# Patient Record
Sex: MALE | Race: WHITE | Employment: FULL TIME | URBAN - METROPOLITAN AREA
[De-identification: names, ages, dates, MRNs, and addresses within clinical notes are randomized per-mention and may not be internally consistent; named-entity substitution may affect disease eponyms.]

---

## 2017-01-25 ENCOUNTER — OFFICE VISIT (OUTPATIENT)
Dept: FAMILY MEDICINE CLINIC | Age: 42
End: 2017-01-25

## 2017-01-25 DIAGNOSIS — Z00.00 ANNUAL PHYSICAL EXAM: Primary | ICD-10-CM

## 2017-01-25 DIAGNOSIS — Z00.00 ANNUAL PHYSICAL EXAM: ICD-10-CM

## 2017-01-25 DIAGNOSIS — I86.1 VARICOCELE: ICD-10-CM

## 2017-01-25 LAB
ALBUMIN SERPL-MCNC: 4.7 G/DL (ref 3.9–4.9)
ALP BLD-CCNC: 89 U/L (ref 35–104)
ALT SERPL-CCNC: 35 U/L (ref 0–41)
ANION GAP SERPL CALCULATED.3IONS-SCNC: 16 MEQ/L (ref 7–13)
AST SERPL-CCNC: 28 U/L (ref 0–40)
BASOPHILS ABSOLUTE: 0.1 K/UL (ref 0–0.2)
BASOPHILS RELATIVE PERCENT: 0.9 %
BILIRUB SERPL-MCNC: 0.5 MG/DL (ref 0–1.2)
BUN BLDV-MCNC: 15 MG/DL (ref 6–20)
CALCIUM SERPL-MCNC: 9.7 MG/DL (ref 8.6–10.2)
CHLORIDE BLD-SCNC: 99 MEQ/L (ref 98–107)
CHOLESTEROL, TOTAL: 235 MG/DL (ref 0–199)
CO2: 23 MEQ/L (ref 22–29)
CREAT SERPL-MCNC: 1.07 MG/DL (ref 0.7–1.2)
EOSINOPHILS ABSOLUTE: 0.3 K/UL (ref 0–0.7)
EOSINOPHILS RELATIVE PERCENT: 3.3 %
GFR AFRICAN AMERICAN: >60
GFR NON-AFRICAN AMERICAN: >60
GLOBULIN: 2.8 G/DL (ref 2.3–3.5)
GLUCOSE BLD-MCNC: 101 MG/DL (ref 74–109)
HCT VFR BLD CALC: 45.3 % (ref 42–52)
HDLC SERPL-MCNC: 43 MG/DL (ref 40–59)
HEMOGLOBIN: 15.3 G/DL (ref 14–18)
LDL CHOLESTEROL CALCULATED: 156 MG/DL (ref 0–129)
LYMPHOCYTES ABSOLUTE: 3.2 K/UL (ref 1–4.8)
LYMPHOCYTES RELATIVE PERCENT: 32.2 %
MCH RBC QN AUTO: 28.7 PG (ref 27–31.3)
MCHC RBC AUTO-ENTMCNC: 33.8 % (ref 33–37)
MCV RBC AUTO: 85 FL (ref 80–100)
MONOCYTES ABSOLUTE: 0.8 K/UL (ref 0.2–0.8)
MONOCYTES RELATIVE PERCENT: 8.1 %
NEUTROPHILS ABSOLUTE: 5.5 K/UL (ref 1.4–6.5)
NEUTROPHILS RELATIVE PERCENT: 55.5 %
PDW BLD-RTO: 13.2 % (ref 11.5–14.5)
PLATELET # BLD: 261 K/UL (ref 130–400)
POTASSIUM SERPL-SCNC: 4 MEQ/L (ref 3.5–5.1)
RBC # BLD: 5.34 M/UL (ref 4.7–6.1)
SODIUM BLD-SCNC: 138 MEQ/L (ref 132–144)
TOTAL PROTEIN: 7.5 G/DL (ref 6.4–8.1)
TRIGL SERPL-MCNC: 178 MG/DL (ref 0–200)
WBC # BLD: 9.8 K/UL (ref 4.8–10.8)

## 2017-01-25 PROCEDURE — 93000 ELECTROCARDIOGRAM COMPLETE: CPT | Performed by: FAMILY MEDICINE

## 2017-01-25 PROCEDURE — 99396 PREV VISIT EST AGE 40-64: CPT | Performed by: FAMILY MEDICINE

## 2017-01-25 ASSESSMENT — ENCOUNTER SYMPTOMS
COUGH: 0
SHORTNESS OF BREATH: 0
DIARRHEA: 0
ABDOMINAL PAIN: 0
EYES NEGATIVE: 1
NAUSEA: 0
CONSTIPATION: 0

## 2017-01-26 VITALS
TEMPERATURE: 97 F | HEIGHT: 68 IN | WEIGHT: 226.06 LBS | RESPIRATION RATE: 12 BRPM | BODY MASS INDEX: 34.26 KG/M2 | DIASTOLIC BLOOD PRESSURE: 84 MMHG | SYSTOLIC BLOOD PRESSURE: 120 MMHG | HEART RATE: 72 BPM

## 2017-02-15 ENCOUNTER — OFFICE VISIT (OUTPATIENT)
Dept: FAMILY MEDICINE CLINIC | Age: 42
End: 2017-02-15

## 2017-02-15 VITALS
HEIGHT: 68 IN | DIASTOLIC BLOOD PRESSURE: 80 MMHG | WEIGHT: 223.9 LBS | RESPIRATION RATE: 18 BRPM | SYSTOLIC BLOOD PRESSURE: 128 MMHG | BODY MASS INDEX: 33.93 KG/M2 | HEART RATE: 78 BPM | TEMPERATURE: 98.2 F

## 2017-02-15 DIAGNOSIS — K52.9 GASTROENTERITIS: Primary | ICD-10-CM

## 2017-02-15 PROCEDURE — 99213 OFFICE O/P EST LOW 20 MIN: CPT | Performed by: FAMILY MEDICINE

## 2017-02-15 RX ORDER — DEXLANSOPRAZOLE 60 MG/1
60 CAPSULE, DELAYED RELEASE ORAL DAILY
Qty: 30 CAPSULE | Refills: 1
Start: 2017-02-15 | End: 2018-10-08 | Stop reason: ALTCHOICE

## 2017-02-15 RX ORDER — DIPHENOXYLATE HYDROCHLORIDE AND ATROPINE SULFATE 2.5; .025 MG/1; MG/1
1 TABLET ORAL 4 TIMES DAILY PRN
Qty: 20 TABLET | Refills: 0 | Status: SHIPPED | OUTPATIENT
Start: 2017-02-15 | End: 2017-02-25

## 2017-02-15 ASSESSMENT — ENCOUNTER SYMPTOMS
COUGH: 0
ABDOMINAL PAIN: 1
RHINORRHEA: 0
WHEEZING: 0
DIARRHEA: 1
SINUS PAIN: 0
SHORTNESS OF BREATH: 0
SWOLLEN GLANDS: 0
EYES NEGATIVE: 1
NAUSEA: 1
SORE THROAT: 0
VOMITING: 1

## 2017-11-08 ENCOUNTER — OFFICE VISIT (OUTPATIENT)
Dept: FAMILY MEDICINE CLINIC | Age: 42
End: 2017-11-08

## 2017-11-08 VITALS
WEIGHT: 226.25 LBS | DIASTOLIC BLOOD PRESSURE: 84 MMHG | OXYGEN SATURATION: 98 % | RESPIRATION RATE: 12 BRPM | TEMPERATURE: 96.6 F | HEART RATE: 82 BPM | HEIGHT: 68 IN | BODY MASS INDEX: 34.29 KG/M2 | SYSTOLIC BLOOD PRESSURE: 118 MMHG

## 2017-11-08 DIAGNOSIS — J01.90 ACUTE BACTERIAL SINUSITIS: Primary | ICD-10-CM

## 2017-11-08 DIAGNOSIS — B96.89 ACUTE BACTERIAL SINUSITIS: Primary | ICD-10-CM

## 2017-11-08 PROCEDURE — 99213 OFFICE O/P EST LOW 20 MIN: CPT | Performed by: FAMILY MEDICINE

## 2017-11-08 RX ORDER — CEFDINIR 300 MG/1
600 CAPSULE ORAL DAILY
Qty: 20 CAPSULE | Refills: 0 | Status: SHIPPED | OUTPATIENT
Start: 2017-11-08 | End: 2018-07-11 | Stop reason: SDUPTHER

## 2017-11-08 NOTE — PROGRESS NOTES
Subjective:      Patient ID: Harley Mosher is a 43 y.o. male. Chief Complaint   Patient presents with    Dizziness     x 3 days. waxing and waning since onset. Occurs intermittently. Episodes last various amounts of time. Aggravated by walking and turning head a certain way. No other symptoms. Has tried nasal decongestants with no relief. HPI    Mary Mcintyre is here today had a little bit of dizziness on and off for last 3-4 days states is more lightheadedness occurs intermittently episodes vary sometimes deathly turning his head and walking makes it worse is tried some nasal decongestants help address with no relief last night sleeping developed a lot of drainage down his throat    No fevers chills at this time  Allergies   Allergen Reactions    Ciprocinonide [Fluocinolone]      Burning skin     Outpatient Encounter Prescriptions as of 11/8/2017   Medication Sig Dispense Refill    cefdinir (OMNICEF) 300 MG capsule Take 2 capsules by mouth daily for 10 days 20 capsule 0    dexlansoprazole (DEXILANT) 60 MG CPDR delayed release capsule Take 1 capsule by mouth daily 30 capsule 1    Probiotic Product (PROBIOTIC DAILY PO) Take by mouth      ibuprofen (ADVIL;MOTRIN) 200 MG tablet Take 200 mg by mouth every 6 hours as needed. OTC as needed        No facility-administered encounter medications on file as of 11/8/2017. Social History     Social History    Marital status:      Spouse name: Jonathan Xiao Number of children: 0    Years of education: N/A     Occupational History    Not on file.      Social History Main Topics    Smoking status: Never Smoker    Smokeless tobacco: Never Used    Alcohol use Yes      Comment: occasssionally    Drug use: No    Sexual activity: Yes     Partners: Female     Other Topics Concern    Not on file     Social History Narrative    No narrative on file     Family History   Problem Relation Age of Onset    High Blood Pressure Father     Heart Disease Maternal

## 2018-03-23 ENCOUNTER — OFFICE VISIT (OUTPATIENT)
Dept: FAMILY MEDICINE CLINIC | Age: 43
End: 2018-03-23
Payer: COMMERCIAL

## 2018-03-23 VITALS
RESPIRATION RATE: 12 BRPM | TEMPERATURE: 96.1 F | OXYGEN SATURATION: 98 % | DIASTOLIC BLOOD PRESSURE: 84 MMHG | SYSTOLIC BLOOD PRESSURE: 120 MMHG | WEIGHT: 222.19 LBS | HEART RATE: 97 BPM | BODY MASS INDEX: 33.67 KG/M2 | HEIGHT: 68 IN

## 2018-03-23 DIAGNOSIS — N64.4 BREAST PAIN, RIGHT: ICD-10-CM

## 2018-03-23 DIAGNOSIS — N64.4 MASTALGIA: Primary | ICD-10-CM

## 2018-03-23 PROCEDURE — 99213 OFFICE O/P EST LOW 20 MIN: CPT | Performed by: FAMILY MEDICINE

## 2018-03-23 RX ORDER — CEPHALEXIN 500 MG/1
500 CAPSULE ORAL 3 TIMES DAILY
Qty: 30 CAPSULE | Refills: 0 | Status: SHIPPED | OUTPATIENT
Start: 2018-03-23 | End: 2018-04-20 | Stop reason: ALTCHOICE

## 2018-03-23 RX ORDER — METHYLPREDNISOLONE 4 MG/1
TABLET ORAL
Qty: 1 KIT | Refills: 0 | Status: SHIPPED | OUTPATIENT
Start: 2018-03-23 | End: 2018-03-29

## 2018-03-23 ASSESSMENT — PATIENT HEALTH QUESTIONNAIRE - PHQ9
1. LITTLE INTEREST OR PLEASURE IN DOING THINGS: 0
SUM OF ALL RESPONSES TO PHQ9 QUESTIONS 1 & 2: 0
SUM OF ALL RESPONSES TO PHQ QUESTIONS 1-9: 0
2. FEELING DOWN, DEPRESSED OR HOPELESS: 0

## 2018-03-23 NOTE — PROGRESS NOTES
Subjective:      Patient ID: Maegan Brown is a 43 y.o. male. Chief Complaint   Patient presents with    Breast Pain     Right x 3-4 weeks. Present on palpation. Denies injury. Unable to feel any lumps. Mild swelling present. Has tried Ibuprofen with mild relief.  Back Pain     X 3-4 DAYS. Upper back and radiates into neck. Aggravated by certain movements and moving head. Tried Heat with mild relief,. HPI    Here today with some right back breast pain he denies any injury and we will feel any lumps felt some mild swelling distress right profile with mild relief and hurts underneath his actual nipple no fevers chills night read by certain movements      He has not noticed any rash like that no one has pulled or irritated his nipple he is a  in his vest best does not hit that area as far she can tell       No fevers chills eating drinking well otherwise      Allergies   Allergen Reactions    Ciprocinonide [Fluocinolone]      Burning skin     Outpatient Encounter Prescriptions as of 3/23/2018   Medication Sig Dispense Refill    methylPREDNISolone (MEDROL DOSEPACK) 4 MG tablet Take by mouth. 1 kit 0    cephALEXin (KEFLEX) 500 MG capsule Take 1 capsule by mouth 3 times daily 30 capsule 0    dexlansoprazole (DEXILANT) 60 MG CPDR delayed release capsule Take 1 capsule by mouth daily 30 capsule 1    Probiotic Product (PROBIOTIC DAILY PO) Take by mouth      ibuprofen (ADVIL;MOTRIN) 200 MG tablet Take 200 mg by mouth every 6 hours as needed. OTC as needed        No facility-administered encounter medications on file as of 3/23/2018. Social History     Social History    Marital status:      Spouse name: Christopher Santos Number of children: 0    Years of education: N/A     Occupational History    Not on file.      Social History Main Topics    Smoking status: Never Smoker    Smokeless tobacco: Never Used    Alcohol use Yes      Comment: occasssionally    Drug use: No    Sexual methylPREDNISolone (MEDROL DOSEPACK) 4 MG tablet     Sig: Take by mouth. Dispense:  1 kit     Refill:  0    cephALEXin (KEFLEX) 500 MG capsule     Sig: Take 1 capsule by mouth 3 times daily     Dispense:  30 capsule     Refill:  0     No orders of the defined types were placed in this encounter.           Health Maintenance Due   Topic Date Due    Diabetes screen  11/07/2015             Controlled Substances Monitoring:             Let's see how                       If anything worsens or changes please call us at once , follow-up in the office as planned,         [address]

## 2018-04-20 ENCOUNTER — OFFICE VISIT (OUTPATIENT)
Dept: FAMILY MEDICINE CLINIC | Age: 43
End: 2018-04-20
Payer: COMMERCIAL

## 2018-04-20 VITALS
HEIGHT: 68 IN | HEART RATE: 91 BPM | SYSTOLIC BLOOD PRESSURE: 112 MMHG | OXYGEN SATURATION: 95 % | RESPIRATION RATE: 12 BRPM | WEIGHT: 219.56 LBS | TEMPERATURE: 96.6 F | BODY MASS INDEX: 33.28 KG/M2 | DIASTOLIC BLOOD PRESSURE: 84 MMHG

## 2018-04-20 DIAGNOSIS — N64.4 BREAST PAIN: Primary | ICD-10-CM

## 2018-04-20 PROCEDURE — 99213 OFFICE O/P EST LOW 20 MIN: CPT | Performed by: FAMILY MEDICINE

## 2018-04-20 RX ORDER — CEPHALEXIN 500 MG/1
500 CAPSULE ORAL 3 TIMES DAILY
Qty: 30 CAPSULE | Refills: 0 | Status: SHIPPED | OUTPATIENT
Start: 2018-04-20 | End: 2018-07-11 | Stop reason: ALTCHOICE

## 2018-05-03 DIAGNOSIS — N64.4 BREAST PAIN: Primary | ICD-10-CM

## 2018-05-04 ENCOUNTER — HOSPITAL ENCOUNTER (OUTPATIENT)
Dept: ULTRASOUND IMAGING | Age: 43
Discharge: HOME | End: 2018-05-06
Payer: COMMERCIAL

## 2018-05-04 ENCOUNTER — HOSPITAL ENCOUNTER (OUTPATIENT)
Dept: WOMENS IMAGING | Age: 43
Discharge: HOME | End: 2018-05-06
Payer: COMMERCIAL

## 2018-05-04 DIAGNOSIS — N64.4 BREAST PAIN: ICD-10-CM

## 2018-05-04 DIAGNOSIS — N64.4 BREAST PAIN: Primary | ICD-10-CM

## 2018-05-04 PROCEDURE — 77066 DX MAMMO INCL CAD BI: CPT

## 2018-05-04 PROCEDURE — 76641 ULTRASOUND BREAST COMPLETE: CPT

## 2018-06-22 ENCOUNTER — OFFICE VISIT (OUTPATIENT)
Dept: FAMILY MEDICINE CLINIC | Age: 43
End: 2018-06-22
Payer: COMMERCIAL

## 2018-06-22 VITALS
SYSTOLIC BLOOD PRESSURE: 118 MMHG | WEIGHT: 220.4 LBS | TEMPERATURE: 98.2 F | RESPIRATION RATE: 14 BRPM | BODY MASS INDEX: 33.4 KG/M2 | HEART RATE: 74 BPM | OXYGEN SATURATION: 99 % | HEIGHT: 68 IN | DIASTOLIC BLOOD PRESSURE: 82 MMHG

## 2018-06-22 DIAGNOSIS — R42 VERTIGO: Primary | ICD-10-CM

## 2018-06-22 DIAGNOSIS — R42 DIZZINESS: ICD-10-CM

## 2018-06-22 PROCEDURE — 99213 OFFICE O/P EST LOW 20 MIN: CPT | Performed by: FAMILY MEDICINE

## 2018-06-22 RX ORDER — METHYLPREDNISOLONE 4 MG/1
TABLET ORAL
Qty: 1 KIT | Refills: 0 | Status: SHIPPED | OUTPATIENT
Start: 2018-06-22 | End: 2019-03-08 | Stop reason: SDUPTHER

## 2018-07-11 ENCOUNTER — OFFICE VISIT (OUTPATIENT)
Dept: FAMILY MEDICINE CLINIC | Age: 43
End: 2018-07-11
Payer: COMMERCIAL

## 2018-07-11 VITALS
HEART RATE: 90 BPM | BODY MASS INDEX: 33.67 KG/M2 | WEIGHT: 222.19 LBS | SYSTOLIC BLOOD PRESSURE: 118 MMHG | HEIGHT: 68 IN | OXYGEN SATURATION: 97 % | DIASTOLIC BLOOD PRESSURE: 80 MMHG | RESPIRATION RATE: 12 BRPM | TEMPERATURE: 95.8 F

## 2018-07-11 DIAGNOSIS — J34.89 SINUS PRESSURE: ICD-10-CM

## 2018-07-11 DIAGNOSIS — H65.03 BILATERAL ACUTE SEROUS OTITIS MEDIA, RECURRENCE NOT SPECIFIED: Primary | ICD-10-CM

## 2018-07-11 PROCEDURE — 99213 OFFICE O/P EST LOW 20 MIN: CPT | Performed by: FAMILY MEDICINE

## 2018-07-11 RX ORDER — CEFDINIR 300 MG/1
600 CAPSULE ORAL DAILY
Qty: 20 CAPSULE | Refills: 0 | Status: SHIPPED | OUTPATIENT
Start: 2018-07-11 | End: 2019-03-08 | Stop reason: SDUPTHER

## 2018-07-11 NOTE — PROGRESS NOTES
Diagnosis Date    Allergic rhinitis     Chronic back pain     Family hx-lung cancer 1/7/2013    FH: kidney cancer 1/7/2013    Tendonitis     rt hand     No past surgical history on file. REVIEW OF SYSTEMS:   Patient seen today for exam.  Denies any problems with hearing, headaches or vision. Denies any shortness of breath, chest pain, nausea or vomiting. No black stool, no blood in the stool. No heartburn. Denies any problems with constipation or diarrhea either. No dysuria type symptoms. Objective:     /80 (Site: Left Arm, Position: Sitting, Cuff Size: Medium Adult)   Pulse 90   Temp 95.8 °F (35.4 °C) (Temporal)   Resp 12   Ht 5' 8\" (1.727 m)   Wt 222 lb 3 oz (100.8 kg)   SpO2 97%   BMI 33.78 kg/m²     Physical Exam        PHYSICAL EXAMINATION:  Vital signs as noted. Alert, oriented in no acute distress. HEENT:  TMs are showing fluid bilaterally. Pharynx reveals postnasal drainage noted. Positive pain over sinuses and forehead  Pos shoddy adenopathy noted bilaterallyNECK: supple. HEART:  RRR without gallops. LUNGS:  clear to auscultation, no wheezing or rhonchi. ABDOMEN:  soft and nontender, bowel sounds positive. EXTREMITIES:  no edema. SKIN: without any changes      Assessment:       Diagnosis Orders   1. Bilateral acute serous otitis media, recurrence not specified  Referral To External ENT - Wesley Gonzalez MD   2.  Sinus pressure  Referral To External ENT - Wesley Gonzalez MD             Plan:        Orders Placed This Encounter   Medications    cefdinir (OMNICEF) 300 MG capsule     Sig: Take 2 capsules by mouth daily for 10 days     Dispense:  20 capsule     Refill:  0     Orders Placed This Encounter   Procedures    Referral To External ENT - Wesley Gonzalez MD     Referral Priority:   Routine     Referral Type:   Eval and Treat     Referral Reason:   Specialty Services Required     Referred to Provider:   Juan Jose Nagy MD     Requested Specialty:   Otolaryngology Number of Visits Requested:   1           Health Maintenance Due   Topic Date Due    Diabetes screen  11/07/2015         Is worse with any dizziness or chronic sinuses will see Dr. Haja Olson for second opinion    Controlled Substances Monitoring:     No flowsheet data found.         If anything worsens or changes please call us at once , follow-up in the office as planned,

## 2018-10-08 ENCOUNTER — OFFICE VISIT (OUTPATIENT)
Dept: FAMILY MEDICINE CLINIC | Age: 43
End: 2018-10-08
Payer: COMMERCIAL

## 2018-10-08 VITALS
WEIGHT: 226.8 LBS | HEART RATE: 80 BPM | TEMPERATURE: 96.7 F | HEIGHT: 69 IN | RESPIRATION RATE: 20 BRPM | BODY MASS INDEX: 33.59 KG/M2 | SYSTOLIC BLOOD PRESSURE: 114 MMHG | DIASTOLIC BLOOD PRESSURE: 78 MMHG

## 2018-10-08 DIAGNOSIS — R61 EXCESSIVE SWEATING: ICD-10-CM

## 2018-10-08 DIAGNOSIS — Z13.220 SCREENING CHOLESTEROL LEVEL: ICD-10-CM

## 2018-10-08 DIAGNOSIS — R53.83 FATIGUE, UNSPECIFIED TYPE: ICD-10-CM

## 2018-10-08 DIAGNOSIS — Z80.1 FAMILY HISTORY OF LUNG CANCER: ICD-10-CM

## 2018-10-08 DIAGNOSIS — Z00.00 ANNUAL PHYSICAL EXAM: ICD-10-CM

## 2018-10-08 DIAGNOSIS — Z23 NEED FOR INFLUENZA VACCINATION: ICD-10-CM

## 2018-10-08 DIAGNOSIS — Z00.00 ANNUAL PHYSICAL EXAM: Primary | ICD-10-CM

## 2018-10-08 LAB
ALBUMIN SERPL-MCNC: 4.2 G/DL (ref 3.9–4.9)
ALP BLD-CCNC: 87 U/L (ref 35–104)
ALT SERPL-CCNC: 15 U/L (ref 0–41)
ANION GAP SERPL CALCULATED.3IONS-SCNC: 16 MEQ/L (ref 7–13)
AST SERPL-CCNC: 19 U/L (ref 0–40)
BASOPHILS ABSOLUTE: 0.1 K/UL (ref 0–0.2)
BASOPHILS RELATIVE PERCENT: 1.2 %
BILIRUB SERPL-MCNC: 0.5 MG/DL (ref 0–1.2)
BUN BLDV-MCNC: 16 MG/DL (ref 6–20)
CALCIUM SERPL-MCNC: 9.1 MG/DL (ref 8.6–10.2)
CHLORIDE BLD-SCNC: 99 MEQ/L (ref 98–107)
CHOLESTEROL, TOTAL: 195 MG/DL (ref 0–199)
CO2: 25 MEQ/L (ref 22–29)
CREAT SERPL-MCNC: 1.05 MG/DL (ref 0.7–1.2)
EOSINOPHILS ABSOLUTE: 0.4 K/UL (ref 0–0.7)
EOSINOPHILS RELATIVE PERCENT: 4.6 %
GFR AFRICAN AMERICAN: >60
GFR NON-AFRICAN AMERICAN: >60
GLOBULIN: 2.9 G/DL (ref 2.3–3.5)
GLUCOSE BLD-MCNC: 98 MG/DL (ref 74–109)
HCT VFR BLD CALC: 42.7 % (ref 42–52)
HDLC SERPL-MCNC: 40 MG/DL (ref 40–59)
HEMOGLOBIN: 14.6 G/DL (ref 14–18)
LDL CHOLESTEROL CALCULATED: 123 MG/DL (ref 0–129)
LYMPHOCYTES ABSOLUTE: 1.9 K/UL (ref 1–4.8)
LYMPHOCYTES RELATIVE PERCENT: 25.3 %
MCH RBC QN AUTO: 29.2 PG (ref 27–31.3)
MCHC RBC AUTO-ENTMCNC: 34.1 % (ref 33–37)
MCV RBC AUTO: 85.6 FL (ref 80–100)
MONOCYTES ABSOLUTE: 0.8 K/UL (ref 0.2–0.8)
MONOCYTES RELATIVE PERCENT: 10 %
NEUTROPHILS ABSOLUTE: 4.4 K/UL (ref 1.4–6.5)
NEUTROPHILS RELATIVE PERCENT: 58.9 %
PDW BLD-RTO: 13.1 % (ref 11.5–14.5)
PLATELET # BLD: 257 K/UL (ref 130–400)
POTASSIUM SERPL-SCNC: 4.2 MEQ/L (ref 3.5–5.1)
RBC # BLD: 4.99 M/UL (ref 4.7–6.1)
SODIUM BLD-SCNC: 140 MEQ/L (ref 132–144)
T4 FREE: 1.21 NG/DL (ref 0.93–1.7)
TOTAL PROTEIN: 7.1 G/DL (ref 6.4–8.1)
TRIGL SERPL-MCNC: 158 MG/DL (ref 0–200)
TSH SERPL DL<=0.05 MIU/L-ACNC: 1.68 UIU/ML (ref 0.27–4.2)
WBC # BLD: 7.6 K/UL (ref 4.8–10.8)

## 2018-10-08 PROCEDURE — 90688 IIV4 VACCINE SPLT 0.5 ML IM: CPT | Performed by: FAMILY MEDICINE

## 2018-10-08 PROCEDURE — 99396 PREV VISIT EST AGE 40-64: CPT | Performed by: FAMILY MEDICINE

## 2018-10-08 PROCEDURE — 90471 IMMUNIZATION ADMIN: CPT | Performed by: FAMILY MEDICINE

## 2018-10-08 RX ORDER — ZOLPIDEM TARTRATE 10 MG/1
10 TABLET ORAL NIGHTLY PRN
Qty: 30 TABLET | Refills: 2 | Status: SHIPPED | OUTPATIENT
Start: 2018-10-08 | End: 2018-11-07

## 2018-10-08 NOTE — PROGRESS NOTES
Vaccine Information Sheet, \"Influenza - Inactivated\"  given to Marleny Leong, or parent/legal guardian of  Marleny Leong and verbalized understanding. Patient responses:    Have you ever had a reaction to a flu vaccine? No  Are you able to eat eggs without adverse effects? Yes  Do you have any current illness? No  Have you ever had Guillian Taylor Syndrome? No    Flu vaccine given per order. Please see immunization tab.
5. Fatigue, unspecified type  TSH Without Reflex    T4, Free    zolpidem (AMBIEN) 10 MG tablet   6. Excessive sweating               Plan:        Orders Placed This Encounter   Medications    zolpidem (AMBIEN) 10 MG tablet     Sig: Take 1 tablet by mouth nightly as needed for Sleep for up to 30 days. .     Dispense:  30 tablet     Refill:  2     Orders Placed This Encounter   Procedures    INFLUENZA, QUADV, 3 YRS AND OLDER, IM, MDV, 0.5ML (FLUZONE QUADV)    CBC With Auto Differential     Standing Status:   Future     Number of Occurrences:   1     Standing Expiration Date:   10/8/2019    Comprehensive Metabolic Panel     Standing Status:   Future     Number of Occurrences:   1     Standing Expiration Date:   10/8/2019    Lipid Panel     Standing Status:   Future     Number of Occurrences:   1     Standing Expiration Date:   10/8/2019     Order Specific Question:   Is Patient Fasting?/# of Hours     Answer:   yes    TSH Without Reflex     Standing Status:   Future     Number of Occurrences:   1     Standing Expiration Date:   10/8/2019    T4, Free     Standing Status:   Future     Number of Occurrences:   1     Standing Expiration Date:   10/8/2019           Health Maintenance Due   Topic Date Due    Diabetes screen  11/07/2015             Controlled Substances Monitoring:     No flowsheet data found. ISteva Holter, am scribing for and in the presence of April De Jesus DO. Electronically signed by :  Koki Edmonds DO, personally performed the services described in this documentation, as scribed by  in my presence, and it is both accurate and complete.  Electronically signed by: April De Jesus DO    10/8/18 9:43 AM    April De Jesus DO

## 2019-03-08 ENCOUNTER — OFFICE VISIT (OUTPATIENT)
Dept: FAMILY MEDICINE CLINIC | Age: 44
End: 2019-03-08
Payer: COMMERCIAL

## 2019-03-08 VITALS
SYSTOLIC BLOOD PRESSURE: 108 MMHG | WEIGHT: 227.3 LBS | BODY MASS INDEX: 33.67 KG/M2 | TEMPERATURE: 96.9 F | HEIGHT: 69 IN | HEART RATE: 92 BPM | OXYGEN SATURATION: 95 % | RESPIRATION RATE: 18 BRPM | DIASTOLIC BLOOD PRESSURE: 78 MMHG

## 2019-03-08 DIAGNOSIS — R06.2 WHEEZING: ICD-10-CM

## 2019-03-08 DIAGNOSIS — R09.89 CHEST CONGESTION: ICD-10-CM

## 2019-03-08 DIAGNOSIS — R05.9 COUGH: Primary | ICD-10-CM

## 2019-03-08 PROCEDURE — 99213 OFFICE O/P EST LOW 20 MIN: CPT | Performed by: FAMILY MEDICINE

## 2019-03-08 PROCEDURE — 94640 AIRWAY INHALATION TREATMENT: CPT | Performed by: FAMILY MEDICINE

## 2019-03-08 RX ORDER — CEFDINIR 300 MG/1
600 CAPSULE ORAL DAILY
Qty: 20 CAPSULE | Refills: 0 | Status: SHIPPED | OUTPATIENT
Start: 2019-03-08 | End: 2019-03-18

## 2019-03-08 RX ORDER — METHYLPREDNISOLONE 4 MG/1
TABLET ORAL
Qty: 1 KIT | Refills: 0 | Status: SHIPPED | OUTPATIENT
Start: 2019-03-08 | End: 2019-03-14

## 2019-03-08 RX ORDER — ALBUTEROL SULFATE 2.5 MG/3ML
2.5 SOLUTION RESPIRATORY (INHALATION) ONCE
Status: COMPLETED | OUTPATIENT
Start: 2019-03-08 | End: 2019-03-08

## 2019-03-08 RX ADMIN — ALBUTEROL SULFATE 2.5 MG: 2.5 SOLUTION RESPIRATORY (INHALATION) at 16:38

## 2019-03-08 ASSESSMENT — PATIENT HEALTH QUESTIONNAIRE - PHQ9
SUM OF ALL RESPONSES TO PHQ QUESTIONS 1-9: 0
SUM OF ALL RESPONSES TO PHQ9 QUESTIONS 1 & 2: 0
2. FEELING DOWN, DEPRESSED OR HOPELESS: 0
SUM OF ALL RESPONSES TO PHQ QUESTIONS 1-9: 0
1. LITTLE INTEREST OR PLEASURE IN DOING THINGS: 0

## 2019-03-11 ENCOUNTER — TELEPHONE (OUTPATIENT)
Dept: FAMILY MEDICINE CLINIC | Age: 44
End: 2019-03-11

## 2019-03-11 RX ORDER — AZITHROMYCIN 250 MG/1
250 TABLET, FILM COATED ORAL SEE ADMIN INSTRUCTIONS
Qty: 6 TABLET | Refills: 0 | Status: SHIPPED | OUTPATIENT
Start: 2019-03-11 | End: 2019-03-16

## 2019-04-18 ENCOUNTER — TELEPHONE (OUTPATIENT)
Dept: FAMILY MEDICINE CLINIC | Age: 44
End: 2019-04-18

## 2023-07-14 ENCOUNTER — OFFICE VISIT (OUTPATIENT)
Dept: PRIMARY CARE | Facility: CLINIC | Age: 48
End: 2023-07-14
Payer: COMMERCIAL

## 2023-07-14 VITALS
TEMPERATURE: 97 F | OXYGEN SATURATION: 96 % | SYSTOLIC BLOOD PRESSURE: 116 MMHG | RESPIRATION RATE: 16 BRPM | HEART RATE: 81 BPM | HEIGHT: 69 IN | WEIGHT: 244 LBS | BODY MASS INDEX: 36.14 KG/M2 | DIASTOLIC BLOOD PRESSURE: 84 MMHG

## 2023-07-14 DIAGNOSIS — S29.019A THORACIC MYOFASCIAL STRAIN, INITIAL ENCOUNTER: Primary | ICD-10-CM

## 2023-07-14 DIAGNOSIS — E66.09 CLASS 2 OBESITY DUE TO EXCESS CALORIES WITHOUT SERIOUS COMORBIDITY WITH BODY MASS INDEX (BMI) OF 36.0 TO 36.9 IN ADULT: ICD-10-CM

## 2023-07-14 DIAGNOSIS — Z00.00 ANNUAL PHYSICAL EXAM: ICD-10-CM

## 2023-07-14 PROBLEM — E66.812 CLASS 2 OBESITY DUE TO EXCESS CALORIES WITHOUT SERIOUS COMORBIDITY WITH BODY MASS INDEX (BMI) OF 36.0 TO 36.9 IN ADULT: Status: ACTIVE | Noted: 2023-07-14

## 2023-07-14 PROCEDURE — 1036F TOBACCO NON-USER: CPT | Performed by: FAMILY MEDICINE

## 2023-07-14 PROCEDURE — 3008F BODY MASS INDEX DOCD: CPT | Performed by: FAMILY MEDICINE

## 2023-07-14 PROCEDURE — 99213 OFFICE O/P EST LOW 20 MIN: CPT | Performed by: FAMILY MEDICINE

## 2023-07-14 RX ORDER — CYCLOBENZAPRINE HCL 10 MG
TABLET ORAL
Qty: 30 TABLET | Refills: 1 | Status: SHIPPED | OUTPATIENT
Start: 2023-07-14 | End: 2023-10-07 | Stop reason: HOSPADM

## 2023-07-14 RX ORDER — BUDESONIDE AND FORMOTEROL FUMARATE DIHYDRATE 80; 4.5 UG/1; UG/1
2 AEROSOL RESPIRATORY (INHALATION)
COMMUNITY
End: 2023-12-05 | Stop reason: ALTCHOICE

## 2023-07-14 RX ORDER — METHYLPREDNISOLONE 4 MG/1
TABLET ORAL
Qty: 21 TABLET | Refills: 0 | Status: SHIPPED | OUTPATIENT
Start: 2023-07-14 | End: 2023-07-21

## 2023-07-14 RX ORDER — ALBUTEROL SULFATE 90 UG/1
2 AEROSOL, METERED RESPIRATORY (INHALATION) EVERY 6 HOURS PRN
COMMUNITY

## 2023-07-14 ASSESSMENT — PATIENT HEALTH QUESTIONNAIRE - PHQ9
1. LITTLE INTEREST OR PLEASURE IN DOING THINGS: NOT AT ALL
2. FEELING DOWN, DEPRESSED OR HOPELESS: NOT AT ALL
SUM OF ALL RESPONSES TO PHQ9 QUESTIONS 1 AND 2: 0

## 2023-07-14 NOTE — PROGRESS NOTES
"Subjective   Patient ID: Dann Ni is a 47 y.o. male who presents for Neck Pain and Shoulder Pain.  HPI  Patient in office being seen for left neck, shoulder, arm pain x 1 1/2 week ago  Pt states possible pinch nerve  *no injury  Pain is described as sharp in shoulder and neck  Rates pain level today as   7/10  Pain does radiate from left side left neck down to left shoulder , arm, ans side  Is taking Tylenol -minimal relief      No other questions and or concerns for today's visit    Review of systems  ; Patient seen today for exam denies any problems with headaches or vision, denies any shortness of breath chest pain nausea or vomiting, no black stool no blood in the stool no heartburn type symptoms denies any problems with constipation or diarrhea, and no dysuria-type symptoms    The patient's allergies medications were reviewed with them today    The patient's social family and surgical history or also reviewed here today, along with her past medical history.     Objective     Alert and active in  no acute distress  HEENT TMs clear oropharynx negative nares clear no drainage noted neck supple  With no adenopathy   Heart regular rate and rhythm without murmur and no carotid bruits  Lungs- clear to auscultation bilaterally, no wheeze or rhonchi noted  Thyroid -negative masses or nodularity  Abdomen- soft times four quadrants , bowel sounds positive no masses or organomegaly, negative tenderness guarding or rebound  Neurological exam unremarkable- DTRs in upper and lower extremities within normal limits.   skin -no lesions noted    Left shoulder positive pain to suboccipital muscles and trapezius on the left neurovascular intact otherwise      /84 (BP Location: Left arm, Patient Position: Sitting, BP Cuff Size: Large adult)   Pulse 81   Temp 36.1 °C (97 °F) (Temporal)   Resp 16   Ht 1.753 m (5' 9\")   Wt 111 kg (244 lb)   SpO2 96%   BMI 36.03 kg/m²     Allergies   Allergen Reactions    " Ciprofloxacin Itching       Assessment/Plan   Problem List Items Addressed This Visit       BMI 36.0-36.9,adult    Class 2 obesity due to excess calories without serious comorbidity with body mass index (BMI) of 36.0 to 36.9 in adult     Other Visit Diagnoses       Thoracic myofascial strain, initial encounter    -  Primary    Relevant Medications    methylPREDNISolone (Medrol Dospak) 4 mg tablets    cyclobenzaprine (Flexeril) 10 mg tablet    Annual physical exam        Relevant Orders    Comprehensive Metabolic Panel    CBC and Auto Differential    Lipid Panel        We will take the medication as ordered let us know if things worsen or change before his next physical get the blood work as ordered      If anything worsens or changes please call us at once, follow up in the office as planned,

## 2023-08-30 ENCOUNTER — OFFICE VISIT (OUTPATIENT)
Dept: PRIMARY CARE | Facility: CLINIC | Age: 48
End: 2023-08-30
Payer: COMMERCIAL

## 2023-08-30 VITALS
WEIGHT: 245.6 LBS | BODY MASS INDEX: 36.38 KG/M2 | HEIGHT: 69 IN | SYSTOLIC BLOOD PRESSURE: 122 MMHG | OXYGEN SATURATION: 96 % | DIASTOLIC BLOOD PRESSURE: 78 MMHG | RESPIRATION RATE: 16 BRPM | HEART RATE: 87 BPM | TEMPERATURE: 97.4 F

## 2023-08-30 DIAGNOSIS — I10 HYPERTENSION, UNSPECIFIED TYPE: ICD-10-CM

## 2023-08-30 DIAGNOSIS — E78.5 DYSLIPIDEMIA: ICD-10-CM

## 2023-08-30 DIAGNOSIS — S29.019A THORACIC MYOFASCIAL STRAIN, INITIAL ENCOUNTER: Primary | ICD-10-CM

## 2023-08-30 PROCEDURE — 3074F SYST BP LT 130 MM HG: CPT | Performed by: FAMILY MEDICINE

## 2023-08-30 PROCEDURE — 3078F DIAST BP <80 MM HG: CPT | Performed by: FAMILY MEDICINE

## 2023-08-30 PROCEDURE — 1036F TOBACCO NON-USER: CPT | Performed by: FAMILY MEDICINE

## 2023-08-30 PROCEDURE — 99213 OFFICE O/P EST LOW 20 MIN: CPT | Performed by: FAMILY MEDICINE

## 2023-08-30 PROCEDURE — 3008F BODY MASS INDEX DOCD: CPT | Performed by: FAMILY MEDICINE

## 2023-08-30 RX ORDER — METHYLPREDNISOLONE 4 MG/1
4 TABLET ORAL ONCE
COMMUNITY
Start: 2023-08-26 | End: 2023-09-25 | Stop reason: ALTCHOICE

## 2023-08-31 NOTE — PROGRESS NOTES
Chhaya brooks Subjective   Patient ID: Dann Ni is a 47 y.o. male who presents for Back Pain.  HPI    Back pain    Pt is recurring back pain started flare last Wednesday  Pt states that pain is in the upper back  Pt went to [facility]gent [facility] [place] on 8/26/23  Pt was given steroid and antiflammtory  injection  Pt was prescribe methylprednisolone   Pt states started to feel better.  Get some rest hydrate hydrate  No other concern    No chest pain shortness of breath with activities    Grandparent with heart disease but mom and dad have not his cholesterols been a problem in the past with been up and down especially his HDL    Trying to give up sugar and doing well with that made a big difference with GERD reflux    Review of systems  ; Patient seen today for exam denies any problems with headaches or vision, denies any shortness of breath chest pain nausea or vomiting, no black stool no blood in the stool no heartburn type symptoms denies any problems with constipation or diarrhea, and no dysuria-type symptoms    The patient's allergies medications were reviewed with them today    The patient's social family and surgical history or also reviewed here today, along with her past medical history.     Objective     Alert and active in  no acute distress  HEENT TMs clear oropharynx negative nares clear no drainage noted neck supple  With no adenopathy   Heart regular rate and rhythm without murmur and no carotid bruits  Lungs- clear to auscultation bilaterally, no wheeze or rhonchi noted  Thyroid -negative masses or nodularity  Abdomen- soft times four quadrants , bowel sounds positive no masses or organomegaly, negative tenderness guarding or rebound  Neurological exam unremarkable- DTRs in upper and lower extremities within normal limits.   skin -no lesions noted      /78 (BP Location: Right arm, Patient Position: Sitting, BP Cuff Size: Large adult)   Pulse 87   Temp 36.3 °C (97.4 °F)  "(Temporal)   Resp 16   Ht 1.753 m (5' 9\")   Wt 111 kg (245 lb 9.6 oz)   SpO2 96%   BMI 36.27 kg/m²     Allergies   Allergen Reactions    Ciprofloxacin Itching       Assessment/Plan   Problem List Items Addressed This Visit    None  Visit Diagnoses       Thoracic myofascial strain, initial encounter    -  Primary    Hypertension, unspecified type        Relevant Orders    CT cardiac scoring wo IV contrast    Dyslipidemia        Relevant Orders    CT cardiac scoring wo IV contrast          At this time I do not see any issues that it is heart related he will go ahead and get the cardiac score as ordered to come to further restratify him with his cholesterol he may need to take a statin we will get a slight side sleeping pillow I think make a difference for him if things worsen or change he will let us know    If anything worsens or changes please call us at once, follow up in the office as planned,     "

## 2023-09-01 ENCOUNTER — APPOINTMENT (OUTPATIENT)
Dept: PRIMARY CARE | Facility: CLINIC | Age: 48
End: 2023-09-01
Payer: COMMERCIAL

## 2023-09-25 ENCOUNTER — OFFICE VISIT (OUTPATIENT)
Dept: PRIMARY CARE | Facility: CLINIC | Age: 48
End: 2023-09-25
Payer: COMMERCIAL

## 2023-09-25 ENCOUNTER — TELEPHONE (OUTPATIENT)
Dept: PRIMARY CARE | Facility: CLINIC | Age: 48
End: 2023-09-25

## 2023-09-25 VITALS
TEMPERATURE: 97.9 F | RESPIRATION RATE: 18 BRPM | BODY MASS INDEX: 37.16 KG/M2 | HEIGHT: 68 IN | SYSTOLIC BLOOD PRESSURE: 120 MMHG | WEIGHT: 245.2 LBS | OXYGEN SATURATION: 98 % | HEART RATE: 87 BPM | DIASTOLIC BLOOD PRESSURE: 80 MMHG

## 2023-09-25 DIAGNOSIS — E78.5 DYSLIPIDEMIA: ICD-10-CM

## 2023-09-25 DIAGNOSIS — M54.9 UPPER BACK PAIN: ICD-10-CM

## 2023-09-25 DIAGNOSIS — I10 HYPERTENSION, UNSPECIFIED TYPE: ICD-10-CM

## 2023-09-25 DIAGNOSIS — R91.8 LUNG NODULES: Primary | ICD-10-CM

## 2023-09-25 DIAGNOSIS — E66.09 CLASS 2 OBESITY DUE TO EXCESS CALORIES WITHOUT SERIOUS COMORBIDITY WITH BODY MASS INDEX (BMI) OF 37.0 TO 37.9 IN ADULT: ICD-10-CM

## 2023-09-25 PROBLEM — M77.9 TENDONITIS: Status: ACTIVE | Noted: 2023-09-25

## 2023-09-25 PROBLEM — J30.9 ALLERGIC RHINITIS: Status: ACTIVE | Noted: 2023-09-25

## 2023-09-25 PROBLEM — K42.9 UMBILICAL HERNIA: Status: ACTIVE | Noted: 2023-09-25

## 2023-09-25 PROBLEM — J45.991 COUGH VARIANT ASTHMA (HHS-HCC): Status: ACTIVE | Noted: 2023-09-25

## 2023-09-25 PROBLEM — K57.92 DIVERTICULITIS: Status: ACTIVE | Noted: 2023-09-25

## 2023-09-25 PROBLEM — K43.9 VENTRAL HERNIA: Status: ACTIVE | Noted: 2023-09-25

## 2023-09-25 PROBLEM — M79.673 FOOT PAIN: Status: ACTIVE | Noted: 2023-09-25

## 2023-09-25 PROBLEM — E55.9 VITAMIN D DEFICIENCY: Status: ACTIVE | Noted: 2023-09-25

## 2023-09-25 PROBLEM — J18.9 RECURRENT PNEUMONIA: Status: ACTIVE | Noted: 2023-09-25

## 2023-09-25 PROBLEM — R53.83 FATIGUE: Status: ACTIVE | Noted: 2023-09-25

## 2023-09-25 PROBLEM — I86.1 VARICOCELE: Status: ACTIVE | Noted: 2017-01-25

## 2023-09-25 PROBLEM — T78.40XA ALLERGIES: Status: ACTIVE | Noted: 2023-09-25

## 2023-09-25 PROCEDURE — 1036F TOBACCO NON-USER: CPT | Performed by: FAMILY MEDICINE

## 2023-09-25 PROCEDURE — 3079F DIAST BP 80-89 MM HG: CPT | Performed by: FAMILY MEDICINE

## 2023-09-25 PROCEDURE — 3074F SYST BP LT 130 MM HG: CPT | Performed by: FAMILY MEDICINE

## 2023-09-25 PROCEDURE — 3008F BODY MASS INDEX DOCD: CPT | Performed by: FAMILY MEDICINE

## 2023-09-25 PROCEDURE — 99214 OFFICE O/P EST MOD 30 MIN: CPT | Performed by: FAMILY MEDICINE

## 2023-09-25 PROCEDURE — 96372 THER/PROPH/DIAG INJ SC/IM: CPT | Performed by: FAMILY MEDICINE

## 2023-09-25 RX ORDER — KETOROLAC TROMETHAMINE 30 MG/ML
60 INJECTION, SOLUTION INTRAMUSCULAR; INTRAVENOUS ONCE
Status: COMPLETED | OUTPATIENT
Start: 2023-09-25 | End: 2023-09-25

## 2023-09-25 RX ORDER — DICLOFENAC SODIUM 75 MG/1
75 TABLET, DELAYED RELEASE ORAL 2 TIMES DAILY PRN
Qty: 60 TABLET | Refills: 2 | Status: SHIPPED | OUTPATIENT
Start: 2023-09-25 | End: 2023-10-07 | Stop reason: HOSPADM

## 2023-09-25 RX ORDER — GABAPENTIN 100 MG/1
CAPSULE ORAL
Qty: 60 CAPSULE | Refills: 2 | Status: SHIPPED | OUTPATIENT
Start: 2023-09-25 | End: 2023-10-07 | Stop reason: HOSPADM

## 2023-09-25 RX ADMIN — KETOROLAC TROMETHAMINE 60 MG: 30 INJECTION, SOLUTION INTRAMUSCULAR; INTRAVENOUS at 08:21

## 2023-09-25 ASSESSMENT — ENCOUNTER SYMPTOMS
WEAKNESS: 0
ABDOMINAL PAIN: 0
BACK PAIN: 1
PARESTHESIAS: 0
WEIGHT LOSS: 0
BOWEL INCONTINENCE: 0
PARESIS: 0
HEADACHES: 0
TINGLING: 0
FEVER: 0
NUMBNESS: 0
DYSURIA: 0
LEG PAIN: 0
PERIANAL NUMBNESS: 0

## 2023-09-25 NOTE — TELEPHONE ENCOUNTER
Ventura Gonzalez, DO Maude Mehta MA  X-rays look fine no signs of fracture,, I think he would benefit from referral to Dr. Cedeno for this chronic thoracic muscle changes in his neck and back,, make sure he has the number      Called patient he is aware.

## 2023-09-25 NOTE — PROGRESS NOTES
Subjective   Patient ID: Dann Ni is a 47 y.o. male who presents for Back Pain.    Back Pain  This is a recurrent problem. The current episode started in the past 7 days. The problem occurs constantly. The problem has been gradually worsening since onset. The pain is present in the thoracic spine. The quality of the pain is described as stabbing. The pain does not radiate. The pain is at a severity of 8/10. The pain is Worse during the night. The symptoms are aggravated by bending, coughing, position, lying down, sitting and twisting. Stiffness is present All day. Pertinent negatives include no abdominal pain, bladder incontinence, bowel incontinence, chest pain, dysuria, fever, headaches, leg pain, numbness, paresis, paresthesias, pelvic pain, perianal numbness, tingling, weakness or weight loss. Risk factors include lack of exercise and obesity.       Patient presents today for upper back pain.  Ongoing x 2-3 months   Describes the pain as sharp at times. Admits that this is mid upper back and radiates to shoulders.  Has tried ibuprofen OTC  Admits that the Flexeril didn't really work for him just made him sleepy.   Denies any injury to his back.   Admits that he was in last month for this as well.   Denies numbness or tingling down his arms.  He states the steroid did help and Toradol helped also.  He denies falling.  He states he was fine for a while and then in worsened.  He fell off a ladder 2 months ago, he landed on his butt.  He feels pulling when he over stretches his neck.  Denies any pain down legs.      No chest pain with exertion no chest pain with walking    That makes it better is arms overhead and is relieved    Declines flu vaccine    Review of systems  ; Patient seen today for exam denies any problems with headaches or vision, denies any shortness of breath chest pain nausea or vomiting, no black stool no blood in the stool no heartburn type symptoms denies any problems with constipation  "or diarrhea, and no dysuria-type symptoms    The patient's allergies medications were reviewed with them today    The patient's social family and surgical history or also reviewed here today, along with her past medical history.     Objective     Alert and active in  no acute distress  HEENT TMs clear oropharynx negative nares clear no drainage noted neck supple  With no adenopathy   Heart regular rate and rhythm without murmur and no carotid bruits  Lungs- clear to auscultation bilaterally, no wheeze or rhonchi noted  Thyroid -negative masses or nodularity  Abdomen- soft times four quadrants , bowel sounds positive no masses or organomegaly, negative tenderness guarding or rebound  Neurological exam unremarkable- DTRs in upper and lower extremities within normal limits.   skin -no lesions noted    Thoracic positive bony tenderness at T3-5 and paraspinal musculature tenderness      /80   Pulse 87   Temp 36.6 °C (97.9 °F) (Temporal)   Resp 18   Ht 1.727 m (5' 8\")   Wt 111 kg (245 lb 3.2 oz)   SpO2 98%   BMI 37.28 kg/m²     Allergies   Allergen Reactions    Ciprofloxacin Itching       Assessment/Plan   Problem List Items Addressed This Visit    None  Visit Diagnoses       Upper back pain        Relevant Medications    ketorolac (Toradol) injection 60 mg (Completed)    diclofenac (Voltaren) 75 mg EC tablet    gabapentin (Neurontin) 100 mg capsule    Other Relevant Orders    Sedimentation Rate    XR thoracic spine 3 views    XR cervical spine 2-3 views    BMI 37.0-37.9, adult        Class 2 obesity due to excess calories without serious comorbidity with body mass index (BMI) of 37.0 to 37.9 in adult        Dyslipidemia        Relevant Orders    Lipid Panel    Hypertension, unspecified type        Relevant Orders    CBC and Auto Differential    Comprehensive Metabolic Panel          Discussed patient's BMI and to institute calorie reduction and increase exercise to decrease risk of diabetes and heart disease " in the future.    CT Cardiac score was not performed AGAINST MEDIAL ADVICE.  Recommend he still have this testing performed.     Xray thoracic spine and cervical spine ordered.  If these are negative, we would recommend he follow up with Dr. Cedeno.    Toradol 60 mg administered.    Voltaren prescribed today.    Gabapentin 100 mg prescribed today for bedtime.    Labs have been ordered, she/he will have these performed and we will contact her/him with results.  (CBC, CMP, Lipid, Sed Rate)    If anything worsens or changes please call us at once, follow up in the office as planned.     Scribe Attestation  By signing my name below, I, Maude Mehta MA, Scribe   attest that this documentation has been prepared under the direction and in the presence of Ventura Gonzalez DO.

## 2023-09-26 ENCOUNTER — LAB (OUTPATIENT)
Dept: LAB | Facility: LAB | Age: 48
End: 2023-09-26
Payer: COMMERCIAL

## 2023-09-26 DIAGNOSIS — R91.8 LUNG NODULES: Primary | ICD-10-CM

## 2023-09-26 DIAGNOSIS — E78.5 DYSLIPIDEMIA: ICD-10-CM

## 2023-09-26 DIAGNOSIS — M54.9 UPPER BACK PAIN: ICD-10-CM

## 2023-09-26 DIAGNOSIS — I10 HYPERTENSION, UNSPECIFIED TYPE: ICD-10-CM

## 2023-09-26 LAB
ALANINE AMINOTRANSFERASE (SGPT) (U/L) IN SER/PLAS: 19 U/L (ref 10–52)
ALBUMIN (G/DL) IN SER/PLAS: 4.4 G/DL (ref 3.4–5)
ALKALINE PHOSPHATASE (U/L) IN SER/PLAS: 70 U/L (ref 33–120)
ANION GAP IN SER/PLAS: 11 MMOL/L (ref 10–20)
ASPARTATE AMINOTRANSFERASE (SGOT) (U/L) IN SER/PLAS: 20 U/L (ref 9–39)
BASOPHILS (10*3/UL) IN BLOOD BY AUTOMATED COUNT: 0.05 X10E9/L (ref 0–0.1)
BASOPHILS/100 LEUKOCYTES IN BLOOD BY AUTOMATED COUNT: 0.8 % (ref 0–2)
BILIRUBIN TOTAL (MG/DL) IN SER/PLAS: 0.5 MG/DL (ref 0–1.2)
CALCIUM (MG/DL) IN SER/PLAS: 9.3 MG/DL (ref 8.6–10.3)
CARBON DIOXIDE, TOTAL (MMOL/L) IN SER/PLAS: 28 MMOL/L (ref 21–32)
CHLORIDE (MMOL/L) IN SER/PLAS: 104 MMOL/L (ref 98–107)
CHOLESTEROL (MG/DL) IN SER/PLAS: 198 MG/DL (ref 0–199)
CHOLESTEROL IN HDL (MG/DL) IN SER/PLAS: 39.1 MG/DL
CHOLESTEROL/HDL RATIO: 5.1
CREATININE (MG/DL) IN SER/PLAS: 1.22 MG/DL (ref 0.5–1.3)
EOSINOPHILS (10*3/UL) IN BLOOD BY AUTOMATED COUNT: 0.25 X10E9/L (ref 0–0.7)
EOSINOPHILS/100 LEUKOCYTES IN BLOOD BY AUTOMATED COUNT: 4 % (ref 0–6)
ERYTHROCYTE DISTRIBUTION WIDTH (RATIO) BY AUTOMATED COUNT: 13 % (ref 11.5–14.5)
ERYTHROCYTE MEAN CORPUSCULAR HEMOGLOBIN CONCENTRATION (G/DL) BY AUTOMATED: 32.9 G/DL (ref 32–36)
ERYTHROCYTE MEAN CORPUSCULAR VOLUME (FL) BY AUTOMATED COUNT: 87 FL (ref 80–100)
ERYTHROCYTES (10*6/UL) IN BLOOD BY AUTOMATED COUNT: 4.9 X10E12/L (ref 4.5–5.9)
GFR MALE: 73 ML/MIN/1.73M2
GLUCOSE (MG/DL) IN SER/PLAS: 98 MG/DL (ref 74–99)
HEMATOCRIT (%) IN BLOOD BY AUTOMATED COUNT: 42.8 % (ref 41–52)
HEMOGLOBIN (G/DL) IN BLOOD: 14.1 G/DL (ref 13.5–17.5)
IMMATURE GRANULOCYTES/100 LEUKOCYTES IN BLOOD BY AUTOMATED COUNT: 0.3 % (ref 0–0.9)
LDL: 116 MG/DL (ref 0–99)
LEUKOCYTES (10*3/UL) IN BLOOD BY AUTOMATED COUNT: 6.3 X10E9/L (ref 4.4–11.3)
LYMPHOCYTES (10*3/UL) IN BLOOD BY AUTOMATED COUNT: 1.31 X10E9/L (ref 1.2–4.8)
LYMPHOCYTES/100 LEUKOCYTES IN BLOOD BY AUTOMATED COUNT: 21 % (ref 13–44)
MONOCYTES (10*3/UL) IN BLOOD BY AUTOMATED COUNT: 0.64 X10E9/L (ref 0.1–1)
MONOCYTES/100 LEUKOCYTES IN BLOOD BY AUTOMATED COUNT: 10.2 % (ref 2–10)
NEUTROPHILS (10*3/UL) IN BLOOD BY AUTOMATED COUNT: 3.98 X10E9/L (ref 1.2–7.7)
NEUTROPHILS/100 LEUKOCYTES IN BLOOD BY AUTOMATED COUNT: 63.7 % (ref 40–80)
NON HDL CHOLESTEROL: 159 MG/DL
PLATELETS (10*3/UL) IN BLOOD AUTOMATED COUNT: 257 X10E9/L (ref 150–450)
POTASSIUM (MMOL/L) IN SER/PLAS: 4.2 MMOL/L (ref 3.5–5.3)
PROTEIN TOTAL: 7.2 G/DL (ref 6.4–8.2)
SEDIMENTATION RATE, ERYTHROCYTE: 13 MM/H (ref 0–15)
SODIUM (MMOL/L) IN SER/PLAS: 139 MMOL/L (ref 136–145)
TRIGLYCERIDE (MG/DL) IN SER/PLAS: 214 MG/DL (ref 0–149)
UREA NITROGEN (MG/DL) IN SER/PLAS: 19 MG/DL (ref 6–23)
VLDL: 43 MG/DL (ref 0–40)

## 2023-09-26 PROCEDURE — 85025 COMPLETE CBC W/AUTO DIFF WBC: CPT

## 2023-09-26 PROCEDURE — 80061 LIPID PANEL: CPT

## 2023-09-26 PROCEDURE — 85652 RBC SED RATE AUTOMATED: CPT

## 2023-09-26 PROCEDURE — 36415 COLL VENOUS BLD VENIPUNCTURE: CPT

## 2023-09-26 PROCEDURE — 80053 COMPREHEN METABOLIC PANEL: CPT

## 2023-09-26 NOTE — TELEPHONE ENCOUNTER
Pt is calling because he saw Dr Gonzalez yesterday for upper back pain and was referred to a chiropractor. He set up an appointment with that doctor for tomorrow but he also made another appointment with Dr Gonzalez for tomorrow because he is now having numbness down his legs. He states it feels like it might be a pinched nerve because it's an electrical feeling. He wasn't sure if he should keep the appointment with Dr Gonzalez tomorrow for this or just go see the chiropractor only  Please advise as to whether or not he should cancel the appointment with Dr Gonzalez (wanted to leave this message with another provider since Dr Gonzalez is out of the office today)    Thanks      Pt's # [phone]

## 2023-09-27 ENCOUNTER — TELEPHONE (OUTPATIENT)
Dept: PRIMARY CARE | Facility: CLINIC | Age: 48
End: 2023-09-27

## 2023-09-27 ENCOUNTER — APPOINTMENT (OUTPATIENT)
Dept: PRIMARY CARE | Facility: CLINIC | Age: 48
End: 2023-09-27
Payer: COMMERCIAL

## 2023-09-27 NOTE — TELEPHONE ENCOUNTER
Per Dr. Gonzalez. Call the lab to add on a ACE level to his labs that were done yesterday.    Called the lab, and LMOM to add on.

## 2023-09-28 LAB — ANGIOTENSIN CONVERTING ENZYME: NORMAL

## 2023-09-29 ENCOUNTER — TELEPHONE (OUTPATIENT)
Dept: PRIMARY CARE | Facility: CLINIC | Age: 48
End: 2023-09-29

## 2023-09-29 ENCOUNTER — LAB (OUTPATIENT)
Dept: LAB | Facility: LAB | Age: 48
End: 2023-09-29
Payer: COMMERCIAL

## 2023-09-29 DIAGNOSIS — R91.8 LUNG NODULES: ICD-10-CM

## 2023-09-29 PROCEDURE — 82164 ANGIOTENSIN I ENZYME TEST: CPT

## 2023-09-29 PROCEDURE — 36415 COLL VENOUS BLD VENIPUNCTURE: CPT

## 2023-09-29 NOTE — TELEPHONE ENCOUNTER
Patient called and was made aware.    Patient had concerns of numbness in his low back and legs. He was not sure if this was due to the Gabapentin. He did stop the medication. Was unsure what else he could do.

## 2023-09-29 NOTE — TELEPHONE ENCOUNTER
Ventura Gonzalez, DO  Anya Joshi MA  Cc: Ventura Gonzalez, DO  This patient is CT cardiac score,, that showed nodules I need you to call the radiologist department, and have them asked the radiologist what test dr simpson recommend next, CT of his chest without contrast dedicated to that, or what other recommendation he has no cancer healthy miguelito,, we do have an ACE level pending for sarcoidosis,, he has had thoracic kind of pain at times over this last month,, blood work all within normal limits I be glad to talk to radiologist if he wants to call my cell phone [phone]              I called and LM for patient to call the office.  Please transfer to Anya when patient calls back

## 2023-09-30 ENCOUNTER — HOSPITAL ENCOUNTER (INPATIENT)
Facility: HOSPITAL | Age: 48
LOS: 1 days | Discharge: HOSPICE | DRG: 092 | End: 2023-10-01
Attending: STUDENT IN AN ORGANIZED HEALTH CARE EDUCATION/TRAINING PROGRAM | Admitting: STUDENT IN AN ORGANIZED HEALTH CARE EDUCATION/TRAINING PROGRAM
Payer: COMMERCIAL

## 2023-09-30 ENCOUNTER — APPOINTMENT (OUTPATIENT)
Dept: RADIOLOGY | Facility: HOSPITAL | Age: 48
DRG: 092 | End: 2023-09-30
Payer: COMMERCIAL

## 2023-09-30 DIAGNOSIS — M89.8X8 MASS OF SPINE: Primary | ICD-10-CM

## 2023-09-30 DIAGNOSIS — J45.909 MILD ASTHMA, UNSPECIFIED WHETHER COMPLICATED, UNSPECIFIED WHETHER PERSISTENT (HHS-HCC): ICD-10-CM

## 2023-09-30 LAB
ALBUMIN SERPL BCP-MCNC: 4.9 G/DL (ref 3.4–5)
ALP SERPL-CCNC: 70 U/L (ref 33–120)
ALT SERPL W P-5'-P-CCNC: 30 U/L (ref 10–52)
ANION GAP SERPL CALC-SCNC: 13 MMOL/L (ref 10–20)
AST SERPL W P-5'-P-CCNC: 28 U/L (ref 9–39)
BASOPHILS # BLD AUTO: 0.05 X10*3/UL (ref 0–0.1)
BASOPHILS NFR BLD AUTO: 0.7 %
BILIRUB SERPL-MCNC: 0.6 MG/DL (ref 0–1.2)
BNP SERPL-MCNC: 15 PG/ML (ref 0–99)
BUN SERPL-MCNC: 15 MG/DL (ref 6–23)
CALCIUM SERPL-MCNC: 9.8 MG/DL (ref 8.6–10.3)
CHLORIDE SERPL-SCNC: 100 MMOL/L (ref 98–107)
CO2 SERPL-SCNC: 28 MMOL/L (ref 21–32)
CREAT SERPL-MCNC: 0.97 MG/DL (ref 0.5–1.3)
EOSINOPHIL # BLD AUTO: 0.15 X10*3/UL (ref 0–0.7)
EOSINOPHIL NFR BLD AUTO: 2.1 %
ERYTHROCYTE [DISTWIDTH] IN BLOOD BY AUTOMATED COUNT: 12.7 % (ref 11.5–14.5)
GFR SERPL CREATININE-BSD FRML MDRD: >90 ML/MIN/1.73M*2
GLUCOSE SERPL-MCNC: 150 MG/DL (ref 74–99)
HCT VFR BLD AUTO: 44.6 % (ref 41–52)
HGB BLD-MCNC: 14.9 G/DL (ref 13.5–17.5)
IMM GRANULOCYTES # BLD AUTO: 0.03 X10*3/UL (ref 0–0.7)
IMM GRANULOCYTES NFR BLD AUTO: 0.4 % (ref 0–0.9)
LYMPHOCYTES # BLD AUTO: 1.36 X10*3/UL (ref 1.2–4.8)
LYMPHOCYTES NFR BLD AUTO: 19.2 %
MCH RBC QN AUTO: 28.4 PG (ref 26–34)
MCHC RBC AUTO-ENTMCNC: 33.4 G/DL (ref 32–36)
MCV RBC AUTO: 85 FL (ref 80–100)
MONOCYTES # BLD AUTO: 0.62 X10*3/UL (ref 0.1–1)
MONOCYTES NFR BLD AUTO: 8.8 %
NEUTROPHILS # BLD AUTO: 4.87 X10*3/UL (ref 1.2–7.7)
NEUTROPHILS NFR BLD AUTO: 68.8 %
NRBC BLD-RTO: 0 /100 WBCS (ref 0–0)
PLATELET # BLD AUTO: 256 X10*3/UL (ref 150–450)
PMV BLD AUTO: 9.6 FL (ref 7.5–11.5)
POTASSIUM SERPL-SCNC: 3.9 MMOL/L (ref 3.5–5.3)
PROT SERPL-MCNC: 8.1 G/DL (ref 6.4–8.2)
RBC # BLD AUTO: 5.25 X10*6/UL (ref 4.5–5.9)
SODIUM SERPL-SCNC: 137 MMOL/L (ref 136–145)
WBC # BLD AUTO: 7.1 X10*3/UL (ref 4.4–11.3)

## 2023-09-30 PROCEDURE — 82607 VITAMIN B-12: CPT

## 2023-09-30 PROCEDURE — 82746 ASSAY OF FOLIC ACID SERUM: CPT

## 2023-09-30 PROCEDURE — 84075 ASSAY ALKALINE PHOSPHATASE: CPT | Performed by: EMERGENCY MEDICINE

## 2023-09-30 PROCEDURE — 70450 CT HEAD/BRAIN W/O DYE: CPT

## 2023-09-30 PROCEDURE — 36415 COLL VENOUS BLD VENIPUNCTURE: CPT | Performed by: EMERGENCY MEDICINE

## 2023-09-30 PROCEDURE — 83880 ASSAY OF NATRIURETIC PEPTIDE: CPT | Performed by: EMERGENCY MEDICINE

## 2023-09-30 PROCEDURE — 99285 EMERGENCY DEPT VISIT HI MDM: CPT | Performed by: STUDENT IN AN ORGANIZED HEALTH CARE EDUCATION/TRAINING PROGRAM

## 2023-09-30 PROCEDURE — 96360 HYDRATION IV INFUSION INIT: CPT

## 2023-09-30 PROCEDURE — 85025 COMPLETE CBC W/AUTO DIFF WBC: CPT | Performed by: STUDENT IN AN ORGANIZED HEALTH CARE EDUCATION/TRAINING PROGRAM

## 2023-09-30 PROCEDURE — 83880 ASSAY OF NATRIURETIC PEPTIDE: CPT | Performed by: STUDENT IN AN ORGANIZED HEALTH CARE EDUCATION/TRAINING PROGRAM

## 2023-09-30 PROCEDURE — 80053 COMPREHEN METABOLIC PANEL: CPT | Mod: STJLAB | Performed by: STUDENT IN AN ORGANIZED HEALTH CARE EDUCATION/TRAINING PROGRAM

## 2023-09-30 PROCEDURE — 72125 CT NECK SPINE W/O DYE: CPT

## 2023-09-30 PROCEDURE — 72128 CT CHEST SPINE W/O DYE: CPT

## 2023-09-30 PROCEDURE — 72131 CT LUMBAR SPINE W/O DYE: CPT

## 2023-09-30 ASSESSMENT — PAIN DESCRIPTION - PAIN TYPE: TYPE: OTHER (COMMENT)

## 2023-09-30 ASSESSMENT — PAIN SCALES - GENERAL: PAINLEVEL_OUTOF10: 8

## 2023-09-30 ASSESSMENT — COLUMBIA-SUICIDE SEVERITY RATING SCALE - C-SSRS
6. HAVE YOU EVER DONE ANYTHING, STARTED TO DO ANYTHING, OR PREPARED TO DO ANYTHING TO END YOUR LIFE?: NO
1. IN THE PAST MONTH, HAVE YOU WISHED YOU WERE DEAD OR WISHED YOU COULD GO TO SLEEP AND NOT WAKE UP?: NO
2. HAVE YOU ACTUALLY HAD ANY THOUGHTS OF KILLING YOURSELF?: NO

## 2023-09-30 ASSESSMENT — PAIN DESCRIPTION - LOCATION: LOCATION: BACK

## 2023-09-30 ASSESSMENT — PAIN DESCRIPTION - DESCRIPTORS: DESCRIPTORS: SHARP

## 2023-09-30 ASSESSMENT — PAIN - FUNCTIONAL ASSESSMENT: PAIN_FUNCTIONAL_ASSESSMENT: 0-10

## 2023-10-01 ENCOUNTER — HOSPITAL ENCOUNTER (INPATIENT)
Facility: HOSPITAL | Age: 48
LOS: 6 days | Discharge: HOME | DRG: 820 | End: 2023-10-07
Attending: STUDENT IN AN ORGANIZED HEALTH CARE EDUCATION/TRAINING PROGRAM | Admitting: STUDENT IN AN ORGANIZED HEALTH CARE EDUCATION/TRAINING PROGRAM
Payer: COMMERCIAL

## 2023-10-01 ENCOUNTER — ANESTHESIA EVENT (OUTPATIENT)
Dept: OPERATING ROOM | Facility: HOSPITAL | Age: 48
DRG: 820 | End: 2023-10-01
Payer: COMMERCIAL

## 2023-10-01 ENCOUNTER — ANESTHESIA (OUTPATIENT)
Dept: OPERATING ROOM | Facility: HOSPITAL | Age: 48
DRG: 820 | End: 2023-10-01
Payer: COMMERCIAL

## 2023-10-01 ENCOUNTER — PREP FOR PROCEDURE (OUTPATIENT)
Dept: NEUROSURGERY | Facility: HOSPITAL | Age: 48
End: 2023-10-01

## 2023-10-01 VITALS
RESPIRATION RATE: 16 BRPM | HEART RATE: 72 BPM | DIASTOLIC BLOOD PRESSURE: 74 MMHG | WEIGHT: 244 LBS | SYSTOLIC BLOOD PRESSURE: 119 MMHG | HEIGHT: 68 IN | BODY MASS INDEX: 36.98 KG/M2 | TEMPERATURE: 98.2 F | OXYGEN SATURATION: 96 %

## 2023-10-01 DIAGNOSIS — D49.2 THORACIC SPINE TUMOR: Primary | ICD-10-CM

## 2023-10-01 DIAGNOSIS — J45.909 MILD ASTHMA, UNSPECIFIED WHETHER COMPLICATED, UNSPECIFIED WHETHER PERSISTENT (HHS-HCC): ICD-10-CM

## 2023-10-01 PROBLEM — M89.8X8 MASS OF SPINE: Status: ACTIVE | Noted: 2023-10-01

## 2023-10-01 PROBLEM — M79.673 FOOT PAIN: Status: RESOLVED | Noted: 2023-09-25 | Resolved: 2023-10-01

## 2023-10-01 PROBLEM — R59.0 HILAR LYMPHADENOPATHY: Status: ACTIVE | Noted: 2023-10-01

## 2023-10-01 PROBLEM — M77.9 TENDONITIS: Status: RESOLVED | Noted: 2023-09-25 | Resolved: 2023-10-01

## 2023-10-01 PROBLEM — J18.9 RECURRENT PNEUMONIA: Status: RESOLVED | Noted: 2023-09-25 | Resolved: 2023-10-01

## 2023-10-01 PROBLEM — R91.8 PULMONARY NODULES: Status: ACTIVE | Noted: 2023-10-01

## 2023-10-01 PROBLEM — R22.2 PARASPINAL MASS: Status: ACTIVE | Noted: 2023-10-01

## 2023-10-01 PROBLEM — M89.8X8 MASS OF SPINE: Status: RESOLVED | Noted: 2023-10-01 | Resolved: 2023-10-01

## 2023-10-01 PROBLEM — K57.92 DIVERTICULITIS: Status: RESOLVED | Noted: 2023-09-25 | Resolved: 2023-10-01

## 2023-10-01 LAB
ANION GAP SERPL CALC-SCNC: 11 MMOL/L (ref 10–20)
APTT PPP: 32 SECONDS (ref 27–38)
BUN SERPL-MCNC: 19 MG/DL (ref 6–23)
CALCIUM SERPL-MCNC: 9.7 MG/DL (ref 8.6–10.3)
CHLORIDE SERPL-SCNC: 103 MMOL/L (ref 98–107)
CO2 SERPL-SCNC: 29 MMOL/L (ref 21–32)
CREAT SERPL-MCNC: 1.02 MG/DL (ref 0.5–1.3)
CRP SERPL-MCNC: 0.63 MG/DL
ERYTHROCYTE [DISTWIDTH] IN BLOOD BY AUTOMATED COUNT: 12.8 % (ref 11.5–14.5)
ERYTHROCYTE [SEDIMENTATION RATE] IN BLOOD BY WESTERGREN METHOD: 15 MM/H (ref 0–15)
FOLATE SERPL-MCNC: >24 NG/ML
GFR SERPL CREATININE-BSD FRML MDRD: >90 ML/MIN/1.73M*2
GLUCOSE SERPL-MCNC: 100 MG/DL (ref 74–99)
HCT VFR BLD AUTO: 41.6 % (ref 41–52)
HGB BLD-MCNC: 13.8 G/DL (ref 13.5–17.5)
INR PPP: 1.1 (ref 0.9–1.1)
MCH RBC QN AUTO: 28.6 PG (ref 26–34)
MCHC RBC AUTO-ENTMCNC: 33.2 G/DL (ref 32–36)
MCV RBC AUTO: 86 FL (ref 80–100)
NRBC BLD-RTO: 0 /100 WBCS (ref 0–0)
PLATELET # BLD AUTO: 264 X10*3/UL (ref 150–450)
PMV BLD AUTO: 9.9 FL (ref 7.5–11.5)
POTASSIUM SERPL-SCNC: 4 MMOL/L (ref 3.5–5.3)
PROTHROMBIN TIME: 12.4 SECONDS (ref 9.8–12.8)
RBC # BLD AUTO: 4.83 X10*6/UL (ref 4.5–5.9)
SODIUM SERPL-SCNC: 139 MMOL/L (ref 136–145)
VIT B12 SERPL-MCNC: 549 PG/ML (ref 211–911)
WBC # BLD AUTO: 8.3 X10*3/UL (ref 4.4–11.3)

## 2023-10-01 PROCEDURE — 99236 HOSP IP/OBS SAME DATE HI 85: CPT | Performed by: STUDENT IN AN ORGANIZED HEALTH CARE EDUCATION/TRAINING PROGRAM

## 2023-10-01 PROCEDURE — 00BX0ZZ EXCISION OF THORACIC SPINAL CORD, OPEN APPROACH: ICD-10-PCS | Performed by: STUDENT IN AN ORGANIZED HEALTH CARE EDUCATION/TRAINING PROGRAM

## 2023-10-01 PROCEDURE — 99255 IP/OBS CONSLTJ NEW/EST HI 80: CPT | Performed by: STUDENT IN AN ORGANIZED HEALTH CARE EDUCATION/TRAINING PROGRAM

## 2023-10-01 PROCEDURE — 72156 MRI NECK SPINE W/O & W/DYE: CPT | Performed by: RADIOLOGY

## 2023-10-01 PROCEDURE — 2500000004 HC RX 250 GENERAL PHARMACY W/ HCPCS (ALT 636 FOR OP/ED)

## 2023-10-01 PROCEDURE — 70450 CT HEAD/BRAIN W/O DYE: CPT | Performed by: STUDENT IN AN ORGANIZED HEALTH CARE EDUCATION/TRAINING PROGRAM

## 2023-10-01 PROCEDURE — 85652 RBC SED RATE AUTOMATED: CPT

## 2023-10-01 PROCEDURE — 85610 PROTHROMBIN TIME: CPT | Performed by: STUDENT IN AN ORGANIZED HEALTH CARE EDUCATION/TRAINING PROGRAM

## 2023-10-01 PROCEDURE — 1100000001 HC PRIVATE ROOM DAILY

## 2023-10-01 PROCEDURE — 86140 C-REACTIVE PROTEIN: CPT

## 2023-10-01 PROCEDURE — 2500000001 HC RX 250 WO HCPCS SELF ADMINISTERED DRUGS (ALT 637 FOR MEDICARE OP)

## 2023-10-01 PROCEDURE — 2500000004 HC RX 250 GENERAL PHARMACY W/ HCPCS (ALT 636 FOR OP/ED): Performed by: STUDENT IN AN ORGANIZED HEALTH CARE EDUCATION/TRAINING PROGRAM

## 2023-10-01 PROCEDURE — 85027 COMPLETE CBC AUTOMATED: CPT

## 2023-10-01 PROCEDURE — 72158 MRI LUMBAR SPINE W/O & W/DYE: CPT | Performed by: RADIOLOGY

## 2023-10-01 PROCEDURE — 2580000001 HC RX 258 IV SOLUTIONS

## 2023-10-01 PROCEDURE — 36415 COLL VENOUS BLD VENIPUNCTURE: CPT | Performed by: STUDENT IN AN ORGANIZED HEALTH CARE EDUCATION/TRAINING PROGRAM

## 2023-10-01 PROCEDURE — 36415 COLL VENOUS BLD VENIPUNCTURE: CPT

## 2023-10-01 PROCEDURE — A9575 INJ GADOTERATE MEGLUMI 0.1ML: HCPCS | Performed by: STUDENT IN AN ORGANIZED HEALTH CARE EDUCATION/TRAINING PROGRAM

## 2023-10-01 PROCEDURE — 74177 CT ABD & PELVIS W/CONTRAST: CPT | Performed by: RADIOLOGY

## 2023-10-01 PROCEDURE — 2550000001 HC RX 255 CONTRASTS: Performed by: STUDENT IN AN ORGANIZED HEALTH CARE EDUCATION/TRAINING PROGRAM

## 2023-10-01 PROCEDURE — 72157 MRI CHEST SPINE W/O & W/DYE: CPT | Performed by: RADIOLOGY

## 2023-10-01 PROCEDURE — 72131 CT LUMBAR SPINE W/O DYE: CPT | Performed by: STUDENT IN AN ORGANIZED HEALTH CARE EDUCATION/TRAINING PROGRAM

## 2023-10-01 PROCEDURE — 72125 CT NECK SPINE W/O DYE: CPT | Performed by: STUDENT IN AN ORGANIZED HEALTH CARE EDUCATION/TRAINING PROGRAM

## 2023-10-01 PROCEDURE — 1210000001 HC SEMI-PRIVATE ROOM DAILY

## 2023-10-01 PROCEDURE — 72128 CT CHEST SPINE W/O DYE: CPT | Performed by: STUDENT IN AN ORGANIZED HEALTH CARE EDUCATION/TRAINING PROGRAM

## 2023-10-01 PROCEDURE — 71260 CT THORAX DX C+: CPT | Performed by: RADIOLOGY

## 2023-10-01 PROCEDURE — 80048 BASIC METABOLIC PNL TOTAL CA: CPT

## 2023-10-01 PROCEDURE — 87081 CULTURE SCREEN ONLY: CPT

## 2023-10-01 RX ORDER — FLUTICASONE FUROATE AND VILANTEROL 100; 25 UG/1; UG/1
1 POWDER RESPIRATORY (INHALATION)
Status: DISCONTINUED | OUTPATIENT
Start: 2023-10-02 | End: 2023-10-07 | Stop reason: HOSPADM

## 2023-10-01 RX ORDER — DEXAMETHASONE SODIUM PHOSPHATE 4 MG/ML
4 INJECTION, SOLUTION INTRA-ARTICULAR; INTRALESIONAL; INTRAMUSCULAR; INTRAVENOUS; SOFT TISSUE EVERY 6 HOURS
Status: DISCONTINUED | OUTPATIENT
Start: 2023-10-02 | End: 2023-10-06

## 2023-10-01 RX ORDER — ALBUTEROL SULFATE 90 UG/1
2 AEROSOL, METERED RESPIRATORY (INHALATION) EVERY 6 HOURS PRN
Status: DISCONTINUED | OUTPATIENT
Start: 2023-10-01 | End: 2023-10-07 | Stop reason: HOSPADM

## 2023-10-01 RX ORDER — AMOXICILLIN 250 MG
2 CAPSULE ORAL 2 TIMES DAILY
Status: DISCONTINUED | OUTPATIENT
Start: 2023-10-01 | End: 2023-10-07 | Stop reason: HOSPADM

## 2023-10-01 RX ORDER — POLYETHYLENE GLYCOL 3350 17 G/17G
17 POWDER, FOR SOLUTION ORAL DAILY PRN
Status: DISCONTINUED | OUTPATIENT
Start: 2023-10-01 | End: 2023-10-01 | Stop reason: HOSPADM

## 2023-10-01 RX ORDER — CYCLOBENZAPRINE HCL 10 MG
10 TABLET ORAL 3 TIMES DAILY
Status: DISCONTINUED | OUTPATIENT
Start: 2023-10-01 | End: 2023-10-07 | Stop reason: HOSPADM

## 2023-10-01 RX ORDER — GADOTERATE MEGLUMINE 376.9 MG/ML
20 INJECTION INTRAVENOUS
Status: COMPLETED | OUTPATIENT
Start: 2023-10-01 | End: 2023-10-01

## 2023-10-01 RX ORDER — DEXAMETHASONE SODIUM PHOSPHATE 4 MG/ML
10 INJECTION, SOLUTION INTRA-ARTICULAR; INTRALESIONAL; INTRAMUSCULAR; INTRAVENOUS; SOFT TISSUE ONCE
Status: COMPLETED | OUTPATIENT
Start: 2023-10-01 | End: 2023-10-01

## 2023-10-01 RX ORDER — DEXAMETHASONE SODIUM PHOSPHATE 4 MG/ML
4 INJECTION, SOLUTION INTRA-ARTICULAR; INTRALESIONAL; INTRAMUSCULAR; INTRAVENOUS; SOFT TISSUE EVERY 6 HOURS
Status: DISCONTINUED | OUTPATIENT
Start: 2023-10-01 | End: 2023-10-01 | Stop reason: HOSPADM

## 2023-10-01 RX ORDER — OXYCODONE HYDROCHLORIDE 5 MG/1
5 TABLET ORAL EVERY 4 HOURS PRN
Status: DISCONTINUED | OUTPATIENT
Start: 2023-10-01 | End: 2023-10-02

## 2023-10-01 RX ORDER — POLYETHYLENE GLYCOL 3350 17 G/17G
17 POWDER, FOR SOLUTION ORAL DAILY
Status: DISCONTINUED | OUTPATIENT
Start: 2023-10-02 | End: 2023-10-07 | Stop reason: HOSPADM

## 2023-10-01 RX ORDER — ALBUTEROL SULFATE 90 UG/1
2 AEROSOL, METERED RESPIRATORY (INHALATION) EVERY 6 HOURS PRN
Status: CANCELLED | OUTPATIENT
Start: 2023-10-01

## 2023-10-01 RX ORDER — FLUTICASONE FUROATE AND VILANTEROL 100; 25 UG/1; UG/1
1 POWDER RESPIRATORY (INHALATION)
Status: CANCELLED | OUTPATIENT
Start: 2023-10-01

## 2023-10-01 RX ORDER — NALOXONE HYDROCHLORIDE 0.4 MG/ML
0.2 INJECTION, SOLUTION INTRAMUSCULAR; INTRAVENOUS; SUBCUTANEOUS EVERY 5 MIN PRN
Status: DISCONTINUED | OUTPATIENT
Start: 2023-10-01 | End: 2023-10-07 | Stop reason: HOSPADM

## 2023-10-01 RX ORDER — OXYCODONE AND ACETAMINOPHEN 5; 325 MG/1; MG/1
1 TABLET ORAL EVERY 6 HOURS PRN
Status: DISCONTINUED | OUTPATIENT
Start: 2023-10-01 | End: 2023-10-01 | Stop reason: HOSPADM

## 2023-10-01 RX ORDER — ACETAMINOPHEN 325 MG/1
650 TABLET ORAL EVERY 6 HOURS SCHEDULED
Status: DISCONTINUED | OUTPATIENT
Start: 2023-10-02 | End: 2023-10-07 | Stop reason: HOSPADM

## 2023-10-01 RX ADMIN — OXYCODONE HYDROCHLORIDE AND ACETAMINOPHEN 1 TABLET: 5; 325 TABLET ORAL at 05:14

## 2023-10-01 RX ADMIN — SODIUM CHLORIDE, POTASSIUM CHLORIDE, SODIUM LACTATE AND CALCIUM CHLORIDE 500 ML: 600; 310; 30; 20 INJECTION, SOLUTION INTRAVENOUS at 02:00

## 2023-10-01 RX ADMIN — GADOTERATE MEGLUMINE 20 ML: 376.9 INJECTION INTRAVENOUS at 11:12

## 2023-10-01 RX ADMIN — IOHEXOL 75 ML: 350 INJECTION, SOLUTION INTRAVENOUS at 12:18

## 2023-10-01 RX ADMIN — OXYCODONE HYDROCHLORIDE AND ACETAMINOPHEN 1 TABLET: 5; 325 TABLET ORAL at 13:54

## 2023-10-01 RX ADMIN — HYDROMORPHONE HYDROCHLORIDE 0.5 MG: 1 INJECTION, SOLUTION INTRAMUSCULAR; INTRAVENOUS; SUBCUTANEOUS at 09:15

## 2023-10-01 RX ADMIN — DEXAMETHASONE SODIUM PHOSPHATE 10 MG: 4 INJECTION, SOLUTION INTRAMUSCULAR; INTRAVENOUS at 14:42

## 2023-10-01 SDOH — SOCIAL STABILITY: SOCIAL INSECURITY: DO YOU FEEL UNSAFE GOING BACK TO THE PLACE WHERE YOU ARE LIVING?: NO

## 2023-10-01 SDOH — SOCIAL STABILITY: SOCIAL INSECURITY: ARE THERE ANY APPARENT SIGNS OF INJURIES/BEHAVIORS THAT COULD BE RELATED TO ABUSE/NEGLECT?: NO

## 2023-10-01 SDOH — SOCIAL STABILITY: SOCIAL INSECURITY: HAVE YOU HAD THOUGHTS OF HARMING ANYONE ELSE?: NO

## 2023-10-01 SDOH — SOCIAL STABILITY: SOCIAL INSECURITY: ABUSE: ADULT

## 2023-10-01 SDOH — SOCIAL STABILITY: SOCIAL INSECURITY: DO YOU FEEL ANYONE HAS EXPLOITED OR TAKEN ADVANTAGE OF YOU FINANCIALLY OR OF YOUR PERSONAL PROPERTY?: YES

## 2023-10-01 SDOH — SOCIAL STABILITY: SOCIAL INSECURITY: ARE THERE ANY APPARENT SIGNS OF INJURIES/BEHAVIORS THAT COULD BE RELATED TO ABUSE/NEGLECT?: YES

## 2023-10-01 SDOH — SOCIAL STABILITY: SOCIAL INSECURITY: ARE YOU OR HAVE YOU BEEN THREATENED OR ABUSED PHYSICALLY, EMOTIONALLY, OR SEXUALLY BY ANYONE?: NO

## 2023-10-01 SDOH — SOCIAL STABILITY: SOCIAL INSECURITY: DO YOU FEEL ANYONE HAS EXPLOITED OR TAKEN ADVANTAGE OF YOU FINANCIALLY OR OF YOUR PERSONAL PROPERTY?: NO

## 2023-10-01 SDOH — SOCIAL STABILITY: SOCIAL INSECURITY: DOES ANYONE TRY TO KEEP YOU FROM HAVING/CONTACTING OTHER FRIENDS OR DOING THINGS OUTSIDE YOUR HOME?: NO

## 2023-10-01 SDOH — SOCIAL STABILITY: SOCIAL INSECURITY: HAS ANYONE EVER THREATENED TO HURT YOUR FAMILY OR YOUR PETS?: YES

## 2023-10-01 SDOH — HEALTH STABILITY: MENTAL HEALTH: EXPERIENCED ANY OF THE FOLLOWING LIFE EVENTS: OTHER (COMMENT)

## 2023-10-01 SDOH — SOCIAL STABILITY: SOCIAL INSECURITY: HAS ANYONE EVER THREATENED TO HURT YOUR FAMILY OR YOUR PETS?: NO

## 2023-10-01 SDOH — SOCIAL STABILITY: SOCIAL INSECURITY

## 2023-10-01 SDOH — SOCIAL STABILITY: SOCIAL INSECURITY: DOES ANYONE TRY TO KEEP YOU FROM HAVING/CONTACTING OTHER FRIENDS OR DOING THINGS OUTSIDE YOUR HOME?: YES

## 2023-10-01 SDOH — SOCIAL STABILITY: SOCIAL INSECURITY: DO YOU FEEL UNSAFE GOING BACK TO THE PLACE WHERE YOU ARE LIVING?: YES

## 2023-10-01 SDOH — SOCIAL STABILITY: SOCIAL INSECURITY: WERE YOU ABLE TO COMPLETE ALL THE BEHAVIORAL HEALTH SCREENINGS?: YES

## 2023-10-01 SDOH — SOCIAL STABILITY: SOCIAL INSECURITY: ARE YOU OR HAVE YOU BEEN THREATENED OR ABUSED PHYSICALLY, EMOTIONALLY, OR SEXUALLY BY ANYONE?: YES

## 2023-10-01 SDOH — SOCIAL STABILITY: SOCIAL INSECURITY: HAVE YOU HAD THOUGHTS OF HARMING ANYONE ELSE?: YES

## 2023-10-01 ASSESSMENT — PATIENT HEALTH QUESTIONNAIRE - PHQ9
2. FEELING DOWN, DEPRESSED OR HOPELESS: NOT AT ALL
2. FEELING DOWN, DEPRESSED OR HOPELESS: NOT AT ALL
1. LITTLE INTEREST OR PLEASURE IN DOING THINGS: NOT AT ALL
SUM OF ALL RESPONSES TO PHQ9 QUESTIONS 1 & 2: 0
2. FEELING DOWN, DEPRESSED OR HOPELESS: NOT AT ALL
SUM OF ALL RESPONSES TO PHQ9 QUESTIONS 1 & 2: 0
SUM OF ALL RESPONSES TO PHQ9 QUESTIONS 1 & 2: 0
1. LITTLE INTEREST OR PLEASURE IN DOING THINGS: NOT AT ALL
1. LITTLE INTEREST OR PLEASURE IN DOING THINGS: NOT AT ALL

## 2023-10-01 ASSESSMENT — LIFESTYLE VARIABLES
HOW MANY STANDARD DRINKS CONTAINING ALCOHOL DO YOU HAVE ON A TYPICAL DAY: 1 OR 2
AUDIT-C TOTAL SCORE: 2
SKIP TO QUESTIONS 9-10: 0
SUBSTANCE_ABUSE_PAST_12_MONTHS: NO
AUDIT-C TOTAL SCORE: 2
HOW OFTEN DO YOU HAVE 6 OR MORE DRINKS ON ONE OCCASION: LESS THAN MONTHLY
HOW OFTEN DO YOU HAVE A DRINK CONTAINING ALCOHOL: MONTHLY OR LESS
AUDIT-C TOTAL SCORE: 2
HOW MANY STANDARD DRINKS CONTAINING ALCOHOL DO YOU HAVE ON A TYPICAL DAY: 1 OR 2
HOW OFTEN DO YOU HAVE 6 OR MORE DRINKS ON ONE OCCASION: LESS THAN MONTHLY
HOW MANY STANDARD DRINKS CONTAINING ALCOHOL DO YOU HAVE ON A TYPICAL DAY: PATIENT DOES NOT DRINK
PRESCIPTION_ABUSE_PAST_12_MONTHS: NO
AUDIT-C TOTAL SCORE: 2
SKIP TO QUESTIONS 9-10: 0
SKIP TO QUESTIONS 9-10: 0
SUBSTANCE_ABUSE_PAST_12_MONTHS: NO
SUBSTANCE_ABUSE_PAST_12_MONTHS: NO
HOW OFTEN DO YOU HAVE 6 OR MORE DRINKS ON ONE OCCASION: LESS THAN MONTHLY
HOW OFTEN DO YOU HAVE A DRINK CONTAINING ALCOHOL: MONTHLY OR LESS
HOW OFTEN DO YOU HAVE A DRINK CONTAINING ALCOHOL: MONTHLY OR LESS
PRESCIPTION_ABUSE_PAST_12_MONTHS: NO
PRESCIPTION_ABUSE_PAST_12_MONTHS: NO

## 2023-10-01 ASSESSMENT — COGNITIVE AND FUNCTIONAL STATUS - GENERAL
PATIENT BASELINE BEDBOUND: NO
DAILY ACTIVITIY SCORE: 24
MOBILITY SCORE: 20
CLIMB 3 TO 5 STEPS WITH RAILING: A LOT
MOBILITY SCORE: 22
DAILY ACTIVITIY SCORE: 24
PATIENT BASELINE BEDBOUND: NO
CLIMB 3 TO 5 STEPS WITH RAILING: A LITTLE
MOBILITY SCORE: 24
WALKING IN HOSPITAL ROOM: A LITTLE
WALKING IN HOSPITAL ROOM: A LOT

## 2023-10-01 ASSESSMENT — ACTIVITIES OF DAILY LIVING (ADL)
GROOMING: INDEPENDENT
TOILETING: INDEPENDENT
WALKS IN HOME: INDEPENDENT
GROOMING: INDEPENDENT
HEARING - RIGHT EAR: FUNCTIONAL
HEARING - RIGHT EAR: FUNCTIONAL
FEEDING YOURSELF: INDEPENDENT
BATHING: INDEPENDENT
ADEQUATE_TO_COMPLETE_ADL: YES
ADEQUATE_TO_COMPLETE_ADL: YES
DRESSING YOURSELF: INDEPENDENT
HEARING - LEFT EAR: FUNCTIONAL
PATIENT'S MEMORY ADEQUATE TO SAFELY COMPLETE DAILY ACTIVITIES?: YES
FEEDING YOURSELF: INDEPENDENT
BATHING: INDEPENDENT
JUDGMENT_ADEQUATE_SAFELY_COMPLETE_DAILY_ACTIVITIES: YES
JUDGMENT_ADEQUATE_SAFELY_COMPLETE_DAILY_ACTIVITIES: YES
ASSISTIVE_DEVICE: EYEGLASSES
TOILETING: NEEDS ASSISTANCE
PATIENT'S MEMORY ADEQUATE TO SAFELY COMPLETE DAILY ACTIVITIES?: YES
HEARING - LEFT EAR: FUNCTIONAL
WALKS IN HOME: INDEPENDENT

## 2023-10-01 ASSESSMENT — PAIN SCALES - GENERAL
PAINLEVEL_OUTOF10: 8
PAINLEVEL_OUTOF10: 0 - NO PAIN
PAINLEVEL_OUTOF10: 0 - NO PAIN
PAINLEVEL_OUTOF10: 7
PAINLEVEL_OUTOF10: 6

## 2023-10-01 ASSESSMENT — PAIN - FUNCTIONAL ASSESSMENT
PAIN_FUNCTIONAL_ASSESSMENT: 0-10
PAIN_FUNCTIONAL_ASSESSMENT: 0-10

## 2023-10-01 ASSESSMENT — ENCOUNTER SYMPTOMS
SEIZURES: 0
FEVER: 0
PALPITATIONS: 0
DIFFICULTY URINATING: 0
WEAKNESS: 0
FREQUENCY: 0
NAUSEA: 1
BLOOD IN STOOL: 0
BACK PAIN: 1
COUGH: 0
SHORTNESS OF BREATH: 0
HEADACHES: 0
DYSURIA: 0
DIARRHEA: 0
VOMITING: 0
CONSTIPATION: 0
SORE THROAT: 0
HEMATURIA: 0
UNEXPECTED WEIGHT CHANGE: 0
HALLUCINATIONS: 0

## 2023-10-01 ASSESSMENT — COLUMBIA-SUICIDE SEVERITY RATING SCALE - C-SSRS
2. HAVE YOU ACTUALLY HAD ANY THOUGHTS OF KILLING YOURSELF?: NO
6. HAVE YOU EVER DONE ANYTHING, STARTED TO DO ANYTHING, OR PREPARED TO DO ANYTHING TO END YOUR LIFE?: NO
1. IN THE PAST MONTH, HAVE YOU WISHED YOU WERE DEAD OR WISHED YOU COULD GO TO SLEEP AND NOT WAKE UP?: NO

## 2023-10-01 ASSESSMENT — PAIN SCALES - WONG BAKER: WONGBAKER_NUMERICALRESPONSE: HURTS WHOLE LOT

## 2023-10-01 ASSESSMENT — PAIN SCALES - PAIN ASSESSMENT IN ADVANCED DEMENTIA (PAINAD)
BREATHING: NORMAL
BREATHING: NORMAL
FACIALEXPRESSION: FACIAL GRIMACING

## 2023-10-01 NOTE — CONSULTS
Reason For Consult  Thoracic pain with progressive numbness below the nipple line    History Of Present Illness  Dann Ni is a 47 y.o. male presenting with thoracic pain with numbness below the nipple line for 1 week. He states that he had prior stabbing pain that was treated with steroids in August. When the pain restarted, he came to the ED to make sure there was nothing concerning. He knew about the pulmonary nodules previously (incidentally found on cardiology workup and was pending a CT chest to look for the lesions as an OP.     The patient denies any bowel/bladder symptoms. He denies any weakness in his legs. However, he endorses gait instability and the sensation of falling. Given his symptoms, there is concern for cord compression. Further imaging will dictate plans.      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    Surgical History  He has a past surgical history that includes Other surgical history (04/12/2019).     Social History  He reports that he has never smoked. He has never used smokeless tobacco. He reports that he does not currently use alcohol. He reports that he does not use drugs.    Family History  Family History   Problem Relation Name Age of Onset    Hip dysplasia Mother [place]     Arthritis Mother [place]     Other (htn) Father      Prostatitis Father          Allergies  Ciprofloxacin    Review of Systems  14/14 systems reviewed and negative other than what is listed in the history of present illness       Physical Exam  Aox3  PERRL   EOMI   5/5 x 4  Numbness below his nipple   Negative Das's     Last Recorded Vitals  Blood pressure (!)  "140/99, pulse 83, temperature 36.3 °C (97.3 °F), temperature source Temporal, resp. rate 16, height 1.727 m (5' 8\"), weight 111 kg (244 lb 7.8 oz), SpO2 97 %.    Relevant Results    Imaging personally reviewed with CTH negative for any acute intracranial process, CT C/T spine without acute fracture but CT T-spine T7 soft tissue mass, CT chest with mutliple bilateral pulmonary nodules      Assessment/Plan     Mr. Ni is a very nice 46 yo man presenting with 1 week of progressive upper back pain with numbness and tingling beginning at the nipple line. CT T-spine and CT Chest concerning for metastatic disease.     - recommend MRI neuro-axis with and without contrast to better assess for cord compression (stat)   - med/onc c/s   - further recs pending imaging     I spent 35 minutes in the professional and overall care of this patient.      Yesenia Marin MD    of Neurosurgery   [facility]   [facility]   Office: [phone]  Fax: [phone]     "

## 2023-10-01 NOTE — ED PROVIDER NOTES
HPI   Chief Complaint   Patient presents with    Numbness     States numbness from chest down since Thursday, pain between shoulder blades [id number]     47-year-old male with past medical history significant for HTN, HLD, asthma presents with numbness.  Per patient numbness began approximately 10 days ago after he noticed some pain in his back and was evaluated by his PCP who prescribed him diclofenac and gabapentin.  The next day after taking these medications the patient reports that he developed a numbness that begins at his nipple line and travels all the way down to his feet.  He also endorses that he has subjective weakness of his lower extremities.  He denies urinary incontinence, fecal incontinence, but endorses saddle anesthesia that is part of his whole lower extremity numbness.  Denies nausea, vomiting, diarrhea, fever, chills, shortness of breath, cough, chest pain, heart palpitation, abdominal pain, dysuria, urinary frequency                    Shea Coma Scale Score: 15                  Patient History   Past Medical History:   Diagnosis Date    Anosmia 06/15/2020    Loss of smell    Cough variant asthma 04/02/2019    Cough variant asthma    Other conditions influencing health status     No significant past surgical history    Other conditions influencing health status     No significant past medical history    Personal history of other diseases of the respiratory system 04/14/2019    History of bronchitis    Personal history of other drug therapy 10/23/2019    History of influenza vaccination    Personal history of other specified conditions 03/30/2019    History of persistent cough     Past Surgical History:   Procedure Laterality Date    OTHER SURGICAL HISTORY  04/12/2019    No history of surgery     Family History   Problem Relation Name Age of Onset    Hip dysplasia Mother [place]     Arthritis Mother [place]     Other (htn) Father      Prostatitis Father       Social History     Tobacco Use    Smoking  status: Never    Smokeless tobacco: Never   Substance Use Topics    Alcohol use: Not Currently    Drug use: Never       Physical Exam   ED Triage Vitals [09/30/23 1312]   Temp Heart Rate Resp BP   36.2 °C (97.2 °F) 80 18 148/88      SpO2 Temp Source Heart Rate Source Patient Position   99 % Temporal Monitor Sitting      BP Location FiO2 (%)     Left arm --       Physical Exam  Vitals reviewed.   Constitutional:       General: He is not in acute distress.     Appearance: Normal appearance. He is normal weight. He is not ill-appearing.   HENT:      Head: Normocephalic and atraumatic.      Right Ear: External ear normal.      Left Ear: External ear normal.      Nose: Nose normal.      Mouth/Throat:      Mouth: Mucous membranes are moist.      Pharynx: Oropharynx is clear.   Eyes:      Extraocular Movements: Extraocular movements intact.      Pupils: Pupils are equal, round, and reactive to light.   Cardiovascular:      Rate and Rhythm: Normal rate and regular rhythm.      Pulses: Normal pulses.      Heart sounds: Normal heart sounds. No murmur heard.     No friction rub. No gallop.   Pulmonary:      Effort: Pulmonary effort is normal.      Breath sounds: Normal breath sounds. No wheezing, rhonchi or rales.   Abdominal:      General: Abdomen is flat. Bowel sounds are normal.      Palpations: Abdomen is soft.   Musculoskeletal:         General: Normal range of motion.      Cervical back: Normal range of motion and neck supple.   Skin:     General: Skin is warm and dry.      Capillary Refill: Capillary refill takes less than 2 seconds.   Neurological:      General: No focal deficit present.      Mental Status: He is alert and oriented to person, place, and time.      Sensory: No sensory deficit.      Motor: No weakness.      Coordination: Coordination normal.      Gait: Gait abnormal.      Deep Tendon Reflexes: Reflexes normal.   Psychiatric:         Mood and Affect: Mood normal.         Behavior: Behavior normal.          ED Course & MDM        Medical Decision Making  47-year-old male presenting with new onset numbness extending from T4 dermatome through bilateral lower extremities to toes.  History concerning for demyelinating process.  Imaging ordered for further work-up and pending at time of transfer to resident physician, Dr. Jordi Aquino.

## 2023-10-01 NOTE — PROGRESS NOTES
Pharmacy Medication History Review    Dann Ni is a 47 y.o. male admitted for Mass of spine. Pharmacy reviewed the patient's [id number] medications and allergies for accuracy.    The list below reflectives the updated PTA list. Please review each medication in order reconciliation for additional clarification and justification.  Medications Prior to Admission   Medication Sig Dispense Refill Last Dose    albuterol 90 mcg/actuation inhaler Inhale 2 puffs every 6 hours if needed for shortness of breath or wheezing.   Past Month    budesonide-formoteroL (Symbicort) 80-4.5 mcg/actuation inhaler Inhale 2 puffs 2 times a day.   9/30/2023 at 0900    cyclobenzaprine (Flexeril) 10 mg tablet 1/2 - 1 tab at hs prn spasms (Patient not taking: Reported on 10/1/2023) 30 tablet 1 Unknown    diclofenac (Voltaren) 75 mg EC tablet Take 1 tablet (75 mg) by mouth 2 times a day as needed (pain). Do not crush, chew, or split. (Patient not taking: Reported on 10/1/2023) 60 tablet 2 9/30/2023 at 0400    gabapentin (Neurontin) 100 mg capsule Take 1-2 at bedtime (Patient not taking: Reported on 10/1/2023) 60 capsule 2 Past Week at 2100        The list below reflectives the updated allergy list. Please review each documented allergy for additional clarification and justification.  Allergies  Reviewed by Samuel Baeulieu DO on 10/1/2023        Severity Reactions Comments    Ciprofloxacin Not Specified Itching             Below are additional concerns with the patient's PTA list.      Criselda Saldaña CPhT

## 2023-10-01 NOTE — PROGRESS NOTES
Physical Therapy                 Therapy Communication Note    Patient Name: Dann Ni  MRN: [id number]  Today's Date: 10/1/2023     Discipline: Physical Therapy    Missed Visit Reason: Missed Visit Reason: Patient in a medical procedure (Taken for MRI.)    Missed Time: Attempt    Comment: Pt chart reviewed and spoke to pt's nurse on floor. Pt to be taken off floor for MRI. Will hold PT eval at this time and await determination of plan of care.

## 2023-10-01 NOTE — PROGRESS NOTES
Occupational Therapy                 Therapy Communication Note    Patient Name: Dann Ni  MRN: [id number]  Today's Date: 10/1/2023     Discipline: Occupational Therapy    Missed Visit Reason: Missed Visit Reason: Patient in a medical procedure    Missed Time: Attempt    Comment:

## 2023-10-01 NOTE — DISCHARGE INSTRUCTIONS
Transfer to [facility] in [facility] for emergent surgery given cord compression at T2-T3 per MRI  Remain n.p.o.

## 2023-10-01 NOTE — H&P
History Of Present Illness  Dann Ni is a 47 y.o. male presenting with with past medical significant for diverticulitis, HLD, and asthma, who is present with one week of upper back pain and generalized numbness. Patient reports that he has had numbness below nipple down to the knees that has been worsening for the past week. He notes that the numbness is associated with sharp back pain between his shoulder blades that is exacerbated by pressure.  Denies any urinary incontinence, constipation, saddle anesthesia, vision change, fevers, unexpected weight loss, or weakness. Additionally, reports no history of TB, cardiac disease, sick contacts, or recent travel.    To note, he has was seen by his PCP in July for back pain and was given a medrol dose pack and cyclobenzaprine which helped at the time. He was also seen at his PCP on 9/25 and given voltaren and gabapentin.    ED Course:  Vitals: BP: 148/88, HR: 80, RR: 18, SpO2: 99% on room air, T: 36.2  Labs: CBC, CMP, BNP all unremarkable.  Imaging: CT T, L, C spine and head showed: 4.1 cm x 1.8 cm x 1 cm ill defined soft tissue mass in the paravertebral soft tissue of the T7 extends into the T7-T8 neural foramen, no bone destruction of the T7 vertebral body.  There is an ill-defined sclerotic lesion in the T6 vertebral body that is incompletely characterized.  No osseous distraction erosive change of the endplates suggest osteomyelitis.  Findings are likely malignant.  Innumerable subcentimeter solid bilateral pulmonary nodules and mediastinal and bilateral hilar lymphadenopathy.  No acute intracranial abnormality or calvarial fracture.  No acute fracture or traumatic malalignment.  Ill-defined mesenteric stranding/fluid of intermittent etiology with several prominent mesenteric lymph nodes.  Interventions: 5 mL LR bolused.  Neurology consulted in the ED, requested MRI for further evaluation.     Past Medical History  As above.    Surgical History  None      Social History  History of chewing tobacco. Rare alcohol use. Denies any recreational drug use. Lives at home with his wife. Current .    Family History  Family History   Problem Relation Name Age of Onset    Hip dysplasia Mother [place]     Arthritis Mother [place]     Other (htn) Father      Prostatitis Father          Allergies  Ciprofloxacin - burning skin    Review of Systems   Constitutional:  Negative for fever and unexpected weight change.   HENT:  Negative for sore throat.    Eyes:  Negative for visual disturbance.   Respiratory:  Negative for cough and shortness of breath.    Cardiovascular:  Negative for chest pain and palpitations.   Gastrointestinal:  Positive for nausea. Negative for blood in stool, constipation, diarrhea and vomiting.   Genitourinary:  Negative for difficulty urinating, dysuria, frequency, hematuria and urgency.   Musculoskeletal:  Positive for back pain.   Neurological:  Negative for seizures, syncope, weakness and headaches.   Psychiatric/Behavioral:  Negative for hallucinations.         Physical Exam  Constitutional:       General: He is not in acute distress.     Appearance: Normal appearance. He is not toxic-appearing.   HENT:      Head: Normocephalic and atraumatic.      Mouth/Throat:      Mouth: Mucous membranes are moist.      Pharynx: No posterior oropharyngeal erythema.   Eyes:      General: No scleral icterus.     Extraocular Movements: Extraocular movements intact.      Pupils: Pupils are equal, round, and reactive to light.   Cardiovascular:      Rate and Rhythm: Normal rate and regular rhythm.      Heart sounds: No murmur heard.     No friction rub. No gallop.   Pulmonary:      Effort: Pulmonary effort is normal. No respiratory distress.      Breath sounds: Normal breath sounds. No wheezing, rhonchi or rales.   Abdominal:      General: Abdomen is flat. There is distension.      Palpations: Abdomen is soft.      Tenderness: There is no abdominal tenderness.  There is no guarding.   Musculoskeletal:         General: No swelling or tenderness.      Cervical back: Neck supple. No rigidity or tenderness.      Comments: Point tenderness of the thoracic spine at the level of the tip of the shoulder blade.   Skin:     General: Skin is warm and dry.      Capillary Refill: Capillary refill takes less than 2 seconds.      Coloration: Skin is not jaundiced.      Findings: No erythema.   Neurological:      General: No focal deficit present.      Mental Status: He is alert and oriented to person, place, and time.      Motor: No weakness.   Psychiatric:         Mood and Affect: Mood normal.         Behavior: Behavior normal.          Last Recorded Vitals  /84   Pulse 96   Temp 35.9 °C (96.6 °F) (Temporal)   Resp 18   Wt 111 kg (244 lb 7.8 oz)   SpO2 94%     Relevant Results  Scheduled medications     Continuous medications     PRN medications  PRN medications: oxyCODONE-acetaminophen  Results for orders placed or performed during the hospital encounter of 09/30/23 (from the past 24 hour(s))   CBC with Differential   Result Value Ref Range    WBC 7.1 4.4 - 11.3 x10*3/uL    nRBC 0.0 0.0 - 0.0 /100 WBCs    RBC 5.25 4.50 - 5.90 x10*6/uL    Hemoglobin 14.9 13.5 - 17.5 g/dL    Hematocrit 44.6 41.0 - 52.0 %    MCV 85 80 - 100 fL    MCH 28.4 26.0 - 34.0 pg    MCHC 33.4 32.0 - 36.0 g/dL    RDW 12.7 11.5 - 14.5 %    Platelets 256 150 - 450 x10*3/uL    MPV 9.6 7.5 - 11.5 fL    Neutrophils % 68.8 40.0 - 80.0 %    Immature Granulocytes %, Automated 0.4 0.0 - 0.9 %    Lymphocytes % 19.2 13.0 - 44.0 %    Monocytes % 8.8 2.0 - 10.0 %    Eosinophils % 2.1 0.0 - 6.0 %    Basophils % 0.7 0.0 - 2.0 %    Neutrophils Absolute 4.87 1.20 - 7.70 x10*3/uL    Immature Granulocytes Absolute, Automated 0.03 0.00 - 0.70 x10*3/uL    Lymphocytes Absolute 1.36 1.20 - 4.80 x10*3/uL    Monocytes Absolute 0.62 0.10 - 1.00 x10*3/uL    Eosinophils Absolute 0.15 0.00 - 0.70 x10*3/uL    Basophils Absolute 0.05  0.00 - 0.10 x10*3/uL   Comprehensive Metabolic Panel   Result Value Ref Range    Glucose 150 (H) 74 - 99 mg/dL    Sodium 137 136 - 145 mmol/L    Potassium 3.9 3.5 - 5.3 mmol/L    Chloride 100 98 - 107 mmol/L    Bicarbonate 28 21 - 32 mmol/L    Anion Gap 13 10 - 20 mmol/L    Urea Nitrogen 15 6 - 23 mg/dL    Creatinine 0.97 0.50 - 1.30 mg/dL    eGFR >90 >60 mL/min/1.73m*2    Calcium 9.8 8.6 - 10.3 mg/dL    Albumin 4.9 3.4 - 5.0 g/dL    Alkaline Phosphatase 70 33 - 120 U/L    Total Protein 8.1 6.4 - 8.2 g/dL    AST 28 9 - 39 U/L    Bilirubin, Total 0.6 0.0 - 1.2 mg/dL    ALT 30 10 - 52 U/L   Brain Natriuretic Peptide   Result Value Ref Range    BNP 15 0 - 99 pg/mL     CT head wo IV contrast  CT cervical spine wo IV contrast  CT lumbar spine wo IV contrast  CT thoracic spine wo IV contrast    Result Date: 10/1/2023  Interpreted By:  Jose Raul Grace, STUDY: CT HEAD WO IV CONTRAST; CT CERVICAL SPINE WO IV CONTRAST; CT LUMBAR SPINE WO IV CONTRAST; CT THORACIC SPINE WO IV CONTRAST;  10/1/2023 12:28 am   INDICATION: anticoagulated head injury; Signs/Symptoms:New onset numbness from T7 moving inferiorly.   COMPARISON: CT coronary artery calcium 09/26/2023   ACCESSION NUMBER(S): [id number]; [id number]; [id number]; [id number]   ORDERING CLINICIAN: CHRISTIANO COPELAND   TECHNIQUE: Axial noncontrast images of the head, cervical, thoracic and lumbar spine with coronal and sagittal reconstructed images.   FINDINGS: CT HEAD:   BRAIN PARENCHYMA:  No acute intraparenchymal hemorrhage or parenchymal evidence of acute large territory ischemic infarct. No mass-effect. Gray-white matter distinction is preserved.   VENTRICLES and EXTRA-AXIAL SPACES: Incidental note of cavum septum pellucidum at vergae. No acute extra-axial or intraventricular hemorrhage. No effacement of cerebral sulci. Ventricles and sulci are age-concordant.   PARANASAL SINUSES/MASTOIDS: Small amount of frothy secretions in the left maxillary sinus. No hemorrhage  or air-fluid levels within the visualized paranasal sinuses. The mastoids are well aerated.   CALVARIUM/ORBITS:  No skull fracture.  The orbits and globes are intact to the extent visualized.   EXTRACRANIAL SOFT TISSUES: No discernible abnormality.     CT CERVICAL SPINE:   PREVERTEBRAL SOFT TISSUES: Within normal limits.   CRANIOCERVICAL JUNCTION: Intact.   ALIGNMENT:  No traumatic malalignment or traumatic facet widening.   VERTEBRAE:  No acute fracture. Vertebral body heights are maintained.   SPINAL CANAL/INTERVERTEBRAL DISCS: No high-grade spinal canal stenosis. No significant disc height loss. Minor anterior endplate spurring at C2-C3, C5-C6, C6-C7.   NEURAL FORAMINA: Multilevel uncovertebral joint and facet arthropathy notably contribute to moderate right C3-C4 foraminal stenosis, mild right C5-C6 foraminal stenosis..   OTHER: None.     CT THORACIC SPINE:   PARASPINAL SOFT TISSUES: There is a 4.1 cm x 1.8 cm x 1 cm ill-defined soft tissue mass in the paravertebral soft tissues at T7 that extends to the T7-T8 neural foramen, and partially surrounds the anterior and right lateral aspects of the T7 vertebral body.   ALIGNMENT:  No traumatic malalignment or traumatic facet widening.   VERTEBRAE: Within the T6 vertebral body, there is a 1 cm mildly sclerotic demonstrating corticoid morphology on axial images suggestive of hemangioma. No acute fracture. Vertebral body heights are maintained. No endplate destruction.   SPINAL CANAL/INTERVERTEBRAL DISCS: No high-grade spinal canal stenosis. No significant disc height loss. There is minor anterior endplate spurring in the midthoracic spine. There are no significant posterior disc spur complexes.   VISUALIZED CHEST: There are innumerable subcentimeter pulmonary nodules in all lobes of both visualized lungs, all measuring < 1 cm. These are noncalcified. Additionally, there are numerous enlarged mediastinal and bilateral hilar lymph nodes, for example 1.7 cm right lower  paratracheal, 1.8 cm subcarinal, amongst numerous others. The ascending aorta is mildly ectatic at 3.9 cm. There is mild coronary artery calcification of the left main coronary artery.     CT LUMBAR SPINE:   PARASPINAL SOFT TISSUES: No significant abnormality.   ALIGNMENT:  No traumatic malalignment or traumatic facet widening.   VERTEBRAE:  No acute fracture. Vertebral body heights are maintained.   SPINAL CANAL/INTERVERTEBRAL DISCS: No high-grade spinal canal stenosis. No significant disc height loss.   NEURAL FORAMINA: No significant neural stenosis.   VISUALIZED ABDOMEN: There is sclerosis of both sacroiliac joints on the ilial and sacral side with associated erosions and ankylosis. There is no surrounding inflammation or fluid collection. There is a nonobstructing 0.4 cm left renal calculus. Mild aortic atherosclerosis without AAA. There is stranding/fluid in the partially visualized mesentery in several prominent lymph nodes measuring up to 1 cm.       1. 4.1 cm x 1.8 cm x 1 cm ill-defined soft tissue mass in the paravertebral soft tissues at T7 extends into the T7-T8 neural foramen likely accounting for patient's neurological symptoms, and partially surrounds the anterior and right lateral aspects of the T7 vertebral body. There is no bone destruction of the T7 vertebral body. However there is an ill-defined sclerotic lesion in the T6 vertebral body that is incompletely characterized. There is no osseous destruction or erosive change about the endplates to suggest diskitis osteomyelitis. Findings are likely malignant. Recommend contrast enhanced MRI of the thoracic spine for further evaluation, there is an seen which may be determined on a clinical basis as this does not appear to cause significant compromise.   2. Innumerable subcentimeter solid bilateral pulmonary nodules and mediastinal and bilateral hilar lymphadenopathy. Given the nature of the paraspinal mass, findings are concerning for metastatic  disease of unknown primary which may include abdominopelvic malignancy or lymphoma. However, lymphoma is less common to manifest as pulmonary nodules. Recommend oncological follow-up in full workup that may include PET-CT, lymph node biopsy. YELLOW ALERT: An incidental finding alert was sent per protocol.   3. No acute intracranial abnormality or calvarial fracture.   4. No acute fracture or traumatic malalignment.   5. Ill-defined mesenteric stranding/fluid of indeterminate etiology with several prominent mesenteric lymph nodes, appearance that can be seen with lymphoma or mesenteric panniculitis.   MACRO: Critical Finding:  See findings. Notification was initiated on 10/1/2023 at 1:37 am by  Jose Raul Grace.  (**-YCF-**) Instructions:   Signed by: Jose Raul Grace 10/1/2023 1:37 AM Dictation workstation:   [id number]     Assessment/Plan   Dann Ni is a 47 y.o. male presenting with with past medical significant for diverticulitis, HLD, and asthma, who is present with one week of upper back pain and generalized numbness.  Imaging showed T7 mass with sclerotic lesion at T6 vertebral body, as well as pulmonary nodules and prominent lymph nodes.  High suspicion for metastatic disease.  Additionally will consider musculoskeletal pain, and osteomyelitis.  Patient currently admitted for evaluation of spinal mass.  Neurology consulted, with MRI ordered.    #T7 Mass 2/2 malignancy?  #Sclerotic lesion T6 vertebral body  #Paresthesia  #Back pain  -Neurologic symptoms most likely secondary to soft tissue mass, concern for possible malignancy.   -CT scan showed 4.1 cm x 1.8 cm x 1 cm mass in the T7-T8 neural foramen and sclerotic lesion at the vertebral body of T6  -Percocet every 6 hours as needed moderate to severe pain  -MR thoracic spine with and without contrast ordered  -ESR and CRP ordered  -Neurology consulted, appreciate recs    #Pulmonary nodules 2/2 metastatic disease?  -Concern for metastatic disease  given history and imaging  -CT scan significant for mediastinal and bilateral hilar lymphadenopathy and prominent mesenteric lymph nodes  -Continue to monitor    Chronic conditions  #Diverticulitis  #HLD  #Asthma  -Continue home medications and management as necessary    IVF: None  Diet: NPO pending possible procedure  ABx: None  Consults: Neurosurgery  DVT: SCDs    CODE STATUS: Full    Dispo: Patient will need MRI and further evaluation for possible metastatic disease.  Expect 2+ additional midnights.    Case to be discussed with attending physician,    Samuel Beaulieu D.O. PGY-1    Senior resident addendum  Patient seen and examined independently from intern physician.  The note as shown as above has been edited to reflect my input.    Olivia Hoffmann MD  PGY-3

## 2023-10-01 NOTE — ED PROVIDER NOTES
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    Loss of smell    Cough variant asthma 04/02/2019    Cough variant asthma    Other conditions influencing health status     No significant past surgical history    Other conditions influencing health status     No significant past medical history    Personal history of other diseases of the respiratory system 04/14/2019    History of bronchitis    Personal history of other drug therapy 10/23/2019    History of influenza vaccination    Personal history of other specified conditions 03/30/2019    History of persistent cough     Past Surgical History:   Procedure Laterality Date    OTHER SURGICAL HISTORY  04/12/2019    No history of surgery     Family History   Problem Relation Name Age of Onset    Hip dysplasia Mother [place]     Arthritis Mother [place]     Other (htn) Father      Prostatitis Father       Social History     Tobacco Use    Smoking status: Never    Smokeless tobacco: Never   Substance Use Topics    Alcohol use: Not Currently    Drug use: Never       Physical Exam   ED Triage Vitals [09/30/23 1312]   Temp Heart Rate Resp BP   36.2 °C (97.2 °F) 80 18 148/88      SpO2 Temp Source Heart Rate Source Patient Position   99 % Temporal Monitor Sitting      BP Location FiO2 (%)     Left arm --       Physical Exam    ED Course & MDM   Diagnoses as of 10/02/23 1101   Mass of spine       Medical Decision Making  Patient signed out to me by previous provider awaiting CT spine imaging results.  Please refer to the prior HPI for history and information.  Briefly, this is a 47-year-old male presenting to the ED with a complaint of numbness from his mid thorax downward.     CT spinal imaging revealed a soft tissue mass in the  neural foramen of T7-T8 and partially surrounding the right lateral aspect of T7 vertebral body.  Findings suggestive of malignancy.  Attending, Dr. Pizano, spoke with neurosurgeon, Dr. Martin, consulted and agreed with MRI and admission.    Disposition: Patient admitted for MRI of the spine and further evaluation by neurosurgery.        Procedure  Procedures     Jordi Corley MD  Resident  10/02/23 [id number]

## 2023-10-01 NOTE — DISCHARGE SUMMARY
Discharge Diagnosis  Paraspinal mass    Issues Requiring Follow-Up  Transfer to [facility] in [facility] for emergent surgery given cord compression at T2-T3 per MRI  Remain n.p.o.    Discharge Meds     Your medication list        ASK your doctor about these medications        Instructions Last Dose Given Next Dose Due   albuterol 90 mcg/actuation inhaler           budesonide-formoteroL 80-4.5 mcg/actuation inhaler  Commonly known as: Symbicort           cyclobenzaprine 10 mg tablet  Commonly known as: Flexeril      1/2 - 1 tab at hs prn spasms       diclofenac 75 mg EC tablet  Commonly known as: Voltaren      Take 1 tablet (75 mg) by mouth 2 times a day as needed (pain). Do not crush, chew, or split.       gabapentin 100 mg capsule  Commonly known as: Neurontin      Take 1-2 at bedtime                Test Results Pending At Discharge  Pending Labs       Order Current Status    Extra Tubes In process    Extra Tubes In process    Light Blue Top In process    SST TOP In process            Hospital Course  Mr. Ni is a 47-year-old man with no significant past medical history admitted for suspected paraspinal mass and suspected metastatic disease. Patien reports that he first started having back pain this past July and August;  Outpatient workup includes back x-rays which were normal. Patient was discharged on Medrol Dosepak and cyclobenzaprine. Patient returned to the ED, states worsening back pain with numbness below the nipple line that started last week as well as sharp pain between his shoulder blades. Patient denies red flags, denies incontinence, constipation, denies weakness or saddle anesthesia. In the ED, vitally stable and Labs eluding CBC and CMP were normal.  CT of his spine demonstrated a 4.1 x 1.8 x 1 cm soft tissue mass in the paravertebral soft tissues at T7 extending into T7-T8 neuroforamen as well as innumerable pulmonary nodules and mediastinal hilar lymphadenopathy.  Patient admitted  for work-up of paraspinal mass and incidental findings made of pulmonary nodules and bilateral hilar lymphadenopathy.  Overall constellation of findings suggests metastatic disease however we need more data to determine primary.  Neurosurgery was consulted for further recommendations regarding paraspinal mass with neural foraminal impingement.  Patient had MRI in the morning of the cervical, thoracic and lumbar spine.  Radiology informed attending that patient has cord compression at T2-T3, concerning for lymphoma. Patient will be transferred to [facility] for emergency surgery.   Patient will need other work-up to determine primary site of malignancy.     Pertinent Physical Exam At Time of Discharge  General: well appearing, no acute distress  HEENT:  moist mucous membranes  CV: regular rate and rhythm, no murmurs, 2+ pulses in all extremities  RESP: CTA bilaterally, normal chest expansion, no resp distress  Abd: soft, nontender, nondistended  Neuro: alert and oriented x3, speech appropriate; B LE: diminished sensation, tenderness to potation on paraspinal thoracic spine  Vascular: no peripheral edema appreciated  Skin: no rashes  MSK: no joint swelling strength intact 5/5 BL LE/UE     Outpatient Follow-Up  Future Appointments   Date Time Provider Department Center   10/9/2023  9:15 AM Ventura Gonzalez DO [id number] [place]         Jazmyn Cerda DO  Case and plan discussed with attending

## 2023-10-02 ENCOUNTER — APPOINTMENT (OUTPATIENT)
Dept: NEUROLOGY | Facility: HOSPITAL | Age: 48
DRG: 820 | End: 2023-10-02
Payer: COMMERCIAL

## 2023-10-02 ENCOUNTER — DOCUMENTATION (OUTPATIENT)
Dept: INPATIENT UNIT | Facility: HOSPITAL | Age: 48
End: 2023-10-02

## 2023-10-02 PROBLEM — Z98.890 PONV (POSTOPERATIVE NAUSEA AND VOMITING): Status: ACTIVE | Noted: 2023-10-02

## 2023-10-02 PROBLEM — R11.2 PONV (POSTOPERATIVE NAUSEA AND VOMITING): Status: ACTIVE | Noted: 2023-10-02

## 2023-10-02 LAB
ABO GROUP (TYPE) IN BLOOD: NORMAL
ABO GROUP (TYPE) IN BLOOD: NORMAL
ANGIOTENSIN CONVERTING ENZYME: 59 U/L (ref 16–85)
ANTIBODY SCREEN: NORMAL
ERYTHROCYTE [DISTWIDTH] IN BLOOD BY AUTOMATED COUNT: 12.6 % (ref 11.5–14.5)
HCT VFR BLD AUTO: 31.5 % (ref 41–52)
HGB BLD-MCNC: 11.3 G/DL (ref 13.5–17.5)
MCH RBC QN AUTO: 29.8 PG (ref 26–34)
MCHC RBC AUTO-ENTMCNC: 35.9 G/DL (ref 32–36)
MCV RBC AUTO: 83 FL (ref 80–100)
NRBC BLD-RTO: 0 /100 WBCS (ref 0–0)
PLATELET # BLD AUTO: 237 X10*3/UL (ref 150–450)
PMV BLD AUTO: 10.2 FL (ref 7.5–11.5)
RBC # BLD AUTO: 3.79 X10*6/UL (ref 4.5–5.9)
RH FACTOR (ANTIGEN D): NORMAL
RH FACTOR (ANTIGEN D): NORMAL
WBC # BLD AUTO: 16.8 X10*3/UL (ref 4.4–11.3)

## 2023-10-02 PROCEDURE — 2500000005 HC RX 250 GENERAL PHARMACY W/O HCPCS

## 2023-10-02 PROCEDURE — 88363 XM ARCHIVE TISSUE MOLEC ANAL: CPT | Performed by: PATHOLOGY

## 2023-10-02 PROCEDURE — 88275 CYTOGENETICS 100-300: CPT | Performed by: STUDENT IN AN ORGANIZED HEALTH CARE EDUCATION/TRAINING PROGRAM

## 2023-10-02 PROCEDURE — 88291 CYTO/MOLECULAR REPORT: CPT | Performed by: STUDENT IN AN ORGANIZED HEALTH CARE EDUCATION/TRAINING PROGRAM

## 2023-10-02 PROCEDURE — 2500000004 HC RX 250 GENERAL PHARMACY W/ HCPCS (ALT 636 FOR OP/ED): Performed by: STUDENT IN AN ORGANIZED HEALTH CARE EDUCATION/TRAINING PROGRAM

## 2023-10-02 PROCEDURE — 88341 IMHCHEM/IMCYTCHM EA ADD ANTB: CPT | Mod: TC | Performed by: STUDENT IN AN ORGANIZED HEALTH CARE EDUCATION/TRAINING PROGRAM

## 2023-10-02 PROCEDURE — 36415 COLL VENOUS BLD VENIPUNCTURE: CPT | Performed by: STUDENT IN AN ORGANIZED HEALTH CARE EDUCATION/TRAINING PROGRAM

## 2023-10-02 PROCEDURE — 88342 IMHCHEM/IMCYTCHM 1ST ANTB: CPT | Performed by: PATHOLOGY

## 2023-10-02 PROCEDURE — 88189 FLOWCYTOMETRY/READ 16 & >: CPT | Performed by: PATHOLOGY

## 2023-10-02 PROCEDURE — 81305 MYD88 GENE P.LEU265PRO VRNT: CPT | Performed by: STUDENT IN AN ORGANIZED HEALTH CARE EDUCATION/TRAINING PROGRAM

## 2023-10-02 PROCEDURE — 88365 INSITU HYBRIDIZATION (FISH): CPT | Mod: TC,SUR | Performed by: STUDENT IN AN ORGANIZED HEALTH CARE EDUCATION/TRAINING PROGRAM

## 2023-10-02 PROCEDURE — 88331 PATH CONSLTJ SURG 1 BLK 1SPC: CPT | Mod: TC | Performed by: PATHOLOGY

## 2023-10-02 PROCEDURE — 2500000002 HC RX 250 W HCPCS SELF ADMINISTERED DRUGS (ALT 637 FOR MEDICARE OP, ALT 636 FOR OP/ED)

## 2023-10-02 PROCEDURE — 88291 CYTO/MOLECULAR REPORT: CPT | Performed by: PATHOLOGY

## 2023-10-02 PROCEDURE — 2580000001 HC RX 258 IV SOLUTIONS: Performed by: ANESTHESIOLOGY

## 2023-10-02 PROCEDURE — 95941 IONM REMOTE/>1 PT OR PER HR: CPT | Performed by: STUDENT IN AN ORGANIZED HEALTH CARE EDUCATION/TRAINING PROGRAM

## 2023-10-02 PROCEDURE — A4217 STERILE WATER/SALINE, 500 ML: HCPCS | Performed by: STUDENT IN AN ORGANIZED HEALTH CARE EDUCATION/TRAINING PROGRAM

## 2023-10-02 PROCEDURE — 95955 EEG DURING SURGERY: CPT | Performed by: STUDENT IN AN ORGANIZED HEALTH CARE EDUCATION/TRAINING PROGRAM

## 2023-10-02 PROCEDURE — 88341 IMHCHEM/IMCYTCHM EA ADD ANTB: CPT | Performed by: PATHOLOGY

## 2023-10-02 PROCEDURE — 1100000001 HC PRIVATE ROOM DAILY

## 2023-10-02 PROCEDURE — 2500000001 HC RX 250 WO HCPCS SELF ADMINISTERED DRUGS (ALT 637 FOR MEDICARE OP): Performed by: STUDENT IN AN ORGANIZED HEALTH CARE EDUCATION/TRAINING PROGRAM

## 2023-10-02 PROCEDURE — 7100000001 HC RECOVERY ROOM TIME - INITIAL BASE CHARGE: Performed by: STUDENT IN AN ORGANIZED HEALTH CARE EDUCATION/TRAINING PROGRAM

## 2023-10-02 PROCEDURE — 88365 INSITU HYBRIDIZATION (FISH): CPT | Performed by: PATHOLOGY

## 2023-10-02 PROCEDURE — 88305 TISSUE EXAM BY PATHOLOGIST: CPT | Performed by: PATHOLOGY

## 2023-10-02 PROCEDURE — 3600000009 HC OR TIME - EACH INCREMENTAL 1 MINUTE - PROCEDURE LEVEL FOUR: Performed by: STUDENT IN AN ORGANIZED HEALTH CARE EDUCATION/TRAINING PROGRAM

## 2023-10-02 PROCEDURE — 88185 FLOWCYTOMETRY/TC ADD-ON: CPT | Mod: TC | Performed by: STUDENT IN AN ORGANIZED HEALTH CARE EDUCATION/TRAINING PROGRAM

## 2023-10-02 PROCEDURE — 88271 CYTOGENETICS DNA PROBE: CPT | Performed by: STUDENT IN AN ORGANIZED HEALTH CARE EDUCATION/TRAINING PROGRAM

## 2023-10-02 PROCEDURE — 88341 IMHCHEM/IMCYTCHM EA ADD ANTB: CPT | Mod: TC,SUR | Performed by: STUDENT IN AN ORGANIZED HEALTH CARE EDUCATION/TRAINING PROGRAM

## 2023-10-02 PROCEDURE — 2720000007 HC OR 272 NO HCPCS: Performed by: STUDENT IN AN ORGANIZED HEALTH CARE EDUCATION/TRAINING PROGRAM

## 2023-10-02 PROCEDURE — 2580000001 HC RX 258 IV SOLUTIONS

## 2023-10-02 PROCEDURE — G0452 MOLECULAR PATHOLOGY INTERPR: HCPCS | Performed by: STUDENT IN AN ORGANIZED HEALTH CARE EDUCATION/TRAINING PROGRAM

## 2023-10-02 PROCEDURE — 3700000001 HC GENERAL ANESTHESIA TIME - INITIAL BASE CHARGE: Performed by: STUDENT IN AN ORGANIZED HEALTH CARE EDUCATION/TRAINING PROGRAM

## 2023-10-02 PROCEDURE — A63281 PR BX/EXCIS SPIN TUM,INDUR,XMED,THOR: Performed by: ANESTHESIOLOGY

## 2023-10-02 PROCEDURE — 3700000002 HC GENERAL ANESTHESIA TIME - EACH INCREMENTAL 1 MINUTE: Performed by: STUDENT IN AN ORGANIZED HEALTH CARE EDUCATION/TRAINING PROGRAM

## 2023-10-02 PROCEDURE — 63281 BX/EXC IDRL SPINE LESN THRC: CPT | Performed by: STUDENT IN AN ORGANIZED HEALTH CARE EDUCATION/TRAINING PROGRAM

## 2023-10-02 PROCEDURE — 86901 BLOOD TYPING SEROLOGIC RH(D): CPT | Performed by: STUDENT IN AN ORGANIZED HEALTH CARE EDUCATION/TRAINING PROGRAM

## 2023-10-02 PROCEDURE — 81261 IGH GENE REARRANGE AMP METH: CPT | Performed by: STUDENT IN AN ORGANIZED HEALTH CARE EDUCATION/TRAINING PROGRAM

## 2023-10-02 PROCEDURE — 88305 TISSUE EXAM BY PATHOLOGIST: CPT | Mod: TC | Performed by: STUDENT IN AN ORGANIZED HEALTH CARE EDUCATION/TRAINING PROGRAM

## 2023-10-02 PROCEDURE — 7100000002 HC RECOVERY ROOM TIME - EACH INCREMENTAL 1 MINUTE: Performed by: STUDENT IN AN ORGANIZED HEALTH CARE EDUCATION/TRAINING PROGRAM

## 2023-10-02 PROCEDURE — 95939 C MOTOR EVOKED UPR&LWR LIMBS: CPT

## 2023-10-02 PROCEDURE — 2780000003 HC OR 278 NO HCPCS: Performed by: STUDENT IN AN ORGANIZED HEALTH CARE EDUCATION/TRAINING PROGRAM

## 2023-10-02 PROCEDURE — 71045 X-RAY EXAM CHEST 1 VIEW: CPT | Performed by: RADIOLOGY

## 2023-10-02 PROCEDURE — P9045 ALBUMIN (HUMAN), 5%, 250 ML: HCPCS | Mod: JZ

## 2023-10-02 PROCEDURE — 88331 PATH CONSLTJ SURG 1 BLK 1SPC: CPT | Mod: TC,SUR | Performed by: PATHOLOGY

## 2023-10-02 PROCEDURE — 95940 IONM IN OPERATNG ROOM 15 MIN: CPT

## 2023-10-02 PROCEDURE — 2500000004 HC RX 250 GENERAL PHARMACY W/ HCPCS (ALT 636 FOR OP/ED)

## 2023-10-02 PROCEDURE — 2500000005 HC RX 250 GENERAL PHARMACY W/O HCPCS: Performed by: STUDENT IN AN ORGANIZED HEALTH CARE EDUCATION/TRAINING PROGRAM

## 2023-10-02 PROCEDURE — 95938 SOMATOSENSORY TESTING: CPT | Performed by: STUDENT IN AN ORGANIZED HEALTH CARE EDUCATION/TRAINING PROGRAM

## 2023-10-02 PROCEDURE — 95939 C MOTOR EVOKED UPR&LWR LIMBS: CPT | Performed by: STUDENT IN AN ORGANIZED HEALTH CARE EDUCATION/TRAINING PROGRAM

## 2023-10-02 PROCEDURE — 3600000004 HC OR TIME - INITIAL BASE CHARGE - PROCEDURE LEVEL FOUR: Performed by: STUDENT IN AN ORGANIZED HEALTH CARE EDUCATION/TRAINING PROGRAM

## 2023-10-02 PROCEDURE — 95870 NDL EMG LMTD STD MUSC 1 XTR: CPT | Performed by: STUDENT IN AN ORGANIZED HEALTH CARE EDUCATION/TRAINING PROGRAM

## 2023-10-02 PROCEDURE — 85027 COMPLETE CBC AUTOMATED: CPT | Performed by: STUDENT IN AN ORGANIZED HEALTH CARE EDUCATION/TRAINING PROGRAM

## 2023-10-02 RX ORDER — ONDANSETRON HYDROCHLORIDE 2 MG/ML
INJECTION, SOLUTION INTRAVENOUS AS NEEDED
Status: DISCONTINUED | OUTPATIENT
Start: 2023-10-02 | End: 2023-10-02

## 2023-10-02 RX ORDER — BUPIVACAINE HYDROCHLORIDE 2.5 MG/ML
INJECTION, SOLUTION INFILTRATION; PERINEURAL AS NEEDED
Status: DISCONTINUED | OUTPATIENT
Start: 2023-10-02 | End: 2023-10-02 | Stop reason: HOSPADM

## 2023-10-02 RX ORDER — OXYCODONE HYDROCHLORIDE 5 MG/1
5 TABLET ORAL EVERY 4 HOURS PRN
Status: DISCONTINUED | OUTPATIENT
Start: 2023-10-02 | End: 2023-10-07 | Stop reason: HOSPADM

## 2023-10-02 RX ORDER — ESMOLOL HYDROCHLORIDE 10 MG/ML
INJECTION INTRAVENOUS AS NEEDED
Status: DISCONTINUED | OUTPATIENT
Start: 2023-10-02 | End: 2023-10-02

## 2023-10-02 RX ORDER — REMIFENTANIL HYDROCHLORIDE 1 MG/ML
INJECTION, POWDER, LYOPHILIZED, FOR SOLUTION INTRAVENOUS CONTINUOUS PRN
Status: DISCONTINUED | OUTPATIENT
Start: 2023-10-02 | End: 2023-10-02

## 2023-10-02 RX ORDER — POLYMYXIN B 500000 [USP'U]/1
INJECTION, POWDER, LYOPHILIZED, FOR SOLUTION INTRAMUSCULAR; INTRATHECAL; INTRAVENOUS; OPHTHALMIC AS NEEDED
Status: DISCONTINUED | OUTPATIENT
Start: 2023-10-02 | End: 2023-10-02 | Stop reason: HOSPADM

## 2023-10-02 RX ORDER — CEFAZOLIN SODIUM 2 G/100ML
2 INJECTION, SOLUTION INTRAVENOUS ONCE
Status: COMPLETED | OUTPATIENT
Start: 2023-10-02 | End: 2023-10-02

## 2023-10-02 RX ORDER — ALBUMIN HUMAN 50 G/1000ML
SOLUTION INTRAVENOUS AS NEEDED
Status: DISCONTINUED | OUTPATIENT
Start: 2023-10-02 | End: 2023-10-02

## 2023-10-02 RX ORDER — DROPERIDOL 2.5 MG/ML
0.62 INJECTION, SOLUTION INTRAMUSCULAR; INTRAVENOUS ONCE AS NEEDED
Status: DISCONTINUED | OUTPATIENT
Start: 2023-10-02 | End: 2023-10-02 | Stop reason: HOSPADM

## 2023-10-02 RX ORDER — DEXTROSE MONOHYDRATE 100 MG/ML
0.3 INJECTION, SOLUTION INTRAVENOUS ONCE AS NEEDED
Status: DISCONTINUED | OUTPATIENT
Start: 2023-10-02 | End: 2023-10-07 | Stop reason: HOSPADM

## 2023-10-02 RX ORDER — HEPARIN SODIUM 5000 [USP'U]/ML
5000 INJECTION, SOLUTION INTRAVENOUS; SUBCUTANEOUS EVERY 8 HOURS
Status: DISCONTINUED | OUTPATIENT
Start: 2023-10-03 | End: 2023-10-07 | Stop reason: HOSPADM

## 2023-10-02 RX ORDER — SODIUM CHLORIDE 9 MG/ML
100 INJECTION, SOLUTION INTRAVENOUS CONTINUOUS
Status: DISCONTINUED | OUTPATIENT
Start: 2023-10-02 | End: 2023-10-07 | Stop reason: HOSPADM

## 2023-10-02 RX ORDER — INSULIN LISPRO 100 [IU]/ML
0-10 INJECTION, SOLUTION INTRAVENOUS; SUBCUTANEOUS
Status: DISCONTINUED | OUTPATIENT
Start: 2023-10-02 | End: 2023-10-07 | Stop reason: HOSPADM

## 2023-10-02 RX ORDER — MIDAZOLAM HYDROCHLORIDE 1 MG/ML
INJECTION INTRAMUSCULAR; INTRAVENOUS AS NEEDED
Status: DISCONTINUED | OUTPATIENT
Start: 2023-10-02 | End: 2023-10-02

## 2023-10-02 RX ORDER — TRANEXAMIC ACID 10 MG/ML
INJECTION, SOLUTION INTRAVENOUS AS NEEDED
Status: DISCONTINUED | OUTPATIENT
Start: 2023-10-02 | End: 2023-10-02

## 2023-10-02 RX ORDER — GLYCOPYRROLATE 0.2 MG/ML
INJECTION INTRAMUSCULAR; INTRAVENOUS AS NEEDED
Status: DISCONTINUED | OUTPATIENT
Start: 2023-10-02 | End: 2023-10-02

## 2023-10-02 RX ORDER — MAGNESIUM SULFATE HEPTAHYDRATE 40 MG/ML
INJECTION, SOLUTION INTRAVENOUS AS NEEDED
Status: DISCONTINUED | OUTPATIENT
Start: 2023-10-02 | End: 2023-10-02

## 2023-10-02 RX ORDER — LIDOCAINE HYDROCHLORIDE AND EPINEPHRINE 5; 5 MG/ML; UG/ML
INJECTION, SOLUTION INFILTRATION; PERINEURAL AS NEEDED
Status: DISCONTINUED | OUTPATIENT
Start: 2023-10-02 | End: 2023-10-02 | Stop reason: HOSPADM

## 2023-10-02 RX ORDER — PANTOPRAZOLE SODIUM 40 MG/1
40 TABLET, DELAYED RELEASE ORAL
Status: DISCONTINUED | OUTPATIENT
Start: 2023-10-03 | End: 2023-10-07 | Stop reason: HOSPADM

## 2023-10-02 RX ORDER — PHENYLEPHRINE HCL IN 0.9% NACL 1 MG/10 ML
SYRINGE (ML) INTRAVENOUS AS NEEDED
Status: DISCONTINUED | OUTPATIENT
Start: 2023-10-02 | End: 2023-10-02

## 2023-10-02 RX ORDER — PROPOFOL 10 MG/ML
INJECTION, EMULSION INTRAVENOUS CONTINUOUS PRN
Status: DISCONTINUED | OUTPATIENT
Start: 2023-10-02 | End: 2023-10-02

## 2023-10-02 RX ORDER — PHENYLEPHRINE 10 MG/250 ML(40 MCG/ML)IN 0.9 % SOD.CHLORIDE INTRAVENOUS
CONTINUOUS PRN
Status: DISCONTINUED | OUTPATIENT
Start: 2023-10-02 | End: 2023-10-02

## 2023-10-02 RX ORDER — LIDOCAINE HYDROCHLORIDE 10 MG/ML
0.1 INJECTION, SOLUTION EPIDURAL; INFILTRATION; INTRACAUDAL; PERINEURAL ONCE
Status: DISCONTINUED | OUTPATIENT
Start: 2023-10-02 | End: 2023-10-02 | Stop reason: HOSPADM

## 2023-10-02 RX ORDER — HYDROMORPHONE HYDROCHLORIDE 1 MG/ML
0.2 INJECTION, SOLUTION INTRAMUSCULAR; INTRAVENOUS; SUBCUTANEOUS
Status: DISCONTINUED | OUTPATIENT
Start: 2023-10-02 | End: 2023-10-07 | Stop reason: HOSPADM

## 2023-10-02 RX ORDER — ALBUTEROL SULFATE 0.83 MG/ML
2.5 SOLUTION RESPIRATORY (INHALATION) ONCE AS NEEDED
Status: DISCONTINUED | OUTPATIENT
Start: 2023-10-02 | End: 2023-10-02 | Stop reason: HOSPADM

## 2023-10-02 RX ORDER — VANCOMYCIN HYDROCHLORIDE 1 G/20ML
INJECTION, POWDER, LYOPHILIZED, FOR SOLUTION INTRAVENOUS AS NEEDED
Status: DISCONTINUED | OUTPATIENT
Start: 2023-10-02 | End: 2023-10-02 | Stop reason: HOSPADM

## 2023-10-02 RX ORDER — SODIUM CHLORIDE 0.9 G/100ML
IRRIGANT IRRIGATION AS NEEDED
Status: DISCONTINUED | OUTPATIENT
Start: 2023-10-02 | End: 2023-10-02 | Stop reason: HOSPADM

## 2023-10-02 RX ORDER — SODIUM CHLORIDE, SODIUM LACTATE, POTASSIUM CHLORIDE, CALCIUM CHLORIDE 600; 310; 30; 20 MG/100ML; MG/100ML; MG/100ML; MG/100ML
100 INJECTION, SOLUTION INTRAVENOUS CONTINUOUS
Status: DISCONTINUED | OUTPATIENT
Start: 2023-10-02 | End: 2023-10-02 | Stop reason: HOSPADM

## 2023-10-02 RX ORDER — LABETALOL HYDROCHLORIDE 5 MG/ML
5 INJECTION, SOLUTION INTRAVENOUS ONCE AS NEEDED
Status: DISCONTINUED | OUTPATIENT
Start: 2023-10-02 | End: 2023-10-02 | Stop reason: HOSPADM

## 2023-10-02 RX ORDER — SODIUM CHLORIDE, SODIUM LACTATE, POTASSIUM CHLORIDE, CALCIUM CHLORIDE 600; 310; 30; 20 MG/100ML; MG/100ML; MG/100ML; MG/100ML
INJECTION, SOLUTION INTRAVENOUS CONTINUOUS PRN
Status: DISCONTINUED | OUTPATIENT
Start: 2023-10-02 | End: 2023-10-02

## 2023-10-02 RX ORDER — FENTANYL CITRATE 50 UG/ML
INJECTION, SOLUTION INTRAMUSCULAR; INTRAVENOUS AS NEEDED
Status: DISCONTINUED | OUTPATIENT
Start: 2023-10-02 | End: 2023-10-02

## 2023-10-02 RX ORDER — PROPOFOL 10 MG/ML
INJECTION, EMULSION INTRAVENOUS AS NEEDED
Status: DISCONTINUED | OUTPATIENT
Start: 2023-10-02 | End: 2023-10-02

## 2023-10-02 RX ORDER — OXYCODONE HYDROCHLORIDE 5 MG/1
10 TABLET ORAL EVERY 4 HOURS PRN
Status: DISCONTINUED | OUTPATIENT
Start: 2023-10-02 | End: 2023-10-07 | Stop reason: HOSPADM

## 2023-10-02 RX ORDER — CEFAZOLIN 1 G/1
INJECTION, POWDER, FOR SOLUTION INTRAVENOUS AS NEEDED
Status: DISCONTINUED | OUTPATIENT
Start: 2023-10-02 | End: 2023-10-02

## 2023-10-02 RX ORDER — DEXTROSE 50 % IN WATER (D50W) INTRAVENOUS SYRINGE
25
Status: DISCONTINUED | OUTPATIENT
Start: 2023-10-02 | End: 2023-10-07 | Stop reason: HOSPADM

## 2023-10-02 RX ORDER — BACITRACIN 500 [USP'U]/G
OINTMENT TOPICAL AS NEEDED
Status: DISCONTINUED | OUTPATIENT
Start: 2023-10-02 | End: 2023-10-02 | Stop reason: HOSPADM

## 2023-10-02 RX ORDER — ONDANSETRON 4 MG/1
4 TABLET, ORALLY DISINTEGRATING ORAL ONCE
Status: DISCONTINUED | OUTPATIENT
Start: 2023-10-02 | End: 2023-10-02 | Stop reason: HOSPADM

## 2023-10-02 RX ADMIN — Medication 120 MCG: at 10:51

## 2023-10-02 RX ADMIN — PROPOFOL 200 MG: 10 INJECTION, EMULSION INTRAVENOUS at 08:07

## 2023-10-02 RX ADMIN — ALBUMIN (HUMAN) 250 ML: 12.5 SOLUTION INTRAVENOUS at 11:02

## 2023-10-02 RX ADMIN — Medication 120 MCG: at 08:42

## 2023-10-02 RX ADMIN — Medication 120 MCG: at 09:05

## 2023-10-02 RX ADMIN — PROPOFOL 125 MCG/KG/MIN: 10 INJECTION, EMULSION INTRAVENOUS at 08:35

## 2023-10-02 RX ADMIN — ACETAMINOPHEN 650 MG: 325 TABLET, FILM COATED ORAL at 21:09

## 2023-10-02 RX ADMIN — Medication 120 MCG: at 09:44

## 2023-10-02 RX ADMIN — FENTANYL CITRATE 100 MCG: 50 INJECTION, SOLUTION INTRAMUSCULAR; INTRAVENOUS at 08:07

## 2023-10-02 RX ADMIN — ESMOLOL HYDROCHLORIDE 20 MG: 100 INJECTION, SOLUTION INTRAVENOUS at 08:44

## 2023-10-02 RX ADMIN — MAGNESIUM SULFATE HEPTAHYDRATE 2 G: 40 INJECTION, SOLUTION INTRAVENOUS at 09:46

## 2023-10-02 RX ADMIN — OXYCODONE HYDROCHLORIDE 5 MG: 5 TABLET ORAL at 12:45

## 2023-10-02 RX ADMIN — Medication 120 MCG: at 09:24

## 2023-10-02 RX ADMIN — CYCLOBENZAPRINE 10 MG: 10 TABLET, FILM COATED ORAL at 02:40

## 2023-10-02 RX ADMIN — ONDANSETRON 4 MG: 2 INJECTION INTRAMUSCULAR; INTRAVENOUS at 10:42

## 2023-10-02 RX ADMIN — DEXAMETHASONE SODIUM PHOSPHATE 4 MG: 4 INJECTION, SOLUTION INTRA-ARTICULAR; INTRALESIONAL; INTRAMUSCULAR; INTRAVENOUS; SOFT TISSUE at 17:30

## 2023-10-02 RX ADMIN — PROPOFOL 125 MCG/KG/MIN: 10 INJECTION, EMULSION INTRAVENOUS at 09:25

## 2023-10-02 RX ADMIN — SENNOSIDES AND DOCUSATE SODIUM 2 TABLET: 50; 8.6 TABLET ORAL at 21:09

## 2023-10-02 RX ADMIN — ACETAMINOPHEN 650 MG: 325 TABLET, FILM COATED ORAL at 02:41

## 2023-10-02 RX ADMIN — OXYCODONE HYDROCHLORIDE 5 MG: 5 TABLET ORAL at 17:42

## 2023-10-02 RX ADMIN — GLYCOPYRROLATE 0.3 MG: 0.2 INJECTION INTRAMUSCULAR; INTRAVENOUS at 08:52

## 2023-10-02 RX ADMIN — Medication 0.3 MCG/KG/MIN: at 09:15

## 2023-10-02 RX ADMIN — REMIFENTANIL HYDROCHLORIDE 0.05 MCG/KG/MIN: 1 INJECTION, POWDER, LYOPHILIZED, FOR SOLUTION INTRAVENOUS at 08:38

## 2023-10-02 RX ADMIN — MIDAZOLAM HYDROCHLORIDE 2 MG: 1 INJECTION, SOLUTION INTRAMUSCULAR; INTRAVENOUS at 07:58

## 2023-10-02 RX ADMIN — CYCLOBENZAPRINE 10 MG: 10 TABLET, FILM COATED ORAL at 21:10

## 2023-10-02 RX ADMIN — Medication 160 MCG: at 11:03

## 2023-10-02 RX ADMIN — ALBUTEROL SULFATE 6 PUFF: 90 AEROSOL, METERED RESPIRATORY (INHALATION) at 08:47

## 2023-10-02 RX ADMIN — SODIUM CHLORIDE, POTASSIUM CHLORIDE, SODIUM LACTATE AND CALCIUM CHLORIDE: 600; 310; 30; 20 INJECTION, SOLUTION INTRAVENOUS at 09:33

## 2023-10-02 RX ADMIN — TRANEXAMIC ACID 1000 MG: 10 INJECTION, SOLUTION INTRAVENOUS at 09:07

## 2023-10-02 RX ADMIN — FENTANYL CITRATE 100 MCG: 50 INJECTION, SOLUTION INTRAMUSCULAR; INTRAVENOUS at 10:50

## 2023-10-02 RX ADMIN — SUGAMMADEX 250 MG: 100 INJECTION, SOLUTION INTRAVENOUS at 12:03

## 2023-10-02 RX ADMIN — GLYCOPYRROLATE 0.2 MG: 0.2 INJECTION INTRAMUSCULAR; INTRAVENOUS at 10:49

## 2023-10-02 RX ADMIN — SODIUM CHLORIDE, POTASSIUM CHLORIDE, SODIUM LACTATE AND CALCIUM CHLORIDE: 600; 310; 30; 20 INJECTION, SOLUTION INTRAVENOUS at 08:15

## 2023-10-02 RX ADMIN — CYCLOBENZAPRINE 10 MG: 10 TABLET, FILM COATED ORAL at 15:00

## 2023-10-02 RX ADMIN — CEFAZOLIN SODIUM 2 G: 2 INJECTION, SOLUTION INTRAVENOUS at 09:00

## 2023-10-02 RX ADMIN — ALBUMIN (HUMAN) 250 ML: 12.5 SOLUTION INTRAVENOUS at 11:25

## 2023-10-02 RX ADMIN — Medication 160 MCG: at 11:15

## 2023-10-02 RX ADMIN — ALBUTEROL SULFATE 6 PUFF: 90 AEROSOL, METERED RESPIRATORY (INHALATION) at 08:45

## 2023-10-02 RX ADMIN — ESMOLOL HYDROCHLORIDE 20 MG: 100 INJECTION, SOLUTION INTRAVENOUS at 09:04

## 2023-10-02 RX ADMIN — ALBUMIN (HUMAN) 250 ML: 12.5 SOLUTION INTRAVENOUS at 11:10

## 2023-10-02 RX ADMIN — Medication 180 MCG: at 08:52

## 2023-10-02 RX ADMIN — SODIUM CHLORIDE, POTASSIUM CHLORIDE, SODIUM LACTATE AND CALCIUM CHLORIDE 100 ML/HR: 600; 310; 30; 20 INJECTION, SOLUTION INTRAVENOUS at 12:15

## 2023-10-02 RX ADMIN — DEXAMETHASONE SODIUM PHOSPHATE 4 MG: 4 INJECTION, SOLUTION INTRA-ARTICULAR; INTRALESIONAL; INTRAMUSCULAR; INTRAVENOUS; SOFT TISSUE at 05:28

## 2023-10-02 RX ADMIN — DEXAMETHASONE SODIUM PHOSPHATE 4 MG: 4 INJECTION, SOLUTION INTRA-ARTICULAR; INTRALESIONAL; INTRAMUSCULAR; INTRAVENOUS; SOFT TISSUE at 08:30

## 2023-10-02 RX ADMIN — Medication 120 MCG: at 09:58

## 2023-10-02 RX ADMIN — Medication 120 MCG: at 10:13

## 2023-10-02 RX ADMIN — Medication 120 MCG: at 09:20

## 2023-10-02 RX ADMIN — PIPERACILLIN SODIUM AND TAZOBACTAM SODIUM 3.38 G: 3; .375 INJECTION, SOLUTION INTRAVENOUS at 21:04

## 2023-10-02 RX ADMIN — Medication 120 MCG: at 10:28

## 2023-10-02 RX ADMIN — ACETAMINOPHEN 650 MG: 325 TABLET, FILM COATED ORAL at 14:40

## 2023-10-02 ASSESSMENT — PAIN SCALES - GENERAL
PAINLEVEL_OUTOF10: 4
PAINLEVEL_OUTOF10: 3
PAINLEVEL_OUTOF10: 5 - MODERATE PAIN
PAIN_LEVEL: 2
PAINLEVEL_OUTOF10: 0 - NO PAIN
PAINLEVEL_OUTOF10: 0 - NO PAIN
PAINLEVEL_OUTOF10: 3
PAINLEVEL_OUTOF10: 0 - NO PAIN
PAINLEVEL_OUTOF10: 5 - MODERATE PAIN
PAINLEVEL_OUTOF10: 5 - MODERATE PAIN
PAINLEVEL_OUTOF10: 4
PAINLEVEL_OUTOF10: 5 - MODERATE PAIN
PAINLEVEL_OUTOF10: 3
PAINLEVEL_OUTOF10: 3
PAINLEVEL_OUTOF10: 0 - NO PAIN
PAINLEVEL_OUTOF10: 5 - MODERATE PAIN
PAINLEVEL_OUTOF10: 0 - NO PAIN

## 2023-10-02 ASSESSMENT — PAIN - FUNCTIONAL ASSESSMENT
PAIN_FUNCTIONAL_ASSESSMENT: 0-10

## 2023-10-02 ASSESSMENT — PAIN SCALES - WONG BAKER: WONGBAKER_NUMERICALRESPONSE: HURTS LITTLE BIT

## 2023-10-02 ASSESSMENT — PAIN DESCRIPTION - DESCRIPTORS: DESCRIPTORS: ACHING

## 2023-10-02 NOTE — ANESTHESIA POSTPROCEDURE EVALUATION
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])    Surgeons: Yesenia Marin MD Responsible Provider: Gilberto Luz MD    Anesthesia Type: general ASA Status: 2            Anesthesia Type: general    Vitals Value Taken Time   /87 10/02/23 1205   Temp 36 10/02/23 1205   Pulse 114 10/02/23 1205   Resp 28 10/02/23 1205   SpO2 98 10/02/23 1205       Anesthesia Post Evaluation    Patient location during evaluation: bedside  Patient participation: complete - patient participated  Level of consciousness: agitated  Pain score: 2  Pain management: adequate  Airway patency: patent  Cardiovascular status: acceptable  Respiratory status: acceptable and face mask  Hydration status: euvolemic        No notable events documented.

## 2023-10-02 NOTE — PERIOPERATIVE NURSING NOTE
NEURO TEAM WAS MADE AWARE THAT XRAY WAS COMPLETED AND LAB WAS SENT. PER SURGICAL TEAM, OKAY FOR PT TO GO TO FLOOR. PER TEAM, CBC DOES NOT NEED TO BE RESULTED .

## 2023-10-02 NOTE — H&P
History Of Present Illness  Dann Ni is a 47 y.o. male presenting with 1 week paresthesias below T4 level.     Patient's imaging was personally reviewed and notable for T2-3 L dorsal epidural soft tissue mass with ventro-lateral displacement of the spinal cord.    Past Medical History  He has a past medical history of Anosmia (06/15/2020), Cough variant asthma (04/02/2019), Diverticulitis (09/25/2023), Foot pain (09/25/2023), Other conditions influencing health status, Other conditions influencing health status, Personal history of other diseases of the respiratory system (04/14/2019), Personal history of other drug therapy (10/23/2019), Personal history of other specified conditions (03/30/2019), Recurrent pneumonia (09/25/2023), and Tendonitis (09/25/2023).    Surgical History  He has a past surgical history that includes Other surgical history (04/12/2019).     Social History  He reports that he has never smoked. He has never used smokeless tobacco. He reports that he does not currently use alcohol. He reports that he does not use drugs.     Allergies  Ciprofloxacin    Medications  Medications Prior to Admission   Medication Sig Dispense Refill Last Dose    albuterol 90 mcg/actuation inhaler Inhale 2 puffs every 6 hours if needed for shortness of breath or wheezing.       budesonide-formoteroL (Symbicort) 80-4.5 mcg/actuation inhaler Inhale 2 puffs 2 times a day.       cyclobenzaprine (Flexeril) 10 mg tablet 1/2 - 1 tab at hs prn spasms (Patient not taking: Reported on 10/1/2023) 30 tablet 1     diclofenac (Voltaren) 75 mg EC tablet Take 1 tablet (75 mg) by mouth 2 times a day as needed (pain). Do not crush, chew, or split. (Patient not taking: Reported on 10/1/2023) 60 tablet 2     gabapentin (Neurontin) 100 mg capsule Take 1-2 at bedtime (Patient not taking: Reported on 10/1/2023) 60 capsule 2        Review of Systems   All other systems reviewed and are negative.       Neurological Exam  Mental  Status  Alert.    Cranial Nerves  CN III, IV, VI: Extraocular movements intact bilaterally. Pupils equal round and reactive to light bilaterally.      Awake, Ox3  BUE D/B/T/HG/IO 5  BLE HF/KE/DF/PF 5  Diminished sensation to light touch below T4 /5 level  No le or clonus    Physical Exam  Constitutional:       Appearance: Normal appearance.   HENT:      Mouth/Throat:      Mouth: Mucous membranes are dry.   Eyes:      Extraocular Movements: Extraocular movements intact.      Pupils: Pupils are equal, round, and reactive to light.   Cardiovascular:      Rate and Rhythm: Normal rate and regular rhythm.   Pulmonary:      Breath sounds: Normal breath sounds.   Abdominal:      General: Bowel sounds are normal.      Palpations: Abdomen is soft.   Musculoskeletal:      Cervical back: Neck supple.   Skin:     General: Skin is warm and dry.   Neurological:      Mental Status: He is alert.       Last Recorded Vitals  There were no vitals taken for this visit.     Assessment/Plan     Patient is a 47 year old male with h/o HLD, asthma p/w 1 week progressive diminished sensation below T4/5, MRI compressive dorsal epidural lesion T2/3.    Floor  OR Mon 10/2 posterior decompression/fusion  CBC/RFP/coag/T+S/EKG/CXR/UA  Home meds  NPO at midnight  SCDs    Daniele Lyons MD    Note authored by resident on neurosurgery team, with all questions or to contact team please page at [phone]

## 2023-10-02 NOTE — ANESTHESIA PROCEDURE NOTES
Airway  Date/Time: 10/2/2023 8:29 AM  Urgency: elective    Difficult airway    Staffing  Performed: resident   Authorized by: Gilberto Luz MD    Performed by: Cristopher Valle MD  Patient location during procedure: OR    Indications and Patient Condition  Indications for airway management: anesthesia  Spontaneous Ventilation: absent  Sedation level: deep  Preoxygenated: yes  Patient position: sniffing      Final Airway Details  Final airway type: endotracheal airway      Successful airway: ETT  Cuffed: yes   Successful intubation technique: video laryngoscopy  Facilitating devices/methods: intubating stylet  Endotracheal tube insertion site: oral  Blade: Vin  Blade size: #4  ETT size (mm): 7.0  Placement verified by: chest auscultation and capnometry   Measured from: lips  Number of attempts at approach: 1  Number of other approaches attempted: 2

## 2023-10-02 NOTE — OP NOTE
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]     Procedures  Laminectomy Thoracic with Excision Lesion  36312 - MD GARRIDO BX/EXC ISPI EMILY IDRL XMED THORACIC      Surgeons      * Yesenia Marin - Primary    Resident/Fellow/Other Assistant:  Elda Guzman     Procedure Summary  Anesthesia: General  ASA: II  Anesthesia Staff: Anesthesiologist: Gilberto Luz MD  Anesthesia Resident: Cristopher Valle MD  Estimated Blood Loss: 450mL  Intra-op Medications:   Medication Name Total Dose   thrombin (recombinant) (Recothrom) topical solution 5,000 Units   gelatin absorbable (Gelfoam) 100 sponge 1 each   polymyxin B injection 500,000 Units   sodium chloride 0.9 % irrigation solution 1,000 mL   vancomycin (Vancocin) vial for injection 1 g   lidocaine-epinephrine (Xylocaine W/EPI) 0.5 %-1:200,000 injection 10 mL   BUPivacaine HCl (Marcaine) 0.25 % (2.5 mg/mL) injection 20 mL   bacitracin ointment 1 Application              Anesthesia Record               Intraprocedure I/O Totals          Intake    Magnesium Sulfate 0.00 mL    The total shown is the total volume documented since Anesthesia Start was filed.    Propofol Drip 0.00 mL    The total shown is the total volume documented since Anesthesia Start was filed.    Esmolol Drip 0.00 mL    The total shown is the total volume documented since Anesthesia Start was filed.    Phenylephrine Drip 0.00 mL    The total shown is the total volume documented since Anesthesia Start was filed.    Total Intake 0 mL          Specimen:   ID Type Source Tests Collected by Time   1 : Epidural Mass Tissue SOFT TISSUE MASS RESECTION SURGICAL PATHOLOGY EXAM Yesenia Marin MD 10/2/2023 0944   2 : Spinous Process Tissue BONE RESECTION  SURGICAL PATHOLOGY EXAM Yesenia Marin MD 10/2/2023 1016   3 : EPIDURAL MASS 2 Tissue SOFT TISSUE MASS RESECTION SURGICAL PATHOLOGY EXAM Yesenia Marin MD 10/2/2023 1024   4 : Additional Epidural Mass Tissue SOFT TISSUE MASS RESECTION SURGICAL PATHOLOGY EXAM Yesenia Marin MD 10/2/2023 1044        Staff:   Circulator: Adela Arthur, RN; Angelique Kimball, JULIO C  Scrub Person: Estella Harman; Tresa Jane         Drains and/or Catheters:   Urethral Catheter Non-latex 16 Fr. (Active)       Tourniquet Times:         Implants:     Findings: epidural mass with cord compression from T2-T4    Indications: Dann Ni is an 47 y.o. 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 He has had 1 week of progressive numbness from the nipple down. He reports gait instability but denies bowel/bladder incontinence. MRI showed a large enhancing T2-4 epidural mass.     The patient was seen in the preoperative area. The risks, benefits, complications, treatment options, non-operative alternatives, expected recovery and outcomes were discussed with the patient. The possibilities of reaction to medication, pulmonary aspiration, injury to surrounding structures, bleeding, recurrent infection, the need for additional procedures, failure to diagnose a condition, and creating a complication requiring transfusion or operation were discussed with the patient. The patient concurred with the proposed plan, giving informed consent.  The site of surgery was properly noted/marked if necessary per policy. The patient has been actively warmed in preoperative area. Preoperative antibiotics have been ordered and given within 1 hours of incision. Venous thrombosis prophylaxis have been ordered including bilateral sequential compression devices    Procedure Details:   The patient was brought to he OR and intubated in the usual fashion. The patient had all anesthesia lines placed without issue. Neuromonitoring was obtained with pre-flip  baselines. The patient was then flipped into the prone position on a Naveen table and secured and padded into place. The C-arm was utilized to localize the T2-4 lesion. The incision was marked and prepped in the usual fashion. Perioperative antibiotics were given. 0.5% lidocaine was injected into the skin and the incision was dissected down to the level of the bone sharply with skin knife and bovie. Confirmatory xrays were taken to assure we were at the level of interest. Laminectomies from T1-T4 was performed with high speed drill and rongeurs. The epidural mass was immediately seen. The tumor was extremely bloody but was well controlled with bipolars and floseal. The tumor was removed en-bloc and sent for frozen. The intraoperative pathology was consistent with our suspicion for lymphoma.     The cord was decompressed and good decompression was confirmed via ultrasound. Neuromonitoring was stable throughout the case with no changes from baseline. The wound was copiously irrigated. A 10-round drain was tunneled subfascially and secured with 2-0 silk. The wound was closed in usual layers with 0 and 2-0 vicryls. The skin was closed with staples. The patient was flipped back into the supine position and turned over to anesthesia for extubation.     Complications:  None; patient tolerated the procedure well.    Disposition: PACU - hemodynamically stable.  Condition: stable     Attending Attestation: I was present and scrubbed for the entire procedure.    Yesenia Marin  Phone Number: [phone]

## 2023-10-02 NOTE — SIGNIFICANT EVENT
"RADAR AUTO PAGE     10/02/23 1513   Onset Documentation   Rapid Response Initiated By Radar auto page   Location/Room [facility] [place]   Pager Time 1511   Event End Time 1515   Level II Called No   Primary Reason for Call Radar auto page     Visit Vitals  BP 94/56 (BP Location: Right arm, Patient Position: Sitting)   Pulse 96   Temp 36 °C (96.8 °F)   Resp 16   Ht 1.727 m (5' 8\")   Wt 111 kg (244 lb 11.4 oz)   SpO2 98%   BMI 37.21 kg/m²   Smoking Status Never   BSA 2.31 m²     Radar score 6      RADAR auto page received. Division RN, Jose L, contacted and she has no further concerns at this time. Please page rapid response with any acute rapid needs.  "

## 2023-10-02 NOTE — NURSING NOTE
10/1/23 Nursing AOC notified of transfer/transport delays at approx 1730. Surgery team at [facility] made aware of delay per Dr. Jurado and stated that delayed ETA was OK with them. Surgical nurse, Valentina Fuentes, given report by this nurse . Night shift nurse given name of oncoming  OR RN at [facility] for further communication.

## 2023-10-02 NOTE — BRIEF OP NOTE
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    Procedure Summary  Anesthesia: General  ASA: II  Anesthesia Staff: Anesthesiologist: Gilberto Luz MD  Anesthesia Resident: Cristopher Valle MD  Estimated Blood Loss: 450mL  Intra-op Medications:   Medication Name Total Dose   thrombin (recombinant) (Recothrom) topical solution 5,000 Units   gelatin absorbable (Gelfoam) 100 sponge 1 each   polymyxin B injection 500,000 Units   sodium chloride 0.9 % irrigation solution 1,000 mL   vancomycin (Vancocin) vial for injection 1 g   lidocaine-epinephrine (Xylocaine W/EPI) 0.5 %-1:200,000 injection 10 mL   BUPivacaine HCl (Marcaine) 0.25 % (2.5 mg/mL) injection 20 mL   bacitracin ointment 1 Application              Anesthesia Record               Intraprocedure I/O Totals          Intake    Magnesium Sulfate 0.00 mL    The total shown is the total volume documented since Anesthesia Start was filed.    Propofol Drip 0.00 mL    The total shown is the total volume documented since Anesthesia Start was filed.    Esmolol Drip 0.00 mL    The total shown is the total volume documented since Anesthesia Start was filed.    Phenylephrine Drip 0.00 mL    The total shown is the total volume documented since Anesthesia Start was filed.    Total Intake 0 mL          Specimen:   ID Type Source Tests Collected by Time   1 : Epidural Mass Tissue SOFT TISSUE MASS RESECTION SURGICAL PATHOLOGY EXAM Yesenia Marin MD 10/2/2023 0944   2 : Spinous Process Tissue BONE RESECTION SURGICAL PATHOLOGY EXAM Yesenia Marin MD 10/2/2023  1016   3 : EPIDURAL MASS 2 Tissue SOFT TISSUE MASS RESECTION SURGICAL PATHOLOGY EXAM Yesenia Marin MD 10/2/2023 1024   4 : Additional Epidural Mass Tissue SOFT TISSUE MASS RESECTION SURGICAL PATHOLOGY EXAM Yesenia Marin MD 10/2/2023 1044        Staff:   Circulator: Adela Arthur, RN; Angelique Kimball, JULIO C  Scrub Person: Estella Harman; Tresa Jane          Findings: bloody epidural mass; stable motors/SSEP throughout surgery    Complications:  None; patient tolerated the procedure well.     Disposition: PACU - hemodynamically stable.  Condition: stable  Specimens Collected:   ID Type Source Tests Collected by Time   1 : Epidural Mass Tissue SOFT TISSUE MASS RESECTION SURGICAL PATHOLOGY EXAM Yesenia Marin MD 10/2/2023 0944   2 : Spinous Process Tissue BONE RESECTION SURGICAL PATHOLOGY EXAM Yesenia Marin MD 10/2/2023 1016   3 : EPIDURAL MASS 2 Tissue SOFT TISSUE MASS RESECTION SURGICAL PATHOLOGY EXAM Yesenia Marin MD 10/2/2023 1024   4 : Additional Epidural Mass Tissue SOFT TISSUE MASS RESECTION SURGICAL PATHOLOGY EXAM Yesenia Marin MD 10/2/2023 1044     Attending Attestation: I was present and scrubbed for the entire procedure.    Yesenia Marin  Phone Number: [phone]

## 2023-10-02 NOTE — ANESTHESIA PREPROCEDURE EVALUATION
Patient: Dann Ni    Procedure Information       Date/Time: 10/02/23 0900    Procedure: Laminectomy Thoracic with Excision Lesion (Spine Thoracic)    Location: [facility] OR 24 / [facility] OR    Surgeons: Yesenia Marin MD            Relevant Problems   Anesthesia (within normal limits)      Cardiovascular   (+) Hyperlipidemia      Endocrine   (+) Class 2 obesity due to excess calories without serious comorbidity with body mass index (BMI) of 36.0 to 36.9 in adult      GI (within normal limits)      /Renal (within normal limits)      Pulmonary  On inhalers daily    (+) Cough variant asthma   (+) Pulmonary nodules      GI/Hepatic (within normal limits)      Hematology (within normal limits)      Eyes, Ears, Nose, and Throat (within normal limits)      Infectious Disease (within normal limits)      Other   (+) Hilar lymphadenopathy       Clinical information reviewed:    Allergies                NPO Detail:  NPO/Void Status  Date of Last Liquid: 10/01/23  Time of Last Liquid: 2300  Date of Last Solid: 10/01/23  Time of Last Solid: 2300         Physical Exam    Airway  Mallampati: II  TM distance: >3 FB  Neck ROM: full     Cardiovascular   Rhythm: regular  Rate: normal     Dental    Pulmonary - normal exam  Breath sounds clear to auscultation     Abdominal   (+) obese  Abdomen: soft             Anesthesia Plan    ASA 2     general     intravenous induction   Anesthetic plan and risks discussed with patient and spouse.  Use of blood products discussed with patient who consented to blood products.    Plan discussed with resident and attending.

## 2023-10-03 ENCOUNTER — APPOINTMENT (OUTPATIENT)
Dept: RADIOLOGY | Facility: HOSPITAL | Age: 48
DRG: 820 | End: 2023-10-03
Payer: COMMERCIAL

## 2023-10-03 LAB
ERYTHROCYTE [DISTWIDTH] IN BLOOD BY AUTOMATED COUNT: 12.5 % (ref 11.5–14.5)
GLUCOSE BLD MANUAL STRIP-MCNC: 138 MG/DL (ref 74–99)
GLUCOSE BLD MANUAL STRIP-MCNC: 145 MG/DL (ref 74–99)
GLUCOSE BLD MANUAL STRIP-MCNC: 156 MG/DL (ref 74–99)
GLUCOSE BLD MANUAL STRIP-MCNC: 206 MG/DL (ref 74–99)
HCT VFR BLD AUTO: 30.1 % (ref 41–52)
HGB BLD-MCNC: 9.8 G/DL (ref 13.5–17.5)
MCH RBC QN AUTO: 28.7 PG (ref 26–34)
MCHC RBC AUTO-ENTMCNC: 32.6 G/DL (ref 32–36)
MCV RBC AUTO: 88 FL (ref 80–100)
NRBC BLD-RTO: 0 /100 WBCS (ref 0–0)
PLATELET # BLD AUTO: 221 X10*3/UL (ref 150–450)
PMV BLD AUTO: 10.3 FL (ref 7.5–11.5)
RBC # BLD AUTO: 3.41 X10*6/UL (ref 4.5–5.9)
STAPHYLOCOCCUS SPEC CULT: NORMAL
WBC # BLD AUTO: 11.3 X10*3/UL (ref 4.4–11.3)

## 2023-10-03 PROCEDURE — 96372 THER/PROPH/DIAG INJ SC/IM: CPT | Performed by: STUDENT IN AN ORGANIZED HEALTH CARE EDUCATION/TRAINING PROGRAM

## 2023-10-03 PROCEDURE — 2500000004 HC RX 250 GENERAL PHARMACY W/ HCPCS (ALT 636 FOR OP/ED): Performed by: STUDENT IN AN ORGANIZED HEALTH CARE EDUCATION/TRAINING PROGRAM

## 2023-10-03 PROCEDURE — 1100000001 HC PRIVATE ROOM DAILY

## 2023-10-03 PROCEDURE — 36415 COLL VENOUS BLD VENIPUNCTURE: CPT | Performed by: STUDENT IN AN ORGANIZED HEALTH CARE EDUCATION/TRAINING PROGRAM

## 2023-10-03 PROCEDURE — 82947 ASSAY GLUCOSE BLOOD QUANT: CPT

## 2023-10-03 PROCEDURE — 2500000004 HC RX 250 GENERAL PHARMACY W/ HCPCS (ALT 636 FOR OP/ED): Performed by: NURSE PRACTITIONER

## 2023-10-03 PROCEDURE — 72080 X-RAY EXAM THORACOLMB 2/> VW: CPT | Mod: FY

## 2023-10-03 PROCEDURE — 2500000002 HC RX 250 W HCPCS SELF ADMINISTERED DRUGS (ALT 637 FOR MEDICARE OP, ALT 636 FOR OP/ED): Performed by: NURSE PRACTITIONER

## 2023-10-03 PROCEDURE — 85027 COMPLETE CBC AUTOMATED: CPT | Performed by: STUDENT IN AN ORGANIZED HEALTH CARE EDUCATION/TRAINING PROGRAM

## 2023-10-03 PROCEDURE — 99222 1ST HOSP IP/OBS MODERATE 55: CPT | Performed by: PHYSICIAN ASSISTANT

## 2023-10-03 PROCEDURE — 2500000005 HC RX 250 GENERAL PHARMACY W/O HCPCS: Performed by: STUDENT IN AN ORGANIZED HEALTH CARE EDUCATION/TRAINING PROGRAM

## 2023-10-03 PROCEDURE — 2500000002 HC RX 250 W HCPCS SELF ADMINISTERED DRUGS (ALT 637 FOR MEDICARE OP, ALT 636 FOR OP/ED): Performed by: STUDENT IN AN ORGANIZED HEALTH CARE EDUCATION/TRAINING PROGRAM

## 2023-10-03 PROCEDURE — 2500000001 HC RX 250 WO HCPCS SELF ADMINISTERED DRUGS (ALT 637 FOR MEDICARE OP): Performed by: STUDENT IN AN ORGANIZED HEALTH CARE EDUCATION/TRAINING PROGRAM

## 2023-10-03 PROCEDURE — 96372 THER/PROPH/DIAG INJ SC/IM: CPT | Performed by: NURSE PRACTITIONER

## 2023-10-03 PROCEDURE — 94640 AIRWAY INHALATION TREATMENT: CPT

## 2023-10-03 RX ADMIN — HEPARIN SODIUM 5000 UNITS: 5000 INJECTION INTRAVENOUS; SUBCUTANEOUS at 08:44

## 2023-10-03 RX ADMIN — PIPERACILLIN SODIUM AND TAZOBACTAM SODIUM 3.38 G: 3; .375 INJECTION, SOLUTION INTRAVENOUS at 09:37

## 2023-10-03 RX ADMIN — PIPERACILLIN SODIUM AND TAZOBACTAM SODIUM 3.38 G: 3; .375 INJECTION, SOLUTION INTRAVENOUS at 14:20

## 2023-10-03 RX ADMIN — HEPARIN SODIUM 5000 UNITS: 5000 INJECTION INTRAVENOUS; SUBCUTANEOUS at 16:30

## 2023-10-03 RX ADMIN — ACETAMINOPHEN 650 MG: 325 TABLET, FILM COATED ORAL at 21:17

## 2023-10-03 RX ADMIN — Medication: at 08:00

## 2023-10-03 RX ADMIN — INSULIN LISPRO 2 UNITS: 100 INJECTION, SOLUTION INTRAVENOUS; SUBCUTANEOUS at 12:46

## 2023-10-03 RX ADMIN — PIPERACILLIN SODIUM AND TAZOBACTAM SODIUM 3.38 G: 3; .375 INJECTION, SOLUTION INTRAVENOUS at 21:17

## 2023-10-03 RX ADMIN — SENNOSIDES AND DOCUSATE SODIUM 2 TABLET: 50; 8.6 TABLET ORAL at 21:17

## 2023-10-03 RX ADMIN — OXYCODONE HYDROCHLORIDE 5 MG: 5 TABLET ORAL at 06:46

## 2023-10-03 RX ADMIN — FLUTICASONE FUROATE AND VILANTEROL TRIFENATATE 1 PUFF: 100; 25 POWDER RESPIRATORY (INHALATION) at 10:02

## 2023-10-03 RX ADMIN — HEPARIN SODIUM 5000 UNITS: 5000 INJECTION INTRAVENOUS; SUBCUTANEOUS at 00:54

## 2023-10-03 RX ADMIN — CYCLOBENZAPRINE 10 MG: 10 TABLET, FILM COATED ORAL at 08:43

## 2023-10-03 RX ADMIN — DEXAMETHASONE SODIUM PHOSPHATE 4 MG: 4 INJECTION, SOLUTION INTRA-ARTICULAR; INTRALESIONAL; INTRAMUSCULAR; INTRAVENOUS; SOFT TISSUE at 00:55

## 2023-10-03 RX ADMIN — PANTOPRAZOLE SODIUM 40 MG: 40 TABLET, DELAYED RELEASE ORAL at 06:40

## 2023-10-03 RX ADMIN — ACETAMINOPHEN 650 MG: 325 TABLET, FILM COATED ORAL at 08:43

## 2023-10-03 RX ADMIN — PIPERACILLIN SODIUM AND TAZOBACTAM SODIUM 3.38 G: 3; .375 INJECTION, SOLUTION INTRAVENOUS at 02:44

## 2023-10-03 RX ADMIN — CYCLOBENZAPRINE 10 MG: 10 TABLET, FILM COATED ORAL at 14:20

## 2023-10-03 RX ADMIN — DEXAMETHASONE SODIUM PHOSPHATE 4 MG: 4 INJECTION, SOLUTION INTRA-ARTICULAR; INTRALESIONAL; INTRAMUSCULAR; INTRAVENOUS; SOFT TISSUE at 11:27

## 2023-10-03 RX ADMIN — INSULIN LISPRO 4 UNITS: 100 INJECTION, SOLUTION INTRAVENOUS; SUBCUTANEOUS at 18:19

## 2023-10-03 RX ADMIN — CYCLOBENZAPRINE 10 MG: 10 TABLET, FILM COATED ORAL at 21:17

## 2023-10-03 RX ADMIN — ACETAMINOPHEN 650 MG: 325 TABLET, FILM COATED ORAL at 14:20

## 2023-10-03 RX ADMIN — DEXAMETHASONE SODIUM PHOSPHATE 4 MG: 4 INJECTION, SOLUTION INTRA-ARTICULAR; INTRALESIONAL; INTRAMUSCULAR; INTRAVENOUS; SOFT TISSUE at 06:45

## 2023-10-03 RX ADMIN — DEXAMETHASONE SODIUM PHOSPHATE 4 MG: 4 INJECTION, SOLUTION INTRA-ARTICULAR; INTRALESIONAL; INTRAMUSCULAR; INTRAVENOUS; SOFT TISSUE at 17:47

## 2023-10-03 RX ADMIN — SENNOSIDES AND DOCUSATE SODIUM 2 TABLET: 50; 8.6 TABLET ORAL at 08:44

## 2023-10-03 RX ADMIN — POLYETHYLENE GLYCOL 3350 17 G: 17 POWDER, FOR SOLUTION ORAL at 08:44

## 2023-10-03 RX ADMIN — ACETAMINOPHEN 650 MG: 325 TABLET, FILM COATED ORAL at 02:41

## 2023-10-03 SDOH — SOCIAL STABILITY: SOCIAL NETWORK: HOW OFTEN DO YOU ATTEND MEETINGS OF THE CLUBS OR ORGANIZATIONS YOU BELONG TO?: NEVER

## 2023-10-03 SDOH — SOCIAL STABILITY: SOCIAL INSECURITY: WITHIN THE LAST YEAR, HAVE YOU BEEN HUMILIATED OR EMOTIONALLY ABUSED IN OTHER WAYS BY YOUR PARTNER OR EX-PARTNER?: NO

## 2023-10-03 SDOH — SOCIAL STABILITY: SOCIAL NETWORK: ARE YOU MARRIED, WIDOWED, DIVORCED, SEPARATED, NEVER MARRIED, OR LIVING WITH A PARTNER?: MARRIED

## 2023-10-03 SDOH — SOCIAL STABILITY: SOCIAL INSECURITY
WITHIN THE LAST YEAR, HAVE YOU BEEN RAPED OR FORCED TO HAVE ANY KIND OF SEXUAL ACTIVITY BY YOUR PARTNER OR EX-PARTNER?: NO

## 2023-10-03 SDOH — SOCIAL STABILITY: SOCIAL NETWORK
DO YOU BELONG TO ANY CLUBS OR ORGANIZATIONS SUCH AS CHURCH GROUPS UNIONS, FRATERNAL OR ATHLETIC GROUPS, OR SCHOOL GROUPS?: NO

## 2023-10-03 SDOH — ECONOMIC STABILITY: FOOD INSECURITY: WITHIN THE PAST 12 MONTHS, YOU WORRIED THAT YOUR FOOD WOULD RUN OUT BEFORE YOU GOT THE MONEY TO BUY MORE.: NEVER TRUE

## 2023-10-03 SDOH — HEALTH STABILITY: MENTAL HEALTH: HOW OFTEN DO YOU HAVE A DRINK CONTAINING ALCOHOL?: NEVER

## 2023-10-03 SDOH — SOCIAL STABILITY: SOCIAL INSECURITY: WITHIN THE LAST YEAR, HAVE YOU BEEN AFRAID OF YOUR PARTNER OR EX-PARTNER?: NO

## 2023-10-03 SDOH — HEALTH STABILITY: MENTAL HEALTH
STRESS IS WHEN SOMEONE FEELS TENSE, NERVOUS, ANXIOUS, OR CAN'T SLEEP AT NIGHT BECAUSE THEIR MIND IS TROUBLED. HOW STRESSED ARE YOU?: TO SOME EXTENT

## 2023-10-03 SDOH — SOCIAL STABILITY: SOCIAL INSECURITY: ARE YOU MARRIED, WIDOWED, DIVORCED, SEPARATED, NEVER MARRIED, OR LIVING WITH A PARTNER?: MARRIED

## 2023-10-03 SDOH — ECONOMIC STABILITY: INCOME INSECURITY: IN THE PAST 12 MONTHS HAS THE ELECTRIC, GAS, OIL, OR WATER COMPANY THREATENED TO SHUT OFF SERVICES IN YOUR HOME?: NO

## 2023-10-03 SDOH — HEALTH STABILITY: MENTAL HEALTH
DO YOU FEEL STRESS - TENSE, RESTLESS, NERVOUS, OR ANXIOUS, OR UNABLE TO SLEEP AT NIGHT BECAUSE YOUR MIND IS TROUBLED ALL THE TIME - THESE DAYS?: TO SOME EXTENT

## 2023-10-03 SDOH — ECONOMIC STABILITY: FOOD INSECURITY: WITHIN THE PAST 12 MONTHS, YOU WORRIED THAT YOUR FOOD WOULD RUN OUT BEFORE YOU GOT MONEY TO BUY MORE.: NEVER TRUE

## 2023-10-03 SDOH — HEALTH STABILITY: MENTAL HEALTH: HOW OFTEN DO YOU HAVE 6 OR MORE DRINKS ON ONE OCCASION?: NEVER

## 2023-10-03 SDOH — SOCIAL STABILITY: SOCIAL NETWORK: IN A TYPICAL WEEK, HOW MANY TIMES DO YOU TALK ON THE PHONE WITH FAMILY, FRIENDS, OR NEIGHBORS?: ONCE A WEEK

## 2023-10-03 SDOH — ECONOMIC STABILITY: INCOME INSECURITY: IN THE LAST 12 MONTHS, WAS THERE A TIME WHEN YOU WERE NOT ABLE TO PAY THE MORTGAGE OR RENT ON TIME?: NO

## 2023-10-03 SDOH — ECONOMIC STABILITY: TRANSPORTATION INSECURITY
IN THE PAST 12 MONTHS, HAS THE LACK OF TRANSPORTATION KEPT YOU FROM MEDICAL APPOINTMENTS OR FROM GETTING MEDICATIONS?: NO

## 2023-10-03 SDOH — HEALTH STABILITY: MENTAL HEALTH: HOW MANY STANDARD DRINKS CONTAINING ALCOHOL DO YOU HAVE ON A TYPICAL DAY?: PATIENT DOES NOT DRINK

## 2023-10-03 SDOH — HEALTH STABILITY: PHYSICAL HEALTH: ON AVERAGE, HOW MANY MINUTES DO YOU ENGAGE IN EXERCISE AT THIS LEVEL?: 90 MIN

## 2023-10-03 SDOH — SOCIAL STABILITY: SOCIAL INSECURITY
WITHIN THE LAST YEAR, HAVE YOU BEEN KICKED, HIT, SLAPPED, OR OTHERWISE PHYSICALLY HURT BY YOUR PARTNER OR EX-PARTNER?: NO

## 2023-10-03 SDOH — HEALTH STABILITY: MENTAL HEALTH: HOW OFTEN DO YOU HAVE SIX OR MORE DRINKS ON ONE OCCASION?: NEVER

## 2023-10-03 SDOH — ECONOMIC STABILITY: HOUSING INSECURITY: IN THE LAST 12 MONTHS, WAS THERE A TIME WHEN YOU WERE NOT ABLE TO PAY THE MORTGAGE OR RENT ON TIME?: NO

## 2023-10-03 SDOH — ECONOMIC STABILITY: FOOD INSECURITY: WITHIN THE PAST 12 MONTHS, THE FOOD YOU BOUGHT JUST DIDN'T LAST AND YOU DIDN'T HAVE MONEY TO GET MORE.: NEVER TRUE

## 2023-10-03 SDOH — ECONOMIC STABILITY: HOUSING INSECURITY
IN THE LAST 12 MONTHS, WAS THERE A TIME WHEN YOU DID NOT HAVE A STEADY PLACE TO SLEEP OR SLEPT IN A SHELTER (INCLUDING NOW)?: NO

## 2023-10-03 SDOH — SOCIAL STABILITY: SOCIAL NETWORK: HOW OFTEN DO YOU GET TOGETHER WITH FRIENDS OR RELATIVES?: THREE TIMES A WEEK

## 2023-10-03 SDOH — SOCIAL STABILITY: SOCIAL INSECURITY
WITHIN THE LAST YEAR, HAVE TO BEEN RAPED OR FORCED TO HAVE ANY KIND OF SEXUAL ACTIVITY BY YOUR PARTNER OR EX-PARTNER?: NO

## 2023-10-03 SDOH — ECONOMIC STABILITY: TRANSPORTATION INSECURITY: IN THE PAST 12 MONTHS, HAS LACK OF TRANSPORTATION KEPT YOU FROM MEDICAL APPOINTMENTS OR FROM GETTING MEDICATIONS?: NO

## 2023-10-03 SDOH — SOCIAL STABILITY: SOCIAL NETWORK: HOW OFTEN DO YOU ATTEND CHURCH OR RELIGIOUS SERVICES?: NEVER

## 2023-10-03 SDOH — ECONOMIC STABILITY: INCOME INSECURITY: IN THE PAST 12 MONTHS, HAS THE ELECTRIC, GAS, OIL, OR WATER COMPANY THREATENED TO SHUT OFF SERVICE IN YOUR HOME?: NO

## 2023-10-03 SDOH — ECONOMIC STABILITY: FOOD INSECURITY: HOW HARD IS IT FOR YOU TO PAY FOR THE VERY BASICS LIKE FOOD, HOUSING, MEDICAL CARE, AND HEATING?: NOT HARD AT ALL

## 2023-10-03 SDOH — HEALTH STABILITY: PHYSICAL HEALTH: ON AVERAGE, HOW MANY DAYS PER WEEK DO YOU ENGAGE IN MODERATE TO STRENUOUS EXERCISE (LIKE A BRISK WALK)?: 7 DAYS

## 2023-10-03 SDOH — HEALTH STABILITY: MENTAL HEALTH: HOW MANY DRINKS CONTAINING ALCOHOL DO YOU HAVE ON A TYPICAL DAY WHEN YOU ARE DRINKING?: PATIENT DOES NOT DRINK

## 2023-10-03 SDOH — SOCIAL STABILITY: SOCIAL NETWORK
DO YOU BELONG TO ANY CLUBS OR ORGANIZATIONS SUCH AS CHURCH GROUPS, UNIONS, FRATERNAL OR ATHLETIC GROUPS, OR SCHOOL GROUPS?: NO

## 2023-10-03 SDOH — SOCIAL STABILITY: SOCIAL NETWORK: HOW OFTEN DO YOU ATTENT MEETINGS OF THE CLUB OR ORGANIZATION YOU BELONG TO?: NEVER

## 2023-10-03 SDOH — ECONOMIC STABILITY: HOUSING INSECURITY: IN THE LAST 12 MONTHS, HOW MANY PLACES HAVE YOU LIVED?: 1

## 2023-10-03 SDOH — ECONOMIC STABILITY: INCOME INSECURITY: HOW HARD IS IT FOR YOU TO PAY FOR THE VERY BASICS LIKE FOOD, HOUSING, MEDICAL CARE, AND HEATING?: NOT HARD AT ALL

## 2023-10-03 SDOH — ECONOMIC STABILITY: TRANSPORTATION INSECURITY
IN THE PAST 12 MONTHS, HAS LACK OF TRANSPORTATION KEPT YOU FROM MEETINGS, WORK, OR FROM GETTING THINGS NEEDED FOR DAILY LIVING?: NO

## 2023-10-03 ASSESSMENT — LIFESTYLE VARIABLES
SKIP TO QUESTIONS 9-10: 1
AUDIT-C TOTAL SCORE: 0

## 2023-10-03 ASSESSMENT — PAIN SCALES - GENERAL
PAINLEVEL_OUTOF10: 4
PAINLEVEL_OUTOF10: 5 - MODERATE PAIN
PAINLEVEL_OUTOF10: 2
PAINLEVEL_OUTOF10: 4

## 2023-10-03 ASSESSMENT — ACTIVITIES OF DAILY LIVING (ADL): LACK_OF_TRANSPORTATION: NO

## 2023-10-03 NOTE — PROGRESS NOTES
"Dann Ni is a 47 y.o. male on day 2 of admission presenting with Thoracic spine tumor.    Subjective   Patient states sensation improving       Objective     Physical Exam  Awake, Ox3  BUE D/B/T/HG/IO 5  BLE HF/KE/DF/PF 5  Diminished sensation to light touch below T4 /5 level, improving  No le or clonus  Last Recorded Vitals  Blood pressure 109/65, pulse (!) 114, temperature 36.6 °C (97.9 °F), temperature source Tympanic, resp. rate 19, height 1.727 m (5' 8\"), weight 111 kg (244 lb 11.4 oz), SpO2 93 %.  Intake/Output last 3 Shifts:  I/O last 3 completed shifts:  In: 2900 (26.1 mL/kg) [P.O.:900; I.V.:2000 (18 mL/kg)]  Out: 6250 (56.3 mL/kg) [Urine:2925 (0.7 mL/kg/hr); Drains:25; Blood:3300]  Weight: 111 kg     Relevant Results                             Assessment/Plan   Principal Problem:    Thoracic spine tumor    48 YO M h/o HLD, asthma p/w 1 week thoracic back pain and T4 sensory level, MRI compressive T2-3 L dorsal epidural mass, CT CAP numerous <7mm pulm nodules, 10/2 s/p T1-4 lamis, tumor debulking, (prelim: lymphoproliferative), CXR R basilar atelecatsis poss aspiration    Tele  Final path   drain   rad onc recs  ok for radiation POD 10  PTOT       I spent 15 minutes in the professional and overall care of this patient.      Candido Shukla MD      "

## 2023-10-03 NOTE — CONSULTS
Radiation Oncology Inpatient Consult    Patient Name:  Dann Ni  MRN:  [id number]  :  1975    Referring Provider: Dr. Marin  Primary Care Provider: Ventura Gonzalez DO  Care Team: Patient Care Team:  Ventura Gonzalez DO as PCP - General  [facility] DO [facility]CP - [facility]  Yesenia Marin MD as Surgeon (Neurosurgery)    Date of Service: 10/3/2023     SUBJECTIVE  History of Present Illness:  This is a 47-year-old male with a history of asthma, HTN, HLD that presented to [facility] ED on  complaining progressive numbness below the nipple line with associated lower extremity weakness.  MRI spine obtained and work-up noted neoplastic infiltration with tumor extension into the spinal canal at the level of T1-T4, with mass effect on the thoracic spinal cord.  CT imaging of the chest identified multiple small pulmonary nodules with mild mediastinal lymphadenopathy.  The patient was assessed by neurosurgical services and taken to the operating room on 10/2 for T1-T4 laminectomy and debulking of epidural mass.  Preliminary intraoperative pathology is noted to be consistent with lymphoma.  Radiation oncology has been asked to assist with potential care coordination.    Today, the patient is found to be resting comfortably.  He reports an improvement in sensation to his abdomen and lower extremities following decompression.  He endorses a sudden onset of described symptoms, beginning ~ 1 week ago.  Prior to this, he describes feeling a sharp pinching sensation in his upper back in August, which improved with short course of steroids and muscle relaxers.  He does not report any B symptoms, noting that he is chronically warm in bed at night.    Prior Radiotherapy:  No  Radiation Treatments       No radiation treatments to show. 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    Loss of smell    Cough variant asthma 04/02/2019    Cough variant asthma    Diverticulitis 09/25/2023    Foot pain 09/25/2023    Other conditions influencing health status     No significant past surgical history    Other conditions influencing health status     No significant past medical history    Personal history of other diseases of the respiratory system 04/14/2019    History of bronchitis    Personal history of other drug therapy 10/23/2019    History of influenza vaccination    Personal history of other specified conditions 03/30/2019    History of persistent cough    Recurrent pneumonia 09/25/2023    Tendonitis 09/25/2023        Past Surgical History:    Past Surgical History:   Procedure Laterality Date    OTHER SURGICAL HISTORY  04/12/2019    No history of surgery        Family History:  Cancer-related family history is not on file.    Social History:    , lives in [place], works as a  for [employer], previously used chewing tobacco, nonsmoker    Allergies:    Allergies   Allergen Reactions    Ciprofloxacin Itching        Medications:    Current Facility-Administered Medications:     acetaminophen (Tylenol) tablet 650 mg, 650 mg, oral, q6h SONAL, Elda Serna MD, 650 mg at 10/03/23 1420    albuterol 90 mcg/actuation inhaler 2 puff, 2 puff, inhalation, q6h PRN, Elda Serna MD, 6 puff at 10/02/23 0847    cyclobenzaprine (Flexeril) tablet 10 mg, 10 mg, oral, TID, Elda Serna MD, 10 mg at 10/03/23 1420    dexAMETHasone (Decadron) injection 4 mg, 4 mg, intravenous, q6h, Lucinda Hackett MD, 4 mg at 10/03/23 1127    dextrose 10 % infusion, 0.3 g/kg/hr, intravenous, Once PRN, PHILIPP Vela    dextrose 50 % injection 25 g, 25 g, intravenous, q15 min PRN, PHILIPP Vela    fluticasone furoate-vilanteroL (Breo Ellipta) 100-25 mcg/dose inhaler 1 puff, 1 puff, inhalation, Daily, Elda Serna MD,  1 puff at 10/03/23 1002    glucagon (Glucagen) injection 1 mg, 1 mg, intramuscular, q15 min PRN, PHILIPP Vela    heparin (porcine) injection 5,000 Units, 5,000 Units, subcutaneous, q8h, Elda Serna MD, 5,000 Units at 10/03/23 0844    HYDROmorphone (Dilaudid) injection 0.2 mg, 0.2 mg, intravenous, q3h PRN, Elda Senra MD    insulin lispro (HumaLOG) injection 0-10 Units, 0-10 Units, subcutaneous, TID with meals, PHILIPP eVla, 2 Units at 10/03/23 1246    naloxone (Narcan) injection 0.2 mg, 0.2 mg, intravenous, q5 min PRN, Lucinda Hackett MD    oxyCODONE (Roxicodone) immediate release tablet 10 mg, 10 mg, oral, q4h PRN, Elda Serna MD    oxyCODONE (Roxicodone) immediate release tablet 5 mg, 5 mg, oral, q4h PRN, Elda Serna MD, 5 mg at 10/03/23 0646    oxygen (O2) therapy, , inhalation, Continuous - 02/gases, Elda Serna MD, Start at 10/03/23 0800    pantoprazole (ProtoNix) EC tablet 40 mg, 40 mg, oral, Daily before breakfast, PHILIPP Vela, 40 mg at 10/03/23 0640    piperacillin-tazobactam-dextrose (Zosyn) IV 3.375 g, 3.375 g, intravenous, q6h, Elda Serna MD, Last Rate: 100 mL/hr at 10/03/23 1420, 3.375 g at 10/03/23 1420    polyethylene glycol (Glycolax, Miralax) packet 17 g, 17 g, oral, Daily, Elda Serna MD, 17 g at 10/03/23 0844    sennosides-docusate sodium (Josi-Colace) 8.6-50 mg per tablet 2 tablet, 2 tablet, oral, BID, Elda Serna MD, 2 tablet at 10/03/23 0844    sodium chloride 0.9% infusion, 100 mL/hr, intravenous, Continuous, Lucinda Hackett MD      Review of Systems:    The patient denies headaches, vision changes, syncope, no chest pain or difficulty with breathing, no fevers or chills, no abdominal pain, no unintended weight loss, no bleeding issues, no bruising, no hematuria or changes in bowel or bladder habits.    Performance Status:  The Karnofsky performance scale today is 80, Normal activity with effort;  "some signs or symptoms of disease (ECOG equivalent 1).        OBJECTIVE  Physical Exam:  /67   Pulse 108   Temp 36.4 °C (97.5 °F) (Temporal)   Resp 18   Ht 1.727 m (5' 8\")   Wt 111 kg (244 lb 11.4 oz)   SpO2 96%   BMI 37.21 kg/m²      General: awake, alert, conversant, appears well-nourished, well-developed, in no distress  HEENT: pupils equal and round, no scleral icterus  Chest: normal respiratory effort, not on supplemental oxygen  Cardiac: regular rate  Abdomen: soft, ND, NT  EXT: no peripheral edema, no asymmetry noted  Neuro: AOx4, cranial nerves II through XII grossly intact, moves all extremities, reports (dulled) sensation to touch in the abdomen, lower extremities, strength is normal, equal bilaterally  Psych: coherent thought process, appropriate mood and affect    Laboratory Review:    Last BMP:  [unfilled]  Last CBC w. Diff.:  [unfilled]      Imaging:    10/1 MRI spine: Abnormal signal within the C5 vertebral body which is highly suspicious for neoplastic infiltration. Abnormal signal within the T2-T4 vertebral bodies and posterior elements consistent with neoplastic infiltration. tumoral extension into the spinal canal at the level of T1-T2 through T3 causing marked mass effect on the thoracic spinal cord. There is extension of tumor into the left T2-T3 and T3-T4 neural foramina. There is soft tissue located within the right T7-T8 prevertebral soft tissues and within the right T7-T8 neural foramen which is most consistent tumor.     ASSESSMENT:  This is a 47-year-old male with a history of asthma, HTN, HLD that presented to [facility] ED on 9/30 complaining progressive numbness below the nipple line with associated weakness in lower extremities.  MRI spine obtained and work-up noted neoplastic infiltration with tumor extension into the spinal canal at the level of T1-T4, with mass effect on the thoracic spinal cord, s/p  T1-T4 laminectomy and debulking of epidural mass on 10/2, with " preliminary pathology showing lymphoma.  Radiation oncology has been asked to assist with potential care coordination.    PLAN:    The patient was discussed with my attending physician, Dr. Skelton.    The working diagnosis of lymphoma and the indications for consolidative radiotherapy following decompression were discussed with the patient and his wife.  The neurosurgical services have tentatively cleared the patient to proceed with postoperative radiation on POD 10.    Recommendations at this time include consultation with malignant hematology to determine whether the patient would be a candidate for upfront systemic therapy, with prn radiotherapy.    We will continue to follow and assist with coordination.    I spent 65 minutes in the professional and overall care of this patient.

## 2023-10-03 NOTE — PROGRESS NOTES
10/03/23        Transitional Care Coordination Progress Note:     Patient discussed during interdisciplinary rounds.     Team members present: RN TCC  interdisciplinary team     Plan per Medical/Surgical team HLD, asthma p/w 1 week thoracic back pain and T4 sensory level, MRI compressive T2-3 L dorsal epidural mass, CT CAP numerous <7mm pulm nodules, 10/2 s/p T1-4 lamis, tumor debulking, (prelim: lymphoproliferative), CXR R basilar atelecatsis poss aspiration Thoracic spine tumor pt ot       Discharge disposition:  home possible home care     Status-Inpatient   Payer- Payor: [employer] / Plan: [employer]AN / Product Type: *No Product type* /    Potential Barriers: None   ADOD: 10.15.2023         Renny Martin RN [facility] [phone]

## 2023-10-04 LAB
CELL COUNT (BLOOD): 0.4 X10*3/UL
CELL POPULATIONS: NORMAL
DIAGNOSIS: NORMAL
FLOW DIFFERENTIAL: NORMAL
FLOW TEST ORDERED: NORMAL
GLUCOSE BLD MANUAL STRIP-MCNC: 136 MG/DL (ref 74–99)
GLUCOSE BLD MANUAL STRIP-MCNC: 150 MG/DL (ref 74–99)
GLUCOSE BLD MANUAL STRIP-MCNC: 162 MG/DL (ref 74–99)
GLUCOSE BLD MANUAL STRIP-MCNC: 212 MG/DL (ref 74–99)
HAV IGM SER QL: NONREACTIVE
HBV CORE IGM SER QL: NONREACTIVE
HBV SURFACE AG SERPL QL IA: NONREACTIVE
HCV AB SER QL: NONREACTIVE
LAB TEST METHOD: NORMAL
LDH SERPL L TO P-CCNC: 135 U/L (ref 84–246)
NUMBER OF CELLS COLLECTED: NORMAL
PATH REPORT.TOTAL CANCER: NORMAL
SIGNATURE COMMENT: NORMAL
SPECIMEN VIABILITY: NORMAL
URATE SERPL-MCNC: 2.4 MG/DL (ref 4–7.5)

## 2023-10-04 PROCEDURE — 36415 COLL VENOUS BLD VENIPUNCTURE: CPT | Performed by: STUDENT IN AN ORGANIZED HEALTH CARE EDUCATION/TRAINING PROGRAM

## 2023-10-04 PROCEDURE — 2500000004 HC RX 250 GENERAL PHARMACY W/ HCPCS (ALT 636 FOR OP/ED): Performed by: STUDENT IN AN ORGANIZED HEALTH CARE EDUCATION/TRAINING PROGRAM

## 2023-10-04 PROCEDURE — 96372 THER/PROPH/DIAG INJ SC/IM: CPT | Performed by: STUDENT IN AN ORGANIZED HEALTH CARE EDUCATION/TRAINING PROGRAM

## 2023-10-04 PROCEDURE — 84550 ASSAY OF BLOOD/URIC ACID: CPT | Performed by: STUDENT IN AN ORGANIZED HEALTH CARE EDUCATION/TRAINING PROGRAM

## 2023-10-04 PROCEDURE — 2500000004 HC RX 250 GENERAL PHARMACY W/ HCPCS (ALT 636 FOR OP/ED): Performed by: NURSE PRACTITIONER

## 2023-10-04 PROCEDURE — 80074 ACUTE HEPATITIS PANEL: CPT | Performed by: STUDENT IN AN ORGANIZED HEALTH CARE EDUCATION/TRAINING PROGRAM

## 2023-10-04 PROCEDURE — 83615 LACTATE (LD) (LDH) ENZYME: CPT | Performed by: STUDENT IN AN ORGANIZED HEALTH CARE EDUCATION/TRAINING PROGRAM

## 2023-10-04 PROCEDURE — 2500000001 HC RX 250 WO HCPCS SELF ADMINISTERED DRUGS (ALT 637 FOR MEDICARE OP): Performed by: STUDENT IN AN ORGANIZED HEALTH CARE EDUCATION/TRAINING PROGRAM

## 2023-10-04 PROCEDURE — 2500000002 HC RX 250 W HCPCS SELF ADMINISTERED DRUGS (ALT 637 FOR MEDICARE OP, ALT 636 FOR OP/ED): Performed by: NURSE PRACTITIONER

## 2023-10-04 PROCEDURE — 96372 THER/PROPH/DIAG INJ SC/IM: CPT | Performed by: NURSE PRACTITIONER

## 2023-10-04 PROCEDURE — 97161 PT EVAL LOW COMPLEX 20 MIN: CPT | Mod: GP

## 2023-10-04 PROCEDURE — 94640 AIRWAY INHALATION TREATMENT: CPT

## 2023-10-04 PROCEDURE — 97165 OT EVAL LOW COMPLEX 30 MIN: CPT | Mod: GO

## 2023-10-04 PROCEDURE — 97530 THERAPEUTIC ACTIVITIES: CPT | Mod: GP

## 2023-10-04 PROCEDURE — 2500000005 HC RX 250 GENERAL PHARMACY W/O HCPCS: Performed by: STUDENT IN AN ORGANIZED HEALTH CARE EDUCATION/TRAINING PROGRAM

## 2023-10-04 PROCEDURE — 1100000001 HC PRIVATE ROOM DAILY

## 2023-10-04 PROCEDURE — 82947 ASSAY GLUCOSE BLOOD QUANT: CPT

## 2023-10-04 RX ADMIN — ACETAMINOPHEN 650 MG: 325 TABLET, FILM COATED ORAL at 14:10

## 2023-10-04 RX ADMIN — HEPARIN SODIUM 5000 UNITS: 5000 INJECTION INTRAVENOUS; SUBCUTANEOUS at 00:35

## 2023-10-04 RX ADMIN — DEXAMETHASONE SODIUM PHOSPHATE 4 MG: 4 INJECTION, SOLUTION INTRA-ARTICULAR; INTRALESIONAL; INTRAMUSCULAR; INTRAVENOUS; SOFT TISSUE at 06:56

## 2023-10-04 RX ADMIN — OXYCODONE HYDROCHLORIDE 5 MG: 5 TABLET ORAL at 22:07

## 2023-10-04 RX ADMIN — PIPERACILLIN SODIUM AND TAZOBACTAM SODIUM 3.38 G: 3; .375 INJECTION, SOLUTION INTRAVENOUS at 08:22

## 2023-10-04 RX ADMIN — OXYCODONE HYDROCHLORIDE 5 MG: 5 TABLET ORAL at 09:41

## 2023-10-04 RX ADMIN — INSULIN LISPRO 2 UNITS: 100 INJECTION, SOLUTION INTRAVENOUS; SUBCUTANEOUS at 17:12

## 2023-10-04 RX ADMIN — Medication: at 08:00

## 2023-10-04 RX ADMIN — INSULIN LISPRO 4 UNITS: 100 INJECTION, SOLUTION INTRAVENOUS; SUBCUTANEOUS at 12:17

## 2023-10-04 RX ADMIN — ACETAMINOPHEN 650 MG: 325 TABLET, FILM COATED ORAL at 08:22

## 2023-10-04 RX ADMIN — POLYETHYLENE GLYCOL 3350 17 G: 17 POWDER, FOR SOLUTION ORAL at 08:22

## 2023-10-04 RX ADMIN — SENNOSIDES AND DOCUSATE SODIUM 2 TABLET: 50; 8.6 TABLET ORAL at 22:09

## 2023-10-04 RX ADMIN — SENNOSIDES AND DOCUSATE SODIUM 2 TABLET: 50; 8.6 TABLET ORAL at 08:22

## 2023-10-04 RX ADMIN — CYCLOBENZAPRINE 10 MG: 10 TABLET, FILM COATED ORAL at 22:09

## 2023-10-04 RX ADMIN — CYCLOBENZAPRINE 10 MG: 10 TABLET, FILM COATED ORAL at 14:10

## 2023-10-04 RX ADMIN — HEPARIN SODIUM 5000 UNITS: 5000 INJECTION INTRAVENOUS; SUBCUTANEOUS at 08:22

## 2023-10-04 RX ADMIN — DEXAMETHASONE SODIUM PHOSPHATE 4 MG: 4 INJECTION, SOLUTION INTRA-ARTICULAR; INTRALESIONAL; INTRAMUSCULAR; INTRAVENOUS; SOFT TISSUE at 12:17

## 2023-10-04 RX ADMIN — ACETAMINOPHEN 650 MG: 325 TABLET, FILM COATED ORAL at 03:51

## 2023-10-04 RX ADMIN — PANTOPRAZOLE SODIUM 40 MG: 40 TABLET, DELAYED RELEASE ORAL at 08:22

## 2023-10-04 RX ADMIN — DEXAMETHASONE SODIUM PHOSPHATE 4 MG: 4 INJECTION, SOLUTION INTRA-ARTICULAR; INTRALESIONAL; INTRAMUSCULAR; INTRAVENOUS; SOFT TISSUE at 00:35

## 2023-10-04 RX ADMIN — DEXAMETHASONE SODIUM PHOSPHATE 4 MG: 4 INJECTION, SOLUTION INTRA-ARTICULAR; INTRALESIONAL; INTRAMUSCULAR; INTRAVENOUS; SOFT TISSUE at 17:12

## 2023-10-04 RX ADMIN — ACETAMINOPHEN 650 MG: 325 TABLET, FILM COATED ORAL at 22:08

## 2023-10-04 RX ADMIN — HEPARIN SODIUM 5000 UNITS: 5000 INJECTION INTRAVENOUS; SUBCUTANEOUS at 17:12

## 2023-10-04 RX ADMIN — FLUTICASONE FUROATE AND VILANTEROL TRIFENATATE 1 PUFF: 100; 25 POWDER RESPIRATORY (INHALATION) at 09:25

## 2023-10-04 RX ADMIN — DEXAMETHASONE SODIUM PHOSPHATE 4 MG: 4 INJECTION, SOLUTION INTRA-ARTICULAR; INTRALESIONAL; INTRAMUSCULAR; INTRAVENOUS; SOFT TISSUE at 23:52

## 2023-10-04 RX ADMIN — PIPERACILLIN SODIUM AND TAZOBACTAM SODIUM 3.38 G: 3; .375 INJECTION, SOLUTION INTRAVENOUS at 22:07

## 2023-10-04 RX ADMIN — PIPERACILLIN SODIUM AND TAZOBACTAM SODIUM 3.38 G: 3; .375 INJECTION, SOLUTION INTRAVENOUS at 12:17

## 2023-10-04 RX ADMIN — CYCLOBENZAPRINE 10 MG: 10 TABLET, FILM COATED ORAL at 08:22

## 2023-10-04 RX ADMIN — PIPERACILLIN SODIUM AND TAZOBACTAM SODIUM 3.38 G: 3; .375 INJECTION, SOLUTION INTRAVENOUS at 03:51

## 2023-10-04 ASSESSMENT — COGNITIVE AND FUNCTIONAL STATUS - GENERAL
DAILY ACTIVITIY SCORE: 20
WALKING IN HOSPITAL ROOM: A LITTLE
DRESSING REGULAR LOWER BODY CLOTHING: A LITTLE
CLIMB 3 TO 5 STEPS WITH RAILING: A LITTLE
TOILETING: A LITTLE
DRESSING REGULAR UPPER BODY CLOTHING: A LITTLE
PERSONAL GROOMING: A LITTLE
MOVING TO AND FROM BED TO CHAIR: A LITTLE
DRESSING REGULAR LOWER BODY CLOTHING: A LITTLE
HELP NEEDED FOR BATHING: A LITTLE
TURNING FROM BACK TO SIDE WHILE IN FLAT BAD: A LITTLE
HELP NEEDED FOR BATHING: A LITTLE
DAILY ACTIVITIY SCORE: 19
TOILETING: A LITTLE
MOVING FROM LYING ON BACK TO SITTING ON SIDE OF FLAT BED WITH BEDRAILS: A LITTLE
MOBILITY SCORE: 18
STANDING UP FROM CHAIR USING ARMS: A LITTLE
DRESSING REGULAR UPPER BODY CLOTHING: A LITTLE

## 2023-10-04 ASSESSMENT — PAIN - FUNCTIONAL ASSESSMENT
PAIN_FUNCTIONAL_ASSESSMENT: 0-10

## 2023-10-04 ASSESSMENT — PAIN SCALES - GENERAL
PAINLEVEL_OUTOF10: 4
PAINLEVEL_OUTOF10: 4
PAINLEVEL_OUTOF10: 3
PAINLEVEL_OUTOF10: 5 - MODERATE PAIN
PAINLEVEL_OUTOF10: 5 - MODERATE PAIN
PAINLEVEL_OUTOF10: 0 - NO PAIN

## 2023-10-04 ASSESSMENT — PAIN DESCRIPTION - DESCRIPTORS: DESCRIPTORS: ACHING

## 2023-10-04 NOTE — CARE PLAN
Problem: Balance  Goal: Tinetti >24  Outcome: Progressing     Problem: Mobility  Goal: Ambulate 150 feet with LRD, Ind  Outcome: Progressing  Goal: Up/dn 1 stair Ind  Outcome: Progressing     Problem: Transfers  Goal: sit<>stand with LRD, Ind  Outcome: Progressing  Goal: bed<>chair with LRD, Ind  Outcome: Progressing

## 2023-10-04 NOTE — PROGRESS NOTES
"Dann Ni is a 47 y.o. male on day 3 of admission presenting with Thoracic spine tumor.    Subjective   Stable sensation       Objective     Physical Exam  Awake, Ox3  BUE D/B/T/HG/IO 5  BLE HF/KE/DF/PF 5  Diminished sensation to light touch below T4 /5 level, improving  No le or clonus  Incision c/d/I    Last Recorded Vitals  Blood pressure 115/87, pulse 96, temperature 36.4 °C (97.5 °F), temperature source Temporal, resp. rate 16, height 1.727 m (5' 8\"), weight 111 kg (244 lb 11.4 oz), SpO2 96 %.  Intake/Output last 3 Shifts:  I/O last 3 completed shifts:  In: 2000 (18 mL/kg) [I.V.:2000 (18 mL/kg)]  Out: 8105 (73 mL/kg) [Urine:4750 (1.2 mL/kg/hr); Drains:55; Blood:3300]  Weight: 111 kg         Assessment/Plan   Principal Problem:    Thoracic spine tumor    46 YO M h/o HLD, asthma p/w 1 week thoracic back pain and T4 sensory level, MRI compressive T2-3 L dorsal epidural mass, CT CAP numerous <7mm pulm nodules, 10/2 s/p T1-4 lamis, tumor debulking, (prelim: lymphoproliferative), CXR R basilar atelecatsis poss aspiration    10/3 drain d/c'd    Tele  Malignant heme recs - transfer when path finalized  Final path   rad onc recs  ok for radiation POD 10  PTOT       I spent 15 minutes in the professional and overall care of this patient.      Elda Serna MD      "

## 2023-10-04 NOTE — HOSPITAL COURSE
48 yo male with a history of HLD and asthma presented 10/1/23 with 1 week thoracic back pain and T4 sensory level. MRI showed compressive T2-3 left dorsal epidural mass.  CT C/A/P showed multiple <7mm pulmonary nodules.  Patient was taken to the OR 10/2/23 for T1-4 laminectomies, tumor debulking      Final pathology pending at discharge    PT/OT evaluated and recommended home care services.      Post op malignant heme consulted and nurse coordinator will schedule follow up when final pathology resulted.      Patient to follow up with Dr. Marin office on 10/18.

## 2023-10-04 NOTE — PROGRESS NOTES
Physical Therapy    Physical Therapy Evaluation & Treatment    Patient Name: Dann Ni  MRN: [id number]  Today's Date: 10/4/2023   Time Calculation  Start Time: 0950  Stop Time: 1020  Time Calculation (min): 30 min    Assessment/Plan   PT Assessment  PT Assessment Results: Decreased strength, Impaired balance, Pain, Impaired sensation  Rehab Prognosis: Excellent  Evaluation/Treatment Tolerance: Patient tolerated treatment well  Medical Staff Made Aware: Yes  End of Session Communication: Bedside nurse  End of Session Patient Position: Up in chair, Alarm off, caregiver present  IP OR SWING BED PT PLAN  Inpatient or Swing Bed: Inpatient  PT Plan  Treatment/Interventions: Bed mobility, Transfer training, Gait training, Stair training, Balance training, Strengthening, Range of motion, Therapeutic exercise, Therapeutic activity  PT Plan: Skilled PT  PT Frequency: Daily  PT Discharge Recommendations: Low intensity level of continued care  Equipment Recommended upon Discharge: Wheeled walker ((has one from father in law, but may be bariatric and too big for pt, will need to verify))  PT Recommended Transfer Status: Assist x1, Contact guard      Subjective     General Visit Information:  General  Reason for Referral: thoracic tumor s/p T1-4 laminectomy and debulking  Past Medical History Relevant to Rehab: HLD, asthma, diverticulitis, umbilical hernia  Prior to Session Communication: Bedside nurse  Patient Position Received: Bed, 3 rail up  Preferred Learning Style: verbal  Home Living:  Home Living  Type of Home: House  Lives With: Spouse (works, but can take FMLA)  Home Layout: One level  Home Access:  (1 BERTA)  Bathroom Shower/Tub: Tub/shower unit  Prior Level of Function:  Prior Function Per Pt/Caregiver Report  Level of [place]: Independent with ADLs and functional transfers (Recent falls secondary to sensory deficits)  Vocational: Full time employment (police)  Precautions:  Precautions  Post-Surgical  Precautions: Spinal precautions    Objective   Pain:  Pain Assessment  Pain Assessment: 0-10  Pain Score: 5 - Moderate pain  Pain Location: Back  Cognition:  Cognition  Overall Cognitive Status: Within Functional Limits  Orientation Level: Oriented X4    General Assessments:      Activity Tolerance  Endurance: Endurance does not limit participation in activity    Sensation  Light Touch:  (Tingling still present from chest down, and decreased sensation, but reporting very improved since surgery, and able to correctly identify laterality of light touch)    Strength  Strength Comments: 5/5 x4 except R hip 4/5    Coordination  Movements are Fluid and Coordinated: Yes    Postural Control  Postural Control: Within Functional Limits    Static Sitting Balance  Static Sitting-Level of Assistance: Close supervision  Dynamic Sitting Balance  Dynamic Sitting-Comments: Close Supervision (Close Supervision)    Static Standing Balance  Static Standing-Level of Assistance: Contact guard  Dynamic Standing Balance  Dynamic Standing-Comments: Close Supervision  Functional Assessments:       Bed Mobility  Bed Mobility: Yes  Bed Mobility 1  Bed Mobility 1: Supine to sitting  Level of Assistance 1: Minimum assistance  Bed Mobility 2  Bed Mobility  2: Rolling left, Rolling right  Level of Assistance 2: Contact guard    Transfers  Transfer: Yes  Transfer 1  Transfer From 1: Sit to  Transfer to 1: Stand  Transfer Device 1: Walker  Transfer Level of Assistance 1: Contact guard  Transfers 2  Transfer From 2: Bed to  Transfer to 2: Chair with arms  Transfer Device 2: Walker  Transfer Level of Assistance 2: Contact guard  Transfers 3  Transfer From 3: Toilet to  Transfer Device 3: Walker  Transfer Level of Assistance 3: Contact guard    Ambulation/Gait Training  Ambulation/Gait Training Performed: Yes  Ambulation/Gait Training 1  Surface 1: Level tile  Device 1: Rolling walker  Assistance 1: Contact guard  Quality of Gait 1: Wide base of  support (decreased foot clearance, step length, forward flexed posture)  Comments/Distance (ft) 1: 2x 20 feet, 30 feet with seated breaks    Treatments:    Bed Mobility  Bed Mobility: Yes  Bed Mobility 1  Bed Mobility 1: Supine to sitting  Level of Assistance 1: Minimum assistance  Bed Mobility 2  Bed Mobility  2: Rolling left, Rolling right  Level of Assistance 2: Contact guard    Ambulation/Gait Training  Ambulation/Gait Training Performed: Yes  Ambulation/Gait Training 1  Surface 1: Level tile  Device 1: Rolling walker  Assistance 1: Contact guard  Quality of Gait 1: Wide base of support (decreased foot clearance, step length, forward flexed posture)  Comments/Distance (ft) 1: 2x 20 feet, 30 feet with seated breaks  Transfers  Transfer: Yes  Transfer 1  Transfer From 1: Sit to  Transfer to 1: Stand  Transfer Device 1: Walker  Transfer Level of Assistance 1: Contact guard  Transfers 2  Transfer From 2: Bed to  Transfer to 2: Chair with arms  Transfer Device 2: Walker  Transfer Level of Assistance 2: Contact guard  Transfers 3  Transfer From 3: Toilet to  Transfer Device 3: Walker  Transfer Level of Assistance 3: Contact guard    Outcome Measures:    [facility] Basic Mobility  Turning from your back to your side while in a flat bed without using bedrails: A little  Moving from lying on your back to sitting on the side of a flat bed without using bedrails: A little  Moving to and from bed to chair (including a wheelchair): A little  Standing up from a chair using your arms (e.g. wheelchair or bedside chair): A little  To walk in hospital room: A little  Climbing 3-5 steps with railing: A little  Basic Mobility - Total Score: 18    Encounter Problems       Encounter Problems (Active)       Balance       Tinetti >24 (Progressing)       Start:  10/04/23    Expected End:  10/18/23               Mobility       Ambulate 150 feet with LRD, Ind (Progressing)       Start:  10/04/23    Expected End:  10/18/23            Up/dn 1  stair Ind (Progressing)       Start:  10/04/23    Expected End:  10/18/23               Transfers       STG - Patient will perform bed mobility (Progressing)       Start:  10/04/23    Expected End:  10/18/23            STG - Patient will transfer sit to and from stand (Progressing)       Start:  10/04/23    Expected End:  10/18/23            STG - Patient will perform toilet transfer (Progressing)       Start:  10/04/23    Expected End:  10/18/23               Transfers       sit<>stand with LRD, Ind (Progressing)       Start:  10/04/23    Expected End:  10/18/23            bed<>chair with LRD, Ind (Progressing)       Start:  10/04/23    Expected End:  10/18/23                   Education Documentation  Mobility Training, taught by Joel Barajas PT at 10/4/2023 12:42 PM.  Learner: Patient  Readiness: Eager  Method: Explanation, Demonstration, Handout  Response: Verbalizes Understanding, Demonstrated Understanding    Handouts, taught by Joel Barajas PT at 10/4/2023 12:42 PM.  Learner: Patient  Readiness: Eager  Method: Explanation, Demonstration, Handout  Response: Verbalizes Understanding, Demonstrated Understanding    Body Mechanics, taught by Joel Barajas PT at 10/4/2023 12:42 PM.  Learner: Patient  Readiness: Eager  Method: Explanation, Demonstration, Handout  Response: Verbalizes Understanding, Demonstrated Understanding    Precautions, taught by Joel Barajas PT at 10/4/2023 12:42 PM.  Learner: Patient  Readiness: Eager  Method: Explanation, Demonstration, Handout  Response: Verbalizes Understanding, Demonstrated Understanding    ADL Training, taught by Joel Barajas PT at 10/4/2023 12:42 PM.  Learner: Patient  Readiness: Eager  Method: Explanation, Demonstration, Handout  Response: Verbalizes Understanding, Demonstrated Understanding    Education Comments  No comments found.

## 2023-10-04 NOTE — CONSULTS
Reason For Consult  Mass concerning for lymphoma    History Of Present Illness  Dann Ni is a 47 y.o. male with a PMHx of asthma, HTN, HLD, who presented as a transfer from [facility] ED on 10/1 for paraspinal mass noted on CT spine associated with numbness and back pain below the nipple line. His symptoms of back pain began in August for which he was initially treated with symptomatic management and steroids outpatient. X-rays at that time were unremarkable. His symptoms improved briefly with steroids and he represented due to worsening of symptoms over the last week. In the last week his symptoms included back pain and numbness with diminished sensation of his lower extremities. Denies incontinence, constipation, or weakness. On arrival he was afebrile and hemodynamically stable. CT spine demonstrated 4.1 x 1.8 x 1 cm soft tissue mass in the paravertebral soft tissues at T7 extending into T7-T8 neuroforamen as well as innumerable pulmonary nodules and mediastinal hilar lymphadenopathy. MRI was obtained which showed large enhancing T2-T4 epidural mass with cord compression. He was taken by neurosurgery on 10/2 for laminectomy and excision of lesion with samples sent to pathology. He has been recovering well post-operatively with incremental improvement of his lower extremity sensation. [facility] consulted for preliminary pathology of lymphoproliferative disease.     Past Medical History  Asthma, diverticulosis, HTN, HLD    Surgical History  None     Social History  History of chewing tobacco use. Denies smoking. Rare alcohol use. Denies drug use. .     Family History  Family History   Problem Relation Name Age of Onset    Hip dysplasia Mother [place]     Arthritis Mother [place]     Other (htn) Father      Prostatitis Father       Maternal grandmother - lung cancer  Maternal grandfather - bladder cancer     Allergies  Ciprofloxacin    Review of Systems  Endorses paresthesia of abdomen and back  Endorses  "diminished sensation lower extremities, improving post-operatively   Denies weight loss, fatigue, night sweats, easy bruising or bleeding      Physical Exam  Constitutional:  Sitting up in chair, NAD. Conversational.   HENT: Normocephalic atraumatic. No cervical lymphadenopathy.   Eyes: Sclera anicteric, without conjunctival injection. EOMI.  CV: RRR, no murmurs, rubs, or gallops. Normal S1, S2.   Resp: Crackles heard in lower lobes bilaterally. Good aeration throughout. No wheezes.   Abd: Mild distension, non-tender. No masses or organomegaly.   Extremities: no peripheral edema  Skin: warm and dry  Neuro: AOx3       Last Recorded Vitals  Blood pressure 126/72, pulse 90, temperature 36.4 °C (97.5 °F), temperature source Temporal, resp. rate 18, height 1.727 m (5' 8\"), weight 111 kg (244 lb 11.4 oz), SpO2 94 %.    Relevant Results:  Results for orders placed or performed during the hospital encounter of 10/01/23 (from the past 24 hour(s))   POCT GLUCOSE   Result Value Ref Range    POCT Glucose 145 (H) 74 - 99 mg/dL   POCT GLUCOSE   Result Value Ref Range    POCT Glucose 136 (H) 74 - 99 mg/dL   POCT GLUCOSE   Result Value Ref Range    POCT Glucose 212 (H) 74 - 99 mg/dL   Lactate dehydrogenase   Result Value Ref Range     84 - 246 U/L   Uric Acid   Result Value Ref Range    Uric Acid 2.4 (L) 4.0 - 7.5 mg/dL   POCT GLUCOSE   Result Value Ref Range    POCT Glucose 162 (H) 74 - 99 mg/dL           Assessment/Plan   Dann Ni is a 48 yo M with a PMHx of asthma, htn, hld who was admitted for paresthesias and numbness below T4 and was found to have a paraspinal mass, now POD #2 for laminectomy with excision of thoracic lesion and decompression. Hematology was consulted for concern for possible lymphoma. In conversation with pathology today his flow cytometry is preliminarily negative. No final pathology back on surgical specimen. There is no indication at this time to initiate treatment until final pathology is " established.     #Lumbar mass  -pending final pathology for initiation of therapy   -if patient is discharged prior to final pathology result, please schedule him with the [facility] diagnostic clinic outpatient   -ordered G6PD, hepatitis, uric acid, and LDH     Patient discussed with Dr. Deysi VALENCIA, MS4

## 2023-10-04 NOTE — PROGRESS NOTES
Occupational Therapy    Evaluation    Patient Name: Dann Ni  MRN: [id number]  Today's Date: 10/4/2023  Time Calculation  Start Time: 1025  Stop Time: 1040  Time Calculation (min): 15 min    Assessment  IP OT Assessment  OT Assessment: Pt presents with decreased ADL's/mobiliutyh 2' decreaed overall activity tolerance, decreased balance, c/o surgical pain.  Plan:  OT Frequency: 2 times per week  OT Discharge Recommendations: Low intensity level of continued care  Equipment Recommended upon Discharge:  (Pt may benefit from a raised toilet seat and tub seat for comfort and safety.)  OT Recommended Transfer Status: Assist of 1 (with walker)    Subjective   Current Problem:  1. Thoracic spine tumor  Surgical Pathology Exam    Surgical Pathology Exam    Flow Cytometry Test    Flow Cytometry Test      2. Mild asthma, unspecified whether complicated, unspecified whether persistent  fluticasone furoate-vilanteroL (Breo Ellipta) 100-25 mcg/dose inhaler 1 puff    albuterol 90 mcg/actuation inhaler 2 puff        General:  General  Reason for Referral: T2-T3 left dorsal epidural mass now s/p T1-T4 lami tumor debulking, CKR right basilar atelectasis, possible aspiration  Past Medical History Relevant to Rehab: HLD, asthma, diverticulitis, umbilical hernia  Patient Position Received: Up in chair (Pt just finishing session with PT)  Preferred Learning Style: visual, written, verbal  General Comment: Pt pleasant and cooeprative, agreeable to OT.  Precautions:  Precautions Comment: spine precautions  Vital Signs:     Pain:  Pain Assessment  Pain Assessment: 0-10  Pain Score: 5 - Moderate pain  Pain Type:  (incisional)  Pain Location: Back    Objective   Cognition:  Overall Cognitive Status: Within Functional Limits  Orientation Level: Oriented X4           Home Living:  Type of Home: House  Lives With: Spouse (dog)  Home Adaptive Equipment:  (Pt thinks he has a RW from a family member (may be a bariatric walker).)  Home Layout:  One level  Home Access: Stairs to enter without rails  Bathroom Shower/Tub: Tub/shower unit   Prior Function:  Level of [place]: Independent with ADLs and functional transfers, Independent with homemaking with ambulation  Vocational: Full time employment (Pt works as a  for the [employer].)  IADL History:  Homemaking Responsibilities:  (Independent prior to recent issues.)  Current License:  (+ )  Mode of Transportation: Car  ADL:  LE Dressing Assistance: Minimal  LE Dressing Deficit: Don/doff R sock, Don/doff L sock (Pt utilized figure 4 technique, required increased time and effort in task. D/W pt the possiblity of using a reacher and sock aide to maximize comfort/independence and carryover of spine precautions.)  Toileting Deficit:  (PT reports that pt toileted during PT session this date at close SBA level, will continue to assess.)  Functional Assistance:  (CGA for short distance functional mobilty in room with RW, slow paced gait. Refer to PT notes for gait/distance details.)  Activity Tolerance:     Bed Mobility/Transfers: Bed Mobility  Bed Mobility: No   and Transfers  Transfer: Yes  Transfer 1  Transfer From 1: Sit to  Transfer to 1: Stand  Technique 1: Sit to stand  Transfer Device 1:  (RW)  Transfer Level of Assistance 1: Contact guard  Transfers 2  Transfer From 2: Stand to  Transfer to 2: Sit  Technique 2: Stand to sit  Transfer Device 2:  (RW)  Transfer Level of Assistance 2:  (CGA, cues for safe hand placement in pre for transfer.)  Modalities:     IADL's:   Homemaking Responsibilities:  (Independent prior to recent issues.)  Current License:  (+ )  Mode of Transportation: Car  Vision: Vision - Basic Assessment  Current Vision:  (WFL)  Sensation:  Light Touch:  (Grossly intact to light touch B/L UE's)  Strength:     Perception:     Coordination:      Hand Function:     Extremities: RUE   RUE :  (AROM limited in end ranges only 2' c/o pain with reaching B/L UE's;  strength not formally assessed 2' c/o back pain, however, appears grossly WFL as observed during tasks in session.) and LUE   LUE:  (AROM B/L UE's limited only in end ranges 2' c/o back pain; strength appears grossly WFL as observed during functional tasks in session.)      Outcome Measures: [facility] Daily Activity  Putting on and taking off regular lower body clothing: A little  Bathing (including washing, rinsing, drying): A little  Putting on and taking off regular upper body clothing: A little  Toileting, which includes using toilet, bedpan or urinal: A little  Taking care of personal grooming such as brushing teeth: A little  Eating Meals: None  Daily Activity - Total Score: 19      Education Documentation  Handouts, taught by Lesli Mcguire OT at 10/4/2023 12:22 PM.  Learner: Patient  Readiness: Acceptance  Method: Explanation, Demonstration, Handout  Response: Verbalizes Understanding, Needs Reinforcement    Body Mechanics, taught by Lesli Mcguire OT at 10/4/2023 12:22 PM.  Learner: Patient  Readiness: Acceptance  Method: Explanation, Demonstration, Handout  Response: Verbalizes Understanding, Needs Reinforcement    Precautions, taught by Lesli Mcguire OT at 10/4/2023 12:22 PM.  Learner: Patient  Readiness: Acceptance  Method: Explanation, Demonstration, Handout  Response: Verbalizes Understanding, Needs Reinforcement    ADL Training, taught by Lesli Mcguire OT at 10/4/2023 12:22 PM.  Learner: Patient  Readiness: Acceptance  Method: Explanation, Demonstration, Handout  Response: Verbalizes Understanding, Needs Reinforcement    Education Comments  No comments found.      Goals:   Encounter Problems       Encounter Problems (Active)       Dressing Upper Extremities       STG - Patient will dress upper body (Progressing)       Start:  10/04/23    Expected End:  10/18/23               Toileting       STG - Patient will complete toileting tasks with (Progressing)       Start:  10/04/23    Expected End:  10/18/23                Transfers       STG - Patient will perform bed mobility (Progressing)       Start:  10/04/23    Expected End:  10/18/23            STG - Patient will transfer sit to and from stand (Progressing)       Start:  10/04/23    Expected End:  10/18/23            STG - Patient will perform toilet transfer (Progressing)       Start:  10/04/23    Expected End:  10/18/23

## 2023-10-04 NOTE — CARE PLAN
Problem: Dressing Upper Extremities  Goal: STG - Patient will dress upper body  Outcome: Progressing     Problem: Toileting  Goal: STG - Patient will complete toileting tasks with  Outcome: Progressing     Problem: Transfers  Goal: STG - Patient will perform bed mobility  Outcome: Progressing  Goal: STG - Patient will transfer sit to and from stand  Outcome: Progressing  Goal: STG - Patient will perform toilet transfer  Outcome: Progressing

## 2023-10-05 ENCOUNTER — APPOINTMENT (OUTPATIENT)
Dept: CARDIOLOGY | Facility: HOSPITAL | Age: 48
End: 2023-10-05
Payer: COMMERCIAL

## 2023-10-05 LAB
ALBUMIN SERPL BCP-MCNC: 4.2 G/DL (ref 3.4–5)
ANION GAP SERPL CALC-SCNC: 15 MMOL/L (ref 10–20)
ATRIAL RATE: 77 BPM
BUN SERPL-MCNC: 31 MG/DL (ref 6–23)
CALCIUM SERPL-MCNC: 8.7 MG/DL (ref 8.6–10.6)
CHLORIDE SERPL-SCNC: 102 MMOL/L (ref 98–107)
CO2 SERPL-SCNC: 24 MMOL/L (ref 21–32)
CREAT SERPL-MCNC: 0.84 MG/DL (ref 0.5–1.3)
ERYTHROCYTE [DISTWIDTH] IN BLOOD BY AUTOMATED COUNT: 12.5 % (ref 11.5–14.5)
GFR SERPL CREATININE-BSD FRML MDRD: >90 ML/MIN/1.73M*2
GLUCOSE BLD MANUAL STRIP-MCNC: 141 MG/DL (ref 74–99)
GLUCOSE BLD MANUAL STRIP-MCNC: 156 MG/DL (ref 74–99)
GLUCOSE BLD MANUAL STRIP-MCNC: 190 MG/DL (ref 74–99)
GLUCOSE BLD MANUAL STRIP-MCNC: 226 MG/DL (ref 74–99)
GLUCOSE SERPL-MCNC: 166 MG/DL (ref 74–99)
HCT VFR BLD AUTO: 29.9 % (ref 41–52)
HGB BLD-MCNC: 9.9 G/DL (ref 13.5–17.5)
MCH RBC QN AUTO: 29.2 PG (ref 26–34)
MCHC RBC AUTO-ENTMCNC: 33.1 G/DL (ref 32–36)
MCV RBC AUTO: 88 FL (ref 80–100)
NRBC BLD-RTO: 0.9 /100 WBCS (ref 0–0)
P AXIS: 64 DEGREES
P OFFSET: 191 MS
P ONSET: 134 MS
PHOSPHATE SERPL-MCNC: 3.7 MG/DL (ref 2.5–4.9)
PLATELET # BLD AUTO: 250 X10*3/UL (ref 150–450)
PMV BLD AUTO: 10.5 FL (ref 7.5–11.5)
POTASSIUM SERPL-SCNC: 4.3 MMOL/L (ref 3.5–5.3)
PR INTERVAL: 172 MS
Q ONSET: 220 MS
QRS COUNT: 12 BEATS
QRS DURATION: 92 MS
QT INTERVAL: 370 MS
QTC CALCULATION(BAZETT): 418 MS
QTC FREDERICIA: 402 MS
R AXIS: 63 DEGREES
RBC # BLD AUTO: 3.39 X10*6/UL (ref 4.5–5.9)
SODIUM SERPL-SCNC: 137 MMOL/L (ref 136–145)
T AXIS: 32 DEGREES
T OFFSET: 405 MS
VENTRICULAR RATE: 77 BPM
WBC # BLD AUTO: 10.8 X10*3/UL (ref 4.4–11.3)

## 2023-10-05 PROCEDURE — 1100000001 HC PRIVATE ROOM DAILY

## 2023-10-05 PROCEDURE — 85027 COMPLETE CBC AUTOMATED: CPT | Performed by: NURSE PRACTITIONER

## 2023-10-05 PROCEDURE — 2500000004 HC RX 250 GENERAL PHARMACY W/ HCPCS (ALT 636 FOR OP/ED): Performed by: NURSE PRACTITIONER

## 2023-10-05 PROCEDURE — 97116 GAIT TRAINING THERAPY: CPT | Mod: GP,CQ

## 2023-10-05 PROCEDURE — 97530 THERAPEUTIC ACTIVITIES: CPT | Mod: GP,CQ

## 2023-10-05 PROCEDURE — 80069 RENAL FUNCTION PANEL: CPT | Performed by: NURSE PRACTITIONER

## 2023-10-05 PROCEDURE — 2500000004 HC RX 250 GENERAL PHARMACY W/ HCPCS (ALT 636 FOR OP/ED): Performed by: STUDENT IN AN ORGANIZED HEALTH CARE EDUCATION/TRAINING PROGRAM

## 2023-10-05 PROCEDURE — 94640 AIRWAY INHALATION TREATMENT: CPT

## 2023-10-05 PROCEDURE — 96372 THER/PROPH/DIAG INJ SC/IM: CPT | Performed by: STUDENT IN AN ORGANIZED HEALTH CARE EDUCATION/TRAINING PROGRAM

## 2023-10-05 PROCEDURE — 2500000001 HC RX 250 WO HCPCS SELF ADMINISTERED DRUGS (ALT 637 FOR MEDICARE OP): Performed by: STUDENT IN AN ORGANIZED HEALTH CARE EDUCATION/TRAINING PROGRAM

## 2023-10-05 PROCEDURE — 2500000005 HC RX 250 GENERAL PHARMACY W/O HCPCS: Performed by: STUDENT IN AN ORGANIZED HEALTH CARE EDUCATION/TRAINING PROGRAM

## 2023-10-05 PROCEDURE — 82947 ASSAY GLUCOSE BLOOD QUANT: CPT

## 2023-10-05 PROCEDURE — 84100 ASSAY OF PHOSPHORUS: CPT | Performed by: NURSE PRACTITIONER

## 2023-10-05 PROCEDURE — 93005 ELECTROCARDIOGRAM TRACING: CPT

## 2023-10-05 PROCEDURE — 36415 COLL VENOUS BLD VENIPUNCTURE: CPT | Performed by: NURSE PRACTITIONER

## 2023-10-05 PROCEDURE — 82960 TEST FOR G6PD ENZYME: CPT | Performed by: STUDENT IN AN ORGANIZED HEALTH CARE EDUCATION/TRAINING PROGRAM

## 2023-10-05 RX ADMIN — HEPARIN SODIUM 5000 UNITS: 5000 INJECTION INTRAVENOUS; SUBCUTANEOUS at 17:36

## 2023-10-05 RX ADMIN — SENNOSIDES AND DOCUSATE SODIUM 2 TABLET: 50; 8.6 TABLET ORAL at 10:20

## 2023-10-05 RX ADMIN — SENNOSIDES AND DOCUSATE SODIUM 2 TABLET: 50; 8.6 TABLET ORAL at 21:44

## 2023-10-05 RX ADMIN — PIPERACILLIN SODIUM AND TAZOBACTAM SODIUM 3.38 G: 3; .375 INJECTION, SOLUTION INTRAVENOUS at 17:34

## 2023-10-05 RX ADMIN — CYCLOBENZAPRINE 10 MG: 10 TABLET, FILM COATED ORAL at 21:44

## 2023-10-05 RX ADMIN — ACETAMINOPHEN 650 MG: 325 TABLET, FILM COATED ORAL at 21:43

## 2023-10-05 RX ADMIN — ACETAMINOPHEN 650 MG: 325 TABLET, FILM COATED ORAL at 10:19

## 2023-10-05 RX ADMIN — DEXAMETHASONE SODIUM PHOSPHATE 4 MG: 4 INJECTION, SOLUTION INTRA-ARTICULAR; INTRALESIONAL; INTRAMUSCULAR; INTRAVENOUS; SOFT TISSUE at 04:54

## 2023-10-05 RX ADMIN — OXYCODONE HYDROCHLORIDE 5 MG: 5 TABLET ORAL at 14:44

## 2023-10-05 RX ADMIN — DEXAMETHASONE SODIUM PHOSPHATE 4 MG: 4 INJECTION, SOLUTION INTRA-ARTICULAR; INTRALESIONAL; INTRAMUSCULAR; INTRAVENOUS; SOFT TISSUE at 17:31

## 2023-10-05 RX ADMIN — OXYCODONE HYDROCHLORIDE 10 MG: 5 TABLET ORAL at 03:15

## 2023-10-05 RX ADMIN — PANTOPRAZOLE SODIUM 40 MG: 40 TABLET, DELAYED RELEASE ORAL at 10:19

## 2023-10-05 RX ADMIN — HEPARIN SODIUM 5000 UNITS: 5000 INJECTION INTRAVENOUS; SUBCUTANEOUS at 23:46

## 2023-10-05 RX ADMIN — PIPERACILLIN SODIUM AND TAZOBACTAM SODIUM 3.38 G: 3; .375 INJECTION, SOLUTION INTRAVENOUS at 10:18

## 2023-10-05 RX ADMIN — CYCLOBENZAPRINE 10 MG: 10 TABLET, FILM COATED ORAL at 10:19

## 2023-10-05 RX ADMIN — PIPERACILLIN SODIUM AND TAZOBACTAM SODIUM 3.38 G: 3; .375 INJECTION, SOLUTION INTRAVENOUS at 21:55

## 2023-10-05 RX ADMIN — PIPERACILLIN SODIUM AND TAZOBACTAM SODIUM 3.38 G: 3; .375 INJECTION, SOLUTION INTRAVENOUS at 04:54

## 2023-10-05 RX ADMIN — FLUTICASONE FUROATE AND VILANTEROL TRIFENATATE 1 PUFF: 100; 25 POWDER RESPIRATORY (INHALATION) at 09:36

## 2023-10-05 RX ADMIN — ACETAMINOPHEN 650 MG: 325 TABLET, FILM COATED ORAL at 17:29

## 2023-10-05 RX ADMIN — ACETAMINOPHEN 650 MG: 325 TABLET, FILM COATED ORAL at 03:14

## 2023-10-05 RX ADMIN — Medication: at 08:00

## 2023-10-05 RX ADMIN — DEXAMETHASONE SODIUM PHOSPHATE 4 MG: 4 INJECTION, SOLUTION INTRA-ARTICULAR; INTRALESIONAL; INTRAMUSCULAR; INTRAVENOUS; SOFT TISSUE at 23:46

## 2023-10-05 RX ADMIN — DEXAMETHASONE SODIUM PHOSPHATE 4 MG: 4 INJECTION, SOLUTION INTRA-ARTICULAR; INTRALESIONAL; INTRAMUSCULAR; INTRAVENOUS; SOFT TISSUE at 10:16

## 2023-10-05 RX ADMIN — HEPARIN SODIUM 5000 UNITS: 5000 INJECTION INTRAVENOUS; SUBCUTANEOUS at 00:01

## 2023-10-05 RX ADMIN — CYCLOBENZAPRINE 10 MG: 10 TABLET, FILM COATED ORAL at 14:45

## 2023-10-05 RX ADMIN — OXYCODONE HYDROCHLORIDE 5 MG: 5 TABLET ORAL at 10:19

## 2023-10-05 ASSESSMENT — PAIN SCALES - GENERAL
PAINLEVEL_OUTOF10: 5 - MODERATE PAIN
PAINLEVEL_OUTOF10: 3
PAINLEVEL_OUTOF10: 7
PAINLEVEL_OUTOF10: 5 - MODERATE PAIN
PAINLEVEL_OUTOF10: 7
PAINLEVEL_OUTOF10: 4

## 2023-10-05 ASSESSMENT — COGNITIVE AND FUNCTIONAL STATUS - GENERAL
TURNING FROM BACK TO SIDE WHILE IN FLAT BAD: A LITTLE
WALKING IN HOSPITAL ROOM: A LITTLE
CLIMB 3 TO 5 STEPS WITH RAILING: A LITTLE
MOVING TO AND FROM BED TO CHAIR: A LITTLE
MOVING FROM LYING ON BACK TO SITTING ON SIDE OF FLAT BED WITH BEDRAILS: A LITTLE
MOBILITY SCORE: 18
STANDING UP FROM CHAIR USING ARMS: A LITTLE

## 2023-10-05 ASSESSMENT — PAIN DESCRIPTION - DESCRIPTORS: DESCRIPTORS: ACHING

## 2023-10-05 ASSESSMENT — PAIN - FUNCTIONAL ASSESSMENT: PAIN_FUNCTIONAL_ASSESSMENT: 0-10

## 2023-10-05 NOTE — CARE PLAN
The patient's goals for the shift include The patients reported goal for this shift is to eat and get some sleep.  The clinical goals for the shift include  (Pt will mainatin safety this shift)  Over the shift, the patient did  make progress toward the following goals. B

## 2023-10-05 NOTE — PROGRESS NOTES
Physical Therapy    Physical Therapy Treatment    Patient Name: Dann Ni  MRN: [id number]  Today's Date: 10/5/2023          Assessment/Plan   PT Assessment  PT Assessment Results: Decreased strength, Impaired balance, Pain, Impaired sensation  Rehab Prognosis: Excellent  Evaluation/Treatment Tolerance: Patient tolerated treatment well  Medical Staff Made Aware: Yes  End of Session Communication: Bedside nurse  End of Session Patient Position: Bed, 2 rail up     PT Plan  Treatment/Interventions: Bed mobility, Transfer training, Gait training, Stair training, Balance training, Strengthening, Range of motion, Therapeutic exercise, Therapeutic activity  PT Plan: Skilled PT  PT Frequency: Daily  PT Discharge Recommendations: Low intensity level of continued care  Equipment Recommended upon Discharge: Wheeled walker ((has one from father in law, but may be bariatric and too big for pt, will need to verify))  PT Recommended Transfer Status: Assist x1, Contact guard      General Visit Information:   PT  Visit  PT Received On: 10/05/23  General  Prior to Session Communication: Bedside nurse  Patient Position Received: Bed, 2 rail up  General Comment: pt now ambulating 70' using wheeled walker. pt demonstrates understanding    Subjective   Precautions:  Precautions  Post-Surgical Precautions: Spinal precautions  Vital Signs:       Objective     Treatments:       Bed Mobility  Bed Mobility: Yes  Bed Mobility 1  Bed Mobility 1: Supine to sitting  Level of Assistance 1: Contact guard    Ambulation/Gait Training 1  Device 1: Rolling walker  Assistance 1: Contact guard  Quality of Gait 1: Wide base of support  Comments/Distance (ft) 1: 1x30, 1x70  Transfers  Transfer: Yes  Transfer 1  Technique 1: Sit to stand, Stand to sit  Transfer Device 1: Walker  Transfer Level of Assistance 1: Contact guard    Outcome Measures:  [facility] Basic Mobility  Turning from your back to your side while in a flat bed without using bedrails: A  little  Moving from lying on your back to sitting on the side of a flat bed without using bedrails: A little  Moving to and from bed to chair (including a wheelchair): A little  Standing up from a chair using your arms (e.g. wheelchair or bedside chair): A little  To walk in hospital room: A little  Climbing 3-5 steps with railing: A little  Basic Mobility - Total Score: 18    Education Documentation  Mobility Training, taught by Juanjo Cortez PTA at 10/5/2023  3:45 PM.  Learner: Patient  Readiness: Acceptance  Method: Explanation  Response: Verbalizes Understanding, Demonstrated Understanding    Education Comments  No comments found.        OP EDUCATION:       Encounter Problems       Encounter Problems (Active)       Balance       Tinetti >24 (Progressing)       Start:  10/04/23    Expected End:  10/18/23               Mobility       Ambulate 150 feet with LRD, Ind (Progressing)       Start:  10/04/23    Expected End:  10/18/23            Up/dn 1 stair Ind (Progressing)       Start:  10/04/23    Expected End:  10/18/23               Transfers       STG - Patient will perform bed mobility (Progressing)       Start:  10/04/23    Expected End:  10/18/23            STG - Patient will transfer sit to and from stand (Progressing)       Start:  10/04/23    Expected End:  10/18/23            STG - Patient will perform toilet transfer (Progressing)       Start:  10/04/23    Expected End:  10/18/23               Transfers       sit<>stand with LRD, Ind (Progressing)       Start:  10/04/23    Expected End:  10/18/23            bed<>chair with LRD, Ind (Progressing)       Start:  10/04/23    Expected End:  10/18/23

## 2023-10-05 NOTE — PROGRESS NOTES
10/05/23        Transitional Care Coordination Progress Note:   Patient discussed during interdisciplinary rounds.   Team members present: JULIO C TCC PCN   Plan per Medical/Surgical team: Malignant heme recs - transfer when path finalized  Final path rad onc recs ok for radiation /chemo POD 14 (or when wound is healed)  rad onc recs PTOT- HC  Discharge disposition: Home wit home care  Status-Inpatient   Payer- Payor: [employer] / Plan: [employer] PLAN / Product Type: *No Product type* /    Potential Barriers:   ADOD: 10.8.2023  Renny Martin RN [facility] [phone]

## 2023-10-05 NOTE — PROGRESS NOTES
"Dann Ni is a 47 y.o. male on day 4 of admission presenting with Thoracic spine tumor.    Subjective   NAEON       Objective     Physical Exam    BUE D/B/T/HG/IO 5  BLE HF/KE/DF/PF 5  Improving sensation to light touch below T4 /5 level  No le or clonus  Incision c/d/I    Last Recorded Vitals  Blood pressure 106/69, pulse 87, temperature 36.5 °C (97.7 °F), temperature source Skin, resp. rate 16, height 1.727 m (5' 8\"), weight 111 kg (244 lb 11.4 oz), SpO2 94 %.  Intake/Output last 3 Shifts:  I/O last 3 completed shifts:  In: - (0 mL/kg)   Out: 2275 (20.5 mL/kg) [Urine:2275 (0.6 mL/kg/hr)]  Weight: 111 kg     Relevant Results                  Assessment/Plan   Principal Problem:    Thoracic spine tumor    Pt is a 46 YO M h/o HLD, asthma p/w 1 week thoracic back pain and T4 sensory level, MRI compressive T2-3 L dorsal epidural mass, CT CAP numerous <7mm pulm nodules, 10/2 s/p T1-4 lamis, tumor debulking, (prelim: lymphoproliferative), CXR R basilar atelecatsis poss aspiration    10/3 drain d/c'd    Plan:     floor  Malignant heme recs - transfer when path finalized  Final path   rad onc recs  ok for radiation /chemo POD 14 (or when wound is healed)  PTOT- HC         Stephanie Freeman MD      "

## 2023-10-06 LAB
G6PD RBC QL: NORMAL
GLUCOSE BLD MANUAL STRIP-MCNC: 198 MG/DL (ref 74–99)
GLUCOSE BLD MANUAL STRIP-MCNC: 204 MG/DL (ref 74–99)
GLUCOSE BLD MANUAL STRIP-MCNC: 236 MG/DL (ref 74–99)
GLUCOSE BLD MANUAL STRIP-MCNC: 270 MG/DL (ref 74–99)

## 2023-10-06 PROCEDURE — 2500000004 HC RX 250 GENERAL PHARMACY W/ HCPCS (ALT 636 FOR OP/ED): Performed by: STUDENT IN AN ORGANIZED HEALTH CARE EDUCATION/TRAINING PROGRAM

## 2023-10-06 PROCEDURE — 82947 ASSAY GLUCOSE BLOOD QUANT: CPT

## 2023-10-06 PROCEDURE — 2500000001 HC RX 250 WO HCPCS SELF ADMINISTERED DRUGS (ALT 637 FOR MEDICARE OP): Performed by: STUDENT IN AN ORGANIZED HEALTH CARE EDUCATION/TRAINING PROGRAM

## 2023-10-06 PROCEDURE — 97530 THERAPEUTIC ACTIVITIES: CPT | Mod: GP,CQ

## 2023-10-06 PROCEDURE — 2500000002 HC RX 250 W HCPCS SELF ADMINISTERED DRUGS (ALT 637 FOR MEDICARE OP, ALT 636 FOR OP/ED): Performed by: NURSE PRACTITIONER

## 2023-10-06 PROCEDURE — 2500000004 HC RX 250 GENERAL PHARMACY W/ HCPCS (ALT 636 FOR OP/ED): Performed by: NURSE PRACTITIONER

## 2023-10-06 PROCEDURE — 96372 THER/PROPH/DIAG INJ SC/IM: CPT | Performed by: STUDENT IN AN ORGANIZED HEALTH CARE EDUCATION/TRAINING PROGRAM

## 2023-10-06 PROCEDURE — 96372 THER/PROPH/DIAG INJ SC/IM: CPT | Performed by: NURSE PRACTITIONER

## 2023-10-06 PROCEDURE — 94640 AIRWAY INHALATION TREATMENT: CPT

## 2023-10-06 PROCEDURE — 97116 GAIT TRAINING THERAPY: CPT | Mod: GP,CQ

## 2023-10-06 PROCEDURE — 1100000001 HC PRIVATE ROOM DAILY

## 2023-10-06 RX ORDER — PANTOPRAZOLE SODIUM 40 MG/1
TABLET, DELAYED RELEASE ORAL
Qty: 10 TABLET | Refills: 0 | Status: SHIPPED | OUTPATIENT
Start: 2023-10-07 | End: 2023-11-28 | Stop reason: ALTCHOICE

## 2023-10-06 RX ORDER — OXYCODONE HYDROCHLORIDE 5 MG/1
5 TABLET ORAL EVERY 6 HOURS PRN
Qty: 28 TABLET | Refills: 0 | Status: SHIPPED | OUTPATIENT
Start: 2023-10-06 | End: 2023-10-13

## 2023-10-06 RX ORDER — AMOXICILLIN AND CLAVULANATE POTASSIUM 875; 125 MG/1; MG/1
875 TABLET, FILM COATED ORAL EVERY 12 HOURS SCHEDULED
Status: DISCONTINUED | OUTPATIENT
Start: 2023-10-06 | End: 2023-10-06

## 2023-10-06 RX ORDER — AMOXICILLIN 250 MG
2 CAPSULE ORAL 2 TIMES DAILY
Qty: 28 TABLET | Refills: 0 | Status: SHIPPED | OUTPATIENT
Start: 2023-10-06 | End: 2023-10-13

## 2023-10-06 RX ORDER — ACETAMINOPHEN 325 MG/1
TABLET ORAL
Start: 2023-10-06 | End: 2024-04-18 | Stop reason: ALTCHOICE

## 2023-10-06 RX ORDER — DEXAMETHASONE 2 MG/1
TABLET ORAL
Qty: 40 TABLET | Refills: 0 | Status: SHIPPED | OUTPATIENT
Start: 2023-10-06 | End: 2023-11-28 | Stop reason: ALTCHOICE

## 2023-10-06 RX ORDER — CYCLOBENZAPRINE HCL 10 MG
10 TABLET ORAL 3 TIMES DAILY PRN
Qty: 30 TABLET | Refills: 0 | Status: SHIPPED | OUTPATIENT
Start: 2023-10-06 | End: 2023-12-06 | Stop reason: SDUPTHER

## 2023-10-06 RX ORDER — DEXAMETHASONE 4 MG/1
4 TABLET ORAL EVERY 6 HOURS SCHEDULED
Status: DISCONTINUED | OUTPATIENT
Start: 2023-10-06 | End: 2023-10-07 | Stop reason: HOSPADM

## 2023-10-06 RX ORDER — AMOXICILLIN AND CLAVULANATE POTASSIUM 875; 125 MG/1; MG/1
875 TABLET, FILM COATED ORAL EVERY 12 HOURS SCHEDULED
Qty: 6 TABLET | Refills: 0 | Status: SHIPPED | OUTPATIENT
Start: 2023-10-06 | End: 2023-10-09

## 2023-10-06 RX ORDER — AMOXICILLIN AND CLAVULANATE POTASSIUM 875; 125 MG/1; MG/1
875 TABLET, FILM COATED ORAL EVERY 12 HOURS SCHEDULED
Qty: 6 TABLET | Refills: 0 | Status: CANCELLED | OUTPATIENT
Start: 2023-10-06 | End: 2023-10-09

## 2023-10-06 RX ORDER — AMOXICILLIN AND CLAVULANATE POTASSIUM 875; 125 MG/1; MG/1
875 TABLET, FILM COATED ORAL ONCE
Status: CANCELLED | OUTPATIENT
Start: 2023-10-06 | End: 2023-10-06

## 2023-10-06 RX ADMIN — SENNOSIDES AND DOCUSATE SODIUM 2 TABLET: 50; 8.6 TABLET ORAL at 21:12

## 2023-10-06 RX ADMIN — PIPERACILLIN SODIUM AND TAZOBACTAM SODIUM 3.38 G: 3; .375 INJECTION, SOLUTION INTRAVENOUS at 16:53

## 2023-10-06 RX ADMIN — HEPARIN SODIUM 5000 UNITS: 5000 INJECTION INTRAVENOUS; SUBCUTANEOUS at 10:06

## 2023-10-06 RX ADMIN — HEPARIN SODIUM 5000 UNITS: 5000 INJECTION INTRAVENOUS; SUBCUTANEOUS at 16:00

## 2023-10-06 RX ADMIN — ACETAMINOPHEN 650 MG: 325 TABLET, FILM COATED ORAL at 21:12

## 2023-10-06 RX ADMIN — DEXAMETHASONE SODIUM PHOSPHATE 4 MG: 4 INJECTION, SOLUTION INTRA-ARTICULAR; INTRALESIONAL; INTRAMUSCULAR; INTRAVENOUS; SOFT TISSUE at 04:59

## 2023-10-06 RX ADMIN — PIPERACILLIN SODIUM AND TAZOBACTAM SODIUM 3.38 G: 3; .375 INJECTION, SOLUTION INTRAVENOUS at 04:59

## 2023-10-06 RX ADMIN — CYCLOBENZAPRINE 10 MG: 10 TABLET, FILM COATED ORAL at 10:05

## 2023-10-06 RX ADMIN — CYCLOBENZAPRINE 10 MG: 10 TABLET, FILM COATED ORAL at 16:51

## 2023-10-06 RX ADMIN — PIPERACILLIN SODIUM AND TAZOBACTAM SODIUM 3.38 G: 3; .375 INJECTION, SOLUTION INTRAVENOUS at 21:13

## 2023-10-06 RX ADMIN — POLYETHYLENE GLYCOL 3350 17 G: 17 POWDER, FOR SOLUTION ORAL at 10:07

## 2023-10-06 RX ADMIN — ACETAMINOPHEN 650 MG: 325 TABLET, FILM COATED ORAL at 16:51

## 2023-10-06 RX ADMIN — ACETAMINOPHEN 650 MG: 325 TABLET, FILM COATED ORAL at 10:06

## 2023-10-06 RX ADMIN — PIPERACILLIN SODIUM AND TAZOBACTAM SODIUM 3.38 G: 3; .375 INJECTION, SOLUTION INTRAVENOUS at 10:08

## 2023-10-06 RX ADMIN — CYCLOBENZAPRINE 10 MG: 10 TABLET, FILM COATED ORAL at 21:13

## 2023-10-06 RX ADMIN — INSULIN LISPRO 4 UNITS: 100 INJECTION, SOLUTION INTRAVENOUS; SUBCUTANEOUS at 13:54

## 2023-10-06 RX ADMIN — SENNOSIDES AND DOCUSATE SODIUM 2 TABLET: 50; 8.6 TABLET ORAL at 10:06

## 2023-10-06 RX ADMIN — OXYCODONE HYDROCHLORIDE 5 MG: 5 TABLET ORAL at 10:35

## 2023-10-06 RX ADMIN — ACETAMINOPHEN 650 MG: 325 TABLET, FILM COATED ORAL at 04:59

## 2023-10-06 RX ADMIN — PANTOPRAZOLE SODIUM 40 MG: 40 TABLET, DELAYED RELEASE ORAL at 06:12

## 2023-10-06 RX ADMIN — DEXAMETHASONE SODIUM PHOSPHATE 4 MG: 4 INJECTION, SOLUTION INTRA-ARTICULAR; INTRALESIONAL; INTRAMUSCULAR; INTRAVENOUS; SOFT TISSUE at 10:07

## 2023-10-06 RX ADMIN — FLUTICASONE FUROATE AND VILANTEROL TRIFENATATE 1 PUFF: 100; 25 POWDER RESPIRATORY (INHALATION) at 07:00

## 2023-10-06 RX ADMIN — DEXAMETHASONE 4 MG: 4 TABLET ORAL at 16:52

## 2023-10-06 RX ADMIN — INSULIN LISPRO 4 UNITS: 100 INJECTION, SOLUTION INTRAVENOUS; SUBCUTANEOUS at 10:07

## 2023-10-06 ASSESSMENT — COGNITIVE AND FUNCTIONAL STATUS - GENERAL
CLIMB 3 TO 5 STEPS WITH RAILING: A LITTLE
MOVING TO AND FROM BED TO CHAIR: A LITTLE
STANDING UP FROM CHAIR USING ARMS: A LITTLE
MOBILITY SCORE: 18
WALKING IN HOSPITAL ROOM: A LITTLE
MOVING FROM LYING ON BACK TO SITTING ON SIDE OF FLAT BED WITH BEDRAILS: A LITTLE
TURNING FROM BACK TO SIDE WHILE IN FLAT BAD: A LITTLE

## 2023-10-06 ASSESSMENT — PAIN SCALES - GENERAL
PAINLEVEL_OUTOF10: 4
PAINLEVEL_OUTOF10: 0 - NO PAIN
PAINLEVEL_OUTOF10: 3
PAINLEVEL_OUTOF10: 5 - MODERATE PAIN
PAINLEVEL_OUTOF10: 0 - NO PAIN

## 2023-10-06 NOTE — PROGRESS NOTES
Physical Therapy    Physical Therapy Treatment    Patient Name: Dann Ni  MRN: [id number]  Today's Date: 10/6/2023  Time Calculation  Start Time: 1210  Stop Time: 1240  Time Calculation (min): 30 min       Assessment/Plan   PT Assessment  PT Assessment Results: Decreased strength, Impaired balance, Pain, Impaired sensation  Rehab Prognosis: Excellent  Evaluation/Treatment Tolerance: Patient tolerated treatment well  Medical Staff Made Aware: Yes  End of Session Communication: Bedside nurse  End of Session Patient Position: Up in chair     PT Plan  Treatment/Interventions: Bed mobility, Transfer training, Gait training, Stair training, Balance training, Strengthening, Range of motion, Therapeutic exercise, Therapeutic activity  PT Plan: Skilled PT  PT Frequency: 5 times per week  PT Discharge Recommendations: High intensity level of continued care  Equipment Recommended upon Discharge: Wheeled walker ((has one from father in law, but may be bariatric and too big for pt, will need to verify))  PT Recommended Transfer Status: Assist x1, Contact guard      General Visit Information:   PT  Visit  PT Received On: 10/06/23  General  Family/Caregiver Present: Yes  Prior to Session Communication: Bedside nurse  Patient Position Received: Up in chair  Preferred Learning Style: auditory, verbal  General Comment:  (pt ambulates up to 80' using wheeled walker and demonstrates safety with stair negotiation. pt demonstrates increased unsteadiness with fatigue.)    Subjective   Precautions:  Precautions  Post-Surgical Precautions: Spinal precautions        Treatments:  Bed Mobility  Bed Mobility: Yes  Bed Mobility 1  Bed Mobility 1: Supine to sitting  Level of Assistance 1: Minimum assistance    Ambulation/Gait Training  Ambulation/Gait Training Performed: Yes  Ambulation/Gait Training 1  Device 1: Rolling walker  Assistance 1: Contact guard  Quality of Gait 1: Wide base of support  Comments/Distance (ft) 1: 1x80,  1x20  Transfers  Transfer: Yes  Transfer 1  Transfer From 1: Sit to  Transfer to 1: Stand  Technique 1: Sit to stand, Stand to sit  Transfer Device 1: Walker  Transfer Level of Assistance 1: Contact guard  Transfers 2  Transfer From 2: Sit to  Transfer to 2: Chair with arms  Technique 2: Stand pivot  Transfer Level of Assistance 2: Moderate tactile cues, +2    Stairs  Stairs: Yes    Outcome Measures:  [facility] Basic Mobility  Turning from your back to your side while in a flat bed without using bedrails: A little  Moving from lying on your back to sitting on the side of a flat bed without using bedrails: A little  Moving to and from bed to chair (including a wheelchair): A little  Standing up from a chair using your arms (e.g. wheelchair or bedside chair): A little  To walk in hospital room: A little  Climbing 3-5 steps with railing: A little  Basic Mobility - Total Score: 18    Education Documentation  Home Exercise Program, taught by Juanjo Cortez PTA at 10/6/2023  1:09 PM.  Learner: Family, Patient  Readiness: Acceptance  Method: Explanation  Response: Verbalizes Understanding    Mobility Training, taught by Juanjo Cortez PTA at 10/5/2023  3:45 PM.  Learner: Patient  Readiness: Acceptance  Method: Explanation  Response: Verbalizes Understanding, Demonstrated Understanding    Education Comments  No comments found.        OP EDUCATION:       Encounter Problems       Encounter Problems (Active)       Balance       Tinetti >24 (Progressing)       Start:  10/04/23    Expected End:  10/18/23               Mobility       Ambulate 150 feet with LRD, Ind (Progressing)       Start:  10/04/23    Expected End:  10/18/23            Up/dn 1 stair Ind (Progressing)       Start:  10/04/23    Expected End:  10/18/23               Transfers       STG - Patient will perform bed mobility (Progressing)       Start:  10/04/23    Expected End:  10/18/23            STG - Patient will transfer sit to and from stand (Progressing)        Start:  10/04/23    Expected End:  10/18/23            STG - Patient will perform toilet transfer (Progressing)       Start:  10/04/23    Expected End:  10/18/23               Transfers       sit<>stand with LRD, Ind (Progressing)       Start:  10/04/23    Expected End:  10/18/23            bed<>chair with LRD, Ind (Progressing)       Start:  10/04/23    Expected End:  10/18/23

## 2023-10-06 NOTE — PROGRESS NOTES
"Dann Ni is a 47 y.o. male on day 5 of admission presenting with Thoracic spine tumor.    Subjective   NAEON       Objective     Physical Exam    BUE D/B/T/HG/IO 5  BLE HF/KE/DF/PF 5  Improving sensation to light touch below T4 /5 level  No le or clonus  Incision c/d/I    Last Recorded Vitals  Blood pressure 133/81, pulse 84, temperature 36.4 °C (97.5 °F), temperature source Temporal, resp. rate 18, height 1.727 m (5' 8\"), weight 111 kg (244 lb 11.4 oz), SpO2 96 %.  Intake/Output last 3 Shifts:  I/O last 3 completed shifts:  In: - (0 mL/kg)   Out: 750 (6.8 mL/kg) [Urine:400 (0.1 mL/kg/hr); Drains:350]  Weight: 111 kg           Assessment/Plan   Principal Problem:    Thoracic spine tumor    Pt is a 46 YO M h/o HLD, asthma p/w 1 week thoracic back pain and T4 sensory level, MRI compressive T2-3 L dorsal epidural mass, CT CAP numerous <7mm pulm nodules, 10/2 s/p T1-4 lamis, tumor debulking, (prelim: lymphoproliferative), CXR R basilar atelecatsis poss aspiration    10/2 flow cytometry no clonal B cell or T cell population identified   10/3 drain d/c'd    Plan:     floor  Malignant heme recs - transfer when path finalized  Final path   rad onc recs  ok for radiation /chemo POD 14 (or when wound is healed)  PTOT- HC         Elda Serna MD      "

## 2023-10-07 ENCOUNTER — HOME HEALTH ADMISSION (OUTPATIENT)
Dept: HOME HEALTH SERVICES | Facility: HOME HEALTH | Age: 48
End: 2023-10-07
Payer: COMMERCIAL

## 2023-10-07 VITALS
OXYGEN SATURATION: 95 % | HEART RATE: 84 BPM | BODY MASS INDEX: 37.09 KG/M2 | RESPIRATION RATE: 18 BRPM | HEIGHT: 68 IN | WEIGHT: 244.71 LBS | DIASTOLIC BLOOD PRESSURE: 79 MMHG | SYSTOLIC BLOOD PRESSURE: 121 MMHG | TEMPERATURE: 97.5 F

## 2023-10-07 LAB
GLUCOSE BLD MANUAL STRIP-MCNC: 161 MG/DL (ref 74–99)
GLUCOSE BLD MANUAL STRIP-MCNC: 195 MG/DL (ref 74–99)

## 2023-10-07 PROCEDURE — 2500000001 HC RX 250 WO HCPCS SELF ADMINISTERED DRUGS (ALT 637 FOR MEDICARE OP): Performed by: STUDENT IN AN ORGANIZED HEALTH CARE EDUCATION/TRAINING PROGRAM

## 2023-10-07 PROCEDURE — 2500000004 HC RX 250 GENERAL PHARMACY W/ HCPCS (ALT 636 FOR OP/ED): Performed by: STUDENT IN AN ORGANIZED HEALTH CARE EDUCATION/TRAINING PROGRAM

## 2023-10-07 PROCEDURE — 94640 AIRWAY INHALATION TREATMENT: CPT

## 2023-10-07 PROCEDURE — 96372 THER/PROPH/DIAG INJ SC/IM: CPT | Performed by: NURSE PRACTITIONER

## 2023-10-07 PROCEDURE — 96372 THER/PROPH/DIAG INJ SC/IM: CPT | Performed by: STUDENT IN AN ORGANIZED HEALTH CARE EDUCATION/TRAINING PROGRAM

## 2023-10-07 PROCEDURE — 2500000004 HC RX 250 GENERAL PHARMACY W/ HCPCS (ALT 636 FOR OP/ED): Performed by: NURSE PRACTITIONER

## 2023-10-07 PROCEDURE — 2500000002 HC RX 250 W HCPCS SELF ADMINISTERED DRUGS (ALT 637 FOR MEDICARE OP, ALT 636 FOR OP/ED): Performed by: NURSE PRACTITIONER

## 2023-10-07 PROCEDURE — 82947 ASSAY GLUCOSE BLOOD QUANT: CPT

## 2023-10-07 RX ADMIN — CYCLOBENZAPRINE 10 MG: 10 TABLET, FILM COATED ORAL at 09:45

## 2023-10-07 RX ADMIN — PANTOPRAZOLE SODIUM 40 MG: 40 TABLET, DELAYED RELEASE ORAL at 09:48

## 2023-10-07 RX ADMIN — DEXAMETHASONE 4 MG: 4 TABLET ORAL at 05:06

## 2023-10-07 RX ADMIN — INSULIN LISPRO 2 UNITS: 100 INJECTION, SOLUTION INTRAVENOUS; SUBCUTANEOUS at 12:00

## 2023-10-07 RX ADMIN — PIPERACILLIN SODIUM AND TAZOBACTAM SODIUM 3.38 G: 3; .375 INJECTION, SOLUTION INTRAVENOUS at 09:45

## 2023-10-07 RX ADMIN — SENNOSIDES AND DOCUSATE SODIUM 2 TABLET: 50; 8.6 TABLET ORAL at 09:45

## 2023-10-07 RX ADMIN — PIPERACILLIN SODIUM AND TAZOBACTAM SODIUM 3.38 G: 3; .375 INJECTION, SOLUTION INTRAVENOUS at 04:55

## 2023-10-07 RX ADMIN — HEPARIN SODIUM 5000 UNITS: 5000 INJECTION INTRAVENOUS; SUBCUTANEOUS at 00:00

## 2023-10-07 RX ADMIN — FLUTICASONE FUROATE AND VILANTEROL TRIFENATATE 1 PUFF: 100; 25 POWDER RESPIRATORY (INHALATION) at 10:31

## 2023-10-07 RX ADMIN — DEXAMETHASONE 4 MG: 4 TABLET ORAL at 12:00

## 2023-10-07 RX ADMIN — DEXAMETHASONE 4 MG: 4 TABLET ORAL at 00:00

## 2023-10-07 RX ADMIN — HEPARIN SODIUM 5000 UNITS: 5000 INJECTION INTRAVENOUS; SUBCUTANEOUS at 09:45

## 2023-10-07 RX ADMIN — PANTOPRAZOLE SODIUM 40 MG: 40 TABLET, DELAYED RELEASE ORAL at 04:58

## 2023-10-07 RX ADMIN — ACETAMINOPHEN 650 MG: 325 TABLET, FILM COATED ORAL at 04:55

## 2023-10-07 RX ADMIN — INSULIN LISPRO 2 UNITS: 100 INJECTION, SOLUTION INTRAVENOUS; SUBCUTANEOUS at 09:46

## 2023-10-07 RX ADMIN — ACETAMINOPHEN 650 MG: 325 TABLET, FILM COATED ORAL at 09:45

## 2023-10-07 ASSESSMENT — PAIN SCALES - WONG BAKER: WONGBAKER_NUMERICALRESPONSE: NO HURT

## 2023-10-07 ASSESSMENT — PAIN SCALES - GENERAL: PAINLEVEL_OUTOF10: 0 - NO PAIN

## 2023-10-07 ASSESSMENT — PAIN - FUNCTIONAL ASSESSMENT: PAIN_FUNCTIONAL_ASSESSMENT: 0-10

## 2023-10-07 NOTE — PROGRESS NOTES
"Dann Ni is a 47 y.o. male on day 6 of admission presenting with Thoracic spine tumor.    Subjective   NAEON       Objective     Physical Exam    BUE D/B/T/HG/IO 5  BLE HF/KE/DF/PF 5  Improving sensation to light touch below T4 /5 level  No le or clonus  Incision c/d/I    Last Recorded Vitals  Blood pressure 120/60, pulse 84, temperature 36.7 °C (98.1 °F), resp. rate 18, height 1.727 m (5' 8\"), weight 111 kg (244 lb 11.4 oz), SpO2 94 %.  Intake/Output last 3 Shifts:  I/O last 3 completed shifts:  In: - (0 mL/kg)   Out: 750 (6.8 mL/kg) [Urine:400 (0.1 mL/kg/hr); Drains:350]  Weight: 111 kg           Assessment/Plan   Principal Problem:    Thoracic spine tumor    Pt is a 46 YO M h/o HLD, asthma p/w 1 week thoracic back pain and T4 sensory level, MRI compressive T2-3 L dorsal epidural mass, CT CAP numerous <7mm pulm nodules, 10/2 s/p T1-4 lamis, tumor debulking, (prelim: lymphoproliferative), CXR R basilar atelecatsis poss aspiration    10/2 flow cytometry no clonal B cell or T cell population identified   10/3 drain d/c'd    Plan:     floor  Malignant heme recs   Final path   rad onc recs  ok for radiation /chemo POD 14 (or when wound is healed)  PTOT- HC    Dispo today         Candido Shukla MD      " [facility]      HOME HEALTH ORDERS  FACE TO FACE ENCOUNTER    Patient Name: Cecile Bowen  YOB: 1952    PCP: Kailey Navarro MD   PCP Address: [address] [facility] / [place] [id number]  PCP Phone Number: [phone]  PCP Fax: [phone]    Encounter Date: 1/21/22    Admit to Home Health    Diagnoses:  Active Hospital Problems    Diagnosis  POA    *Dialysis patient [Z99.2]  Not Applicable     Chronic    Discharge planning issues [Z02.9]  Not Applicable    Pressure injury of right buttock, stage 3 [L89.313]  Yes    Urinary tract infection due to ESBL Klebsiella [N39.0, B96.89]  Yes    Chronic kidney disease-mineral and bone disorder [N18.9, E83.9, M89.9]  Yes    Anemia of chronic disease [D63.8]  Yes    Secondary hypertension [I15.9]  Yes    Debility [R53.81]  Yes     Chronic    A-fib [I48.91]  Yes     Chronic    ZAK (acute kidney injury) [N17.9]  Yes    Normocytic anemia [D64.9]  Yes     Chronic    S/P left mastectomy [Z90.12]  Not Applicable     Chronic    Requires daily assistance for activities of daily living (ADL) and comfort needs [Z74.1]  Yes     Chronic    Malignant neoplasm of left breast, estrogen receptor positive [C50.912, Z17.0]  Not Applicable     Chronic    Cervicogenic migraine [G43.809]  Yes     Chronic    CKD stage 4 due to type 2 diabetes mellitus [E11.22, N18.4]  Yes     Chronic     Maribel Lakhani      Chronic pain [G89.29]  Yes     Chronic    Long term prescription opiate use [Z79.891]  Not Applicable     Chronic    Morbid obesity due to excess calories [E66.01]  Yes     Chronic    Suprapubic catheter [Z93.59]  Not Applicable     Chronic    Uncontrolled type 2 diabetes mellitus with stage 4 chronic kidney disease, with long-term current use of insulin [E11.22, E11.65, N18.4, Z79.4]  Not Applicable    Major depressive disorder, recurrent episode, moderate [F33.1]  Yes     Chronic    Neurogenic bladder [N31.9]  Yes     Chronic     Recurrent urinary tract infection [N39.0]  Yes    Hyponatremia [E87.1]  Yes     Chronic    VIJAYA (obstructive sleep apnea) [G47.33]  Yes     Chronic    Hyperlipidemia associated with type 2 diabetes mellitus [E11.69, E78.5]  Yes     Chronic    Fibromyalgia [M79.7]  Yes     Chronic    Hypothyroidism [E03.9]  Yes     Chronic      Resolved Hospital Problems   No resolved problems to display.       Follow Up Appointments:  Future Appointments   Date Time Provider Department Center   2/5/2022  7:00 AM DIALYSIS, [facility]       Allergies:Review of patient's allergies indicates:  No Known Allergies    Medications: Review discharge medications with patient and family and provide education.    Current Facility-Administered Medications   Medication Dose Route Frequency Provider Last Rate Last Admin    acetaminophen tablet 650 mg  650 mg Oral Q4H PRN Veronica Morrow MD   650 mg at 02/03/22 1210    anastrozole tablet 1 mg  1 mg Oral Daily Veronica Morrow MD   1 mg at 02/04/22 0914    apixaban tablet 2.5 mg  2.5 mg Oral BID Veronica Morrow MD   2.5 mg at 02/04/22 0914    atorvastatin tablet 80 mg  80 mg Oral Daily Veronica Morrow MD   80 mg at 02/04/22 0913    butalbital-acetaminophen-caffeine -40 mg per tablet 1 tablet  1 tablet Oral Daily PRN Veronica Morrow MD   1 tablet at 02/03/22 1446    cloNIDine tablet 0.2 mg  0.2 mg Oral TID Veronica Morrow MD   0.2 mg at 02/04/22 0913    dextrose 50% injection 12.5 g  12.5 g Intravenous PRN Veronica Morrow MD        dextrose 50% injection 25 g  25 g Intravenous PRN Veronica Morrow MD        epoetin chloe-epbx injection 6,000 Units  50 Units/kg Intravenous Every Tues, Thurs, Sat Jam Morris NP   6,000 Units at 02/03/22 1212    ergocalciferol capsule 50,000 Units  50,000 Units Oral Q7 Days Veronica Morrow MD   50,000 Units at 01/29/22 0817    famotidine tablet 20 mg  20 mg Oral Daily Veronica Morrow MD   20 mg at 02/04/22 0912    furosemide tablet  160 mg  160 mg Oral BID Dianne Santo NP   160 mg at 02/04/22 0912    gemfibroziL tablet 600 mg  600 mg Oral Every Mon, Wed, Fri Veronica Morrow MD   600 mg at 02/02/22 1717    glucagon (human recombinant) injection 1 mg  1 mg Intramuscular PRN Veronica Morrow MD        glucose chewable tablet 16 g  16 g Oral PRN Veronica Morrow MD        glucose chewable tablet 24 g  24 g Oral PRN Veronica Morrow MD        heparin (porcine) injection 1,000 Units  1,000 Units Intra-Catheter PRN Emmanuel Hare MD   1,000 Units at 02/03/22 1213    influenza (QUADRIVALENT ADJUVANTED PF) vaccine 0.5 mL  0.5 mL Intramuscular vaccine x 1 dose Liana Ruiz MD        insulin aspart U-100 pen 0-5 Units  0-5 Units Subcutaneous QID (AC + HS) PRN Veronica Morrow MD   2 Units at 02/04/22 1231    levothyroxine tablet 50 mcg  50 mcg Oral Before breakfast Veronica Morrow MD   50 mcg at 02/04/22 0501    lisinopriL tablet 10 mg  10 mg Oral Daily Vero Degroot MD   10 mg at 02/04/22 0913    melatonin tablet 6 mg  6 mg Oral Nightly PRN Veronica Morrow MD   6 mg at 02/03/22 2051    methocarbamoL tablet 750 mg  750 mg Oral TID PRN Veronica Morrow MD   750 mg at 02/04/22 0912    metoprolol tartrate (LOPRESSOR) tablet 25 mg  25 mg Oral BID Veronica Morrow MD   25 mg at 02/04/22 0912    ondansetron injection 4 mg  4 mg Intravenous Q8H PRN Veronica Morrow MD        oxybutynin 24 hr tablet 10 mg  10 mg Oral Daily Veronica Morrow MD   10 mg at 02/04/22 0914    oxyCODONE immediate release tablet Tab 10 mg  10 mg Oral Q4H PRN Veronica Morrow MD   10 mg at 02/04/22 0912    polyethylene glycol packet 17 g  17 g Oral BID Veronica Morrow MD   17 g at 02/04/22 0914    senna-docusate 8.6-50 mg per tablet 1 tablet  1 tablet Oral BID Veronica Morrow MD   1 tablet at 02/04/22 0914    sodium chloride 0.9% bolus 250 mL  250 mL Intravenous PRN Emmanuel Hare MD        sodium chloride 0.9% bolus 250 mL  250 mL Intravenous PRN Emmanuel DUMONT  MD Ananya        sodium chloride 0.9% bolus 250 mL  250 mL Intravenous PRN Emmanuel Hare MD        sodium chloride 0.9% bolus 250 mL  250 mL Intravenous PRN Emmanuel Hare MD        sodium chloride 0.9% bolus 250 mL  250 mL Intravenous PRN Emmanuel Hare MD        sodium chloride 0.9% flush 10 mL  10 mL Intravenous PRN Veronica Morrow MD        sumatriptan tablet 100 mg  100 mg Oral BID PRN Veronica Morrow MD   100 mg at 01/31/22 1927    venlafaxine 24 hr capsule 150 mg  150 mg Oral Daily Veronica Morrow MD   150 mg at 02/04/22 0913     Current Discharge Medication List      START taking these medications    Details   lisinopriL 10 MG tablet Take 1 tablet (10 mg total) by mouth once daily.  Qty: 90 tablet, Refills: 3    Comments: .      venlafaxine (EFFEXOR-XR) 150 MG Cp24 Take 1 capsule (150 mg total) by mouth once daily.  Qty: 30 capsule, Refills: 11         CONTINUE these medications which have NOT CHANGED    Details   albuterol (PROVENTIL HFA) 90 mcg/actuation inhaler Inhale 2 puffs into the lungs every 6 (six) hours as needed for Wheezing or Shortness of Breath. Rescue  Qty: 8.5 g, Refills: 0      anastrozole (ARIMIDEX) 1 mg Tab TAKE 1 TABLET BY MOUTH EVERY DAY  Qty: 90 tablet, Refills: 2    Associated Diagnoses: Malignant neoplasm of upper-outer quadrant of left breast in female, estrogen receptor positive      apixaban (ELIQUIS) 2.5 mg Tab Take 1 tablet (2.5 mg total) by mouth 2 (two) times daily.  Qty: 60 tablet, Refills: 0      !! blood sugar diagnostic Strp ONE TOUCH ULTRA Check blood sugars 1-2 x a day  Qty: 50 strip, Refills: 6    Comments: ONE TOUCH ULTRA Check blood sugars 1-2 x a day  Associated Diagnoses: Uncontrolled type 2 diabetes mellitus with chronic kidney disease, with long-term current use of insulin      !! blood sugar diagnostic Strp To check BG 4  times daily, to use with insurance preferred meter  Qty: 400 each, Refills: 3      butalbital-acetaminophen-caffeine  -40 mg (FIORICET, ESGIC) -40 mg per tablet TAKE 1 TABLET BY MOUTH WHEN NOT CONTROLLED BY OTHER MEDS. NO MORE THAN 10 TABS PER 30 DAYS  Qty: 10 tablet, Refills: 0      cloNIDine (CATAPRES) 0.2 MG tablet Take 1 tablet (0.2 mg total) by mouth 3 (three) times daily.  Qty: 90 tablet, Refills: 11      ergocalciferol (ERGOCALCIFEROL) 50,000 unit Cap TAKE 1 CAPSULE (50,000 UNITS TOTAL) BY MOUTH EVERY 7 DAYS. TAKE ONE CAPSULE BY MOUTH ONE TIME PER WEEK, TAKES ON TUESDAYS  Qty: 12 capsule, Refills: 3      famotidine (PEPCID) 20 MG tablet Take 1 tablet (20 mg total) by mouth once daily.  Qty: 30 tablet, Refills: 11      furosemide (LASIX) 80 MG tablet Take 2 tablets (160 mg total) by mouth 2 (two) times daily.  Qty: 120 tablet, Refills: 0      !! lancets Misc One touch ultra, checks 1-2 x a day  Qty: 50 each, Refills: 6      !! lancets Misc To check BG 4 times daily, to use with insurance preferred meter  Qty: 400 each, Refills: 3      melatonin (MELATIN) 3 mg tablet Take 2 tablets (6 mg total) by mouth nightly as needed for Insomnia.  Qty: 30 tablet, Refills: 0      methocarbamoL (ROBAXIN) 750 MG Tab TAKE 1-2 TABLETS (750-1,500 MG TOTAL) BY MOUTH 3 (THREE) TIMES DAILY AS NEEDED.  Qty: 180 tablet, Refills: 1    Associated Diagnoses: Chronic midline low back pain without sciatica; Chronic neck pain; Fibromyalgia      oxybutynin (DITROPAN-XL) 10 MG 24 hr tablet TAKE 1 TABLET BY MOUTH EVERY DAY  Qty: 90 tablet, Refills: 1    Associated Diagnoses: Bladder spasms      polyethylene glycol (GLYCOLAX) 17 gram PwPk One packet QD prn constipation  Qty: 30 packet, Refills: 6      sevelamer carbonate (RENVELA) 800 mg Tab Take 1 tablet (800 mg total) by mouth 3 (three) times daily with meals.  Qty: 90 tablet, Refills: 11      sumatriptan (IMITREX) 100 MG tablet TAKE 1 TABLET (100 MG TOTAL) BY MOUTH 2 (TWO) TIMES DAILY AS NEEDED FOR MIGRAINE.  Qty: 9 tablet, Refills: 11    Associated Diagnoses: Intractable chronic migraine  "without aura and with status migrainosus      SYNTHROID 50 mcg tablet TAKE 1 TABLET BY MOUTH EVERY DAY  Qty: 90 tablet, Refills: 2      traZODone (DESYREL) 100 MG tablet Take 1 tablet (100 mg total) by mouth nightly as needed for Insomnia.  Qty: 90 tablet, Refills: 0      atorvastatin (LIPITOR) 80 MG tablet Take 1 tablet (80 mg total) by mouth once daily.  Qty: 90 tablet, Refills: 3      BD INSULIN SYRINGE ULTRA-FINE 0.5 mL 31 gauge x 5/16" Syrg USE WITH INSULIN 4 TIMES A DAY  Qty: 100 each, Refills: 12      blood-glucose meter kit To check BG 4 times daily, to use with insurance preferred meter  Qty: 1 each, Refills: 0      gemfibroziL (LOPID) 600 MG tablet Take 1 tablet (600 mg total) by mouth every Mon, Wed, Fri.  Qty: 36 tablet, Refills: 3      magnesium oxide (MAG-OX) 400 mg (241.3 mg magnesium) tablet TAKE 1 TABLET (400 MG TOTAL) BY MOUTH 2 (TWO) TIMES DAILY.  Qty: 180 tablet, Refills: 3      metoprolol tartrate (LOPRESSOR) 25 MG tablet Take 1 tablet (25 mg total) by mouth 2 (two) times daily.  Qty: 60 tablet, Refills: 11    Comments: .      pen needle, diabetic (BD ULTRA-FINE SHORT PEN NEEDLE) 31 gauge x 5/16" Ndle USE WITH LANTUS DAILY, e 11.65  Qty: 100 each, Refills: 3    Comments: DX Code Needed  PATIENT IS REQUESTING A REFILL FOR THIS RX.  Associated Diagnoses: Type 2 diabetes mellitus not at goal       !! - Potential duplicate medications found. Please discuss with provider.            I have seen and examined this patient within the last 30 days. My clinical findings that support the need for the home health skilled services and home bound status are the following:no   Requiring assistive device to leave home due to unsteady gait caused by  Weakness/Debility.     Diet:   Diabetic; 2000 Calorie; Renal. Fluid restriction 1500 mL    Labs:  Report Lab results to PCP.    Referrals/ Consults  Physical Therapy to evaluate and treat. Evaluate for home safety and equipment needs; Establish/upgrade home exercise " program. Perform / instruct on therapeutic exercises, gait training, transfer training, and Range of Motion.  Occupational Therapy to evaluate and treat. Evaluate home environment for safety and equipment needs. Perform/Instruct on transfers, ADL training, ROM, and therapeutic exercises.   to evaluate for community resources/long-range planning.  Aide to provide assistance with personal care, ADLs, and vital signs.    Activities:   activity as tolerated    Nursing:   Agency to admit patient within 24 hours of hospital discharge unless specified on physician order or at patient request    SN to complete comprehensive assessment including routine vital signs. Instruct on disease process and s/s of complications to report to MD. Review/verify medication list sent home with the patient at time of discharge  and instruct patient/caregiver as needed. Frequency may be adjusted depending on start of care date.     Skilled nurse to perform up to 3 visits PRN for symptoms related to diagnosis    Notify MD if SBP > 160 or < 90; DBP > 90 or < 50; HR > 120 or < 50; Temp > 101; O2 < 88%    Ok to schedule additional visits based on staff availability and patient request on consecutive days within the home health episode.    When multiple disciplines ordered:    Start of Care occurs on Sunday - Wednesday schedule remaining discipline evaluations as ordered on separate consecutive days following the start of care.    Thursday SOC -schedule subsequent evaluations Friday and Monday the following week.     Friday - Saturday SOC - schedule subsequent discipline evaluations on consecutive days starting Monday of the following week.    For all post-discharge communication and subsequent orders please contact patient's primary care physician. If unable to reach primary care physician or do not receive response within 30 minutes, please contact [facility] On call RN for clinical staff order clarification    Miscellaneous    Suprapubic Cath Care: Instruct patient/caregiver to empty collection bag.  Change catheter every month. NEXT change due 2/20  Diabetic Care:   SN to perform and educate Diabetic management with blood glucose monitoring:  Fingerstick blood sugar a.m. and p.m. and Report CBG < 70 or > 280 to physician.    Home Health Aide:  Nursing Weekly, Physical Therapy Twice weekly, Occupational Therapy Twice weekly, Medical Social Work Twice weekly and Home Health Aide Three times weekly    Wound Care Orders  Right buttocks, right calf area:  - cleanse with wound cleanser or sterile normal saline, apply cavilon barrier film to the klaus-wound, apply triad ointment to the wound bed then cover/reapply the foam dressing daily/prn cleansing    I certify that this patient is confined to her home and needs intermittent skilled nursing care, physical therapy and occupational therapy.      Liana Ruiz MD

## 2023-10-09 ENCOUNTER — APPOINTMENT (OUTPATIENT)
Dept: PRIMARY CARE | Facility: CLINIC | Age: 48
End: 2023-10-09
Payer: COMMERCIAL

## 2023-10-09 ENCOUNTER — HOME CARE VISIT (OUTPATIENT)
Dept: HOME HEALTH SERVICES | Facility: HOME HEALTH | Age: 48
End: 2023-10-09
Payer: COMMERCIAL

## 2023-10-09 VITALS
HEIGHT: 68 IN | SYSTOLIC BLOOD PRESSURE: 138 MMHG | BODY MASS INDEX: 36.37 KG/M2 | WEIGHT: 240 LBS | OXYGEN SATURATION: 94 % | DIASTOLIC BLOOD PRESSURE: 84 MMHG | HEART RATE: 90 BPM

## 2023-10-09 VITALS — SYSTOLIC BLOOD PRESSURE: 110 MMHG | DIASTOLIC BLOOD PRESSURE: 76 MMHG | HEART RATE: 75 BPM

## 2023-10-09 PROCEDURE — G0152 HHCP-SERV OF OT,EA 15 MIN: HCPCS

## 2023-10-09 PROCEDURE — 0023 HH SOC

## 2023-10-09 PROCEDURE — G0299 HHS/HOSPICE OF RN EA 15 MIN: HCPCS

## 2023-10-09 SDOH — ECONOMIC STABILITY: FOOD INSECURITY: MEALS PER DAY: 3

## 2023-10-09 ASSESSMENT — ENCOUNTER SYMPTOMS
PAIN SEVERITY GOAL: 1/10
DEPRESSION: 0
HIGHEST PAIN SEVERITY IN PAST 24 HOURS: 6/10
FATIGUE: 1
PAIN LOCATION - EXACERBATING FACTORS: RANDOMLY
PAIN LOCATION - RELIEVING FACTORS: MEDS,REST
PERSON REPORTING PAIN: PATIENT
MUSCLE WEAKNESS: 1
HIGHEST PAIN SEVERITY IN PAST 24 HOURS: 6/10
LOWEST PAIN SEVERITY IN PAST 24 HOURS: 1/10
PAIN LOCATION - PAIN FREQUENCY: CONSTANT
CHANGE IN APPETITE: UNCHANGED
LOSS OF SENSATION IN FEET: 1
PAIN: 1
PAIN LOCATION - EXACERBATING FACTORS: LAYING FLAT
PAIN LOCATION: BACK
PAIN LOCATION - PAIN SEVERITY: 5/10
OCCASIONAL FEELINGS OF UNSTEADINESS: 1
SUBJECTIVE PAIN PROGRESSION: GRADUALLY IMPROVING
LOWEST PAIN SEVERITY IN PAST 24 HOURS: 2/10
APPETITE LEVEL: GOOD
PERSON REPORTING PAIN: PATIENT
PAIN LOCATION - PAIN FREQUENCY: FREQUENT
PAIN: 1
PAIN LOCATION - PAIN QUALITY: TIGHTNESS
PAIN LOCATION - RELIEVING FACTORS: MEDICATIONS
LAST BOWEL MOVEMENT: 66756
PAIN LOCATION - PAIN SEVERITY: 4/10
PAIN LOCATION: BACK
PAIN LOCATION - PAIN QUALITY: STABBING
PAIN SEVERITY GOAL: 1/10
SUBJECTIVE PAIN PROGRESSION: GRADUALLY IMPROVING

## 2023-10-09 ASSESSMENT — ACTIVITIES OF DAILY LIVING (ADL)
BATHING_CURRENT_FUNCTION: SUPERVISION
BATHING ASSESSED: 1
BATHING ASSESSED: 1
CURRENT_FUNCTION: STAND BY ASSIST
PHYSICAL TRANSFERS ASSESSED: 1
BATHING_CURRENT_FUNCTION: SUPERVISION
PHYSICAL TRANSFERS ASSESSED: 1
PREPARING MEALS: NEEDS ASSISTANCE
ENTERING_EXITING_HOME: MODERATE ASSIST
TRANSPORTATION ASSESSED: 1
DRESSING_UB_CURRENT_FUNCTION: INDEPENDENT
OASIS_M1830: 01
TOILETING: 1
AMBULATION ASSISTANCE: STAND BY ASSIST
SHOPPING ASSESSED: 1
PHYSICAL_TRANSFERS_DEVICES: WALKER
ENTERING_EXITING_HOME: SUPERVISION
TRANSPORTATION: DEPENDENT
AMBULATION ASSISTANCE: 1
PHYSICAL_TRANSFERS_DEVICES: WALKER
LIGHT HOUSEKEEPING: DEPENDENT
HOUSEKEEPING ASSESSED: 1
DRESSING_LB_CURRENT_FUNCTION: SUPERVISION
SHOPPING: DEPENDENT
PREPARING MEALS: NEEDS ASSISTANCE
TOILETING: INDEPENDENT
CURRENT_FUNCTION: STAND BY ASSIST

## 2023-10-09 ASSESSMENT — PAIN SCALES - PAIN ASSESSMENT IN ADVANCED DEMENTIA (PAINAD)
FACIALEXPRESSION: 0 - SMILING OR INEXPRESSIVE.
TOTALSCORE: 0
CONSOLABILITY: 0
BODYLANGUAGE: 0 - RELAXED.
BODYLANGUAGE: 0
CONSOLABILITY: 0 - NO NEED TO CONSOLE.
FACIALEXPRESSION: 0
BREATHING: 0
NEGVOCALIZATION: 0
NEGVOCALIZATION: 0 - NONE.

## 2023-10-09 NOTE — DOCUMENTATION CLARIFICATION NOTE
"    PATIENT:               TRACY SMITH  ACCT #:                  [id number]  MRN:                       [id number]  :                       1975  ADMIT DATE:       10/1/2023 9:36 PM  DISCH DATE:        10/7/2023 3:04 PM  RESPONDING PROVIDER #:        [id number]          PROVIDER RESPONSE TEXT:    Aspiration PNA    CDI QUERY TEXT:    [facility]_Pneumonia Specificity    Instruction:    Based on your assessment of the patient and the clinical information, please provide the requested documentation by clicking on the appropriate radio button and enter any additional information if prompted.    Question: Can a diagnosis be given for the above clinical information?      When answering this query, please exercise your independent professional judgment. The fact that a question is being asked, does not imply that any particular answer is desired or expected.    The patient's clinical indicators include:  Clinical Information: 47 year old male with h/o HLD, asthma p/w 1 week progressive diminished sensation below T4/5, MRI compressive dorsal epidural lesion T2/3.    Clinical Indicators:  10/2 CXR \"New likely right basilar atelectasis. Superimposed aspiration or developing infection are not excluded in the appropriate clinical setting.\"    10/1 CT C/A/P \"There are multiple less than 7 mm pulmonary nodules with nodules  noted in all pulmonary lobes. No dominant nodule is seen. See discussion above. 2. There is also significant though relatively mild mediastinal lymphadenopathy without overt evidence of other thoracic adenopathy.\"    10/7 PN by Dr. Shukla \"CT CAP numerous <7mm pulm nodules, 10/2 s/p T1-4 lamis, tumor debulking, (prelim: lymphoproliferative), CXR R basilar atelectasis poss aspiration\"    Treatment: CXR,  CT C/A/P,  Zosyn 3.375g IV Q6hr    Risk Factors: Asthma, pulmonary nodules  Options provided:  -- Aspiration PNA  -- Other - I will add my own diagnosis  -- Refer to Clinical Documentation Reviewer    Query " created by: Madhavi Jewell on 10/9/2023 8:23 AM      Electronically signed by:  BRYAN ESTRELLA MD 10/9/2023 8:29 AM

## 2023-10-10 ENCOUNTER — HOME CARE VISIT (OUTPATIENT)
Dept: HOME HEALTH SERVICES | Facility: HOME HEALTH | Age: 48
End: 2023-10-10
Payer: COMMERCIAL

## 2023-10-10 PROCEDURE — G0151 HHCP-SERV OF PT,EA 15 MIN: HCPCS

## 2023-10-10 SDOH — HEALTH STABILITY: PHYSICAL HEALTH: EXERCISE TYPE: BLE

## 2023-10-10 ASSESSMENT — ACTIVITIES OF DAILY LIVING (ADL)
AMBULATION ASSISTANCE: 1
PHYSICAL TRANSFERS ASSESSED: 1
AMBULATION_DISTANCE/DURATION_TOLERATED: 50'
ENTERING_EXITING_HOME: STAND BY ASSIST
AMBULATION ASSISTANCE ON FLAT SURFACES: 1
CURRENT_FUNCTION: STAND BY ASSIST
AMBULATION ASSISTANCE: STAND BY ASSIST

## 2023-10-10 ASSESSMENT — ENCOUNTER SYMPTOMS
MUSCLE WEAKNESS: 1
PERSON REPORTING PAIN: PATIENT
OCCASIONAL FEELINGS OF UNSTEADINESS: 0
PAIN SEVERITY GOAL: 0/10
PAIN: 1
PAIN LOCATION: BACK
PAIN LOCATION - PAIN SEVERITY: 5/10
LOWEST PAIN SEVERITY IN PAST 24 HOURS: 4/10
HIGHEST PAIN SEVERITY IN PAST 24 HOURS: 6/10
SUBJECTIVE PAIN PROGRESSION: WAXING AND WANING

## 2023-10-10 NOTE — DISCHARGE SUMMARY
Discharge Diagnosis  Thoracic spine tumor    Issues Requiring Follow-Up  Discharged pending final path for spinal mass    Test Results Pending At Discharge  Pending Labs       Order Current Status    ABO/Rh Collected (10/02/23 0006)    Surgical Pathology Exam Preliminary result            Hospital Course  48 yo male with a history of HLD and asthma presented 10/1/23 with 1 week thoracic back pain and T4 sensory level. MRI showed compressive T2-3 left dorsal epidural mass.  CT C/A/P showed multiple <7mm pulmonary nodules.  Patient was taken to the OR 10/2/23 for T1-4 laminectomies, tumor debulking      Final pathology pending at discharge    PT/OT evaluated and recommended home care services.      Post op malignant heme consulted and nurse coordinator will schedule follow up when final pathology resulted.      Patient to follow up with Dr. Marin office on 10/18.      Pertinent Physical Exam At Time of Discharge  Physical Exam    Home Medications     Medication List      START taking these medications     acetaminophen 325 mg tablet; Commonly known as: Tylenol; Take as needed   while having post operative pain   dexAMETHasone 2 mg tablet; Commonly known as: Decadron; Take 2 tablets   (4 mg) by mouth every 6 hours for 2 days, THEN 2 tablets (4 mg) every 8   hours for 2 days, THEN 1 tablet (2 mg) every 8 hours for 2 days, THEN 1   tablet (2 mg) every 12 hours for 2 days, THEN 1 tablet (2 mg) once daily   for 2 days.; Start taking on: October 6, 2023   oxyCODONE 5 mg immediate release tablet; Commonly known as: Roxicodone;   Take 1 tablet (5 mg) by mouth every 6 hours if needed for severe pain (7 -   10) for up to 7 days. ICD 10: G89.18   pantoprazole 40 mg EC tablet; Commonly known as: ProtoNix; Do not crush,   chew, or split.  Take while on dexamethasone taper to prevent GI Upset Do   not start before October 7, 2023.   sennosides-docusate sodium 8.6-50 mg tablet; Commonly known as:   Josi-Colace; Take 2 tablets by  mouth 2 times a day for 7 days. Take while   using oxycodone for post operative pain to prevent consitpation     CHANGE how you take these medications     cyclobenzaprine 10 mg tablet; Commonly known as: Flexeril; Take 1 tablet   (10 mg) by mouth 3 times a day as needed for muscle spasms.; What changed:   how much to take, how to take this, when to take this, reasons to take   this, additional instructions     CONTINUE taking these medications     albuterol 90 mcg/actuation inhaler   budesonide-formoteroL 80-4.5 mcg/actuation inhaler; Commonly known as:   Symbicort     STOP taking these medications     diclofenac 75 mg EC tablet; Commonly known as: Voltaren   gabapentin 100 mg capsule; Commonly known as: Neurontin     ASK your doctor about these medications     amoxicillin-pot clavulanate 875-125 mg tablet; Commonly known as:   Augmentin; Take 1 tablet (875 mg) by mouth every 12 hours for 6 doses.;   Ask about: Should I take this medication?       Outpatient Follow-Up  Future Appointments   Date Time Provider Department Center   10/12/2023 To Be Determined Ventura Burkett LPN [facility]   10/12/2023 To Be Determined JANICE Adams [facility]   10/17/2023 To Be Determined JANICE Adams [facility]   10/18/2023 12:30 PM Dann Avila PA-C [id number] [place]   10/19/2023 To Be Determined JANICE Adams [facility]   10/19/2023 To Be Determined Ventura Burkett LPN [facility]   10/24/2023 To Be Determined JANICE Adams [facility]   10/24/2023 To Be Determined Fátima Bosch RN [facility]   10/26/2023 To Be Determined Vanessa Raya, OT [facility]       Yesenia Marin MD

## 2023-10-12 ENCOUNTER — HOME CARE VISIT (OUTPATIENT)
Dept: HOME HEALTH SERVICES | Facility: HOME HEALTH | Age: 48
End: 2023-10-12
Payer: COMMERCIAL

## 2023-10-12 VITALS
DIASTOLIC BLOOD PRESSURE: 74 MMHG | HEART RATE: 86 BPM | RESPIRATION RATE: 18 BRPM | SYSTOLIC BLOOD PRESSURE: 136 MMHG | TEMPERATURE: 98.4 F | OXYGEN SATURATION: 99 %

## 2023-10-12 PROCEDURE — G0300 HHS/HOSPICE OF LPN EA 15 MIN: HCPCS

## 2023-10-12 PROCEDURE — G0158 HHC OT ASSISTANT EA 15: HCPCS

## 2023-10-12 ASSESSMENT — ENCOUNTER SYMPTOMS
PAIN: 1
PAIN LOCATION: BACK
LIMITED RANGE OF MOTION: 1
SUBJECTIVE PAIN PROGRESSION: WAXING AND WANING
LOWEST PAIN SEVERITY IN PAST 24 HOURS: 4/10
LOSS OF SENSATION IN FEET: 1
HIGHEST PAIN SEVERITY IN PAST 24 HOURS: 6/10
PERSON REPORTING PAIN: PATIENT
PAIN LOCATION - PAIN SEVERITY: 5/10
MUSCLE WEAKNESS: 1
HIGHEST PAIN SEVERITY IN PAST 24 HOURS: 6/10
PERSON REPORTING PAIN: PATIENT
CHANGE IN APPETITE: INCREASED
DEPRESSION: 0
SUBJECTIVE PAIN PROGRESSION: GRADUALLY IMPROVING
PAIN SEVERITY GOAL: 0/10
OCCASIONAL FEELINGS OF UNSTEADINESS: 1
LOWEST PAIN SEVERITY IN PAST 24 HOURS: 0/10
PAIN: 1
APPETITE LEVEL: GOOD

## 2023-10-12 ASSESSMENT — ACTIVITIES OF DAILY LIVING (ADL)
AMBULATION ASSISTANCE: 1
CURRENT_FUNCTION: SUPERVISION
AMBULATION ASSISTANCE: 1
AMBULATION ASSISTANCE: SUPERVISION
AMBULATION ASSISTANCE: INDEPENDENT
BATHING_CURRENT_FUNCTION: INDEPENDENT
BATHING ASSESSED: 1
PHYSICAL TRANSFERS ASSESSED: 1

## 2023-10-12 ASSESSMENT — PAIN SCALES - PAIN ASSESSMENT IN ADVANCED DEMENTIA (PAINAD)
BREATHING: 0
CONSOLABILITY: 0
FACIALEXPRESSION: 0 - SMILING OR INEXPRESSIVE.
CONSOLABILITY: 0 - NO NEED TO CONSOLE.
TOTALSCORE: 0
BODYLANGUAGE: 0 - RELAXED.
NEGVOCALIZATION: 0
BODYLANGUAGE: 0
FACIALEXPRESSION: 0
NEGVOCALIZATION: 0 - NONE.

## 2023-10-13 ENCOUNTER — HOME CARE VISIT (OUTPATIENT)
Dept: HOME HEALTH SERVICES | Facility: HOME HEALTH | Age: 48
End: 2023-10-13
Payer: COMMERCIAL

## 2023-10-13 PROCEDURE — G0157 HHC PT ASSISTANT EA 15: HCPCS

## 2023-10-16 ENCOUNTER — HOME CARE VISIT (OUTPATIENT)
Dept: HOME HEALTH SERVICES | Facility: HOME HEALTH | Age: 48
End: 2023-10-16
Payer: COMMERCIAL

## 2023-10-16 PROCEDURE — G0157 HHC PT ASSISTANT EA 15: HCPCS

## 2023-10-16 ASSESSMENT — ENCOUNTER SYMPTOMS
DENIES PAIN: 1
PERSON REPORTING PAIN: PATIENT

## 2023-10-17 ENCOUNTER — HOME CARE VISIT (OUTPATIENT)
Dept: HOME HEALTH SERVICES | Facility: HOME HEALTH | Age: 48
End: 2023-10-17
Payer: COMMERCIAL

## 2023-10-17 LAB
ELECTRONICALLY SIGNED BY: NORMAL
MYD88 L265P INTERPRETATION: NORMAL
MYD88 L265P MUTATION RESULTS: NOT DETECTED

## 2023-10-17 RX ORDER — CEFDINIR 300 MG/1
CAPSULE ORAL
COMMUNITY
End: 2023-12-05 | Stop reason: ALTCHOICE

## 2023-10-17 RX ORDER — ZOLPIDEM TARTRATE 10 MG/1
TABLET ORAL
COMMUNITY
End: 2023-12-05 | Stop reason: ALTCHOICE

## 2023-10-17 RX ORDER — PREDNISONE 10 MG/1
TABLET ORAL
COMMUNITY
End: 2023-12-05 | Stop reason: ALTCHOICE

## 2023-10-17 RX ORDER — BENZONATATE 100 MG/1
CAPSULE ORAL
COMMUNITY
End: 2023-12-05 | Stop reason: ALTCHOICE

## 2023-10-17 RX ORDER — IBUPROFEN 200 MG
200 TABLET ORAL EVERY 6 HOURS PRN
COMMUNITY
End: 2023-12-05 | Stop reason: ALTCHOICE

## 2023-10-17 RX ORDER — AZITHROMYCIN 500 MG/1
TABLET, FILM COATED ORAL
COMMUNITY
End: 2023-12-05 | Stop reason: ALTCHOICE

## 2023-10-17 RX ORDER — BUDESONIDE AND FORMOTEROL FUMARATE DIHYDRATE 160; 4.5 UG/1; UG/1
2 AEROSOL RESPIRATORY (INHALATION)
COMMUNITY
Start: 2022-07-18

## 2023-10-17 RX ORDER — AZITHROMYCIN 250 MG/1
TABLET, FILM COATED ORAL
COMMUNITY
End: 2023-12-05 | Stop reason: ALTCHOICE

## 2023-10-18 ENCOUNTER — DOCUMENTATION (OUTPATIENT)
Dept: HEMATOLOGY/ONCOLOGY | Facility: HOSPITAL | Age: 48
End: 2023-10-18

## 2023-10-18 ENCOUNTER — OFFICE VISIT (OUTPATIENT)
Dept: NEUROSURGERY | Facility: CLINIC | Age: 48
End: 2023-10-18
Payer: COMMERCIAL

## 2023-10-18 VITALS
DIASTOLIC BLOOD PRESSURE: 80 MMHG | HEART RATE: 94 BPM | WEIGHT: 217.8 LBS | RESPIRATION RATE: 16 BRPM | HEIGHT: 68 IN | SYSTOLIC BLOOD PRESSURE: 130 MMHG | BODY MASS INDEX: 33.01 KG/M2

## 2023-10-18 DIAGNOSIS — D49.2 THORACIC SPINE TUMOR: Primary | ICD-10-CM

## 2023-10-18 PROCEDURE — 1036F TOBACCO NON-USER: CPT | Performed by: PHYSICIAN ASSISTANT

## 2023-10-18 PROCEDURE — 3008F BODY MASS INDEX DOCD: CPT | Performed by: PHYSICIAN ASSISTANT

## 2023-10-18 PROCEDURE — 99024 POSTOP FOLLOW-UP VISIT: CPT | Performed by: PHYSICIAN ASSISTANT

## 2023-10-18 ASSESSMENT — PATIENT HEALTH QUESTIONNAIRE - PHQ9
1. LITTLE INTEREST OR PLEASURE IN DOING THINGS: NOT AT ALL
SUM OF ALL RESPONSES TO PHQ9 QUESTIONS 1 & 2: 0
2. FEELING DOWN, DEPRESSED OR HOPELESS: NOT AT ALL

## 2023-10-18 NOTE — PROGRESS NOTES
10/18/23 1300  Received referral for this patient from neurosurg team. Patient was inpatient and found to have  compressive epidural mass. T1 to T4 lami revealed a concern for lymphoma. At this point the process appears to be low grade, and we are awaiting final path. I know patient had been anxious to hear from us regarding getting him in with med onc and I have reached out to him to outline the next steps. We are awaiting final path results and then will get him in with Dr. Pteers. Patient states he remains unable to complete ADLs without assist and that his wife needs to accompany him to appointments. He will be able to come to an appointment once we have final path results. He is aware that final path is pending and will determine tx direction. He does have questions and fears that we discussed and he has my phone number for follow up. I will reach out to him once finalized path results. JULIO C Holley

## 2023-10-18 NOTE — PROGRESS NOTES
[facility]  Department of Neurological Surgery  Post Operative Patient Visit      History of Present Illness:  Dann Ni is a 47 y.o. year old male who presents post thoracic decompression to relieve all by Dr. Marin on October 2, 2023.  Patient at this time is progressing through home physical therapy and is ambulating with a wheeled walker for assistance.  He is making good strides though still has difficulty with balance, coordination, and leg strength.  Continues to deny bowel or bladder concerns.  Inspection of his incision shows well approximated, no erythema, nonedematous surgical incision with good fibrotic tissue spanning.  Staples removed at visit today with patient tolerating well and no complications encountered.  He has upcoming follow-up visits with oncology planned though not scheduled yet as still awaiting final tumor pathology report.  Currently will place formal referral to outpatient physical therapy and surveillance MRI with and without contrast of the spine.  Follow-up visit with surgeon will be made at 6 weeks postop.        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    Loss of smell    Cough variant asthma 04/02/2019    Cough variant asthma    Diverticulitis 09/25/2023    Foot pain 09/25/2023    Other conditions influencing health status     No significant past surgical history    Other conditions influencing health status     No significant past medical  history    Personal history of other diseases of the respiratory system 04/14/2019    History of bronchitis    Personal history of other drug therapy 10/23/2019    History of influenza vaccination    Personal history of other specified conditions 03/30/2019    History of persistent cough    Recurrent pneumonia 09/25/2023    Tendonitis 09/25/2023     Past Surgical History:   Procedure Laterality Date    OTHER SURGICAL HISTORY  04/12/2019    No history of surgery     Social History     Tobacco Use    Smoking status: Never    Smokeless tobacco: Never   Substance Use Topics    Alcohol use: Not Currently     family history includes Arthritis in his mother; Cancer in an other family member; Dementia in some other family members; Hip dysplasia in his mother; Kidney cancer in his maternal grandfather and maternal grandmother; Lung cancer in his maternal grandfather and maternal grandmother; Prostatitis in his father; htn in his father.    Current Outpatient Medications:     acetaminophen (Tylenol) 325 mg tablet, Take as needed while having post operative pain, Disp: , Rfl:     albuterol 90 mcg/actuation inhaler, Inhale 2 puffs every 6 hours if needed for shortness of breath or wheezing., Disp: , Rfl:     azithromycin (Zithromax) 250 mg tablet, Take by mouth., Disp: , Rfl:     azithromycin (Zithromax) 500 mg tablet, Take by mouth., Disp: , Rfl:     benzonatate (Tessalon) 100 mg capsule, Take by mouth., Disp: , Rfl:     budesonide-formoteroL (Symbicort) 160-4.5 mcg/actuation inhaler, Inhale 2 puffs 2 times a day., Disp: , Rfl:     budesonide-formoteroL (Symbicort) 80-4.5 mcg/actuation inhaler, Inhale 2 puffs 2 times a day., Disp: , Rfl:     cefdinir (Omnicef) 300 mg capsule, Take by mouth., Disp: , Rfl:     cyclobenzaprine (Flexeril) 10 mg tablet, Take 1 tablet (10 mg) by mouth 3 times a day as needed for muscle spasms., Disp: 30 tablet, Rfl: 0    dexAMETHasone (Decadron) 2 mg tablet, Take 2 tablets (4 mg) by mouth every 6  hours for 2 days, THEN 2 tablets (4 mg) every 8 hours for 2 days, THEN 1 tablet (2 mg) every 8 hours for 2 days, THEN 1 tablet (2 mg) every 12 hours for 2 days, THEN 1 tablet (2 mg) once daily for 2 days., Disp: 40 tablet, Rfl: 0    ibuprofen 200 mg tablet, Take 1 tablet (200 mg) by mouth every 6 hours if needed (pain)., Disp: , Rfl:     Lactobacillus acidophilus (PROBIOTIC ORAL), Take by mouth., Disp: , Rfl:     pantoprazole (ProtoNix) 40 mg EC tablet, Do not crush, chew, or split.  Take while on dexamethasone taper to prevent GI Upset Do not start before October 7, 2023., Disp: 10 tablet, Rfl: 0    predniSONE (Deltasone) 10 mg tablet, Take by mouth., Disp: , Rfl:     zolpidem (Ambien) 10 mg tablet, Take by mouth., Disp: , Rfl:   Allergies   Allergen Reactions    Fluocinolone Unknown     Burning skin    Ciprofloxacin Itching         The above clinical summary has been dictated with voice recognition software. It has not been proofread for grammatical errors, typographical mistakes, or other semantic inconsistencies.    Thank you for visiting our office today. It was our pleasure to take part in your healthcare.     Do not hesitate to call with any questions regarding your plan of care after leaving at [phone] M-F 8am-4pm.     To clinicians, thank you very much for this kind referral. It is a privilege to partner with you in the care of your patients. My office would be delighted to assist you with any further consultations or with questions regarding the plan of care outlined. Do not hesitate to call the office or contact me directly.       Sincerely,      PHI Gomez, PAArnavC  Associate Physician Assistant, Neurosurgery  Clinical   [facility]    [place] [address] [address] [place] [address]    Phone: [phone]  Fax: [phone]

## 2023-10-19 ENCOUNTER — HOME CARE VISIT (OUTPATIENT)
Dept: HOME HEALTH SERVICES | Facility: HOME HEALTH | Age: 48
End: 2023-10-19
Payer: COMMERCIAL

## 2023-10-19 VITALS
DIASTOLIC BLOOD PRESSURE: 88 MMHG | SYSTOLIC BLOOD PRESSURE: 140 MMHG | HEART RATE: 90 BPM | TEMPERATURE: 97.5 F | RESPIRATION RATE: 18 BRPM

## 2023-10-19 PROCEDURE — G0157 HHC PT ASSISTANT EA 15: HCPCS

## 2023-10-19 PROCEDURE — G0300 HHS/HOSPICE OF LPN EA 15 MIN: HCPCS

## 2023-10-19 ASSESSMENT — ENCOUNTER SYMPTOMS
PERSON REPORTING PAIN: PATIENT
PAIN: 1
CHANGE IN APPETITE: INCREASED
DENIES PAIN: 1
PAIN SEVERITY GOAL: 0/10
PERSON REPORTING PAIN: PATIENT
APPETITE LEVEL: GOOD
PAIN LOCATION: BACK
HIGHEST PAIN SEVERITY IN PAST 24 HOURS: 3/10
SUBJECTIVE PAIN PROGRESSION: GRADUALLY IMPROVING
LOWEST PAIN SEVERITY IN PAST 24 HOURS: 2/10

## 2023-10-19 NOTE — HOME HEALTH
Patient was seen for routine nursing visit. Patient had staples removed. Patient looking to be discharged from agency to start outpt physical therapy soon, scheduled to start 10/30/23.

## 2023-10-20 ENCOUNTER — HOME CARE VISIT (OUTPATIENT)
Dept: HOME HEALTH SERVICES | Facility: HOME HEALTH | Age: 48
End: 2023-10-20
Payer: COMMERCIAL

## 2023-10-20 LAB
BCR CLONALITY RESULTS: NORMAL
ELECTRONICALLY SIGNED BY: NORMAL

## 2023-10-20 PROCEDURE — G0152 HHCP-SERV OF OT,EA 15 MIN: HCPCS

## 2023-10-20 ASSESSMENT — ENCOUNTER SYMPTOMS
LOWEST PAIN SEVERITY IN PAST 24 HOURS: 2/10
PAIN SEVERITY GOAL: 0/10
HIGHEST PAIN SEVERITY IN PAST 24 HOURS: 6/10
SUBJECTIVE PAIN PROGRESSION: GRADUALLY IMPROVING
PAIN: 1
PERSON REPORTING PAIN: PATIENT

## 2023-10-21 ASSESSMENT — ACTIVITIES OF DAILY LIVING (ADL)
DRESSING_LB_CURRENT_FUNCTION: INDEPENDENT
TOILETING: SUPERVISION
DRESSING_UB_CURRENT_FUNCTION: INDEPENDENT
BATHING_CURRENT_FUNCTION: MODERATE ASSIST
BATHING ASSESSED: 1
TOILETING: 1

## 2023-10-23 ENCOUNTER — APPOINTMENT (OUTPATIENT)
Dept: HOME HEALTH SERVICES | Facility: HOME HEALTH | Age: 48
End: 2023-10-23
Payer: COMMERCIAL

## 2023-10-24 ENCOUNTER — APPOINTMENT (OUTPATIENT)
Dept: HOME HEALTH SERVICES | Facility: HOME HEALTH | Age: 48
End: 2023-10-24
Payer: COMMERCIAL

## 2023-10-24 ENCOUNTER — HOME CARE VISIT (OUTPATIENT)
Dept: HOME HEALTH SERVICES | Facility: HOME HEALTH | Age: 48
End: 2023-10-24
Payer: COMMERCIAL

## 2023-10-24 PROCEDURE — G0300 HHS/HOSPICE OF LPN EA 15 MIN: HCPCS

## 2023-10-24 PROCEDURE — G0151 HHCP-SERV OF PT,EA 15 MIN: HCPCS

## 2023-10-24 ASSESSMENT — ACTIVITIES OF DAILY LIVING (ADL)
MONEY MANAGEMENT (EXPENSES/BILLS): INDEPENDENT
PHYSICAL TRANSFERS ASSESSED: 1
AMBULATION ASSISTANCE: 1

## 2023-10-24 ASSESSMENT — ENCOUNTER SYMPTOMS
LAST BOWEL MOVEMENT: 66771
HIGHEST PAIN SEVERITY IN PAST 24 HOURS: 2/10
DEPRESSION: 0
MUSCLE WEAKNESS: 1
OCCASIONAL FEELINGS OF UNSTEADINESS: 1
PAIN LOCATION: BACK
STOOL FREQUENCY: DAILY
PAIN: 1
CHANGE IN APPETITE: UNCHANGED
PERSON REPORTING PAIN: PATIENT
DENIES PAIN: 1
PERSON REPORTING PAIN: PATIENT
APPETITE LEVEL: GOOD
PAIN LOCATION - PAIN SEVERITY: 2/10
LOWEST PAIN SEVERITY IN PAST 24 HOURS: 1/10
PAIN SEVERITY GOAL: 0/10
LOSS OF SENSATION IN FEET: 0
BOWEL PATTERN NORMAL: 1
LIMITED RANGE OF MOTION: 1

## 2023-10-25 ENCOUNTER — PATIENT OUTREACH (OUTPATIENT)
Dept: HEMATOLOGY/ONCOLOGY | Facility: HOSPITAL | Age: 48
End: 2023-10-25
Payer: COMMERCIAL

## 2023-10-25 LAB
CHROM ANALY OVERALL INTERP-IMP: NORMAL
CHROM ANALY OVERALL INTERP-IMP: NORMAL
ELECTRONICALLY COSIGNED BY CYTOGENETICS: NORMAL
ELECTRONICALLY COSIGNED BY CYTOGENETICS: NORMAL
ELECTRONICALLY SIGNED BY CYTOGENETICS: NORMAL
ELECTRONICALLY SIGNED BY CYTOGENETICS: NORMAL
STRUCT VAR ISCN NAME: NORMAL
STRUCT VAR ISCN NAME: NORMAL

## 2023-10-25 SDOH — HEALTH STABILITY: PHYSICAL HEALTH: EXERCISE TYPE: BLE

## 2023-10-25 ASSESSMENT — ACTIVITIES OF DAILY LIVING (ADL)
OASIS_M1830: 00
AMBULATION ASSISTANCE: INDEPENDENT
HOME_HEALTH_OASIS: 00
CURRENT_FUNCTION: INDEPENDENT

## 2023-10-25 NOTE — PROGRESS NOTES
10/25/23 6545  Follow up call to patient regarding continued pending pathology. Patient has many questions, but is patient awaiting results. He is going to be OP PT/OT starting next week and they are hopeful he will be getting back to light duty soon. His greatest concern at this time is using up his FMLA which began 10/2. We discussed his thoughts with regards to tx and dx. I will be reaching out to him on Friday just to touch base. He understands that we will not have path finalized by then but he just wants to touch base. I did reach out to Dr. Peters about possibly talking with patient. Patient is fine either way as long as we keep him updated. JULIO C Holley

## 2023-10-26 ENCOUNTER — APPOINTMENT (OUTPATIENT)
Dept: HOME HEALTH SERVICES | Facility: HOME HEALTH | Age: 48
End: 2023-10-26
Payer: COMMERCIAL

## 2023-10-27 ENCOUNTER — APPOINTMENT (OUTPATIENT)
Dept: HOME HEALTH SERVICES | Facility: HOME HEALTH | Age: 48
End: 2023-10-27
Payer: COMMERCIAL

## 2023-10-30 ENCOUNTER — EVALUATION (OUTPATIENT)
Dept: PHYSICAL THERAPY | Facility: CLINIC | Age: 48
End: 2023-10-30
Payer: COMMERCIAL

## 2023-10-30 DIAGNOSIS — D49.2 THORACIC SPINE TUMOR: ICD-10-CM

## 2023-10-30 PROCEDURE — 97535 SELF CARE MNGMENT TRAINING: CPT | Mod: GP | Performed by: PHYSICAL THERAPIST

## 2023-10-30 PROCEDURE — 97110 THERAPEUTIC EXERCISES: CPT | Mod: GP | Performed by: PHYSICAL THERAPIST

## 2023-10-30 PROCEDURE — 97161 PT EVAL LOW COMPLEX 20 MIN: CPT | Mod: GP | Performed by: PHYSICAL THERAPIST

## 2023-10-30 ASSESSMENT — PAIN DESCRIPTION - DESCRIPTORS: DESCRIPTORS: ACHING

## 2023-10-30 ASSESSMENT — ENCOUNTER SYMPTOMS
LOSS OF SENSATION IN FEET: 1
OCCASIONAL FEELINGS OF UNSTEADINESS: 1

## 2023-10-30 ASSESSMENT — PAIN - FUNCTIONAL ASSESSMENT: PAIN_FUNCTIONAL_ASSESSMENT: 0-10

## 2023-10-30 ASSESSMENT — PAIN SCALES - GENERAL: PAINLEVEL_OUTOF10: 0 - NO PAIN

## 2023-10-30 NOTE — PROGRESS NOTES
PT Initial Evaluation    Patient Name:  Dann Ni    MRN:  [id number]    :  1975    Today's Date:  10/30/23    Informed Consent  Patient has been informed of all evaluation findings and treatment plans and agrees to participate in Physical Therapy services and plans as outlined.    Diagnosis:  Diagnosis and Precautions: Diagnosis D49.2 (ICD-10-CM) - Thoracic spine tumor    Goals:    By the end of 12 visits patient will be able to do the following with < 1/10 thoracic spine pain:    HEP:  Patient will consistently perform his home exercise program for 20-30 minute sessions, 1-2x/day, 3-4 days/week independently by the end of 12 visits.    Basic ADL's:   Patient will perform bADL's/instrumental ADL's for 30 minutes moving between various closed kinetic chain postures.    ROM and Strength:  Patient will demonstrate 5/5 bilateral shoulder/hip strength in all planes to improve their ability to lift, stand, ambulate and perform basic ADL's.    Stair Negotiation:  Patient will be able to ambulate up/down stairs for 1-2 flights at a time.    Gait/Locomotion:  Patient will be able to ambulate for 30-60 minutes at a time.  Minimal to no gait deviation across level ground/stairs.    Patient will demonstrate > 8 point increase on the Barajas Balance Scale and be a low fall risk.    Work:  Patient will return to work and perform normal work duties as per MD clearance.    Sleep:  Patient will sleep thru the night 4/7 nights/week.    Participation restrictions:  Decrease Oswestry to < or = to 0% for increased functional ability.    Pain:  Decrease pain at worst to < or = to 1/10 for improved QOL and ability to sleep.    Plan of Care:      Treatment/Interventions: Aquatic therapy, Cryotherapy, Education/ Instruction, Electrical stimulation, Gait training, Manual therapy, Neuromuscular re-education, Self care/ home management, Taping techniques, Therapeutic activities, Therapeutic exercises  PT Plan: Skilled PT  PT  Frequency: 2 times per week  Duration: 12 visits  Onset Date: 09/30/23  Certification Period Start Date: 10/30/23  Certification Period End Date: 01/28/24  Number of Treatments Authorized: 12  Rehab Potential: Good  Plan of Care Agreement: Patient    PT Assessment:    Patient is a 47 y.o. MALE with c/o lower extremity weakness/falls.   Patient is alert and oriented x 3.  Patient presents with medical diagnosis of s/p Laminectomy Thoracic with Excision Lesion 10/2/2023, Dr. Marin  contributing to compensatory soft tissue dysfunction, pain, stiffness and weakness of the thoracic spine.   Significant past medical history/past surgical history includes see above.    Skilled care is needed to progress the patient back to these activities without exacerbating symptoms.   Patient requires skilled PT services to address the problems identified and the individualized patient's goals as outlined in the problems and goals section of this evaluation.  A skilled PT is required to address these key impairments and to provide and progress with an appropriate home exercise program. Patient does nothave any significant PMH influencing Rx and reports motivation to return to FUNCT ACTIV- RTW-RTSPORT: FUNCTIONAL ACTIVITY and RETURN TO WORK.   Patient demonstrates to be a good candidate for physical therapy with good rehab potential and verbalized a good understanding of HISHER: HISdiagnosis, prognosis and treatment.  Goals have been established and reviewed with the patient.      PT Assessment Results: Decreased strength, Decreased range of motion, Decreased endurance, Impaired balance, Decreased mobility, Pain, Impaired sensation  Rehab Prognosis: Good  Evaluation/Treatment Tolerance: Patient tolerated treatment well, Patient limited by fatigue    Complexity:  complexity: Low complexity evaluation  due to a 15  minute duration, a past medical history withwithout: WITHOUT any personal factors and/or comorbidities that could impact the  POC, examination of body systems completed on one to two elements, the patient presents with a stable condition, and clinical decision making using the Oswestry was of low complexity.     Prognosis:  Rehab Prognosis: Good    Problem List  Activity Limitations, Decreased Functional Level, Decreased knowledge of HEP, Flexibility, Gait issues, Participation Restrictions, Range of Motion/joint mobility issues, Strength, Endurance, and Return to work    Impairments (ROM, strength, gait, functional movement, palpation, balance)   Decreased core stability/strength  Decreased overall balance as noted by Barajas Balance Score.  Increased fall risk as noted by Barajas Score 40/56 (medium fall risk  IMPAIRMENTS: IMPAIRED STRENGTH UPPER BODY, IMPAIRED STRENGTH LOWER BODY, IMPAIRED GAIT, IMPAIRED BALANCE, IMPAIRED SENSATION, IMPAIRED CORE STABILITY, IMPAIRED FUNCTIONAL ACTIVITY LEVEL, and INCREASED FALL RISK    Functional Limitations:   Reduced functional activity level  Currently off work as a   Currently not driving  FLS: LIMITATIONS PERFORMING BASIC ADL'S, ISSUES WITH SLEEP, WORK ISSUES, PARTICIPATION IN HOBBIES, PARTICIPATION IN LEISURE ACTIVITIES, and PARTICIPATION IN HOME MANAGEMENT    General Visit Information:  Reason for Referral: PT Evaluate and Treat  Referred By: Dann Avila PA-C  General Comment: Diagnosis D49.2 (ICD-10-CM) - Thoracic spine tumor (Laminectomy Thoracic with Excision Lesion 10/2/2023, Dr. Marin)    Pre-Cautions:  STEADI Fall Risk Score (The score of 4 or more indicates an increased risk of falling): 8  LE Weight Bearing Status: Weight Bearing as Tolerated  Medical Precautions:  (Laminectomy Thoracic with Excision Lesion 10/2/2023, Dr. Marin, Tumor thoracic spine, Fractured L wrist as a kid, asthma, cancer))  Post-Surgical Precautions: Spinal precautions  As per patient no lifting > 10#, no doing dishes, no vacuuming, no laundry    Reason for Visit:  PT Evaluate and Treat    Initial  Evaluation:  Referred By: Dann Avila PA-C    Insurance  Insurance reviewed  Visit No. 1  60 visits per calendar year    Subjective:    Current Episode  Date of Onset:  9/30/2023  Mechanism of injury:  Patient reports he just started noticing losing feeling in his legs/lower torso and went to the ER and was diagnosed with a tumor and then had thoracic spine surgery a few days later.    Pain Score:  Pain Assessment: 0-10  Pain Assessment  Pain Assessment: 0-10  Pain Score: 0 - No pain  Pain Type: Acute pain  Pain Location: Back  Pain Orientation: Posterior  Pain Descriptors: Aching  Pain Frequency: Intermittent  Pain Onset:  (9/30/2023)  Pain Type: Acute pain  Pain Location: Back  Pain Orientation: Posterior  Pain Descriptors: Aching  Pain Frequency: Intermittent    Better with:  meds    Worse with:  prolonged sitting/walking, balance is off, lifting, driving, works as a  (currently not working)    Medical History/Surgical History:  Medical Precautions:  (Laminectomy Thoracic with Excision Lesion 10/2/2023, Dr. Marin, Tumor thoracic spine, Fractured L wrist as a kid, asthma, cancer)    Reviewed medical history form with patient (medications/allergies reviewed with patient).    Functional Assessment:  Level of Reagan:  Level of Reagan: Needs assistance with ADLs    Functional Limitations:  currently off work    Work Status:  , currently not working  Patient Awareness:  Patient is aware of HISHER: HISdiagnosis and prognosis.  Social Support/History:      Objective:  Restrictions as per MD's Protocol if surgical:  As per patient no lifting > 10#, no doing dishes, no vacuuming, no laundry  (From Dann Avila PA-C from secure chat) Maybe avoid significant bending and twisting. Also slowly ramp up lifting     Weightbearing Status:  WBAT    Any Pre-Cautions:  Medium fall risk    Skin:  spinal surgical incision over thoracic spine healing and closed, no active signs of  infection    Palpation:   no point tenderness over thoracic spine    Sensation:  Patient denies numbness/tingling of bilateral upper extremities.      Gait:  walker assist, WBAT    ROM:  AROM  Thoracic spine, lumbar spine not tested  KNEE AROM WNL'S R AND L, HIP AROM WNL'S R AND L, ANKLE AROM WNL'S R AND L, SHOULDER AROM WNLS R AND L, and ELBOW AROM WNLS' R AND L    Strength:    R hip 4/5 all planes, L hip 4-/5 all planes  R knee 4/5 all planes, L knee 4/5 all planes  R shoulder 4/5 all planes, L shoulder 4-/5 all planes  R elbow 4/5 all planes, L elbow 4-/5 all planes  STRENGTH: R/L ANKLE 5/5 ALL PLANES    Outcome measure     Oswestry Disablity Index (JASMYN): 12%    Treatment  Time in clinic started at  4pm  Time in clinic ended at  4:43pm  Total time in clinic is . 43 minutes  Total timed code time is  38 minutes    Treatment Performed Today:.   PT Initial Evaluation x 1 unit  HEP x 1 unit  There-Ex x 1 unit  Individual(s) Educated: Patient, Spouse  Education Provided: Home Exercise Program  Diagnosis and Precautions: Diagnosis D49.2 (ICD-10-CM) - Thoracic spine tumor  Risk and Benefits Discussed with Patient/Caregiver/Other: yes  Patient/Caregiver Demonstrated Understanding: yes  Plan of Care Discussed and Agreed Upon: yes  Patient Response to Education: Patient/Caregiver Verbalized Understanding of Information, Patient/Caregiver Performed Return Demonstration of Exercises/Activities, Patient/Caregiver Asked Appropriate Questions    Access Code: [email or web address]  URL: [email or web address]  Date: 10/30/2023  Prepared by: Dylan Iverson    Exercises  - Supine Diaphragmatic Breathing  - 1 x daily - 3-4 x weekly - 2 sets - 10 reps - 5-10 hold  - Supine Posterior Pelvic Tilt  - 1 x daily - 3-4 x weekly - 2 sets - 10 reps - 5-10 hold  - Supine Bridge  - 1 x daily - 3-4 x weekly - 2 sets - 10 reps - 5-10 hold  - Supine March  - 1 x daily - 3-4 x weekly - 2 sets - 10 reps - 5-10 hold  - Hooklying Heel Walk  -  1 x daily - 3-4 x weekly - 2 sets - 10 reps - 5-10 hold

## 2023-10-30 NOTE — PATIENT INSTRUCTIONS
Access Code: [email or web address]  URL: [email or web address]  Date: 10/30/2023  Prepared by: Dylan Iverson    Exercises  - Supine Diaphragmatic Breathing  - 1 x daily - 3-4 x weekly - 2 sets - 10 reps - 5-10 hold  - Supine Posterior Pelvic Tilt  - 1 x daily - 3-4 x weekly - 2 sets - 10 reps - 5-10 hold  - Supine Bridge  - 1 x daily - 3-4 x weekly - 2 sets - 10 reps - 5-10 hold  - Supine March  - 1 x daily - 3-4 x weekly - 2 sets - 10 reps - 5-10 hold  - Hooklying Heel Walk  - 1 x daily - 3-4 x weekly - 2 sets - 10 reps - 5-10 hold

## 2023-10-30 NOTE — LETTER
October 30, 2023     Patient: Dann Ni   YOB: 1975   Date of Visit: 10/30/2023       To Whom it May Concern:    Dann Ni was seen in my clinic on 10/30/2023. He {Return to school/sport:40516}.    If you have any questions or concerns, please don't hesitate to call.         Sincerely,          Dylan Iverson, PT        CC: No Recipients

## 2023-10-30 NOTE — LETTER
October 30, 2023     Patient: Dann Ni   YOB: 1975   Date of Visit: 10/30/2023       To Whom It May Concern:    It is my medical opinion that Dann Ni {Work release (duty restriction):63632}.    If you have any questions or concerns, please don't hesitate to call.         Sincerely,        Dylan Iverson, PT    CC: No Recipients

## 2023-11-01 ENCOUNTER — TREATMENT (OUTPATIENT)
Dept: PHYSICAL THERAPY | Facility: CLINIC | Age: 48
End: 2023-11-01
Payer: COMMERCIAL

## 2023-11-01 DIAGNOSIS — D49.2 THORACIC SPINE TUMOR: ICD-10-CM

## 2023-11-01 PROCEDURE — 97110 THERAPEUTIC EXERCISES: CPT | Mod: GP | Performed by: PHYSICAL THERAPIST

## 2023-11-01 ASSESSMENT — PAIN - FUNCTIONAL ASSESSMENT: PAIN_FUNCTIONAL_ASSESSMENT: 0-10

## 2023-11-01 ASSESSMENT — PAIN SCALES - GENERAL: PAINLEVEL_OUTOF10: 0 - NO PAIN

## 2023-11-01 NOTE — PROGRESS NOTES
PHYSICAL THERAPY TREATMENT    Patient name:  Dann Ni    MRN:  [id number]    :  1975    Today's Date:  23    Referral by:  Referred By: Dann Avila PA-C    Diagnoses:  Diagnosis and Precautions: Diagnosis D49.2 (ICD-10-CM) - Thoracic spine tumor    Assessment/Plan:  Therapeutic exercise performed in order to improve scapular/core strength/endurance.  Patient requires increased tactile/verbal cues with exercise in order to reduce thoracic spine stress/strain during the particular exercise performed.  Patient challenged with exercises performed in clinic secondary to scapular/core fatigue.     PT Assessment  PT Assessment Results: Decreased strength, Decreased range of motion, Decreased endurance, Impaired balance, Decreased mobility, Pain, Impaired sensation  Rehab Prognosis: Good  Evaluation/Treatment Tolerance: Patient limited by fatigue, Patient tolerated treatment well    General Visit Information  PT  Visit  PT Received On: 23  Response to Previous Treatment: Patient with no complaints from previous session.    General   General  Reason for Referral: PT Evaluate and Treat  Referred By: Dann Avila PA-C  General Comment: Diagnosis D49.2 (ICD-10-CM) - Thoracic spine tumor (Laminectomy Thoracic with Excision Lesion 10/2/2023, Dr. Marin)    Insurance Reviewed  Name of insurance:  [facility]  Visit #:   2  60 visits per calendar year    Subjective:  Patient reports feeling a little sore after last session.    Precautions:  Precautions  STEADI Fall Risk Score (The score of 4 or more indicates an increased risk of falling): 8  LE Weight Bearing Status: Weight Bearing as Tolerated  Medical Precautions:  (Laminectomy Thoracic with Excision Lesion 10/2/2023, Dr. Marin, Tumor thoracic spine, Fractured L wrist as a kid, asthma, cancer)  Post-Surgical Precautions: Spinal precautions    Pain:  Pain Assessment  Pain Assessment: 0-10  Pain Score: 0 - No pain  Pain Type: Acute pain  Pain Location: Back  Pain  Orientation: Posterior    Treatments  Therapeutic Exercise  Therapeutic Exercise Performed: Yes  Therapeutic Exercise Activity 1: UBE 3' FWD/3' BWD 2.0 resistance  Therapeutic Exercise Activity 2: TB shoulder extension yellow 2 sets 10 reps  Therapeutic Exercise Activity 3: TB rows yellow 2 sets, 10 reps  Therapeutic Exercise Activity 4: TB Palloff press yellow 3 sets, 10 reps R and L  Therapeutic Exercise Activity 5: TB shoulder ER yellow 2 sets, 10 reps  Therapeutic Exercise Activity 6: Wall towel slides shoulder flexion/scaption x 10 reps each R and L  Therapeutic Exercise Activity 7: Wall towel ABC's x 1 R and L  Therapeutic Exercise Activity 8: Standing marching 2 sets, 10 reps  Therapeutic Exercise Activity 9: Mini-Squats 2 sets, 10 reps  Therapeutic Exercise Activity 10: standing hip extension/hip flexion/hip abduction x 10 reps each R and L  Therapeutic Exercise Activity 11: supine bridges x 20 reps  Therapeutic Exercise Activity 12: supine marching with PPT x 10 reps  Therapeutic Exercise Activity 13: supine heel walk x 10 reps  Therapeutic Exercise Activity 14: supine reverse crunch x 20 reps    Treatment  Time in clinic started at  3:13pm  Time in clinic ended at  3:56pm  Total time in clinic is .  43 minutes  Total timed code time is  38 minutes    Treatment Performed Today:.   Therapeutic Exercise x 3 units

## 2023-11-06 ENCOUNTER — APPOINTMENT (OUTPATIENT)
Dept: PHYSICAL THERAPY | Facility: CLINIC | Age: 48
End: 2023-11-06
Payer: COMMERCIAL

## 2023-11-08 ENCOUNTER — TREATMENT (OUTPATIENT)
Dept: PHYSICAL THERAPY | Facility: CLINIC | Age: 48
End: 2023-11-08
Payer: COMMERCIAL

## 2023-11-08 DIAGNOSIS — D49.2 THORACIC SPINE TUMOR: Primary | ICD-10-CM

## 2023-11-08 PROCEDURE — G0180 MD CERTIFICATION HHA PATIENT: HCPCS | Performed by: FAMILY MEDICINE

## 2023-11-08 PROCEDURE — 97110 THERAPEUTIC EXERCISES: CPT | Mod: GP

## 2023-11-08 NOTE — PROGRESS NOTES
PHYSICAL THERAPY TREATMENT    Patient name:  Dann Ni    MRN:  [id number]    :  1975    Today's Date:  23    Referral by:   Dann Avila PA-C    Diagnoses:   Thoracic spine tumor    Assessment/Plan:  Therapeutic exercise performed in order to improve scapular/core strength/endurance.  Patient requires increased tactile/verbal cues with exercise in order to reduce thoracic spine stress/strain during the particular exercise performed.  Patient challenged with exercises performed in clinic secondary to scapular/core fatigue.              General    Worked on gait with quad cane on right with step thru gait pattern today.    Insurance Reviewed  Name of insurance:  [employer]  Visit #:   3  60 visits per calendar year    Subjective:  Patient reports feeling a little sore after last session.    Precautions:   STEADI Fall Risk Score (The score of 4 or more indicates an increased risk of falling): 8  LE Weight Bearing Status: Weight Bearing as Tolerated  Medical Precautions:  (Laminectomy Thoracic with Excision Lesion 10/2/2023, Dr. Marin, Tumor thoracic spine, Fractured L wrist as a kid, asthma, cancer))  Post-Surgical Precautions: Spinal precautions    Pain:   Patient denies pain today.    Treatments   Therapeutic Exercise  Therapeutic Exercise Performed: Yes  Therapeutic Exercise Activity 1: UBE 3' FWD/3' BWD 2.0 resistance  Therapeutic Exercise Activity 2: TB shoulder extension yellow 2 sets 10 reps  TB Blue shoulder add 0-60 deg 15 x 2 sets.  Therapeutic Exercise Activity 3: TB rows green 2 sets, 16 reps  Therapeutic Exercise Activity 4: TB Palloff press yellow 3 sets, 10 reps R and L  Therapeutic Exercise Activity 5: TB shoulder ER yellow 2 sets, 10 reps  Therapeutic Exercise Activity 6: Wall towel slides shoulder flexion/scaption x 10 reps each R and L  Therapeutic Exercise Activity 7: Wall towel ABC's x 1 R and L  Therapeutic Exercise Activity 8: Standing marching 2 sets, 10 reps  Therapeutic Exercise  Activity 9: Mini-Squats 2 sets, 15 reps  Therapeutic Exercise Activity 10: standing hip extension/hip abduction x 12 reps each R and L  Therapeutic Exercise Activity 11: supine bridges x 20 reps NT  Therapeutic Exercise Activity 12: supine marching with PPT x 10 reps NT  Therapeutic Exercise Activity 13: supine heel walk x 10 reps NT  Therapeutic Exercise Activity 14: supine reverse crunch x 20 reps NT  LAQ with nerve glides 15 x 2 sets.  Includes gait with quad cane approx 100 feet.       Treatment  Time in clinic started at  4:15pm  Time in clinic ended at  5:00pm  Total time in clinic is .  45 minutes  Total timed code time is  42 minutes    Treatment Performed Today:.   Therapeutic Exercise x 3 units

## 2023-11-10 ENCOUNTER — OFFICE VISIT (OUTPATIENT)
Dept: HEMATOLOGY/ONCOLOGY | Facility: HOSPITAL | Age: 48
End: 2023-11-10
Payer: COMMERCIAL

## 2023-11-10 ENCOUNTER — DOCUMENTATION (OUTPATIENT)
Dept: HEMATOLOGY/ONCOLOGY | Facility: HOSPITAL | Age: 48
End: 2023-11-10
Payer: COMMERCIAL

## 2023-11-10 VITALS
HEIGHT: 68 IN | SYSTOLIC BLOOD PRESSURE: 129 MMHG | TEMPERATURE: 97.5 F | OXYGEN SATURATION: 98 % | HEART RATE: 97 BPM | WEIGHT: 239.7 LBS | DIASTOLIC BLOOD PRESSURE: 80 MMHG | RESPIRATION RATE: 18 BRPM | BODY MASS INDEX: 36.33 KG/M2

## 2023-11-10 DIAGNOSIS — C85.80 MARGINAL ZONE LYMPHOMA (MULTI): ICD-10-CM

## 2023-11-10 LAB
LAB AP ASR DISCLAIMER: NORMAL
LABORATORY COMMENT REPORT: NORMAL
Lab: NORMAL
PATH REPORT.FINAL DX SPEC: NORMAL
PATH REPORT.GROSS SPEC: NORMAL
PATH REPORT.RELEVANT HX SPEC: NORMAL
PATH REPORT.TOTAL CANCER: NORMAL
RESIDENT REVIEW: NORMAL

## 2023-11-10 PROCEDURE — 99205 OFFICE O/P NEW HI 60 MIN: CPT | Performed by: INTERNAL MEDICINE

## 2023-11-10 PROCEDURE — 99215 OFFICE O/P EST HI 40 MIN: CPT | Performed by: INTERNAL MEDICINE

## 2023-11-10 PROCEDURE — 1036F TOBACCO NON-USER: CPT | Performed by: INTERNAL MEDICINE

## 2023-11-10 PROCEDURE — 3008F BODY MASS INDEX DOCD: CPT | Performed by: INTERNAL MEDICINE

## 2023-11-10 ASSESSMENT — ENCOUNTER SYMPTOMS
OCCASIONAL FEELINGS OF UNSTEADINESS: 1
DEPRESSION: 0
LOSS OF SENSATION IN FEET: 0

## 2023-11-10 ASSESSMENT — PATIENT HEALTH QUESTIONNAIRE - PHQ9
1. LITTLE INTEREST OR PLEASURE IN DOING THINGS: NOT AT ALL
SUM OF ALL RESPONSES TO PHQ9 QUESTIONS 1 AND 2: 0
2. FEELING DOWN, DEPRESSED OR HOPELESS: NOT AT ALL

## 2023-11-10 ASSESSMENT — PAIN SCALES - GENERAL: PAINLEVEL: 0-NO PAIN

## 2023-11-10 NOTE — PROGRESS NOTES
11/10/23 0915  Patient pathology resulted and patient is seeing Dr. Peters today at 3 pm. Scheduling order entered JULIO C Holley

## 2023-11-13 ENCOUNTER — HOSPITAL ENCOUNTER (OUTPATIENT)
Dept: RADIOLOGY | Facility: HOSPITAL | Age: 48
Discharge: HOME | End: 2023-11-13
Payer: COMMERCIAL

## 2023-11-13 ENCOUNTER — TREATMENT (OUTPATIENT)
Dept: PHYSICAL THERAPY | Facility: CLINIC | Age: 48
End: 2023-11-13
Payer: COMMERCIAL

## 2023-11-13 DIAGNOSIS — D49.2 THORACIC SPINE TUMOR: Primary | ICD-10-CM

## 2023-11-13 DIAGNOSIS — C85.80 MARGINAL ZONE LYMPHOMA (MULTI): ICD-10-CM

## 2023-11-13 LAB — GLUCOSE BLD MANUAL STRIP-MCNC: 118 MG/DL (ref 74–99)

## 2023-11-13 PROCEDURE — 97110 THERAPEUTIC EXERCISES: CPT | Mod: GP | Performed by: PHYSICAL THERAPIST

## 2023-11-13 PROCEDURE — 82947 ASSAY GLUCOSE BLOOD QUANT: CPT

## 2023-11-13 PROCEDURE — 3430000001 HC RX 343 DIAGNOSTIC RADIOPHARMACEUTICALS: Performed by: INTERNAL MEDICINE

## 2023-11-13 PROCEDURE — A9552 F18 FDG: HCPCS | Performed by: INTERNAL MEDICINE

## 2023-11-13 PROCEDURE — 78815 PET IMAGE W/CT SKULL-THIGH: CPT | Mod: PET TUMOR INIT TX STRAT | Performed by: STUDENT IN AN ORGANIZED HEALTH CARE EDUCATION/TRAINING PROGRAM

## 2023-11-13 PROCEDURE — 78815 PET IMAGE W/CT SKULL-THIGH: CPT | Mod: PI

## 2023-11-13 RX ORDER — FLUDEOXYGLUCOSE F 18 200 MCI/ML
12.65 INJECTION, SOLUTION INTRAVENOUS
Status: COMPLETED | OUTPATIENT
Start: 2023-11-13 | End: 2023-11-13

## 2023-11-13 RX ADMIN — FLUDEOXYGLUCOSE F 18 12.65 MILLICURIE: 200 INJECTION, SOLUTION INTRAVENOUS at 12:12

## 2023-11-13 ASSESSMENT — PAIN SCALES - GENERAL: PAINLEVEL_OUTOF10: 2

## 2023-11-13 ASSESSMENT — PAIN - FUNCTIONAL ASSESSMENT: PAIN_FUNCTIONAL_ASSESSMENT: 0-10

## 2023-11-13 NOTE — PROGRESS NOTES
PHYSICAL THERAPY TREATMENT    Patient name:  Dann Ni    MRN:  [id number]    :  1975    Today's Date:  23    Referral by:  Referred By: Dann Avila PA-C    Diagnoses:  Diagnosis and Precautions: Diagnosis D49.2 (ICD-10-CM) - Thoracic spine tumor    Assessment/Plan:  Therapeutic exercise performed in order to improve scapular/core strength/endurance.  Patient requires increased tactile/verbal cues with exercise in order to reduce thoracic spine stress/strain during the particular exercise performed.  Patient challenged with exercises performed in clinic secondary to scapular/core fatigue.     PT Assessment  PT Assessment Results: Decreased strength, Decreased range of motion, Decreased endurance, Decreased mobility, Pain, Impaired balance  Rehab Prognosis: Good  Evaluation/Treatment Tolerance: Patient limited by fatigue    General Visit Information  PT  Visit  PT Received On: 23    General   General  Reason for Referral: PT Evaluate and Treat  Referred By: Dann Avila PA-C  General Comment: Diagnosis D49.2 (ICD-10-CM) - Thoracic spine tumor (Laminectomy Thoracic with Excision Lesion 10/2/2023, Dr. Marin)    Insurance Reviewed  Name of insurance:  [employer]  Visit #:   4  60 visits per calendar year    Subjective:  Patient reports feeling a little achy after last time.  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    Precautions:  Precautions  STEADI Fall Risk Score (The score of 4 or more indicates an increased risk of falling): 8  LE Weight Bearing Status: Weight Bearing as Tolerated  Medical Precautions:  (Laminectomy Thoracic with Excision Lesion 10/2/2023, Dr. Marin, Tumor thoracic spine, Fractured L wrist as a kid, asthma, cancer)  Post-Surgical Precautions: Spinal precautions    Pain:  Pain Assessment  Pain Assessment: 0-10  Pain Score: 2  Pain Type: Acute pain  Pain Location: Back  Pain Orientation:  Posterior    Treatments  Therapeutic Exercise  Therapeutic Exercise Performed: Yes  Therapeutic Exercise Activity 1: UBE 3' FWD/3' BWD 2.0 resistance  Therapeutic Exercise Activity 2: TB shoulder extension yellow 2 sets 10 reps  Therapeutic Exercise Activity 3: TB rows yellow 2 sets, 10 reps  Therapeutic Exercise Activity 4: TB Palloff press yellow 3 sets, 10 reps R and L  Therapeutic Exercise Activity 5: supine reverse crunch x 20 reps  Therapeutic Exercise Activity 6: Wall towel slides shoulder flexion/scaption x 10 reps each R and L  Therapeutic Exercise Activity 7: Wall towel ABC's x 1 R and L  Therapeutic Exercise Activity 8: Standing marching 2 sets, 10 reps  Therapeutic Exercise Activity 9: Mini-Squats 2 sets, 10 reps  Therapeutic Exercise Activity 10: standing hip extension/hip flexion/hip abduction x 10 reps each R and L  Therapeutic Exercise Activity 11: supine bridges x 30 reps  Therapeutic Exercise Activity 12: supine marching with PPT x 20 reps  Therapeutic Exercise Activity 13: supine heel walk x 10 reps    Treatment  Time in clinic started at  3:58pm  Time in clinic ended at  4:38pm  Total time in clinic is .   40 minutes  Total timed code time is   38 minutes    Treatment Performed Today:.   Therapeutic Exercise x 3 units

## 2023-11-15 ENCOUNTER — TREATMENT (OUTPATIENT)
Dept: PHYSICAL THERAPY | Facility: CLINIC | Age: 48
End: 2023-11-15
Payer: COMMERCIAL

## 2023-11-15 DIAGNOSIS — D49.2 THORACIC SPINE TUMOR: Primary | ICD-10-CM

## 2023-11-15 PROCEDURE — 97110 THERAPEUTIC EXERCISES: CPT | Mod: GP | Performed by: PHYSICAL THERAPIST

## 2023-11-15 ASSESSMENT — PAIN - FUNCTIONAL ASSESSMENT: PAIN_FUNCTIONAL_ASSESSMENT: 0-10

## 2023-11-15 ASSESSMENT — PAIN SCALES - GENERAL: PAINLEVEL_OUTOF10: 1

## 2023-11-15 NOTE — PROGRESS NOTES
PHYSICAL THERAPY TREATMENT    Patient name:  Dann Ni    MRN:  [id number]    :  1975    Today's Date:  23    Referral by:  Referred By: Dann Avila PA-C    Diagnoses:  Diagnosis and Precautions: Diagnosis D49.2 (ICD-10-CM) - Thoracic spine tumor    Assessment/Plan:  Therapeutic exercise performed in order to improve scapular/core strength/endurance.  Patient requires increased tactile/verbal cues with exercise in order to reduce thoracic spine stress/strain during the particular exercise performed.  Patient challenged with exercises performed in clinic secondary to scapular/core fatigue.     PT Assessment  PT Assessment Results: Decreased strength, Decreased range of motion, Decreased endurance, Impaired balance, Decreased mobility, Pain  Rehab Prognosis: Good  Evaluation/Treatment Tolerance: Patient limited by fatigue  Plan:  Progress as tolerated.    General Visit Information  PT  Visit  PT Received On: 11/15/23    General   General  Reason for Referral: PT Evaluate and Treat  Referred By: Dann Avila PA-C  General Comment: Diagnosis D49.2 (ICD-10-CM) - Thoracic spine tumor (Laminectomy Thoracic with Excision Lesion 10/2/2023, Dr. Marin)    Insurance Reviewed  Name of insurance:  [employer]  Visit #:   5  60 visits per calendar year  BACK SX 10/2; 500 DED IS MET 1000 FAM DED 2000 OOP IS MET 4000 FAM, OOP 0% COINSUR $20 COPAY (DO NOT COLLECT SINCE DED + OOP ALREADY MET FOR ) 60V CY (NONE USED FOR ) PER [email or web address] [id number] // Marilynn confirmed 23 5:41pm     Subjective:  Patient reports feeling a little fatigued this evening.    Precautions:  Precautions  STEADI Fall Risk Score (The score of 4 or more indicates an increased risk of falling): 8  LE Weight Bearing Status: Weight Bearing as Tolerated  Medical Precautions:  (Laminectomy Thoracic with Excision Lesion 10/2/2023, Dr. Marin, Tumor thoracic spine, Fractured L wrist as a kid, asthma, cancer)  Post-Surgical Precautions: Spinal  precautions    Pain:  Pain Assessment  Pain Assessment: 0-10  Pain Score: 1  Pain Type: Acute pain  Pain Location: Back  Pain Orientation: Posterior    Treatments  Therapeutic Exercise  Therapeutic Exercise Performed: Yes  Therapeutic Exercise Activity 1: UBE 4' FWD/4' BWD 2.0 resistance  Therapeutic Exercise Activity 2: TB shoulder extension red 3 sets 10 reps  Therapeutic Exercise Activity 3: TB rows red 3 sets, 10 reps  Therapeutic Exercise Activity 4: TB Palloff press red 3 sets, 10 reps R and L  Therapeutic Exercise Activity 5: TB shoulder horizontal abduction red 3 sets, 10 reps R and L  Therapeutic Exercise Activity 6: TB wall clocks x 3 directions x 10 each R and L red TB  Therapeutic Exercise Activity 7: Standing HR/TR x 30 reps each  Therapeutic Exercise Activity 8: Standing marching 2 sets, 10 reps  Therapeutic Exercise Activity 9: Mini-Squats 2 sets, 10 reps  Therapeutic Exercise Activity 10: standing hip extension/hip flexion/hip abduction 2 sets x 10 reps each R and L  Therapeutic Exercise Activity 11: supine bridges x 30 reps  Therapeutic Exercise Activity 12: supine marching with PPT x 20 reps  Therapeutic Exercise Activity 13: supine heel walk x 10 reps  Therapeutic Exercise Activity 14: supine reverse crunch x 20 reps    Treatment  Time in clinic started at  5pm  Time in clinic ended at  5:50pm  Total time in clinic is .   50 minutes  Total timed code time is   45 minutes    Treatment Performed Today:.   Therapeutic Exercise x 3 units

## 2023-11-16 ENCOUNTER — HOSPITAL ENCOUNTER (OUTPATIENT)
Dept: RADIOLOGY | Facility: HOSPITAL | Age: 48
Discharge: HOME | End: 2023-11-16
Payer: COMMERCIAL

## 2023-11-16 DIAGNOSIS — C85.80 MARGINAL ZONE LYMPHOMA (MULTI): ICD-10-CM

## 2023-11-16 PROCEDURE — 72157 MRI CHEST SPINE W/O & W/DYE: CPT

## 2023-11-16 PROCEDURE — 2550000001 HC RX 255 CONTRASTS: Performed by: INTERNAL MEDICINE

## 2023-11-16 PROCEDURE — 72156 MRI NECK SPINE W/O & W/DYE: CPT

## 2023-11-16 PROCEDURE — A9575 INJ GADOTERATE MEGLUMI 0.1ML: HCPCS | Performed by: INTERNAL MEDICINE

## 2023-11-16 PROCEDURE — 72157 MRI CHEST SPINE W/O & W/DYE: CPT | Performed by: RADIOLOGY

## 2023-11-16 PROCEDURE — 72156 MRI NECK SPINE W/O & W/DYE: CPT | Performed by: RADIOLOGY

## 2023-11-16 RX ORDER — GADOTERATE MEGLUMINE 376.9 MG/ML
20 INJECTION INTRAVENOUS
Status: COMPLETED | OUTPATIENT
Start: 2023-11-16 | End: 2023-11-16

## 2023-11-16 RX ADMIN — GADOTERATE MEGLUMINE 20 ML: 376.9 INJECTION INTRAVENOUS at 18:50

## 2023-11-17 ENCOUNTER — TELEPHONE (OUTPATIENT)
Dept: HEMATOLOGY/ONCOLOGY | Facility: HOSPITAL | Age: 48
End: 2023-11-17

## 2023-11-17 ENCOUNTER — OFFICE VISIT (OUTPATIENT)
Dept: HEMATOLOGY/ONCOLOGY | Facility: HOSPITAL | Age: 48
End: 2023-11-17
Payer: COMMERCIAL

## 2023-11-17 VITALS
HEIGHT: 68 IN | HEART RATE: 98 BPM | DIASTOLIC BLOOD PRESSURE: 87 MMHG | OXYGEN SATURATION: 95 % | WEIGHT: 240.8 LBS | BODY MASS INDEX: 36.49 KG/M2 | SYSTOLIC BLOOD PRESSURE: 126 MMHG | RESPIRATION RATE: 17 BRPM | TEMPERATURE: 97.5 F

## 2023-11-17 DIAGNOSIS — C85.80 MARGINAL ZONE LYMPHOMA (MULTI): Primary | ICD-10-CM

## 2023-11-17 PROCEDURE — 99215 OFFICE O/P EST HI 40 MIN: CPT | Performed by: INTERNAL MEDICINE

## 2023-11-17 PROCEDURE — 1036F TOBACCO NON-USER: CPT | Performed by: INTERNAL MEDICINE

## 2023-11-17 PROCEDURE — 3008F BODY MASS INDEX DOCD: CPT | Performed by: INTERNAL MEDICINE

## 2023-11-17 RX ORDER — PROCHLORPERAZINE EDISYLATE 5 MG/ML
10 INJECTION INTRAMUSCULAR; INTRAVENOUS EVERY 6 HOURS PRN
Status: CANCELLED | OUTPATIENT
Start: 2023-11-30

## 2023-11-17 RX ORDER — EPINEPHRINE 0.3 MG/.3ML
0.3 INJECTION SUBCUTANEOUS EVERY 5 MIN PRN
Status: CANCELLED | OUTPATIENT
Start: 2023-12-28

## 2023-11-17 RX ORDER — EPINEPHRINE 0.3 MG/.3ML
0.3 INJECTION SUBCUTANEOUS EVERY 5 MIN PRN
Status: CANCELLED | OUTPATIENT
Start: 2023-12-29

## 2023-11-17 RX ORDER — PROCHLORPERAZINE EDISYLATE 5 MG/ML
10 INJECTION INTRAMUSCULAR; INTRAVENOUS EVERY 6 HOURS PRN
Status: CANCELLED | OUTPATIENT
Start: 2023-12-28

## 2023-11-17 RX ORDER — ALBUTEROL SULFATE 0.83 MG/ML
3 SOLUTION RESPIRATORY (INHALATION) AS NEEDED
Status: CANCELLED | OUTPATIENT
Start: 2023-12-29

## 2023-11-17 RX ORDER — FAMOTIDINE 10 MG/ML
20 INJECTION INTRAVENOUS ONCE AS NEEDED
Status: CANCELLED | OUTPATIENT
Start: 2023-12-29

## 2023-11-17 RX ORDER — EPINEPHRINE 0.3 MG/.3ML
0.3 INJECTION SUBCUTANEOUS EVERY 5 MIN PRN
Status: CANCELLED | OUTPATIENT
Start: 2023-12-30

## 2023-11-17 RX ORDER — DIPHENHYDRAMINE HYDROCHLORIDE 50 MG/ML
50 INJECTION INTRAMUSCULAR; INTRAVENOUS AS NEEDED
Status: CANCELLED | OUTPATIENT
Start: 2023-12-30

## 2023-11-17 RX ORDER — EPINEPHRINE 0.3 MG/.3ML
0.3 INJECTION SUBCUTANEOUS EVERY 5 MIN PRN
Status: CANCELLED | OUTPATIENT
Start: 2023-12-02

## 2023-11-17 RX ORDER — FAMOTIDINE 10 MG/ML
20 INJECTION INTRAVENOUS ONCE AS NEEDED
Status: CANCELLED | OUTPATIENT
Start: 2023-12-02

## 2023-11-17 RX ORDER — FAMOTIDINE 10 MG/ML
20 INJECTION INTRAVENOUS ONCE AS NEEDED
Status: CANCELLED | OUTPATIENT
Start: 2023-12-30

## 2023-11-17 RX ORDER — PROCHLORPERAZINE EDISYLATE 5 MG/ML
10 INJECTION INTRAMUSCULAR; INTRAVENOUS EVERY 6 HOURS PRN
Status: CANCELLED | OUTPATIENT
Start: 2023-12-01

## 2023-11-17 RX ORDER — DIPHENHYDRAMINE HCL 50 MG
50 CAPSULE ORAL ONCE
Status: CANCELLED | OUTPATIENT
Start: 2023-11-30

## 2023-11-17 RX ORDER — PROCHLORPERAZINE MALEATE 10 MG
10 TABLET ORAL EVERY 6 HOURS PRN
Status: CANCELLED | OUTPATIENT
Start: 2023-12-29

## 2023-11-17 RX ORDER — ALBUTEROL SULFATE 0.83 MG/ML
3 SOLUTION RESPIRATORY (INHALATION) AS NEEDED
Status: CANCELLED | OUTPATIENT
Start: 2023-12-01

## 2023-11-17 RX ORDER — ALBUTEROL SULFATE 0.83 MG/ML
3 SOLUTION RESPIRATORY (INHALATION) AS NEEDED
Status: CANCELLED | OUTPATIENT
Start: 2023-12-28

## 2023-11-17 RX ORDER — EPINEPHRINE 0.3 MG/.3ML
0.3 INJECTION SUBCUTANEOUS EVERY 5 MIN PRN
Status: CANCELLED | OUTPATIENT
Start: 2023-12-01

## 2023-11-17 RX ORDER — FAMOTIDINE 10 MG/ML
20 INJECTION INTRAVENOUS ONCE AS NEEDED
Status: CANCELLED | OUTPATIENT
Start: 2023-11-30

## 2023-11-17 RX ORDER — DIPHENHYDRAMINE HYDROCHLORIDE 50 MG/ML
50 INJECTION INTRAMUSCULAR; INTRAVENOUS AS NEEDED
Status: CANCELLED | OUTPATIENT
Start: 2023-11-30

## 2023-11-17 RX ORDER — ALBUTEROL SULFATE 0.83 MG/ML
3 SOLUTION RESPIRATORY (INHALATION) AS NEEDED
Status: CANCELLED | OUTPATIENT
Start: 2023-12-02

## 2023-11-17 RX ORDER — DIPHENHYDRAMINE HCL 50 MG
50 CAPSULE ORAL ONCE
Status: CANCELLED | OUTPATIENT
Start: 2023-12-28

## 2023-11-17 RX ORDER — EPINEPHRINE 0.3 MG/.3ML
0.3 INJECTION SUBCUTANEOUS EVERY 5 MIN PRN
Status: CANCELLED | OUTPATIENT
Start: 2023-11-30

## 2023-11-17 RX ORDER — DIPHENHYDRAMINE HYDROCHLORIDE 50 MG/ML
50 INJECTION INTRAMUSCULAR; INTRAVENOUS AS NEEDED
Status: CANCELLED | OUTPATIENT
Start: 2023-12-29

## 2023-11-17 RX ORDER — PROCHLORPERAZINE MALEATE 10 MG
10 TABLET ORAL EVERY 6 HOURS PRN
Status: CANCELLED | OUTPATIENT
Start: 2023-12-28

## 2023-11-17 RX ORDER — ACETAMINOPHEN 325 MG/1
650 TABLET ORAL ONCE
Status: CANCELLED | OUTPATIENT
Start: 2023-12-28

## 2023-11-17 RX ORDER — PROCHLORPERAZINE MALEATE 10 MG
10 TABLET ORAL EVERY 6 HOURS PRN
Status: CANCELLED | OUTPATIENT
Start: 2023-12-01

## 2023-11-17 RX ORDER — PROCHLORPERAZINE MALEATE 10 MG
10 TABLET ORAL EVERY 6 HOURS PRN
Status: CANCELLED | OUTPATIENT
Start: 2023-11-30

## 2023-11-17 RX ORDER — DIPHENHYDRAMINE HYDROCHLORIDE 50 MG/ML
50 INJECTION INTRAMUSCULAR; INTRAVENOUS AS NEEDED
Status: CANCELLED | OUTPATIENT
Start: 2023-12-01

## 2023-11-17 RX ORDER — FAMOTIDINE 10 MG/ML
20 INJECTION INTRAVENOUS ONCE AS NEEDED
Status: CANCELLED | OUTPATIENT
Start: 2023-12-28

## 2023-11-17 RX ORDER — FAMOTIDINE 10 MG/ML
20 INJECTION INTRAVENOUS ONCE AS NEEDED
Status: CANCELLED | OUTPATIENT
Start: 2023-12-01

## 2023-11-17 RX ORDER — PROCHLORPERAZINE EDISYLATE 5 MG/ML
10 INJECTION INTRAMUSCULAR; INTRAVENOUS EVERY 6 HOURS PRN
Status: CANCELLED | OUTPATIENT
Start: 2023-12-29

## 2023-11-17 RX ORDER — ACETAMINOPHEN 325 MG/1
650 TABLET ORAL ONCE
Status: CANCELLED | OUTPATIENT
Start: 2023-11-30

## 2023-11-17 RX ORDER — PALONOSETRON 0.05 MG/ML
0.25 INJECTION, SOLUTION INTRAVENOUS ONCE
Status: CANCELLED | OUTPATIENT
Start: 2023-11-30

## 2023-11-17 RX ORDER — PROCHLORPERAZINE MALEATE 10 MG
10 TABLET ORAL EVERY 6 HOURS PRN
Qty: 30 TABLET | Refills: 5 | Status: SHIPPED | OUTPATIENT
Start: 2023-11-17

## 2023-11-17 RX ORDER — ONDANSETRON HYDROCHLORIDE 8 MG/1
8 TABLET, FILM COATED ORAL EVERY 8 HOURS PRN
Qty: 30 TABLET | Refills: 5 | Status: SHIPPED | OUTPATIENT
Start: 2023-11-17

## 2023-11-17 RX ORDER — PALONOSETRON 0.05 MG/ML
0.25 INJECTION, SOLUTION INTRAVENOUS ONCE
Status: CANCELLED | OUTPATIENT
Start: 2023-12-28

## 2023-11-17 RX ORDER — DIPHENHYDRAMINE HYDROCHLORIDE 50 MG/ML
50 INJECTION INTRAMUSCULAR; INTRAVENOUS AS NEEDED
Status: CANCELLED | OUTPATIENT
Start: 2023-12-02

## 2023-11-17 RX ORDER — ALBUTEROL SULFATE 0.83 MG/ML
3 SOLUTION RESPIRATORY (INHALATION) AS NEEDED
Status: CANCELLED | OUTPATIENT
Start: 2023-12-30

## 2023-11-17 RX ORDER — DIPHENHYDRAMINE HYDROCHLORIDE 50 MG/ML
50 INJECTION INTRAMUSCULAR; INTRAVENOUS AS NEEDED
Status: CANCELLED | OUTPATIENT
Start: 2023-12-28

## 2023-11-17 RX ORDER — ALBUTEROL SULFATE 0.83 MG/ML
3 SOLUTION RESPIRATORY (INHALATION) AS NEEDED
Status: CANCELLED | OUTPATIENT
Start: 2023-11-30

## 2023-11-17 ASSESSMENT — ENCOUNTER SYMPTOMS
OCCASIONAL FEELINGS OF UNSTEADINESS: 1
LOSS OF SENSATION IN FEET: 1
DEPRESSION: 0

## 2023-11-17 ASSESSMENT — PAIN SCALES - GENERAL: PAINLEVEL: 1

## 2023-11-20 ENCOUNTER — TREATMENT (OUTPATIENT)
Dept: PHYSICAL THERAPY | Facility: CLINIC | Age: 48
End: 2023-11-20
Payer: COMMERCIAL

## 2023-11-20 DIAGNOSIS — D49.2 THORACIC SPINE TUMOR: Primary | ICD-10-CM

## 2023-11-20 PROCEDURE — 97110 THERAPEUTIC EXERCISES: CPT | Mod: GP,CQ

## 2023-11-20 ASSESSMENT — PAIN - FUNCTIONAL ASSESSMENT: PAIN_FUNCTIONAL_ASSESSMENT: 0-10

## 2023-11-20 ASSESSMENT — PAIN SCALES - GENERAL: PAINLEVEL_OUTOF10: 1

## 2023-11-20 NOTE — PROGRESS NOTES
PHYSICAL THERAPY TREATMENT    Patient name:  Dann Ni    MRN:  [id number]    :  1975    Today's Date:  23    Diagnoses:       Assessment:  Therapeutic exercise performed in order to improve scapular/core strength/endurance.  Patient's muscles fatigue noted during/ after tx but pt seems to know his limits and stays w/in them. Patient would benefit from P.T. to continue to address impairments in order to improve strength, flexibility, posture, and  to decrease symptoms and increase overall function.   Patient challenged with exercises performed in clinic secondary to scapular/core fatigue.     Plan:  Progress as tolerated.    Insurance Reviewed  Name of insurance:  [employer]  Visit #:  6  POC: 12  60 visits per calendar year  BACK SX 10/2; 500 DED IS MET 1000 FAM DED 2000 OOP IS MET 4000 FAM, OOP 0% COINSUR $20 COPAY (DO NOT COLLECT SINCE DED + OOP ALREADY MET FOR ) 60V CY (NONE USED FOR ) PER [email or web address] [id number] // Marilynn confirmed 23 5:41pm     Subjective:  Patient reports feeling a little fatigued this evening bc he was at [employer] all wkd so a little more fatigued than usual.  Precautions:  Precautions  STEADI Fall Risk Score (The score of 4 or more indicates an increased risk of falling): 8  Post-Surgical Precautions: Spinal precautionsMedium fall risk   (Laminectomy Thoracic with Excision Lesion 10/2/2023, Dr. Marin, Tumor thoracic spine, Fractured L wrist as a kid, asthma, cancer)     Pain:  Pain Assessment  Pain Assessment: 0-10  Pain Score: 1  Pain Type: Acute pain  Pain Location: Back  Pain Orientation: Posterior  Objective:  Pt amb in to clinic w/quad cane w/ decent balance.    Treatments   Therapeutic Exercise  Therapeutic Exercise Activity 1: UBE 4' FWD/4' BWD 2.0 resistance  Therapeutic Exercise Activity 2: TB shoulder extension red 2x15  Therapeutic Exercise Activity 3: TB rows red 2x15  Therapeutic Exercise Activity 4: TB Palloff press red 3 sets, 10  "reps R and L NT  Therapeutic Exercise Activity 5: TB shoulder horizontal abduction red 2x15 reps R and L  Therapeutic Exercise Activity 6: TB wall clocks x 3 directions x 10 each R and L red TB NT  Therapeutic Exercise Activity 7: Standing HR/TR x 30 reps each nt  Therapeutic Exercise Activity 8: Standing marching 1 sets, 10 reps   Therapeutic Exercise Activity 9: Mini-Squats 2 sets, 10 reps NT  Therapeutic Exercise Activity 10: standing hip extension/hip flexion/hip abduction 2 sets x 10 reps each R and L NT  Therapeutic Exercise Activity 11: supine bridges w/Les on RSB x15  Therapeutic Exercise Activity 12: supine marching with PPT x 20 reps  Therapeutic Exercise Activity 13: supine heel walk x 10 reps NT  Therapeutic Exercise Activity 14: supine reverse crunch x 20 reps NT  Pulleys 2'  Shoulder ADD PurpleTT x15ea  Standing biceps curls 2 way 5# alt x15ea  Clamshells NV  Hamstring Stretch Longsit 3x30\"ea B   Step Ups 4\" f/l/r ~x10ea  Dynamic Amb NV                 "

## 2023-11-21 ENCOUNTER — APPOINTMENT (OUTPATIENT)
Dept: RADIOLOGY | Facility: HOSPITAL | Age: 48
End: 2023-11-21
Payer: COMMERCIAL

## 2023-11-21 ENCOUNTER — TELEPHONE (OUTPATIENT)
Dept: NEUROLOGY | Facility: CLINIC | Age: 48
End: 2023-11-21
Payer: COMMERCIAL

## 2023-11-21 NOTE — TELEPHONE ENCOUNTER
Pt is going to either fax or drop off paperwork for you at [employer] so he can return to work on light duty.  He said PT cleared him and he feels ready.  Just wanted to give you a heads up.  I told him after today you were off till Monday.

## 2023-11-22 ENCOUNTER — LAB (OUTPATIENT)
Dept: LAB | Facility: LAB | Age: 48
End: 2023-11-22
Payer: COMMERCIAL

## 2023-11-22 ENCOUNTER — APPOINTMENT (OUTPATIENT)
Dept: PHYSICAL THERAPY | Facility: CLINIC | Age: 48
End: 2023-11-22
Payer: COMMERCIAL

## 2023-11-22 DIAGNOSIS — C85.80 MARGINAL ZONE LYMPHOMA (MULTI): ICD-10-CM

## 2023-11-22 LAB
ALBUMIN SERPL BCP-MCNC: 4.5 G/DL (ref 3.4–5)
ALP SERPL-CCNC: 87 U/L (ref 33–120)
ALT SERPL W P-5'-P-CCNC: 41 U/L (ref 10–52)
ANION GAP SERPL CALC-SCNC: 13 MMOL/L (ref 10–20)
AST SERPL W P-5'-P-CCNC: 44 U/L (ref 9–39)
BASOPHILS # BLD AUTO: 0.08 X10*3/UL (ref 0–0.1)
BASOPHILS NFR BLD AUTO: 1 %
BILIRUB SERPL-MCNC: 0.4 MG/DL (ref 0–1.2)
BUN SERPL-MCNC: 16 MG/DL (ref 6–23)
CALCIUM SERPL-MCNC: 9.4 MG/DL (ref 8.6–10.3)
CHLORIDE SERPL-SCNC: 102 MMOL/L (ref 98–107)
CO2 SERPL-SCNC: 28 MMOL/L (ref 21–32)
CREAT SERPL-MCNC: 0.93 MG/DL (ref 0.5–1.3)
EOSINOPHIL # BLD AUTO: 0.07 X10*3/UL (ref 0–0.7)
EOSINOPHIL NFR BLD AUTO: 0.9 %
ERYTHROCYTE [DISTWIDTH] IN BLOOD BY AUTOMATED COUNT: 13.4 % (ref 11.5–14.5)
GFR SERPL CREATININE-BSD FRML MDRD: >90 ML/MIN/1.73M*2
GLUCOSE SERPL-MCNC: 92 MG/DL (ref 74–99)
HBV CORE AB SER QL: NONREACTIVE
HBV SURFACE AB SER-ACNC: 5.9 MIU/ML
HBV SURFACE AG SERPL QL IA: NONREACTIVE
HCT VFR BLD AUTO: 40.4 % (ref 41–52)
HGB BLD-MCNC: 13.5 G/DL (ref 13.5–17.5)
IMM GRANULOCYTES # BLD AUTO: 0.07 X10*3/UL (ref 0–0.7)
IMM GRANULOCYTES NFR BLD AUTO: 0.9 % (ref 0–0.9)
LDH SERPL L TO P-CCNC: 233 U/L (ref 84–246)
LYMPHOCYTES # BLD AUTO: 1.39 X10*3/UL (ref 1.2–4.8)
LYMPHOCYTES NFR BLD AUTO: 18.1 %
MCH RBC QN AUTO: 28.9 PG (ref 26–34)
MCHC RBC AUTO-ENTMCNC: 33.4 G/DL (ref 32–36)
MCV RBC AUTO: 87 FL (ref 80–100)
MONOCYTES # BLD AUTO: 0.76 X10*3/UL (ref 0.1–1)
MONOCYTES NFR BLD AUTO: 9.9 %
NEUTROPHILS # BLD AUTO: 5.33 X10*3/UL (ref 1.2–7.7)
NEUTROPHILS NFR BLD AUTO: 69.2 %
NRBC BLD-RTO: 0 /100 WBCS (ref 0–0)
PLATELET # BLD AUTO: 277 X10*3/UL (ref 150–450)
POTASSIUM SERPL-SCNC: 4.2 MMOL/L (ref 3.5–5.3)
PROT SERPL-MCNC: 7.3 G/DL (ref 6.4–8.2)
PROT SERPL-MCNC: 7.4 G/DL (ref 6.4–8.2)
RBC # BLD AUTO: 4.67 X10*6/UL (ref 4.5–5.9)
SODIUM SERPL-SCNC: 139 MMOL/L (ref 136–145)
URATE SERPL-MCNC: 5.2 MG/DL (ref 4–7.5)
WBC # BLD AUTO: 7.7 X10*3/UL (ref 4.4–11.3)

## 2023-11-22 PROCEDURE — 36415 COLL VENOUS BLD VENIPUNCTURE: CPT

## 2023-11-22 PROCEDURE — 86320 SERUM IMMUNOELECTROPHORESIS: CPT | Performed by: INTERNAL MEDICINE

## 2023-11-22 PROCEDURE — 80053 COMPREHEN METABOLIC PANEL: CPT

## 2023-11-22 PROCEDURE — 83615 LACTATE (LD) (LDH) ENZYME: CPT

## 2023-11-22 PROCEDURE — 86334 IMMUNOFIX E-PHORESIS SERUM: CPT

## 2023-11-22 PROCEDURE — 84155 ASSAY OF PROTEIN SERUM: CPT

## 2023-11-22 PROCEDURE — 84165 PROTEIN E-PHORESIS SERUM: CPT | Performed by: INTERNAL MEDICINE

## 2023-11-22 PROCEDURE — 86706 HEP B SURFACE ANTIBODY: CPT

## 2023-11-22 PROCEDURE — 87340 HEPATITIS B SURFACE AG IA: CPT

## 2023-11-22 PROCEDURE — 86704 HEP B CORE ANTIBODY TOTAL: CPT

## 2023-11-22 PROCEDURE — 84550 ASSAY OF BLOOD/URIC ACID: CPT

## 2023-11-22 PROCEDURE — 85025 COMPLETE CBC W/AUTO DIFF WBC: CPT

## 2023-11-22 PROCEDURE — 83521 IG LIGHT CHAINS FREE EACH: CPT

## 2023-11-22 PROCEDURE — 84165 PROTEIN E-PHORESIS SERUM: CPT

## 2023-11-22 PROCEDURE — 82232 ASSAY OF BETA-2 PROTEIN: CPT

## 2023-11-22 PROCEDURE — 82784 ASSAY IGA/IGD/IGG/IGM EACH: CPT

## 2023-11-23 LAB
B2 MICROGLOB SERPL-MCNC: 2.2 MG/L (ref 0.7–2.2)
IGA SERPL-MCNC: 132 MG/DL (ref 70–400)
IGG SERPL-MCNC: 1060 MG/DL (ref 700–1600)
IGM SERPL-MCNC: 105 MG/DL (ref 40–230)

## 2023-11-24 LAB
KAPPA LC SERPL-MCNC: 2.02 MG/DL (ref 0.33–1.94)
KAPPA LC/LAMBDA SER: 1.46 {RATIO} (ref 0.26–1.65)
LAMBDA LC SERPL-MCNC: 1.38 MG/DL (ref 0.57–2.63)

## 2023-11-25 NOTE — PROGRESS NOTES
"Patient ID: Dann Ni is a 48 y.o. male.  Referring Physician: Jeff Peters MD PhD  [address] [place] [address]  Primary Care Provider: DO Leela Watson    The patient was in good health until July 2023. He developed back pain which did not respond to NSAID or steroid. By late 9/2023 he lost sensation below the diaphragm and were unable to walk. On 9/30 he had CT scan showing a 4.1x1.8x1cm paraspinal mass at the T7 level. MRI showed abnormal enhancing signals at the C5, T2-8 levels.  On 10/2/2023 he had a debulking surgery which later showed evidence of lymphoma. In the meantime he has improved sensation and is able to walk with a cane.     Today the patient came to discuss management of lymphoma.     ROS: positive for the following: pain the in upper back, improving, controlled with tylenol; lost 5lbs since surgery; weakness in the legs, improving; gait slow and limping, improving. Denies: fever, night sweating, urine/fecal incontinence or retention.         Review of Systems   All other systems reviewed and are negative.       Objective   BSA: 2.28 meters squared  /80 (BP Location: Left arm, Patient Position: Sitting, BP Cuff Size: Large adult)   Pulse 97   Temp 36.4 °C (97.5 °F)   Resp 18   Ht (S) 1.72 m (5' 7.72\")   Wt 109 kg (239 lb 11.2 oz)   SpO2 98%   BMI 36.75 kg/m²     Family History   Problem Relation Name Age of Onset    Hip dysplasia Mother [place]     Arthritis Mother [place]     Other (htn) Father      Prostatitis Father      Lung cancer Maternal Grandmother      Kidney cancer Maternal Grandmother      Lung cancer Maternal Grandfather      Kidney cancer Maternal Grandfather      Dementia Other      Cancer Other      Dementia Other       Oncology History   Marginal zone lymphoma (CMS/HCC)   11/17/2023 Initial Diagnosis    Marginal zone lymphoma (CMS/HCC)     11/30/2023 -  Chemotherapy    Bendamustine + RiTUXimab, 28 Day Cycles         Dann Ni  " reports that he has never smoked. He has never used smokeless tobacco.  He  reports that he does not currently use alcohol.  He  reports no history of drug use.    EXAM: No LAD. No splenomegaly. No skin lesions. Muscle power preserved in the UE but reduced in LLE; walk with a cane in slow pace; upper back surgical scar healed.  Other details below.  Physical Exam  Constitutional:       General: He is not in acute distress.     Appearance: He is not toxic-appearing.   HENT:      Head: Normocephalic.      Nose: Nose normal.      Mouth/Throat:      Mouth: Mucous membranes are moist.   Eyes:      Extraocular Movements: Extraocular movements intact.      Pupils: Pupils are equal, round, and reactive to light.   Cardiovascular:      Rate and Rhythm: Normal rate and regular rhythm.      Heart sounds: No murmur heard.  Pulmonary:      Effort: Pulmonary effort is normal.      Breath sounds: Normal breath sounds.   Abdominal:      General: Bowel sounds are normal.      Palpations: Abdomen is soft. There is no mass.      Tenderness: There is no abdominal tenderness. There is no rebound.   Musculoskeletal:         General: No swelling, tenderness, deformity or signs of injury.      Right lower leg: No edema.      Left lower leg: No edema.   Skin:     Coloration: Skin is not jaundiced.      Findings: No bruising, lesion or rash.   Neurological:      Mental Status: He is alert and oriented to person, place, and time.      Cranial Nerves: No cranial nerve deficit.      Motor: No weakness.      Gait: Gait normal.   Psychiatric:         Mood and Affect: Mood normal.         Performance Status:  Symptomatic; fully ambulatory    Assessment/Plan      #ENMZL  -Based on current data of CT and MRI, there are bone and paraspinal tissue involvement. The exact stage is not defined yet, and the data will be needed to determine best course of treatment. If the stage is limited, potentially he could received XRT; if it is advanced, there likely  he will need multi-agent chemo. The clinical course suggest the disease burden may be extensive.  -Chemo may be R-CHOP, R-stacey, with rituximab maintenance. Details will be discussed once the plan is finalized. Patient is reassured that treatment will be initiated in a timely manner.   -discussed that MZL is typically a low grade b cell lymphoma. Current ki67 level of 10% is consistent with the assessment. However, MZL may also transform to high grade lymphoma. PET/CT will be best to evaluate the possibility.     Plan:  RTC 1wk  Will need PET/CT, MRI whole spine, labs including hepatitis B, ECHO.     Time spent: 65min, >50% on counseling and care coordination.                Jeff Peters MD PhD

## 2023-11-25 NOTE — PROGRESS NOTES
"Patient ID: Dann Ni is a 48 y.o. male.  Referring Physician: Jeff Peters MD PhD  [address] [place] [address]  Primary Care Provider: DO Leela Watson    The patient was in good health until July 2023. He developed back pain which did not respond to NSAID or steroid. By late 9/2023 he lost sensation below the diaphragm and were unable to walk. On 9/30 he had CT scan showing a 4.1x1.8x1cm paraspinal mass at the T7 level. MRI showed abnormal enhancing signals at the C5, T2-8 levels.  On 10/2/2023 he had a debulking surgery which later showed evidence of lymphoma. In the meantime he has improved sensation and is able to walk with a cane.     Since the prior visit he had more tests including PET/CT.   Today the patient came to discuss management of lymphoma.     ROS: positive for the following: pain the in upper back, improving, controlled with tylenol; lost 5lbs since surgery; weakness in the legs, improving; gait slow and limping, improving. Denies: fever, night sweating, urine/fecal incontinence or retention.         Review of Systems   All other systems reviewed and are negative.       Objective   BSA: 2.28 meters squared  /87   Pulse 98   Temp 36.4 °C (97.5 °F) (Skin)   Resp 17   Ht (S) 1.719 m (5' 7.68\")   Wt 109 kg (240 lb 12.8 oz)   SpO2 95%   BMI 36.96 kg/m²     Family History   Problem Relation Name Age of Onset    Hip dysplasia Mother [place]     Arthritis Mother [place]     Other (htn) Father      Prostatitis Father      Lung cancer Maternal Grandmother      Kidney cancer Maternal Grandmother      Lung cancer Maternal Grandfather      Kidney cancer Maternal Grandfather      Dementia Other      Cancer Other      Dementia Other       Oncology History   Marginal zone lymphoma (CMS/HCC)   11/17/2023 Initial Diagnosis    Marginal zone lymphoma (CMS/HCC)     11/30/2023 -  Chemotherapy    Bendamustine + RiTUXimab, 28 Day Cycles         Dann Ni  reports that " he has never smoked. He has never used smokeless tobacco.  He  reports that he does not currently use alcohol.  He  reports no history of drug use.    EXAM: No LAD. No splenomegaly. No skin lesions. Muscle power preserved in the UE but reduced in LLE; walk with a cane in slow pace; upper back surgical scar healed.  Other details below.  Physical Exam  Constitutional:       General: He is not in acute distress.     Appearance: He is not toxic-appearing.   HENT:      Head: Normocephalic.      Nose: Nose normal.      Mouth/Throat:      Mouth: Mucous membranes are moist.   Eyes:      Extraocular Movements: Extraocular movements intact.      Pupils: Pupils are equal, round, and reactive to light.   Cardiovascular:      Rate and Rhythm: Normal rate and regular rhythm.      Heart sounds: No murmur heard.  Pulmonary:      Effort: Pulmonary effort is normal.      Breath sounds: Normal breath sounds.   Abdominal:      General: Bowel sounds are normal.      Palpations: Abdomen is soft. There is no mass.      Tenderness: There is no abdominal tenderness. There is no rebound.   Musculoskeletal:         General: No swelling, tenderness, deformity or signs of injury.      Right lower leg: No edema.      Left lower leg: No edema.   Skin:     Coloration: Skin is not jaundiced.      Findings: No bruising, lesion or rash.   Neurological:      Mental Status: He is alert and oriented to person, place, and time.      Cranial Nerves: No cranial nerve deficit.      Motor: No weakness.      Gait: Gait normal.   Psychiatric:         Mood and Affect: Mood normal.         Performance Status:  Symptomatic; fully ambulatory    Assessment/Plan      #EMZL  -Reviewed PET/CT and MRI with the patient and wife. The results are consistent with stage IV disease, involving LN above and below the diaphragm, extensively axial and appendicular skeleton including bilateral scapulas, right clavicular head, T5 vertebral body, bilateral iliac bones, left  ischium, and left femur without definite anatomic correlate (max SUV 7.6).  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   -Current data support treatment using multi-agent chemo. Bendamustine and rituximab have been approved and widely used, found to be safe and effective.   -Patient was disappointed to learn that EMZL such as his may not be curable. Was relieved to know that the treatment can bring multiple years of remission, improvement of strength / quality of life / work experience.   -Discussed the Aes of r-anum, which are numerous, including SAEs such as infection, or even death. Most AE and DUSTIN however can be resolved with effective treatments. He consented for R-anum.   -Due to the location of the disease and bone marrow uptake, he may be at high risk of CNS involvement / relapse. Will also do head CT and intrathecal treatment, timing likely after 2 cycles of R-ANUM. He consented for IT Methotrexate and cytarabine.   -In addition, consolidation using XRT will be discussed in the future.         Plan:  -Consented for R-Anum, and IT methotrexate and cytarabine.   -R-ANUM c1d1 on 11/30. With GCSF support.   -RTC 1wk after Chemo    Time spent: 45min, >50% on counseling and care coordination.          Jeff Peters MD PhD

## 2023-11-27 ENCOUNTER — TREATMENT (OUTPATIENT)
Dept: PHYSICAL THERAPY | Facility: CLINIC | Age: 48
End: 2023-11-27
Payer: COMMERCIAL

## 2023-11-27 ENCOUNTER — TELEPHONE (OUTPATIENT)
Dept: ADMISSION | Facility: HOSPITAL | Age: 48
End: 2023-11-27
Payer: COMMERCIAL

## 2023-11-27 DIAGNOSIS — D49.2 THORACIC SPINE TUMOR: Primary | ICD-10-CM

## 2023-11-27 PROCEDURE — 97110 THERAPEUTIC EXERCISES: CPT | Mod: GP | Performed by: PHYSICAL THERAPIST

## 2023-11-27 ASSESSMENT — PAIN SCALES - GENERAL: PAINLEVEL_OUTOF10: 0 - NO PAIN

## 2023-11-27 ASSESSMENT — PAIN - FUNCTIONAL ASSESSMENT: PAIN_FUNCTIONAL_ASSESSMENT: 0-10

## 2023-11-27 NOTE — PROGRESS NOTES
PHYSICAL THERAPY TREATMENT    Patient name:  Dann Ni    MRN:  [id number]    :  1975    Today's Date:  23    Referral by:  Referred By: Dann Avila PA-C    Diagnoses:  Diagnosis and Precautions: Diagnosis D49.2 (ICD-10-CM) - Thoracic spine tumor    Assessment/Plan:  Therapeutic exercise performed in order to improve scapular/core strength/endurance.  Patient requires increased tactile/verbal cues with exercise in order to reduce thoracic spine stress/strain during the particular exercise performed.  Patient challenged with exercises performed in clinic secondary to scapular/core fatigue.     PT Assessment  PT Assessment Results: Decreased strength, Decreased range of motion, Decreased endurance, Impaired balance, Decreased mobility, Pain  Rehab Prognosis: Good  Evaluation/Treatment Tolerance: Patient limited by fatigue  Plan:  Progress as tolerated.    General Visit Information  PT  Visit  PT Received On: 23    General   General  Reason for Referral: PT Evaluate and Treat  Referred By: Dann Avila PA-C  General Comment: Diagnosis D49.2 (ICD-10-CM) - Thoracic spine tumor (Laminectomy Thoracic with Excision Lesion 10/2/2023, Dr. Marin)    Insurance Reviewed  Name of insurance:  [employer]  Visit #:   7  60 visits per calendar year  BACK SX 10/2; 500 DED IS MET 1000 FAM DED 2000 OOP IS MET 4000 FAM, OOP 0% COINSUR $20 COPAY (DO NOT COLLECT SINCE DED + OOP ALREADY MET FOR ) 60V CY (NONE USED FOR ) PER [email or web address] [id number] // Marilynn confirmed 23 5:41pm     Subjective:  Patient reports that he is eager to get back to work.    Precautions:  Precautions  STEADI Fall Risk Score (The score of 4 or more indicates an increased risk of falling): 8  LE Weight Bearing Status: Weight Bearing as Tolerated  Medical Precautions:  (Laminectomy Thoracic with Excision Lesion 10/2/2023, Dr. Marin, Tumor thoracic spine, Fractured L wrist as a kid, asthma, cancer)  Post-Surgical Precautions: Spinal  "precautions    Pain:  Pain Assessment  Pain Assessment: 0-10  Pain Score: 0 - No pain  Pain Type: Acute pain  Pain Location: Back  Pain Orientation: Posterior    Treatments  Therapeutic Exercise  Therapeutic Exercise Performed: Yes  Therapeutic Exercise Activity 1: UBE 4' FWD/4' BWD 2.0 resistance, sitting  Therapeutic Exercise Activity 2: Standing TB shoulder extension red 3 sets 10 reps  Therapeutic Exercise Activity 3: TB rows red 3 sets, 10 reps in standing  Therapeutic Exercise Activity 4: TB Palloff press red 3 sets, 10 reps R and L in standing  Therapeutic Exercise Activity 5: TB shoulder horizontal abduction red 3 sets, 10 reps R and L in standing  Therapeutic Exercise Activity 6: TB wall clocks x 3 directions x 10 each R and L red TB in standing  Therapeutic Exercise Activity 7: Standing HR/TR x 30 reps each  Therapeutic Exercise Activity 8: Standing marching 2 sets, 10 reps  Therapeutic Exercise Activity 9: Mini-Squats 2 sets, 10 reps  Therapeutic Exercise Activity 10: standing hip extension/hip flexion/hip abduction 2 sets x 10 reps each R and L  Therapeutic Exercise Activity 11: Tandem stance 10\"x5 R and L  Therapeutic Exercise Activity 12: R stance with L foot toe touch 10\"x5 (R and L)  Therapeutic Exercise Activity 13: prone I/Y/T's 0 lb., 2 sets, 10 reps each R and L  Therapeutic Exercise Activity 14: prone rows/shoulder extension 2 lbs., 2 sets, 10 reps R and L    Treatment  Time in clinic started at  4:45pm  Time in clinic ended at 5:31pm  Total time in clinic is .    46 minutes  Total timed code time is    38 minutes    Treatment Performed Today:.   Therapeutic Exercise x 3 units  "

## 2023-11-27 NOTE — TELEPHONE ENCOUNTER
Pt called nurse triage line and said he received a text to call and schedule with a pharmacist for an appointment. Scheduling told them they couldn't schedule since not sure what this was regarding. I see an active scheduling order in here for this appointment to be scheduled. He said his first infusion is scheduled on 11/30/23 and if this needs to be done prior to infusion it would need to be scheduled as a telemedicine visit. He also wanted to have some clarification on what is being done on 12/5/23 as far as the infusion. He is scheduled for 240 min but it says count check in notes. Please clarify what is getting done at this visit? Messaged team, pharmacist, scheduling team.

## 2023-11-27 NOTE — TELEPHONE ENCOUNTER
I called patient back to let him know about 12/5/23 appointment and labs are done and depending on labs will decide how long he will be at visit. I told him the pharmacist would call him tomorrow to go over plan with patient.  Pt agreed to plan.

## 2023-11-28 NOTE — PROGRESS NOTES
ONCOLOGY PHARMACY CHEMOTHERAPY EDUCATION NOTE     Diagnosis: Marginal Zone Lymphoma    Prescriber: Dr. Peters    Current Outpatient Medications on File Prior to Visit   Medication Sig Dispense Refill    acetaminophen (Tylenol) 325 mg tablet Take as needed while having post operative pain      albuterol 90 mcg/actuation inhaler Inhale 2 puffs every 6 hours if needed for shortness of breath or wheezing.      azithromycin (Zithromax) 250 mg tablet Take by mouth.      azithromycin (Zithromax) 500 mg tablet Take by mouth.      benzonatate (Tessalon) 100 mg capsule Take by mouth.      budesonide-formoteroL (Symbicort) 160-4.5 mcg/actuation inhaler Inhale 2 puffs 2 times a day.      budesonide-formoteroL (Symbicort) 80-4.5 mcg/actuation inhaler Inhale 2 puffs 2 times a day.      cefdinir (Omnicef) 300 mg capsule Take by mouth.      cyclobenzaprine (Flexeril) 10 mg tablet Take 1 tablet (10 mg) by mouth 3 times a day as needed for muscle spasms. 30 tablet 0    ibuprofen 200 mg tablet Take 1 tablet (200 mg) by mouth every 6 hours if needed (pain).      Lactobacillus acidophilus (PROBIOTIC ORAL) Take by mouth.      ondansetron (Zofran) 8 mg tablet Take 1 tablet (8 mg) by mouth every 8 hours if needed for nausea or vomiting. 30 tablet 5    predniSONE (Deltasone) 10 mg tablet Take by mouth.      prochlorperazine (Compazine) 10 mg tablet Take 1 tablet (10 mg) by mouth every 6 hours if needed for nausea or vomiting. 30 tablet 5    zolpidem (Ambien) 10 mg tablet Take by mouth.      [DISCONTINUED] dexAMETHasone (Decadron) 2 mg tablet Take 2 tablets (4 mg) by mouth every 6 hours for 2 days, THEN 2 tablets (4 mg) every 8 hours for 2 days, THEN 1 tablet (2 mg) every 8 hours for 2 days, THEN 1 tablet (2 mg) every 12 hours for 2 days, THEN 1 tablet (2 mg) once daily for 2 days. 40 tablet 0    [DISCONTINUED] pantoprazole (ProtoNix) 40 mg EC tablet Do not crush, chew, or split.  Take while on dexamethasone taper to prevent GI Upset Do not  start before October 7, 2023. (Patient not taking: Reported on 11/10/2023) 10 tablet 0     No current facility-administered medications on file prior to visit.     Treatment Plan       None            Note:      Pharmacist consulted to provide education on Bendamustine and Rituximab (BR)  chemotherapy via Telemedicine visit.      1.  Chemotherapy Education: The chemotherapy regimen BR was discussed with patient including treatment schedule, potential side effects, and management of side effects.  Potential side effects include but are not limited to: chemotherapy side effects: neutropenia, infection risk, anemia, fatigue, weakness, low energy, thrombocytopenia, bleeding/bruising, n/v, diarrhea, infusion reactions, and tumor lysis syndrome.  Management techniques of these side effects were discussed, such as blood count checks, prophylactic anti-infective medicines, temperature checks, blood product transfusions, electrolyte monitoring, antiemetic use, loperamide use with max dose of 8 tabs per 24 hours, staying hydrated if having diarrhea, and hydration for TLS prevention.  Written materials were provided including drug-specific side effect handouts.  Prescriptions for supportive care/prophylaxis (if applicable) medications were also discussed in terms of use and potential side effects.      2.  Home Medications: Reviewed patients documented home medications. Drug interactions identified between chemotherapy and patient’s home medications: none*.  All questions were answered.  patient verbalized understanding of the plan of care and management of side effects.  Spent approximately 45minutes coordinating care and patient interaction.  Will follow-up as necessary.        Thank you for consulting [facility] Oncology Pharmacy Team.   Please don’t hesitate to reach out for further questions regarding this patient.     Jean Jama, [facility], PharmD

## 2023-11-29 ENCOUNTER — TREATMENT (OUTPATIENT)
Dept: PHYSICAL THERAPY | Facility: CLINIC | Age: 48
End: 2023-11-29
Payer: COMMERCIAL

## 2023-11-29 ENCOUNTER — TELEPHONE (OUTPATIENT)
Dept: ADMISSION | Facility: HOSPITAL | Age: 48
End: 2023-11-29
Payer: COMMERCIAL

## 2023-11-29 DIAGNOSIS — D49.2 THORACIC SPINE TUMOR: Primary | ICD-10-CM

## 2023-11-29 LAB
ALBUMIN: 4.3 G/DL (ref 3.4–5)
ALPHA 1 GLOBULIN: 0.4 G/DL (ref 0.2–0.6)
ALPHA 2 GLOBULIN: 0.8 G/DL (ref 0.4–1.1)
BETA GLOBULIN: 0.9 G/DL (ref 0.5–1.2)
GAMMA GLOBULIN: 0.9 G/DL (ref 0.5–1.4)
IMMUNOFIXATION COMMENT: NORMAL
PATH REVIEW - SERUM IMMUNOFIXATION: NORMAL
PATH REVIEW-SERUM PROTEIN ELECTROPHORESIS: NORMAL
PROTEIN ELECTROPHORESIS COMMENT: NORMAL

## 2023-11-29 PROCEDURE — 97110 THERAPEUTIC EXERCISES: CPT | Mod: GP | Performed by: PHYSICAL THERAPIST

## 2023-11-29 ASSESSMENT — PAIN - FUNCTIONAL ASSESSMENT: PAIN_FUNCTIONAL_ASSESSMENT: 0-10

## 2023-11-29 ASSESSMENT — PAIN SCALES - GENERAL: PAINLEVEL_OUTOF10: 1

## 2023-11-29 NOTE — TELEPHONE ENCOUNTER
Pt will be at [facility] infusion on 11/30 and would like to hand deliver intermittent FMLA paperwork   MP CARRERA will see pt in infusion and  paperwork to deliver to oncology team.

## 2023-11-29 NOTE — PROGRESS NOTES
PHYSICAL THERAPY TREATMENT    Patient name:  Dann Ni    MRN:  [id number]    :  1975    Today's Date:  23    Referral by:  Referred By: Dann Avila PA-C    Diagnoses:  Diagnosis and Precautions: Diagnosis D49.2 (ICD-10-CM) - Thoracic spine tumor    Assessment/Plan:  Therapeutic exercise performed in order to improve scapular/core strength/endurance.  Patient requires increased tactile/verbal cues with exercise in order to reduce thoracic spine stress/strain during the particular exercise performed.  Patient challenged with exercises performed in clinic secondary to scapular/core fatigue.     PT Assessment  PT Assessment Results: Decreased strength, Decreased range of motion, Decreased endurance, Impaired balance, Decreased mobility  Rehab Prognosis: Good  Evaluation/Treatment Tolerance: Patient limited by fatigue  Plan:  Progress as tolerated.    General Visit Information  PT  Visit  PT Received On: 23     General  Reason for Referral: PT Evaluate and Treat  Referred By: Dann Avila PA-C  General Comment: Diagnosis D49.2 (ICD-10-CM) - Thoracic spine tumor (Laminectomy Thoracic with Excision Lesion 10/2/2023, Dr. Marin)    Insurance Reviewed  Name of insurance:  [employer]  Visit #:     60 visits per calendar year  BACK SX 10/2; 500 DED IS MET 1000 FAM DED 2000 OOP IS MET 4000 FAM, OOP 0% COINSUR $20 COPAY (DO NOT COLLECT SINCE DED + OOP ALREADY MET FOR ) 60V CY (NONE USED FOR ) PER [email or web address] [id number] // Marilynn confirmed 23 5:41pm     Subjective:  Patient reports that has an oncology treatment tomorrow.    Precautions  STEADI Fall Risk Score (The score of 4 or more indicates an increased risk of falling): 8  LE Weight Bearing Status: Weight Bearing as Tolerated  Medical Precautions:  (Laminectomy Thoracic with Excision Lesion 10/2/2023, Dr. Marin, Tumor thoracic spine, Fractured L wrist as a kid, asthma, cancer)  Post-Surgical Precautions: Spinal precautions (20 lb. lifting  "restriction)    Pain Assessment  Pain Assessment: 0-10  Pain Score: 1  Pain Type: Acute pain  Pain Location: Back  Pain Orientation: Posterior    Treatments  Therapeutic Exercise  Therapeutic Exercise Performed: Yes  Therapeutic Exercise Activity 1: UBE 4' FWD/4' BWD 3.0 resistance, sitting  Therapeutic Exercise Activity 2: Lat stretch 30\"x4 R and L  Therapeutic Exercise Activity 3: Standing TB shoulder extension blue 3 sets 10 reps  Therapeutic Exercise Activity 4: TB Palloff press blue 3 sets, 10 reps R and L in standing  Therapeutic Exercise Activity 5: TB rows blue 3 sets, 10 reps in standing  Therapeutic Exercise Activity 6: TB wall clocks x 3 directions x 10 each R and L red TB in standing  Therapeutic Exercise Activity 7: Standing HR/TR x 30 reps each  Therapeutic Exercise Activity 8: Standing marching 2 sets, 10 reps  Therapeutic Exercise Activity 9: Mini-Squats 2 sets, 10 reps  Therapeutic Exercise Activity 10: standing hip extension/hip flexion/hip abduction 2 sets x 10 reps each R and L  Therapeutic Exercise Activity 11: supine bridges x 30 reps  Therapeutic Exercise Activity 12: supine posterior pelvic tilts x 20 reps  Therapeutic Exercise Activity 13: supine marching x 20 reps  Therapeutic Exercise Activity 14: supine heel walk x 10 reps    Treatment  Time in clinic started at 4:58pm  Time in clinic ended at 5:38pm  Total time in clinic is .    40 minutes  Total timed code time is     38 minutes    Treatment Performed Today:.   Therapeutic Exercise x 3 units  "

## 2023-11-30 ENCOUNTER — INFUSION (OUTPATIENT)
Dept: HEMATOLOGY/ONCOLOGY | Facility: HOSPITAL | Age: 48
End: 2023-11-30
Payer: COMMERCIAL

## 2023-11-30 ENCOUNTER — SOCIAL WORK (OUTPATIENT)
Dept: CASE MANAGEMENT | Facility: HOSPITAL | Age: 48
End: 2023-11-30

## 2023-11-30 ENCOUNTER — APPOINTMENT (OUTPATIENT)
Dept: NEUROSURGERY | Facility: CLINIC | Age: 48
End: 2023-11-30
Payer: COMMERCIAL

## 2023-11-30 VITALS
OXYGEN SATURATION: 97 % | DIASTOLIC BLOOD PRESSURE: 77 MMHG | WEIGHT: 245.37 LBS | RESPIRATION RATE: 18 BRPM | TEMPERATURE: 97.7 F | HEART RATE: 115 BPM | BODY MASS INDEX: 37.67 KG/M2 | SYSTOLIC BLOOD PRESSURE: 120 MMHG

## 2023-11-30 DIAGNOSIS — C85.80 MARGINAL ZONE LYMPHOMA (MULTI): ICD-10-CM

## 2023-11-30 PROCEDURE — 96375 TX/PRO/DX INJ NEW DRUG ADDON: CPT | Mod: INF

## 2023-11-30 PROCEDURE — 96415 CHEMO IV INFUSION ADDL HR: CPT

## 2023-11-30 PROCEDURE — 2500000004 HC RX 250 GENERAL PHARMACY W/ HCPCS (ALT 636 FOR OP/ED): Performed by: INTERNAL MEDICINE

## 2023-11-30 PROCEDURE — 2500000004 HC RX 250 GENERAL PHARMACY W/ HCPCS (ALT 636 FOR OP/ED): Mod: JW | Performed by: INTERNAL MEDICINE

## 2023-11-30 PROCEDURE — 2500000001 HC RX 250 WO HCPCS SELF ADMINISTERED DRUGS (ALT 637 FOR MEDICARE OP): Performed by: INTERNAL MEDICINE

## 2023-11-30 PROCEDURE — 96411 CHEMO IV PUSH ADDL DRUG: CPT

## 2023-11-30 PROCEDURE — 96413 CHEMO IV INFUSION 1 HR: CPT

## 2023-11-30 RX ORDER — FAMOTIDINE 10 MG/ML
20 INJECTION INTRAVENOUS ONCE AS NEEDED
Status: DISCONTINUED | OUTPATIENT
Start: 2023-11-30 | End: 2023-11-30 | Stop reason: HOSPADM

## 2023-11-30 RX ORDER — ALBUTEROL SULFATE 0.83 MG/ML
3 SOLUTION RESPIRATORY (INHALATION) AS NEEDED
Status: DISCONTINUED | OUTPATIENT
Start: 2023-11-30 | End: 2023-11-30 | Stop reason: HOSPADM

## 2023-11-30 RX ORDER — ACETAMINOPHEN 325 MG/1
650 TABLET ORAL ONCE
Status: COMPLETED | OUTPATIENT
Start: 2023-11-30 | End: 2023-11-30

## 2023-11-30 RX ORDER — HEPARIN SODIUM,PORCINE/PF 10 UNIT/ML
50 SYRINGE (ML) INTRAVENOUS AS NEEDED
Status: CANCELLED | OUTPATIENT
Start: 2023-11-30

## 2023-11-30 RX ORDER — DIPHENHYDRAMINE HCL 50 MG
50 CAPSULE ORAL ONCE
Status: COMPLETED | OUTPATIENT
Start: 2023-11-30 | End: 2023-11-30

## 2023-11-30 RX ORDER — DIPHENHYDRAMINE HYDROCHLORIDE 50 MG/ML
50 INJECTION INTRAMUSCULAR; INTRAVENOUS AS NEEDED
Status: DISCONTINUED | OUTPATIENT
Start: 2023-11-30 | End: 2023-11-30 | Stop reason: HOSPADM

## 2023-11-30 RX ORDER — HEPARIN 100 UNIT/ML
500 SYRINGE INTRAVENOUS AS NEEDED
Status: CANCELLED | OUTPATIENT
Start: 2023-11-30

## 2023-11-30 RX ORDER — PALONOSETRON 0.05 MG/ML
0.25 INJECTION, SOLUTION INTRAVENOUS ONCE
Status: COMPLETED | OUTPATIENT
Start: 2023-11-30 | End: 2023-11-30

## 2023-11-30 RX ORDER — EPINEPHRINE 0.3 MG/.3ML
0.3 INJECTION SUBCUTANEOUS EVERY 5 MIN PRN
Status: DISCONTINUED | OUTPATIENT
Start: 2023-11-30 | End: 2023-11-30 | Stop reason: HOSPADM

## 2023-11-30 RX ORDER — PROCHLORPERAZINE EDISYLATE 5 MG/ML
10 INJECTION INTRAMUSCULAR; INTRAVENOUS EVERY 6 HOURS PRN
Status: DISCONTINUED | OUTPATIENT
Start: 2023-11-30 | End: 2023-11-30 | Stop reason: HOSPADM

## 2023-11-30 RX ORDER — PROCHLORPERAZINE MALEATE 10 MG
10 TABLET ORAL EVERY 6 HOURS PRN
Status: DISCONTINUED | OUTPATIENT
Start: 2023-11-30 | End: 2023-11-30 | Stop reason: HOSPADM

## 2023-11-30 RX ADMIN — METHYLPREDNISOLONE SODIUM SUCCINATE 40 MG: 40 INJECTION, POWDER, FOR SOLUTION INTRAMUSCULAR; INTRAVENOUS at 10:49

## 2023-11-30 RX ADMIN — SODIUM CHLORIDE 855 MG: 9 INJECTION, SOLUTION INTRAVENOUS at 09:48

## 2023-11-30 RX ADMIN — DEXAMETHASONE SODIUM PHOSPHATE 12 MG: 10 INJECTION, SOLUTION INTRAMUSCULAR; INTRAVENOUS at 08:56

## 2023-11-30 RX ADMIN — PALONOSETRON HYDROCHLORIDE 250 MCG: 0.25 INJECTION INTRAVENOUS at 08:56

## 2023-11-30 RX ADMIN — DIPHENHYDRAMINE HYDROCHLORIDE 50 MG: 50 CAPSULE ORAL at 08:56

## 2023-11-30 RX ADMIN — BENDAMUSTINE HYDROCHLORIDE 205 MG: 25 INJECTION, SOLUTION INTRAVENOUS at 09:29

## 2023-11-30 RX ADMIN — ACETAMINOPHEN 650 MG: 325 TABLET ORAL at 08:56

## 2023-11-30 ASSESSMENT — PAIN SCALES - GENERAL: PAINLEVEL: 1

## 2023-11-30 NOTE — SIGNIFICANT EVENT
11/30/23 0844   Prechemo Checklist   Has the patient been in the hospital, ED, or urgent care since last date of service No   Chemo/Immuno Consent Signed Yes   Protocol/Indications Verified Yes   Confirmed to previous date/time of medication N/A   Compared to previous dose N/A   All medications are dated accurately Yes   Pregnancy Test Negative Not applicable   Parameters Met Yes   BSA/Weight-Height Verified Yes   Dose Calculations Verified Yes

## 2023-11-30 NOTE — PROGRESS NOTES
Adverse Event Note     Name:Dann Ni  : 1975  MRN: [id number]      Adverse Event:  Medication: Rituximab  Administered Date/Time: 2023 0948  Reactions/Symptoms Started Time: 1040   Symptoms: (check all that apply)   [] Back Pain   [] Erythema Face     [] Hypotension     [] Rash/Rigors [x] Other - chest warmth   [] Bleeding    [] Erythema Hands  [] Itching  [] Swelling/Edema [] Unknown   [] Chest Pain [] Hives/Urticaria     [] Low platelet Ct  [] Syncope   [] Cytopenia  [] Hypertension       [] Neutropenia    Severity: Mild   Provider Notified: Yes   Medications Given(Add all medications to the chart via , or treatment plan)   [] Acetaminophen-Tylenol       [] Famotidine-Pepcid  [] Nitroglycerine-NTG   [] Albuterol                               [] Hydrocortisone       [] Ondansetron-Zofran   [] Diphenhydramine-Benadryl  [] Lorazepam-Ativan  [] Naloxone-Narcan   [] Epinephrine-Epi                     [x] Methylprednisone   Additional Details/ Comments:   VSS.  Fluids given for 15 minutes and infusion restarted at 50% previous rate.  No further issues.

## 2023-11-30 NOTE — PROGRESS NOTES
SW received consult from LORE Roberts RN regarding FMLA paperwork for pt. SW met with pt during infusion visit to follow up. Pt expressed needing paperwork completed and faxed to [phone]. SW completed some of the requested paperwork and emailed the rest to provider and nurse. SW to follow up as needed.     Sharmin CARRERA  11/30/23

## 2023-12-01 ENCOUNTER — INFUSION (OUTPATIENT)
Dept: HEMATOLOGY/ONCOLOGY | Facility: HOSPITAL | Age: 48
End: 2023-12-01
Payer: COMMERCIAL

## 2023-12-01 VITALS
SYSTOLIC BLOOD PRESSURE: 130 MMHG | TEMPERATURE: 97 F | WEIGHT: 244.71 LBS | OXYGEN SATURATION: 98 % | DIASTOLIC BLOOD PRESSURE: 84 MMHG | HEART RATE: 101 BPM | RESPIRATION RATE: 18 BRPM | BODY MASS INDEX: 37.56 KG/M2

## 2023-12-01 DIAGNOSIS — C85.80 MARGINAL ZONE LYMPHOMA (MULTI): ICD-10-CM

## 2023-12-01 PROCEDURE — 96372 THER/PROPH/DIAG INJ SC/IM: CPT | Mod: 59

## 2023-12-01 PROCEDURE — 2500000004 HC RX 250 GENERAL PHARMACY W/ HCPCS (ALT 636 FOR OP/ED): Mod: JW | Performed by: INTERNAL MEDICINE

## 2023-12-01 PROCEDURE — 96409 CHEMO IV PUSH SNGL DRUG: CPT

## 2023-12-01 PROCEDURE — 96375 TX/PRO/DX INJ NEW DRUG ADDON: CPT | Mod: INF

## 2023-12-01 PROCEDURE — 2500000004 HC RX 250 GENERAL PHARMACY W/ HCPCS (ALT 636 FOR OP/ED): Performed by: INTERNAL MEDICINE

## 2023-12-01 RX ORDER — EPINEPHRINE 0.3 MG/.3ML
0.3 INJECTION SUBCUTANEOUS EVERY 5 MIN PRN
Status: DISCONTINUED | OUTPATIENT
Start: 2023-12-01 | End: 2023-12-01 | Stop reason: HOSPADM

## 2023-12-01 RX ORDER — ALBUTEROL SULFATE 0.83 MG/ML
3 SOLUTION RESPIRATORY (INHALATION) AS NEEDED
Status: DISCONTINUED | OUTPATIENT
Start: 2023-12-01 | End: 2023-12-01 | Stop reason: HOSPADM

## 2023-12-01 RX ORDER — PROCHLORPERAZINE EDISYLATE 5 MG/ML
10 INJECTION INTRAMUSCULAR; INTRAVENOUS EVERY 6 HOURS PRN
Status: DISCONTINUED | OUTPATIENT
Start: 2023-12-01 | End: 2023-12-01 | Stop reason: HOSPADM

## 2023-12-01 RX ORDER — FAMOTIDINE 10 MG/ML
20 INJECTION INTRAVENOUS ONCE AS NEEDED
Status: DISCONTINUED | OUTPATIENT
Start: 2023-12-01 | End: 2023-12-01 | Stop reason: HOSPADM

## 2023-12-01 RX ORDER — PROCHLORPERAZINE MALEATE 10 MG
10 TABLET ORAL EVERY 6 HOURS PRN
Status: DISCONTINUED | OUTPATIENT
Start: 2023-12-01 | End: 2023-12-01 | Stop reason: HOSPADM

## 2023-12-01 RX ORDER — DIPHENHYDRAMINE HYDROCHLORIDE 50 MG/ML
50 INJECTION INTRAMUSCULAR; INTRAVENOUS AS NEEDED
Status: DISCONTINUED | OUTPATIENT
Start: 2023-12-01 | End: 2023-12-01 | Stop reason: HOSPADM

## 2023-12-01 RX ADMIN — BENDAMUSTINE HYDROCHLORIDE 205 MG: 25 INJECTION, SOLUTION INTRAVENOUS at 12:13

## 2023-12-01 RX ADMIN — DEXAMETHASONE SODIUM PHOSPHATE 12 MG: 10 INJECTION, SOLUTION INTRAMUSCULAR; INTRAVENOUS at 11:40

## 2023-12-01 RX ADMIN — PEGFILGRASTIM 6 MG: KIT SUBCUTANEOUS at 13:06

## 2023-12-01 ASSESSMENT — PAIN SCALES - GENERAL: PAINLEVEL: 2

## 2023-12-02 ENCOUNTER — APPOINTMENT (OUTPATIENT)
Dept: HEMATOLOGY/ONCOLOGY | Facility: HOSPITAL | Age: 48
End: 2023-12-02
Payer: COMMERCIAL

## 2023-12-04 ENCOUNTER — APPOINTMENT (OUTPATIENT)
Dept: PHYSICAL THERAPY | Facility: CLINIC | Age: 48
End: 2023-12-04
Payer: COMMERCIAL

## 2023-12-04 NOTE — PROGRESS NOTES
"PHYSICAL THERAPY TREATMENT    Patient name:  Dann Ni    MRN:  [id number]    :  1975    Today's Date:  23    Referral by:       Diagnoses:         Insurance Reviewed  Name of insurance:  [employer]  Visit #:     60 visits per calendar year  BACK SX 10/2; 500 DED IS MET 1000 FAM DED 2000 OOP IS MET 4000 FAM, OOP 0% COINSUR $20 COPAY (DO NOT COLLECT SINCE DED + OOP ALREADY MET FOR ) 60V CY (NONE USED FOR ) PER [email or web address] [id number] // Marilynn confirmed 23 5:41pm     Assessment:  Therapeutic exercise performed in order to improve scapular/core strength/endurance.  Patient requires increased tactile/verbal cues with exercise in order to reduce thoracic spine stress/strain during the particular exercise performed.  Patient challenged with exercises performed in clinic secondary to scapular/core fatigue.        Plan:  Progress as tolerated.    Subjective:  Patient reports that has an oncology treatment tomorrow.        Treatments   Therapeutic Exercise Activity 1: UBE 4' FWD/4' BWD 3.0 resistance, sitting  Therapeutic Exercise Activity 2: Lat stretch 30\"x4 R and L  Therapeutic Exercise Activity 3: Standing TB shoulder extension blue 3 sets 10 reps  Therapeutic Exercise Activity 4: TB Palloff press blue 3 sets, 10 reps R and L in standing  Therapeutic Exercise Activity 5: TB rows blue 3 sets, 10 reps in standing  Therapeutic Exercise Activity 6: TB wall clocks x 3 directions x 10 each R and L red TB in standing  Therapeutic Exercise Activity 7: Standing HR/TR x 30 reps each  Therapeutic Exercise Activity 8: Standing marching 2 sets, 10 reps  Therapeutic Exercise Activity 9: Mini-Squats 2 sets, 10 reps  Therapeutic Exercise Activity 10: standing hip extension/hip flexion/hip abduction 2 sets x 10 reps each R and L  Therapeutic Exercise Activity 11: supine bridges x 30 reps  Therapeutic Exercise Activity 12: supine posterior pelvic tilts x 20 reps  Therapeutic Exercise Activity 13: supine " marching x 20 reps  Therapeutic Exercise Activity 14: supine heel walk x 10 reps

## 2023-12-05 ENCOUNTER — OFFICE VISIT (OUTPATIENT)
Dept: HEMATOLOGY/ONCOLOGY | Facility: HOSPITAL | Age: 48
End: 2023-12-05
Payer: COMMERCIAL

## 2023-12-05 ENCOUNTER — INFUSION (OUTPATIENT)
Dept: HEMATOLOGY/ONCOLOGY | Facility: HOSPITAL | Age: 48
End: 2023-12-05
Payer: COMMERCIAL

## 2023-12-05 VITALS
TEMPERATURE: 98.6 F | WEIGHT: 242.29 LBS | DIASTOLIC BLOOD PRESSURE: 90 MMHG | RESPIRATION RATE: 18 BRPM | BODY MASS INDEX: 37.19 KG/M2 | OXYGEN SATURATION: 100 % | SYSTOLIC BLOOD PRESSURE: 129 MMHG | HEART RATE: 110 BPM

## 2023-12-05 DIAGNOSIS — C85.80 MARGINAL ZONE LYMPHOMA (MULTI): Primary | ICD-10-CM

## 2023-12-05 DIAGNOSIS — D84.9 IMMUNOSUPPRESSED STATUS (MULTI): ICD-10-CM

## 2023-12-05 DIAGNOSIS — C85.80 MARGINAL ZONE LYMPHOMA (MULTI): ICD-10-CM

## 2023-12-05 LAB
ALBUMIN SERPL BCP-MCNC: 4.3 G/DL (ref 3.4–5)
ALP SERPL-CCNC: 183 U/L (ref 33–120)
ALT SERPL W P-5'-P-CCNC: 26 U/L (ref 10–52)
ANION GAP SERPL CALC-SCNC: 16 MMOL/L (ref 10–20)
AST SERPL W P-5'-P-CCNC: 23 U/L (ref 9–39)
BASOPHILS # BLD MANUAL: 0 X10*3/UL (ref 0–0.1)
BASOPHILS NFR BLD MANUAL: 0 %
BILIRUB SERPL-MCNC: 0.4 MG/DL (ref 0–1.2)
BUN SERPL-MCNC: 13 MG/DL (ref 6–23)
CALCIUM SERPL-MCNC: 9.6 MG/DL (ref 8.6–10.3)
CHLORIDE SERPL-SCNC: 99 MMOL/L (ref 98–107)
CO2 SERPL-SCNC: 28 MMOL/L (ref 21–32)
CREAT SERPL-MCNC: 1.05 MG/DL (ref 0.5–1.3)
DOHLE BOD BLD QL SMEAR: PRESENT
EOSINOPHIL # BLD MANUAL: 0.44 X10*3/UL (ref 0–0.7)
EOSINOPHIL NFR BLD MANUAL: 1 %
ERYTHROCYTE [DISTWIDTH] IN BLOOD BY AUTOMATED COUNT: 13.8 % (ref 11.5–14.5)
GFR SERPL CREATININE-BSD FRML MDRD: 88 ML/MIN/1.73M*2
GLUCOSE SERPL-MCNC: 204 MG/DL (ref 74–99)
HCT VFR BLD AUTO: 41.7 % (ref 41–52)
HGB BLD-MCNC: 14 G/DL (ref 13.5–17.5)
IMM GRANULOCYTES # BLD AUTO: 5.65 X10*3/UL (ref 0–0.7)
IMM GRANULOCYTES NFR BLD AUTO: 13 % (ref 0–0.9)
LDH SERPL L TO P-CCNC: 447 U/L (ref 84–246)
LYMPHOCYTES # BLD MANUAL: 0.44 X10*3/UL (ref 1.2–4.8)
LYMPHOCYTES NFR BLD MANUAL: 1 %
MCH RBC QN AUTO: 28.8 PG (ref 26–34)
MCHC RBC AUTO-ENTMCNC: 33.6 G/DL (ref 32–36)
MCV RBC AUTO: 86 FL (ref 80–100)
MONOCYTES # BLD MANUAL: 3.48 X10*3/UL (ref 0.1–1)
MONOCYTES NFR BLD MANUAL: 8 %
MYELOCYTES # BLD MANUAL: 0.44 X10*3/UL
MYELOCYTES NFR BLD MANUAL: 1 %
NEUTROPHILS # BLD MANUAL: 38.72 X10*3/UL (ref 1.2–7.7)
NEUTS BAND # BLD MANUAL: 0.87 X10*3/UL (ref 0–0.7)
NEUTS BAND NFR BLD MANUAL: 2 %
NEUTS SEG # BLD MANUAL: 37.85 X10*3/UL (ref 1.2–7)
NEUTS SEG NFR BLD MANUAL: 87 %
NRBC BLD-RTO: 0.2 /100 WBCS (ref 0–0)
PLATELET # BLD AUTO: 223 X10*3/UL (ref 150–450)
POLYCHROMASIA BLD QL SMEAR: ABNORMAL
POTASSIUM SERPL-SCNC: 3.9 MMOL/L (ref 3.5–5.3)
PROT SERPL-MCNC: 7.1 G/DL (ref 6.4–8.2)
RBC # BLD AUTO: 4.86 X10*6/UL (ref 4.5–5.9)
RBC MORPH BLD: ABNORMAL
SODIUM SERPL-SCNC: 139 MMOL/L (ref 136–145)
TOTAL CELLS COUNTED BLD: 100
URATE SERPL-MCNC: 7.4 MG/DL (ref 4–7.5)
WBC # BLD AUTO: 43.5 X10*3/UL (ref 4.4–11.3)

## 2023-12-05 PROCEDURE — 85007 BL SMEAR W/DIFF WBC COUNT: CPT

## 2023-12-05 PROCEDURE — 84550 ASSAY OF BLOOD/URIC ACID: CPT

## 2023-12-05 PROCEDURE — 83615 LACTATE (LD) (LDH) ENZYME: CPT

## 2023-12-05 PROCEDURE — 99215 OFFICE O/P EST HI 40 MIN: CPT | Mod: 25 | Performed by: PHYSICIAN ASSISTANT

## 2023-12-05 PROCEDURE — 85027 COMPLETE CBC AUTOMATED: CPT

## 2023-12-05 PROCEDURE — 36415 COLL VENOUS BLD VENIPUNCTURE: CPT

## 2023-12-05 PROCEDURE — 84075 ASSAY ALKALINE PHOSPHATASE: CPT

## 2023-12-05 ASSESSMENT — ENCOUNTER SYMPTOMS
DIARRHEA: 0
DEPRESSION: 0
LEG SWELLING: 0
HEMOPTYSIS: 0
BLOOD IN STOOL: 0
COUGH: 0
NUMBNESS: 0
DYSURIA: 0
WHEEZING: 0
SHORTNESS OF BREATH: 0
DIZZINESS: 0
MYALGIAS: 1
FEVER: 0
VOMITING: 0
CONSTIPATION: 0
OCCASIONAL FEELINGS OF UNSTEADINESS: 1
ABDOMINAL PAIN: 0
HEADACHES: 0
PALPITATIONS: 0
HEMATURIA: 0
UNEXPECTED WEIGHT CHANGE: 0
EYE PROBLEMS: 0
FATIGUE: 1
NAUSEA: 0
ARTHRALGIAS: 1
FREQUENCY: 0
LOSS OF SENSATION IN FEET: 0

## 2023-12-05 ASSESSMENT — PAIN SCALES - GENERAL: PAINLEVEL: 3

## 2023-12-05 NOTE — ASSESSMENT & PLAN NOTE
:: Due to disease and chemotherapy  - Based on NCCN Guidelines, can consider VZV prophylaxis with BR, but not required, so will defer to Dr. Peters

## 2023-12-05 NOTE — ASSESSMENT & PLAN NOTE
- Workup and treatment as above  - Recovering well from surgery -- follows up with Neurosurgery on 12/7/23  Lab Results   Component Value Date    WBC 43.5 (H) 12/05/2023    HGB 14.0 12/05/2023    HCT 41.7 12/05/2023    MCV 86 12/05/2023     12/05/2023    - Plan for C2 BR on 12/28-29/23

## 2023-12-05 NOTE — PROGRESS NOTES
Patient ID: Dann Ni is a 48 y.o. male.    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    Loss of smell    Cough variant asthma 04/02/2019    Cough variant asthma    Diverticulitis 09/25/2023    Foot pain 09/25/2023    Other conditions influencing health status     No significant past surgical history    Other conditions influencing health status     No significant past medical history    Personal history of other diseases of the respiratory system 04/14/2019    History of bronchitis    Personal history of other drug therapy 10/23/2019    History of influenza vaccination    Personal history of other specified conditions 03/30/2019    History of persistent cough    Recurrent pneumonia 09/25/2023    Tendonitis 09/25/2023       Surgical History:     Past Surgical History:   Procedure Laterality Date    OTHER SURGICAL HISTORY  04/12/2019    No history of surgery        Family History:     Family History   Problem Relation Name Age of Onset    Hip dysplasia Mother [place]     Arthritis Mother [place]     Other (htn) Father      Prostatitis Father      Lung cancer Maternal Grandmother      Kidney cancer Maternal Grandmother      Lung cancer Maternal Grandfather      Kidney cancer Maternal Grandfather      Dementia Other      Cancer Other      Dementia Other         Social History:     Social History     Tobacco Use    Smoking status: Never    Smokeless tobacco: Never  "  Substance Use Topics    Alcohol use: Not Currently    Drug use: Never      -------------------------------------------------------------------------------------------------------  Subjective       The patient presents to the clinic today (12/05/23) for routine follow-up after C1 BR for extranodal marginal zone lymphoma ; overall, he is doing \"okay.\"    He says he has a little bit \"flu-y\" feeling -- his  ankles and joints ache a bit. He's also been tired. He did get Neulasta OnPro. He has been taking Claritin. He has also taken a Tylenol in the mornings, but does take his temperature first. Denies fevers and chills. Denies chest pain, dyspnea, cough, and productive cough. He's had a little nausea  in the evening, resolves with ondansetron. Appetite is  overall down. Denies taste change. No diarrhea or constipation. No rashes. No swelling in his arms or his legs.     He  does have some tingling in his feet, but that's been consistent since his cord compression. He has been making progress in terms of the strength in his legs. No tingling in his hands.     Review of Systems   Constitutional:  Positive for fatigue. Negative for fever and unexpected weight change.   HENT:   Negative for mouth sores.    Eyes:  Negative for eye problems.   Respiratory:  Negative for cough, hemoptysis, shortness of breath and wheezing.    Cardiovascular:  Negative for chest pain, leg swelling and palpitations.   Gastrointestinal:  Negative for abdominal pain, blood in stool, constipation, diarrhea, nausea and vomiting.   Genitourinary:  Negative for bladder incontinence, dysuria, frequency and hematuria.    Musculoskeletal:  Positive for arthralgias and myalgias. Negative for gait problem.   Skin:  Negative for rash.   Neurological:  Negative for dizziness, gait problem, headaches and numbness.   All other systems reviewed and are negative.   "   -------------------------------------------------------------------------------------------------------  Objective   BSA: 2.29 meters squared  /90 (BP Location: Left arm, Patient Position: Sitting)   Pulse 110   Temp 37 °C (98.6 °F) (Core)   Resp 18   Wt 110 kg (242 lb 4.6 oz)   SpO2 100%   BMI 37.19 kg/m²     Physical Exam  HENT:      Mouth/Throat:      Mouth: Mucous membranes are moist.      Pharynx: No posterior oropharyngeal erythema.   Eyes:      General: No scleral icterus.     Extraocular Movements: Extraocular movements intact.      Pupils: Pupils are equal, round, and reactive to light.   Cardiovascular:      Rate and Rhythm: Normal rate and regular rhythm.      Heart sounds: No murmur heard.     No friction rub. No gallop.   Pulmonary:      Effort: No respiratory distress.      Breath sounds: No stridor. No wheezing, rhonchi or rales.   Abdominal:      General: There is no distension.      Palpations: There is no mass.      Tenderness: There is no abdominal tenderness. There is no guarding or rebound.   Musculoskeletal:         General: No swelling or deformity.      Right lower leg: No edema.      Left lower leg: No edema.   Lymphadenopathy:      Cervical: No cervical adenopathy.   Skin:     Findings: No lesion or rash.      Comments: R Chest MediPort is c/d/I without erythema, edema, tenderness or discharge   Neurological:      Mental Status: He is alert.      Cranial Nerves: No cranial nerve deficit.      Motor: No weakness.   Psychiatric:         Mood and Affect: Mood normal.         Behavior: Behavior normal.       Performance Status:  Symptomatic; fully ambulatory  -------------------------------------------------------------------------------------------------------  Assessment/Plan    Marginal zone lymphoma (CMS/HCC)  - Workup and treatment as above  - Recovering well from surgery -- follows up with Neurosurgery on 12/7/23  Lab Results   Component Value Date    WBC 43.5 (H) 12/05/2023     HGB 14.0 12/05/2023    HCT 41.7 12/05/2023    MCV 86 12/05/2023     12/05/2023    - Plan for C2 BR on 12/28-29/23    Immunosuppressed status (CMS/HCC)  :: Due to disease and chemotherapy  - Based on NCCN Guidelines, can consider VZV prophylaxis with BR, but not required, so will defer to Dr. Peters    RT:  - 12/7/23: Dr. Marin ([facility]  - 12/21/23: Labs and Dr. Peters  - 12/28, 12/29/23: C2 BR  -------------------------------------------------------------------------------------------------------  Joel Diaz PA-C

## 2023-12-06 ENCOUNTER — OFFICE VISIT (OUTPATIENT)
Dept: PRIMARY CARE | Facility: CLINIC | Age: 48
End: 2023-12-06
Payer: COMMERCIAL

## 2023-12-06 ENCOUNTER — TREATMENT (OUTPATIENT)
Dept: PHYSICAL THERAPY | Facility: CLINIC | Age: 48
End: 2023-12-06
Payer: COMMERCIAL

## 2023-12-06 VITALS
WEIGHT: 245 LBS | HEART RATE: 108 BPM | HEIGHT: 68 IN | DIASTOLIC BLOOD PRESSURE: 72 MMHG | RESPIRATION RATE: 16 BRPM | BODY MASS INDEX: 37.13 KG/M2 | OXYGEN SATURATION: 97 % | SYSTOLIC BLOOD PRESSURE: 106 MMHG | TEMPERATURE: 98.1 F

## 2023-12-06 DIAGNOSIS — C85.80 MARGINAL ZONE LYMPHOMA (MULTI): ICD-10-CM

## 2023-12-06 DIAGNOSIS — R53.83 FATIGUE, UNSPECIFIED TYPE: ICD-10-CM

## 2023-12-06 DIAGNOSIS — Z00.00 ROUTINE GENERAL MEDICAL EXAMINATION AT A HEALTH CARE FACILITY: ICD-10-CM

## 2023-12-06 DIAGNOSIS — G47.09 OTHER INSOMNIA: ICD-10-CM

## 2023-12-06 DIAGNOSIS — E55.9 VITAMIN D DEFICIENCY: ICD-10-CM

## 2023-12-06 DIAGNOSIS — D84.9 IMMUNOSUPPRESSED STATUS (MULTI): ICD-10-CM

## 2023-12-06 DIAGNOSIS — D49.2 THORACIC SPINE TUMOR: Primary | ICD-10-CM

## 2023-12-06 DIAGNOSIS — D49.2 THORACIC SPINE TUMOR: ICD-10-CM

## 2023-12-06 DIAGNOSIS — M62.838 CERVICAL PARASPINAL MUSCLE SPASM: Primary | ICD-10-CM

## 2023-12-06 DIAGNOSIS — E78.5 HYPERLIPIDEMIA, UNSPECIFIED HYPERLIPIDEMIA TYPE: ICD-10-CM

## 2023-12-06 PROCEDURE — 3008F BODY MASS INDEX DOCD: CPT | Performed by: FAMILY MEDICINE

## 2023-12-06 PROCEDURE — 99396 PREV VISIT EST AGE 40-64: CPT | Performed by: FAMILY MEDICINE

## 2023-12-06 PROCEDURE — 1036F TOBACCO NON-USER: CPT | Performed by: FAMILY MEDICINE

## 2023-12-06 PROCEDURE — 97110 THERAPEUTIC EXERCISES: CPT | Mod: GP | Performed by: PHYSICAL THERAPIST

## 2023-12-06 RX ORDER — CYCLOBENZAPRINE HCL 10 MG
10 TABLET ORAL 3 TIMES DAILY PRN
Qty: 30 TABLET | Refills: 2 | Status: SHIPPED | OUTPATIENT
Start: 2023-12-06

## 2023-12-06 RX ORDER — ZOLPIDEM TARTRATE 10 MG/1
10 TABLET ORAL NIGHTLY PRN
Qty: 30 TABLET | Refills: 1 | Status: SHIPPED | OUTPATIENT
Start: 2023-12-06 | End: 2024-05-10

## 2023-12-06 ASSESSMENT — PAIN - FUNCTIONAL ASSESSMENT: PAIN_FUNCTIONAL_ASSESSMENT: 0-10

## 2023-12-06 ASSESSMENT — PAIN SCALES - GENERAL: PAINLEVEL_OUTOF10: 4

## 2023-12-06 NOTE — PROGRESS NOTES
"Subjective   Patient ID: Dann Ni is a 48 y.o. male who presents for Annual Exam.  HPI    Annual physical   Eats a generally healthy diet   Active with limits  Denies any chest pain,SOB  No Abdominal pain   No black or bloody stools   Urination/BM normal   Last eye apt over 1 year ago  Last dental apt  6 year ago  No new family h/o cancers or heart disease     Flu declined    No other concern at this time     Review of systems  ; Patient seen today for exam denies any problems with headaches or vision, denies any shortness of breath chest pain nausea or vomiting, no black stool no blood in the stool no heartburn type symptoms denies any problems with constipation or diarrhea, and no dysuria-type symptoms    The patient's allergies medications were reviewed with them today    The patient's social family and surgical history or also reviewed here today, along with her past medical history.     Objective     Alert and active in  no acute distress  HEENT TMs clear oropharynx negative nares clear no drainage noted neck supple  With no adenopathy   Heart regular rate and rhythm without murmur and no carotid bruits  Lungs- clear to auscultation bilaterally, no wheeze or rhonchi noted  Thyroid -negative masses or nodularity  Abdomen- soft times four quadrants , bowel sounds positive no masses or organomegaly, negative tenderness guarding or rebound  Neurological exam unremarkable- DTRs in upper and lower extremities within normal limits.   skin -no lesions noted      /72 (BP Location: Right arm, Patient Position: Sitting, BP Cuff Size: Large adult)   Pulse 108   Temp 36.7 °C (98.1 °F) (Temporal)   Resp 16   Ht 1.719 m (5' 7.68\")   Wt 111 kg (245 lb)   SpO2 97%   BMI 37.61 kg/m²     Allergies   Allergen Reactions    Fluocinolone Unknown     Burning skin    Ciprofloxacin Itching       Assessment/Plan   Problem List Items Addressed This Visit       Fatigue    Hyperlipidemia    Vitamin D deficiency    " Thoracic spine tumor    Relevant Medications    cyclobenzaprine (Flexeril) 10 mg tablet    Marginal zone lymphoma (CMS/HCC)    Immunosuppressed status (CMS/HCC)    Routine general medical examination at a health care facility     Other Visit Diagnoses       Cervical paraspinal muscle spasm    -  Primary    Relevant Medications    cyclobenzaprine (Flexeril) 10 mg tablet    Other insomnia        Relevant Medications    zolpidem (Ambien) 10 mg tablet          Will be here for anything he needs reviewed his PET scan with it being marginal hoping for control of this unfortunate disease that has been very difficult for him reviewed 7.4 lifestyle and focusing on basic foods and less sugar in the diet    If anything worsens or changes please call us at once, follow up in the office as planned,

## 2023-12-06 NOTE — PROGRESS NOTES
PHYSICAL THERAPY TREATMENT    Patient name:  Dann Ni    MRN:  [id number]    :  1975    Today's Date:  23    Referral by:  Referred By: Dann Avila PA-C    Diagnoses:  Diagnosis and Precautions: Diagnosis D49.2 (ICD-10-CM) - Thoracic spine tumor    Assessment/Plan:  Therapeutic exercise performed in order to improve core strength/endurance.  Decreased intensity of exercise today with reduced volume after patient quite fatigued after having chemo therapy treatment late last week.  Patient challenged with exercises performed in clinic secondary to core fatigue.     PT Assessment  PT Assessment Results: Decreased strength, Decreased range of motion, Decreased endurance, Decreased mobility, Pain  Rehab Prognosis: Good  Evaluation/Treatment Tolerance: Patient limited by fatigue  Plan:  Progress as tolerated.    General Visit Information  PT  Visit  PT Received On: 23     General  Reason for Referral: PT Evaluate and Treat  Referred By: Dann Avila PA-C  General Comment: Diagnosis D49.2 (ICD-10-CM) - Thoracic spine tumor (Laminectomy Thoracic with Excision Lesion 10/2/2023, Dr. Marin)    Insurance Reviewed  Name of insurance:  [facility]  Visit #:     60 visits per calendar year  BACK SX 10/2; 500 DED IS MET 1000 FAM DED 2000 OOP IS MET 4000 FAM, OOP 0% COINSUR $20 COPAY (DO NOT COLLECT SINCE DED + OOP ALREADY MET FOR ) 60V CY (NONE USED FOR ) PER [email or web address] [id number] // Marilynn confirmed 23 5:41pm     Subjective:  Patient reports that he is quite tired after the oncology treatment from last week.    Precautions  STEADI Fall Risk Score (The score of 4 or more indicates an increased risk of falling): 8  LE Weight Bearing Status: Weight Bearing as Tolerated  Medical Precautions:  (Laminectomy Thoracic with Excision Lesion 10/2/2023, Dr. Marin, Tumor thoracic spine, Fractured L wrist as a kid, asthma, cancer)  Post-Surgical Precautions: Spinal precautions (20 lb. lifting  done   "restriction)    Pain Assessment  Pain Assessment: 0-10  Pain Score: 4  Pain Type: Acute pain  Pain Location: Back  Pain Orientation: Posterior    Treatments  Therapeutic Exercise  Therapeutic Exercise Performed: Yes  Therapeutic Exercise Activity 1: UBE 3' FWD/3' BWD 1.0 resistance, sitting  Therapeutic Exercise Activity 2: suoine LTR stretch 10\"x10 R and L  Therapeutic Exercise Activity 3: supine bridges 3 sets, 10 reps  Therapeutic Exercise Activity 4: supine posterior pelvic tilts x 30 reps  Therapeutic Exercise Activity 5: supine marching x 30 reps  Therapeutic Exercise Activity 6: supine heel walk x 20 reps  Therapeutic Exercise Activity 7: supine reverse crunch 3 sets, 10 reps  Therapeutic Exercise Activity 8: supine air bicycle 5 sets, 2 reps  Therapeutic Exercise Activity 9: seated prayer stretch 30'x3 x 3 directions    Time in clinic started at 4:45pm  Time in clinic ended at 5:23pm  Total time in clinic is .    38 minutes  Total timed code time is    38 minutes    Treatment Performed Today/Billing:  Therapeutic Exercise x 3 units  "

## 2023-12-07 ENCOUNTER — OFFICE VISIT (OUTPATIENT)
Dept: NEUROSURGERY | Facility: CLINIC | Age: 48
End: 2023-12-07
Payer: COMMERCIAL

## 2023-12-07 VITALS
RESPIRATION RATE: 16 BRPM | HEART RATE: 101 BPM | BODY MASS INDEX: 37.1 KG/M2 | HEIGHT: 68 IN | WEIGHT: 244.8 LBS | DIASTOLIC BLOOD PRESSURE: 80 MMHG | SYSTOLIC BLOOD PRESSURE: 122 MMHG

## 2023-12-07 DIAGNOSIS — C85.80 MARGINAL ZONE LYMPHOMA (MULTI): Primary | ICD-10-CM

## 2023-12-07 PROCEDURE — 1036F TOBACCO NON-USER: CPT | Performed by: STUDENT IN AN ORGANIZED HEALTH CARE EDUCATION/TRAINING PROGRAM

## 2023-12-07 PROCEDURE — 99024 POSTOP FOLLOW-UP VISIT: CPT | Performed by: STUDENT IN AN ORGANIZED HEALTH CARE EDUCATION/TRAINING PROGRAM

## 2023-12-07 PROCEDURE — 3008F BODY MASS INDEX DOCD: CPT | Performed by: STUDENT IN AN ORGANIZED HEALTH CARE EDUCATION/TRAINING PROGRAM

## 2023-12-07 ASSESSMENT — PATIENT HEALTH QUESTIONNAIRE - PHQ9
1. LITTLE INTEREST OR PLEASURE IN DOING THINGS: NOT AT ALL
2. FEELING DOWN, DEPRESSED OR HOPELESS: NOT AT ALL
SUM OF ALL RESPONSES TO PHQ9 QUESTIONS 1 & 2: 0

## 2023-12-07 NOTE — PROGRESS NOTES
Dann Ni is a 48 y.o. year old male s/p T2-4 lami for tumor resection    Past Surgical History:   Procedure Laterality Date    OTHER SURGICAL HISTORY  04/12/2019    No history of surgery           Current Outpatient Medications:     acetaminophen (Tylenol) 325 mg tablet, Take as needed while having post operative pain, Disp: , Rfl:     albuterol 90 mcg/actuation inhaler, Inhale 2 puffs every 6 hours if needed for shortness of breath or wheezing., Disp: , Rfl:     budesonide-formoteroL (Symbicort) 160-4.5 mcg/actuation inhaler, Inhale 2 puffs 2 times a day., Disp: , Rfl:     cyclobenzaprine (Flexeril) 10 mg tablet, Take 1 tablet (10 mg) by mouth 3 times a day as needed for muscle spasms., Disp: 30 tablet, Rfl: 2    ondansetron (Zofran) 8 mg tablet, Take 1 tablet (8 mg) by mouth every 8 hours if needed for nausea or vomiting., Disp: 30 tablet, Rfl: 5    prochlorperazine (Compazine) 10 mg tablet, Take 1 tablet (10 mg) by mouth every 6 hours if needed for nausea or vomiting., Disp: 30 tablet, Rfl: 5    zolpidem (Ambien) 10 mg tablet, Take 1 tablet (10 mg) by mouth as needed at bedtime for sleep., Disp: 30 tablet, Rfl: 1      There were no vitals filed for this visit.  Objective   Aox3  PERRL, EOMI   5/5 x 4   Incision well healed   Ambulates with a cane for stability    Relevant Results    MRI T spine with good decompression (small seroma) (11/2023)    Assessment and Plan:   Dann Ni is a very nice 48 y.o. year old patient s/p T2-4 laminectomy for tumor resection here for a post-op visit. The patient is progressing very well and has started chemo. He states that he is walking better (graduated from walker to a cane).     Sensation is improving too but still has some diminished senses in the abdomen and the right leg. He will continue with his cancer treatments and keep us updated.      We will see the patient again in 6 months.      Yesenia Marin MD    of Neurosurgery   [facility]   Office: [phone]  Fax: [phone]

## 2023-12-08 ENCOUNTER — APPOINTMENT (OUTPATIENT)
Dept: NEUROSURGERY | Facility: CLINIC | Age: 48
End: 2023-12-08
Payer: COMMERCIAL

## 2023-12-13 ENCOUNTER — TREATMENT (OUTPATIENT)
Dept: PHYSICAL THERAPY | Facility: CLINIC | Age: 48
End: 2023-12-13
Payer: COMMERCIAL

## 2023-12-13 DIAGNOSIS — D49.2 THORACIC SPINE TUMOR: Primary | ICD-10-CM

## 2023-12-13 PROCEDURE — 97110 THERAPEUTIC EXERCISES: CPT | Mod: GP | Performed by: PHYSICAL THERAPIST

## 2023-12-13 ASSESSMENT — PAIN - FUNCTIONAL ASSESSMENT: PAIN_FUNCTIONAL_ASSESSMENT: 0-10

## 2023-12-13 NOTE — PROGRESS NOTES
PHYSICAL THERAPY TREATMENT    Patient name:  Dann Ni    MRN:  [id number]    :  1975    Today's Date:  23    Referral by:  Referred By: Dann Avila PA-C    Diagnoses:  Diagnosis and Precautions: Diagnosis D49.2 (ICD-10-CM) - Thoracic spine tumor    Assessment/Plan:  Therapeutic exercise performed in order to improve core strength/endurance.  Decreased intensity of exercise today with reduced volume after patient quite fatigued after having chemo therapy treatment late last week.  Patient challenged with exercises performed in clinic secondary to core fatigue.     PT Assessment  PT Assessment Results: Decreased strength, Decreased range of motion, Decreased endurance, Decreased mobility, Pain  Rehab Prognosis: Good  Evaluation/Treatment Tolerance: Patient limited by pain, Patient limited by fatigue  Plan:  Progress as tolerated.    General Visit Information  PT  Visit  PT Received On: 23     General  Reason for Referral: PT Evaluate and Treat  Referred By: Dann Avila PA-C  General Comment: Diagnosis D49.2 (ICD-10-CM) - Thoracic spine tumor (Laminectomy Thoracic with Excision Lesion 10/2/2023, Dr. Marin)    Insurance Reviewed  [facility]  Visit #:   10/12  60 visits per calendar year  BACK SX 10/2; 500 DED IS MET 1000 FAM DED 2000 OOP IS MET 4000 FAM, OOP 0% COINSUR $20 COPAY (DO NOT COLLECT SINCE DED + OOP ALREADY MET FOR ) 60V CY (NONE USED FOR ) PER [email or web address] [id number] // Marilynn confirmed 23 5:41pm     Subjective:  Patient reports that he went back to work this week and is feeling a little tired.    Precautions  STEADI Fall Risk Score (The score of 4 or more indicates an increased risk of falling): 8  LE Weight Bearing Status: Weight Bearing as Tolerated  Medical Precautions:  (Laminectomy Thoracic with Excision Lesion 10/2/2023, Dr. Marin, Tumor thoracic spine, Fractured L wrist as a kid, asthma, cancer)  Post-Surgical Precautions: Spinal precautions (20  lb. lifting restriction)    Pain Assessment  Pain Assessment: 0-10  Pain Type: Acute pain  Pain Location: Back  Pain Orientation: Posterior    Treatments  Therapeutic Exercise  Therapeutic Exercise Performed: Yes  Therapeutic Exercise Activity 1: UBE 4' FWD/4' BWD 2.0 resistance, sitting  Therapeutic Exercise Activity 2: TB rows blue x 30 reps  Therapeutic Exercise Activity 3: TB shoulder extension blue x 30 reps  Therapeutic Exercise Activity 4: TB Palloff press blue x 30 reps R and L  Therapeutic Exercise Activity 5: TB Lifts blue x 30 reps R and L  Therapeutic Exercise Activity 6: Marching x 30 reps  Therapeutic Exercise Activity 7: Mini-Squats x 30 reps  Therapeutic Exercise Activity 8: Standing Hip PRE's flexion/abduction/extension x 30 reps R and L  Therapeutic Exercise Activity 9: supine bridges x 30 reps  Therapeutic Exercise Activity 10: supine reverse crunch x 30 reps  Therapeutic Exercise Activity 11: supine air bicycle x 10 reps  Therapeutic Exercise Activity 12: supine posterior pelvic tilt x 20 reps    Time in clinic started at 5pm  Time in clinic ended at 5:47pm  Total time in clinic is .    47 minutes  Total timed code time is    40 minutes    Treatment Performed Today/Billing:  Therapeutic Exercise x 3 units

## 2023-12-21 ENCOUNTER — OFFICE VISIT (OUTPATIENT)
Dept: HEMATOLOGY/ONCOLOGY | Facility: HOSPITAL | Age: 48
End: 2023-12-21
Payer: COMMERCIAL

## 2023-12-21 ENCOUNTER — TELEPHONE (OUTPATIENT)
Dept: ADMISSION | Facility: HOSPITAL | Age: 48
End: 2023-12-21
Payer: COMMERCIAL

## 2023-12-21 VITALS
SYSTOLIC BLOOD PRESSURE: 136 MMHG | TEMPERATURE: 97 F | HEART RATE: 106 BPM | OXYGEN SATURATION: 95 % | RESPIRATION RATE: 17 BRPM | WEIGHT: 246.5 LBS | BODY MASS INDEX: 37.48 KG/M2 | DIASTOLIC BLOOD PRESSURE: 83 MMHG

## 2023-12-21 DIAGNOSIS — C85.80 MARGINAL ZONE LYMPHOMA (MULTI): ICD-10-CM

## 2023-12-21 PROCEDURE — 99215 OFFICE O/P EST HI 40 MIN: CPT | Performed by: INTERNAL MEDICINE

## 2023-12-21 PROCEDURE — 1036F TOBACCO NON-USER: CPT | Performed by: INTERNAL MEDICINE

## 2023-12-21 PROCEDURE — 3008F BODY MASS INDEX DOCD: CPT | Performed by: INTERNAL MEDICINE

## 2023-12-21 ASSESSMENT — ENCOUNTER SYMPTOMS
LOSS OF SENSATION IN FEET: 1
OCCASIONAL FEELINGS OF UNSTEADINESS: 1
DEPRESSION: 0

## 2023-12-21 ASSESSMENT — PAIN SCALES - GENERAL: PAINLEVEL: 2

## 2023-12-21 NOTE — TELEPHONE ENCOUNTER
Pt left a VM on the triage line requesting to speak to Zari before his appt today regarding paperwork that needs completed. Message sent to the team and secure chat sent to Zari letting her know.

## 2023-12-22 ENCOUNTER — TELEPHONE (OUTPATIENT)
Dept: ADMISSION | Facility: HOSPITAL | Age: 48
End: 2023-12-22
Payer: COMMERCIAL

## 2023-12-22 DIAGNOSIS — C85.80 MARGINAL ZONE LYMPHOMA (MULTI): Primary | ICD-10-CM

## 2023-12-22 NOTE — TELEPHONE ENCOUNTER
Patient inquiring why head ct was ordered. States he is agreeable to scan just unsure why it was ordered as he was unaware.

## 2023-12-26 ENCOUNTER — LAB (OUTPATIENT)
Dept: LAB | Facility: LAB | Age: 48
End: 2023-12-26
Payer: COMMERCIAL

## 2023-12-26 DIAGNOSIS — C85.80 MARGINAL ZONE LYMPHOMA (MULTI): ICD-10-CM

## 2023-12-26 LAB
ALBUMIN SERPL BCP-MCNC: 4.6 G/DL (ref 3.4–5)
ALP SERPL-CCNC: 92 U/L (ref 33–120)
ALT SERPL W P-5'-P-CCNC: 17 U/L (ref 10–52)
ANION GAP SERPL CALC-SCNC: 14 MMOL/L (ref 10–20)
AST SERPL W P-5'-P-CCNC: 19 U/L (ref 9–39)
BASOPHILS # BLD AUTO: 0.03 X10*3/UL (ref 0–0.1)
BASOPHILS NFR BLD AUTO: 0.6 %
BILIRUB SERPL-MCNC: 0.6 MG/DL (ref 0–1.2)
BUN SERPL-MCNC: 16 MG/DL (ref 6–23)
CALCIUM SERPL-MCNC: 9.2 MG/DL (ref 8.6–10.3)
CHLORIDE SERPL-SCNC: 102 MMOL/L (ref 98–107)
CO2 SERPL-SCNC: 25 MMOL/L (ref 21–32)
CREAT SERPL-MCNC: 0.88 MG/DL (ref 0.5–1.3)
EOSINOPHIL # BLD AUTO: 0.11 X10*3/UL (ref 0–0.7)
EOSINOPHIL NFR BLD AUTO: 2.2 %
ERYTHROCYTE [DISTWIDTH] IN BLOOD BY AUTOMATED COUNT: 14.3 % (ref 11.5–14.5)
GFR SERPL CREATININE-BSD FRML MDRD: >90 ML/MIN/1.73M*2
GLUCOSE SERPL-MCNC: 102 MG/DL (ref 74–99)
HBV CORE AB SER QL: NONREACTIVE
HCT VFR BLD AUTO: 40.1 % (ref 41–52)
HGB BLD-MCNC: 13.3 G/DL (ref 13.5–17.5)
IMM GRANULOCYTES # BLD AUTO: 0.01 X10*3/UL (ref 0–0.7)
IMM GRANULOCYTES NFR BLD AUTO: 0.2 % (ref 0–0.9)
LDH SERPL L TO P-CCNC: 170 U/L (ref 84–246)
LYMPHOCYTES # BLD AUTO: 0.44 X10*3/UL (ref 1.2–4.8)
LYMPHOCYTES NFR BLD AUTO: 8.9 %
MCH RBC QN AUTO: 28.2 PG (ref 26–34)
MCHC RBC AUTO-ENTMCNC: 33.2 G/DL (ref 32–36)
MCV RBC AUTO: 85 FL (ref 80–100)
MONOCYTES # BLD AUTO: 0.57 X10*3/UL (ref 0.1–1)
MONOCYTES NFR BLD AUTO: 11.5 %
NEUTROPHILS # BLD AUTO: 3.78 X10*3/UL (ref 1.2–7.7)
NEUTROPHILS NFR BLD AUTO: 76.6 %
NRBC BLD-RTO: 0 /100 WBCS (ref 0–0)
PLATELET # BLD AUTO: 247 X10*3/UL (ref 150–450)
POTASSIUM SERPL-SCNC: 4 MMOL/L (ref 3.5–5.3)
PROT SERPL-MCNC: 7.2 G/DL (ref 6.4–8.2)
RBC # BLD AUTO: 4.71 X10*6/UL (ref 4.5–5.9)
SODIUM SERPL-SCNC: 137 MMOL/L (ref 136–145)
URATE SERPL-MCNC: 5.2 MG/DL (ref 4–7.5)
WBC # BLD AUTO: 4.9 X10*3/UL (ref 4.4–11.3)

## 2023-12-26 PROCEDURE — 83615 LACTATE (LD) (LDH) ENZYME: CPT

## 2023-12-26 PROCEDURE — 85025 COMPLETE CBC W/AUTO DIFF WBC: CPT

## 2023-12-26 PROCEDURE — 36415 COLL VENOUS BLD VENIPUNCTURE: CPT

## 2023-12-26 PROCEDURE — 80053 COMPREHEN METABOLIC PANEL: CPT

## 2023-12-26 PROCEDURE — 84550 ASSAY OF BLOOD/URIC ACID: CPT

## 2023-12-26 PROCEDURE — 86704 HEP B CORE ANTIBODY TOTAL: CPT

## 2023-12-28 ENCOUNTER — INFUSION (OUTPATIENT)
Dept: HEMATOLOGY/ONCOLOGY | Facility: HOSPITAL | Age: 48
End: 2023-12-28
Payer: COMMERCIAL

## 2023-12-28 VITALS
TEMPERATURE: 97.7 F | RESPIRATION RATE: 16 BRPM | DIASTOLIC BLOOD PRESSURE: 83 MMHG | OXYGEN SATURATION: 97 % | HEART RATE: 93 BPM | SYSTOLIC BLOOD PRESSURE: 133 MMHG | BODY MASS INDEX: 37.51 KG/M2 | WEIGHT: 246.7 LBS

## 2023-12-28 DIAGNOSIS — C85.80 MARGINAL ZONE LYMPHOMA (MULTI): ICD-10-CM

## 2023-12-28 PROCEDURE — 96415 CHEMO IV INFUSION ADDL HR: CPT

## 2023-12-28 PROCEDURE — 96413 CHEMO IV INFUSION 1 HR: CPT

## 2023-12-28 PROCEDURE — 2500000004 HC RX 250 GENERAL PHARMACY W/ HCPCS (ALT 636 FOR OP/ED): Mod: JZ | Performed by: INTERNAL MEDICINE

## 2023-12-28 PROCEDURE — 96375 TX/PRO/DX INJ NEW DRUG ADDON: CPT | Mod: INF

## 2023-12-28 PROCEDURE — 2500000004 HC RX 250 GENERAL PHARMACY W/ HCPCS (ALT 636 FOR OP/ED): Performed by: INTERNAL MEDICINE

## 2023-12-28 PROCEDURE — 96411 CHEMO IV PUSH ADDL DRUG: CPT

## 2023-12-28 PROCEDURE — 2500000001 HC RX 250 WO HCPCS SELF ADMINISTERED DRUGS (ALT 637 FOR MEDICARE OP): Performed by: INTERNAL MEDICINE

## 2023-12-28 RX ORDER — ALBUTEROL SULFATE 0.83 MG/ML
3 SOLUTION RESPIRATORY (INHALATION) AS NEEDED
Status: DISCONTINUED | OUTPATIENT
Start: 2023-12-28 | End: 2023-12-28 | Stop reason: HOSPADM

## 2023-12-28 RX ORDER — DIPHENHYDRAMINE HCL 50 MG
50 CAPSULE ORAL ONCE
Status: COMPLETED | OUTPATIENT
Start: 2023-12-28 | End: 2023-12-28

## 2023-12-28 RX ORDER — PROCHLORPERAZINE MALEATE 10 MG
10 TABLET ORAL EVERY 6 HOURS PRN
Status: DISCONTINUED | OUTPATIENT
Start: 2023-12-28 | End: 2023-12-28 | Stop reason: HOSPADM

## 2023-12-28 RX ORDER — FAMOTIDINE 10 MG/ML
20 INJECTION INTRAVENOUS ONCE AS NEEDED
Status: DISCONTINUED | OUTPATIENT
Start: 2023-12-28 | End: 2023-12-28 | Stop reason: HOSPADM

## 2023-12-28 RX ORDER — PALONOSETRON 0.05 MG/ML
0.25 INJECTION, SOLUTION INTRAVENOUS ONCE
Status: COMPLETED | OUTPATIENT
Start: 2023-12-28 | End: 2023-12-28

## 2023-12-28 RX ORDER — DIPHENHYDRAMINE HYDROCHLORIDE 50 MG/ML
50 INJECTION INTRAMUSCULAR; INTRAVENOUS AS NEEDED
Status: DISCONTINUED | OUTPATIENT
Start: 2023-12-28 | End: 2023-12-28 | Stop reason: HOSPADM

## 2023-12-28 RX ORDER — ACETAMINOPHEN 325 MG/1
650 TABLET ORAL ONCE
Status: COMPLETED | OUTPATIENT
Start: 2023-12-28 | End: 2023-12-28

## 2023-12-28 RX ORDER — PROCHLORPERAZINE EDISYLATE 5 MG/ML
10 INJECTION INTRAMUSCULAR; INTRAVENOUS EVERY 6 HOURS PRN
Status: DISCONTINUED | OUTPATIENT
Start: 2023-12-28 | End: 2023-12-28 | Stop reason: HOSPADM

## 2023-12-28 RX ORDER — EPINEPHRINE 0.3 MG/.3ML
0.3 INJECTION SUBCUTANEOUS EVERY 5 MIN PRN
Status: DISCONTINUED | OUTPATIENT
Start: 2023-12-28 | End: 2023-12-28 | Stop reason: HOSPADM

## 2023-12-28 RX ADMIN — DIPHENHYDRAMINE HYDROCHLORIDE 50 MG: 50 CAPSULE ORAL at 09:51

## 2023-12-28 RX ADMIN — SODIUM CHLORIDE 855 MG: 9 INJECTION, SOLUTION INTRAVENOUS at 11:04

## 2023-12-28 RX ADMIN — DEXAMETHASONE SODIUM PHOSPHATE 12 MG: 10 INJECTION, SOLUTION INTRAMUSCULAR; INTRAVENOUS at 09:51

## 2023-12-28 RX ADMIN — PALONOSETRON 250 MCG: 0.05 INJECTION, SOLUTION INTRAVENOUS at 09:50

## 2023-12-28 RX ADMIN — BENDAMUSTINE HYDROCHLORIDE 205 MG: 25 INJECTION, SOLUTION INTRAVENOUS at 10:39

## 2023-12-28 RX ADMIN — ACETAMINOPHEN 650 MG: 325 TABLET ORAL at 09:51

## 2023-12-28 ASSESSMENT — PAIN SCALES - GENERAL: PAINLEVEL: 0-NO PAIN

## 2023-12-28 NOTE — TELEPHONE ENCOUNTER
Returned call to notify per Dr Peters that CT is to get ready for intrathecal treatment. Patient states this has not yet been discussed and he has many questions. Fellow up message sent to MD.

## 2023-12-28 NOTE — TELEPHONE ENCOUNTER
Scheduling order placed for virtual apt with Garry MIN for patient's desired date and time to discuss questions per Dr Peters.

## 2023-12-28 NOTE — PROGRESS NOTES
Patient tolerated C2D1 without incident. Patient inquired about future treatment plans and relayed information to Dr. Peters's team who will reach out to patient tomorrow with further information. PIV left in place for treatment tomorrow. Discharged in stable condition with spouse.

## 2023-12-29 ENCOUNTER — INFUSION (OUTPATIENT)
Dept: HEMATOLOGY/ONCOLOGY | Facility: HOSPITAL | Age: 48
End: 2023-12-29
Payer: COMMERCIAL

## 2023-12-29 VITALS
RESPIRATION RATE: 16 BRPM | HEART RATE: 94 BPM | SYSTOLIC BLOOD PRESSURE: 135 MMHG | TEMPERATURE: 98.1 F | DIASTOLIC BLOOD PRESSURE: 80 MMHG

## 2023-12-29 DIAGNOSIS — C85.80 MARGINAL ZONE LYMPHOMA (MULTI): Primary | ICD-10-CM

## 2023-12-29 DIAGNOSIS — C85.80 MARGINAL ZONE LYMPHOMA (MULTI): ICD-10-CM

## 2023-12-29 PROCEDURE — 96367 TX/PROPH/DG ADDL SEQ IV INF: CPT

## 2023-12-29 PROCEDURE — 2500000004 HC RX 250 GENERAL PHARMACY W/ HCPCS (ALT 636 FOR OP/ED): Performed by: INTERNAL MEDICINE

## 2023-12-29 PROCEDURE — 96413 CHEMO IV INFUSION 1 HR: CPT

## 2023-12-29 PROCEDURE — 96372 THER/PROPH/DIAG INJ SC/IM: CPT | Mod: 59

## 2023-12-29 RX ORDER — FAMOTIDINE 10 MG/ML
20 INJECTION INTRAVENOUS ONCE AS NEEDED
Status: CANCELLED | OUTPATIENT
Start: 2024-01-26

## 2023-12-29 RX ORDER — EPINEPHRINE 0.3 MG/.3ML
0.3 INJECTION SUBCUTANEOUS EVERY 5 MIN PRN
Status: DISCONTINUED | OUTPATIENT
Start: 2023-12-29 | End: 2023-12-29 | Stop reason: HOSPADM

## 2023-12-29 RX ORDER — PROCHLORPERAZINE MALEATE 10 MG
10 TABLET ORAL EVERY 6 HOURS PRN
Status: DISCONTINUED | OUTPATIENT
Start: 2023-12-29 | End: 2023-12-29 | Stop reason: HOSPADM

## 2023-12-29 RX ORDER — EPINEPHRINE 0.3 MG/.3ML
0.3 INJECTION SUBCUTANEOUS EVERY 5 MIN PRN
Status: CANCELLED | OUTPATIENT
Start: 2024-01-26

## 2023-12-29 RX ORDER — DIPHENHYDRAMINE HYDROCHLORIDE 50 MG/ML
50 INJECTION INTRAMUSCULAR; INTRAVENOUS AS NEEDED
Status: CANCELLED | OUTPATIENT
Start: 2024-01-26

## 2023-12-29 RX ORDER — DIPHENHYDRAMINE HYDROCHLORIDE 50 MG/ML
50 INJECTION INTRAMUSCULAR; INTRAVENOUS AS NEEDED
Status: CANCELLED | OUTPATIENT
Start: 2024-01-25

## 2023-12-29 RX ORDER — ACETAMINOPHEN 325 MG/1
650 TABLET ORAL ONCE
Status: CANCELLED | OUTPATIENT
Start: 2024-01-25

## 2023-12-29 RX ORDER — PROCHLORPERAZINE EDISYLATE 5 MG/ML
10 INJECTION INTRAMUSCULAR; INTRAVENOUS EVERY 6 HOURS PRN
Status: CANCELLED | OUTPATIENT
Start: 2024-01-26

## 2023-12-29 RX ORDER — FAMOTIDINE 10 MG/ML
20 INJECTION INTRAVENOUS ONCE AS NEEDED
Status: DISCONTINUED | OUTPATIENT
Start: 2023-12-29 | End: 2023-12-29 | Stop reason: HOSPADM

## 2023-12-29 RX ORDER — EPINEPHRINE 0.3 MG/.3ML
0.3 INJECTION SUBCUTANEOUS EVERY 5 MIN PRN
Status: CANCELLED | OUTPATIENT
Start: 2024-01-25

## 2023-12-29 RX ORDER — FAMOTIDINE 10 MG/ML
20 INJECTION INTRAVENOUS ONCE AS NEEDED
Status: CANCELLED | OUTPATIENT
Start: 2024-01-25

## 2023-12-29 RX ORDER — ALBUTEROL SULFATE 0.83 MG/ML
3 SOLUTION RESPIRATORY (INHALATION) AS NEEDED
Status: CANCELLED | OUTPATIENT
Start: 2024-01-26

## 2023-12-29 RX ORDER — PALONOSETRON 0.05 MG/ML
0.25 INJECTION, SOLUTION INTRAVENOUS ONCE
Status: CANCELLED | OUTPATIENT
Start: 2024-01-25

## 2023-12-29 RX ORDER — DIPHENHYDRAMINE HCL 50 MG
50 CAPSULE ORAL ONCE
Status: CANCELLED | OUTPATIENT
Start: 2024-01-25

## 2023-12-29 RX ORDER — PROCHLORPERAZINE EDISYLATE 5 MG/ML
10 INJECTION INTRAMUSCULAR; INTRAVENOUS EVERY 6 HOURS PRN
Status: DISCONTINUED | OUTPATIENT
Start: 2023-12-29 | End: 2023-12-29 | Stop reason: HOSPADM

## 2023-12-29 RX ORDER — ALBUTEROL SULFATE 0.83 MG/ML
3 SOLUTION RESPIRATORY (INHALATION) AS NEEDED
Status: DISCONTINUED | OUTPATIENT
Start: 2023-12-29 | End: 2023-12-29 | Stop reason: HOSPADM

## 2023-12-29 RX ORDER — ALBUTEROL SULFATE 0.83 MG/ML
3 SOLUTION RESPIRATORY (INHALATION) AS NEEDED
Status: CANCELLED | OUTPATIENT
Start: 2024-01-25

## 2023-12-29 RX ORDER — PROCHLORPERAZINE MALEATE 10 MG
10 TABLET ORAL EVERY 6 HOURS PRN
Status: CANCELLED | OUTPATIENT
Start: 2024-01-26

## 2023-12-29 RX ORDER — PROCHLORPERAZINE MALEATE 10 MG
10 TABLET ORAL EVERY 6 HOURS PRN
Status: CANCELLED | OUTPATIENT
Start: 2024-01-25

## 2023-12-29 RX ORDER — PROCHLORPERAZINE EDISYLATE 5 MG/ML
10 INJECTION INTRAMUSCULAR; INTRAVENOUS EVERY 6 HOURS PRN
Status: CANCELLED | OUTPATIENT
Start: 2024-01-25

## 2023-12-29 RX ORDER — DIPHENHYDRAMINE HYDROCHLORIDE 50 MG/ML
50 INJECTION INTRAMUSCULAR; INTRAVENOUS AS NEEDED
Status: DISCONTINUED | OUTPATIENT
Start: 2023-12-29 | End: 2023-12-29 | Stop reason: HOSPADM

## 2023-12-29 RX ADMIN — DEXAMETHASONE SODIUM PHOSPHATE 12 MG: 10 INJECTION, SOLUTION INTRAMUSCULAR; INTRAVENOUS at 10:08

## 2023-12-29 RX ADMIN — PEGFILGRASTIM 6 MG: KIT SUBCUTANEOUS at 11:19

## 2023-12-29 RX ADMIN — BENDAMUSTINE HYDROCHLORIDE 205 MG: 25 INJECTION, SOLUTION INTRAVENOUS at 10:51

## 2023-12-29 NOTE — PROGRESS NOTES
Dann EARL Grahamer, 48 y.o. male is here for chemotherapy infusion of: Bendamustine 205 mg. Pt arrived with cane alongside daughter. A&O x 4. VSS. PIV in place from 12/28. CDI. Blood return noted and flushed.   Cycle: 2,  Day: 2     O:   Lab Results   Component Value Date    WBC 4.9 12/26/2023    HGB 13.3 (L) 12/26/2023    HCT 40.1 (L) 12/26/2023    MCV 85 12/26/2023     12/26/2023     Lab Results   Component Value Date    NEUTROABS 3.78 12/26/2023     Lab Results   Component Value Date    CREATININE 0.88 12/26/2023     BP Readings from Last 1 Encounters:   12/29/23 135/80     A:  Encounter Diagnosis   Name Primary?    Marginal zone lymphoma (CMS/HCC)      Meets parameters to proceed with treatment.    IV device: PIV   IV therapy site and patency: forearm left, condition patent and no redness  Premeds given: Not required  Chemotherapy Bendamustine 205 mg given over 10 m.   IV Fluid given: normal saline 250 to flush  Tolerated procedure well. IV removed. Pressure dressing applied.  Time patient discharged: 1130

## 2024-01-02 ENCOUNTER — TELEMEDICINE (OUTPATIENT)
Dept: HEMATOLOGY/ONCOLOGY | Facility: HOSPITAL | Age: 49
End: 2024-01-02
Payer: COMMERCIAL

## 2024-01-02 DIAGNOSIS — C85.80 MARGINAL ZONE LYMPHOMA (MULTI): ICD-10-CM

## 2024-01-02 PROCEDURE — 99214 OFFICE O/P EST MOD 30 MIN: CPT | Performed by: NURSE PRACTITIONER

## 2024-01-03 ENCOUNTER — TREATMENT (OUTPATIENT)
Dept: PHYSICAL THERAPY | Facility: CLINIC | Age: 49
End: 2024-01-03
Payer: COMMERCIAL

## 2024-01-03 DIAGNOSIS — D49.2 THORACIC SPINE TUMOR: Primary | ICD-10-CM

## 2024-01-03 PROCEDURE — 97110 THERAPEUTIC EXERCISES: CPT | Mod: GP | Performed by: PHYSICAL THERAPIST

## 2024-01-03 ASSESSMENT — PAIN SCALES - GENERAL: PAINLEVEL_OUTOF10: 2

## 2024-01-03 ASSESSMENT — PAIN - FUNCTIONAL ASSESSMENT: PAIN_FUNCTIONAL_ASSESSMENT: 0-10

## 2024-01-03 NOTE — PROGRESS NOTES
PHYSICAL THERAPY TREATMENT    Patient name:  Dann Ni    MRN:  [id number]    :  1975    Today's Date:  24    Referral by:  Referred By: Dann Avila PA-C    Diagnoses:  Diagnosis and Precautions: Diagnosis D49.2 (ICD-10-CM) - Thoracic spine tumor    Assessment/Plan:  Therapeutic exercise performed in order to improve core strength/endurance.  Decreased intensity of exercise today with reduced volume after patient quite fatigued after having chemo therapy treatment late last week.  Patient challenged with exercises performed in clinic secondary to core fatigue.     PT Assessment  PT Assessment Results: Decreased strength, Decreased endurance, Impaired balance, Decreased mobility, Pain  Rehab Prognosis: Good  Evaluation/Treatment Tolerance: Patient limited by fatigue, Patient limited by pain  Plan:  Progress as tolerated.    General Visit Information  PT  Visit  PT Received On: 24     General  Reason for Referral: PT Evaluate and Treat  Referred By: Dann Avila PA-C  General Comment: Diagnosis D49.2 (ICD-10-CM) - Thoracic spine tumor (Laminectomy Thoracic with Excision Lesion 10/2/2023, Dr. Marin)    Insurance Reviewed  Name of insurance:  [facility]  Visit #:     60 visits per calendar year  BACK SX 10/2; 500 DED IS MET 1000 FAM DED 2000 OOP IS MET 4000 FAM, OOP 0% COINSUR $20 COPAY (DO NOT COLLECT SINCE DED + OOP ALREADY MET FOR ) 60V CY (NONE USED FOR ) PER [email or web address] [id number] // Marilynn confirmed 23 5:41pm     Subjective:  Patient reports that he is feeling tired after having chemo last week and also from work.    Precautions  STEADI Fall Risk Score (The score of 4 or more indicates an increased risk of falling): 8  LE Weight Bearing Status: Weight Bearing as Tolerated  Medical Precautions:  (Laminectomy Thoracic with Excision Lesion 10/2/2023, Dr. Marin, Tumor thoracic spine, Fractured L wrist as a kid, asthma, cancer)  Post-Surgical Precautions: Spinal precautions (20 lb.  lifting restriction)    Pain Assessment  Pain Assessment: 0-10  Pain Score: 2  Pain Type: Acute pain  Pain Location: Back  Pain Orientation: Posterior    Treatments  Therapeutic Exercise  Therapeutic Exercise Performed: Yes  Therapeutic Exercise Activity 1: UBE 4' FWD/4' BWD 2.0 resistance, sitting  Therapeutic Exercise Activity 2: TB rows blue x 30 reps  Therapeutic Exercise Activity 3: TB shoulder extension blue x 30 reps  Therapeutic Exercise Activity 4: TB Palloff press blue x 30 reps R and L  Therapeutic Exercise Activity 5: TB Lifts blue x 30 reps R and L  Therapeutic Exercise Activity 6: Marching x 30 reps  Therapeutic Exercise Activity 7: Mini-Squats x 30 reps  Therapeutic Exercise Activity 8: Standing Hip PRE's flexion/abduction/extension x 30 reps R and L  Therapeutic Exercise Activity 9: supine bridges x 30 reps  Therapeutic Exercise Activity 10: supine reverse crunch x 30 reps  Therapeutic Exercise Activity 11: supine air bicycle x 10 reps  Therapeutic Exercise Activity 12: supine posterior pelvic tilt x 20 reps    Time in clinic started at 4:15pm  Time in clinic ended at 4:58pm  Total time in clinic is .    43 minutes  Total timed code time is    38 minutes    Treatment Performed Today/Billing:  Therapeutic Exercise x 3 units

## 2024-01-04 ASSESSMENT — ENCOUNTER SYMPTOMS
DIARRHEA: 0
BLOOD IN STOOL: 0
HEMATURIA: 0
DIZZINESS: 0
FATIGUE: 1
DYSURIA: 0
HEADACHES: 0
VOMITING: 0
LEG SWELLING: 0
MYALGIAS: 1
PALPITATIONS: 0
ARTHRALGIAS: 1
HEMOPTYSIS: 0
COUGH: 0
CONSTIPATION: 0
ABDOMINAL PAIN: 0
UNEXPECTED WEIGHT CHANGE: 0
EYE PROBLEMS: 0
SHORTNESS OF BREATH: 0
NAUSEA: 0
NUMBNESS: 0
WHEEZING: 0
FREQUENCY: 0
FEVER: 0

## 2024-01-04 NOTE — PROGRESS NOTES
Patient ID: Dann Ni is a 48 y.o. male.    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    Loss of smell    Cough variant asthma 04/02/2019    Cough variant asthma    Diverticulitis 09/25/2023    Foot pain 09/25/2023    Other conditions influencing health status     No significant past surgical history    Other conditions influencing health status     No significant past medical history    Personal history of other diseases of the respiratory system 04/14/2019    History of bronchitis    Personal history of other drug therapy 10/23/2019    History of influenza vaccination    Personal history of other specified conditions 03/30/2019    History of persistent cough    Recurrent pneumonia 09/25/2023    Tendonitis 09/25/2023       Surgical History:     Past Surgical History:   Procedure Laterality Date    OTHER SURGICAL HISTORY  04/12/2019    No history of surgery        Family History:     Family History   Problem Relation Name Age of Onset    Hip dysplasia Mother [place]     Arthritis Mother [place]     Other (htn) Father      Prostatitis Father      Lung cancer Maternal Grandmother      Kidney cancer Maternal Grandmother      Lung cancer Maternal Grandfather      Kidney cancer Maternal Grandfather      Dementia Other      Cancer Other      Dementia Other         Social History:     Social History     Tobacco Use    Smoking status: Never    Smokeless tobacco: Never  "  Substance Use Topics    Alcohol use: Not Currently    Drug use: Never      -------------------------------------------------------------------------------------------------------  Subjective       The patient presents to the clinic today (1/2/24) for routine follow-up after C1 BR for extranodal marginal zone lymphoma ; overall, he is doing \"okay.\"    He is undergoing therapy with Bendamustine/ Rituximab for his MZL. Has fatigue and joint pain following therapy. Ambulation, in general, continues to improve. He is now able to ambulate with a cane.      Denies fevers and chills. Denies chest pain, dyspnea, cough, and productive cough.      He has questions about his planned intrathecal chemotherapy.     Review of Systems   Constitutional:  Positive for fatigue. Negative for fever and unexpected weight change.   HENT:   Negative for mouth sores.    Eyes:  Negative for eye problems.   Respiratory:  Negative for cough, hemoptysis, shortness of breath and wheezing.    Cardiovascular:  Negative for chest pain, leg swelling and palpitations.   Gastrointestinal:  Negative for abdominal pain, blood in stool, constipation, diarrhea, nausea and vomiting.   Genitourinary:  Negative for bladder incontinence, dysuria, frequency and hematuria.    Musculoskeletal:  Positive for arthralgias and myalgias. Negative for gait problem.   Skin:  Negative for rash.   Neurological:  Negative for dizziness, gait problem, headaches and numbness.   Hematological: Negative.    Psychiatric/Behavioral: Negative.     All other systems reviewed and are negative.     -------------------------------------------------------------------------------------------------------  Objective   BSA: There is no height or weight on file to calculate BSA.  There were no vitals taken for this visit.      Performance Status:  Symptomatic; fully " "ambulatory  -------------------------------------------------------------------------------------------------------  Assessment/Plan    Marginal zone lymphoma (CMS/HCC)  #EMZL  -Reviewed PET/CT and MRI with the patient and wife. The results are consistent with stage IV disease, involving LN above and below the diaphragm, extensively axial and appendicular skeleton including bilateral scapulas, right clavicular head, T5 vertebral body, bilateral iliac bones, left ischium, and left femur without definite anatomic correlate (max SUV 7.6).  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   -Current data support treatment using multi-agent chemo. Bendamustine and rituximab have been approved and widely used, found to be safe and effective.   -Patient was disappointed to learn that EMZL such as his may not be curable. Was relieved to know that the treatment can bring multiple years of remission, improvement of strength / quality of life / work experience.   -Discussed the Aes of r-anum, which are numerous, including SAEs such as infection, or even death. Most AE and DUSTIN however can be resolved with effective treatments. He consented for R-anum.   -Due to the location of the disease and bone marrow uptake, he may be at high risk of CNS involvement / relapse. Will also do head CT and intrathecal treatment, timing likely after 2 cycles of R-ANUM. He consented for IT Methotrexate and cytarabine.   - IT chemotherapy discussed with patient. Rationale and basic plan provided. Discussed with Dr. Peters. Will plan for \"at least 2 IT chemotherapies\" assuming CSF is negative for disease.   -In addition, consolidation using XRT will be discussed in the future.      Plan:  -Previously consented for R-Anum, and IT methotrexate and cytarabine. Will need head CT for the IR procedure. Scheduled 1/22/24  -R-ANUM c2d1 on 12/28. With GCSF support. C3 to begin on " 1/25/24.   -RTC 1/18 with Dr. Peters.       -------------------------------------------------------------------------------------------------------  STEPHANI Coleman-CNP

## 2024-01-05 ASSESSMENT — ENCOUNTER SYMPTOMS
HEMATOLOGIC/LYMPHATIC NEGATIVE: 1
PSYCHIATRIC NEGATIVE: 1

## 2024-01-05 NOTE — PROGRESS NOTES
Patient ID: Dann Ni is a 48 y.o. male.  Referring Physician: Jeff Peters MD PhD  [address] [place] [address]  Primary Care Provider: DO Leela Watson    The patient was in good health until July 2023. He developed back pain which did not respond to NSAID or steroid. By late 9/2023 he lost sensation below the diaphragm and were unable to walk. On 9/30 he had CT scan showing a 4.1x1.8x1cm paraspinal mass at the T7 level. MRI showed abnormal enhancing signals at the C5, T2-8 levels.  On 10/2/2023 he had a debulking surgery which later showed evidence of lymphoma. In the meantime he has improved sensation and is able to walk with a cane.     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 He was consented for rituximab bendamustine and started C1 on 11/30/2023. Tolerated relatively well, but had nausea and flu like symptoms for a few days after chemo.     Today the patient he feels walking is improving. Back pain also improves. No fever, wt loss, or nausea.         Review of Systems   All other systems reviewed and are negative.       Objective   BSA: 2.32 meters squared  /83   Pulse 106   Temp 36.1 °C (97 °F) (Skin)   Resp 17   Wt 112 kg (246 lb 8 oz)   SpO2 95%   BMI 37.48 kg/m²     Family History   Problem Relation Name Age of Onset    Hip dysplasia Mother [place]     Arthritis Mother [place]     Other (htn) Father      Prostatitis Father      Lung cancer Maternal Grandmother      Kidney cancer Maternal Grandmother      Lung cancer Maternal Grandfather      Kidney cancer Maternal Grandfather      Dementia Other      Cancer Other      Dementia Other       Oncology History   Marginal zone lymphoma (CMS/HCC)   10/2/2023 Cancer Staged    Staging form: Hodgkin and Non-Hodgkin Lymphoma, AJCC 8th Edition, Clinical stage from 10/2/2023: Stage IV (Marginal zone lymphoma) - Signed by Jeff Peters MD PhD on 11/25/2023      11/17/2023 Initial Diagnosis    Marginal zone lymphoma (CMS/HCC)     11/30/2023 -  Chemotherapy    Bendamustine + RiTUXimab, 28 Day Cycles         Dann Ni  reports that he has never smoked. He has never used smokeless tobacco.  He  reports that he does not currently use alcohol.  He  reports no history of drug use.    EXAM: No LAD. No splenomegaly. No skin lesions. Muscle power preserved in the UE but reduced in LLE; walk with a cane in slow pace; upper back surgical scar healed.  Other details below.  Physical Exam  Constitutional:       General: He is not in acute distress.     Appearance: He is not toxic-appearing.   HENT:      Head: Normocephalic.      Nose: Nose normal.      Mouth/Throat:      Mouth: Mucous membranes are moist.   Eyes:      Extraocular Movements: Extraocular movements intact.      Pupils: Pupils are equal, round, and reactive to light.   Cardiovascular:      Rate and Rhythm: Normal rate and regular rhythm.      Heart sounds: No murmur heard.  Pulmonary:      Effort: Pulmonary effort is normal.      Breath sounds: Normal breath sounds.   Abdominal:      General: Bowel sounds are normal.      Palpations: Abdomen is soft. There is no mass.      Tenderness: There is no abdominal tenderness. There is no rebound.   Musculoskeletal:         General: No swelling, tenderness, deformity or signs of injury.      Right lower leg: No edema.      Left lower leg: No edema.   Skin:     Coloration: Skin is not jaundiced.      Findings: No bruising, lesion or rash.   Neurological:      Mental Status: He is alert and oriented to person, place, and time.      Cranial Nerves: No cranial nerve deficit.      Motor: No weakness.      Gait: Gait normal.   Psychiatric:         Mood and Affect: Mood normal.         Performance Status:  Symptomatic; fully ambulatory    Assessment/Plan      #EMZL  -Reviewed PET/CT and MRI with the patient and wife. The results are consistent with stage IV disease, involving LN  above and below the diaphragm, extensively axial and appendicular skeleton including bilateral scapulas, right clavicular head, T5 vertebral body, bilateral iliac bones, left ischium, and left femur without definite anatomic correlate (max SUV 7.6).  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   -Current data support treatment using multi-agent chemo. Bendamustine and rituximab have been approved and widely used, found to be safe and effective.   -Patient was disappointed to learn that EMZL such as his may not be curable. Was relieved to know that the treatment can bring multiple years of remission, improvement of strength / quality of life / work experience.   -Discussed the Aes of r-anum, which are numerous, including SAEs such as infection, or even death. Most AE and DUSTIN however can be resolved with effective treatments. He consented for R-anum.   -Due to the location of the disease and bone marrow uptake, he may be at high risk of CNS involvement / relapse. Will also do head CT and intrathecal treatment, timing likely after 2 cycles of R-ANUM. He consented for IT Methotrexate and cytarabine.   -In addition, consolidation using XRT will be discussed in the future.     Plan:  -Consented for R-Anum, and IT methotrexate and cytarabine. Will need head CT for the IR procedure.   -R-ANUM c2d1 on 12/28. With GCSF support.   -RTC 4wk  -PAULA in 2 wk (mal hem) for count checks.     Time spent: 45min, >50% on counseling and care coordination.          Jeff Peters MD PhD

## 2024-01-05 NOTE — ASSESSMENT & PLAN NOTE
"#EMZL  -Reviewed PET/CT and MRI with the patient and wife. The results are consistent with stage IV disease, involving LN above and below the diaphragm, extensively axial and appendicular skeleton including bilateral scapulas, right clavicular head, T5 vertebral body, bilateral iliac bones, left ischium, and left femur without definite anatomic correlate (max SUV 7.6).  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   -Current data support treatment using multi-agent chemo. Bendamustine and rituximab have been approved and widely used, found to be safe and effective.   -Patient was disappointed to learn that EMZL such as his may not be curable. Was relieved to know that the treatment can bring multiple years of remission, improvement of strength / quality of life / work experience.   -Discussed the Aes of r-anum, which are numerous, including SAEs such as infection, or even death. Most AE and DUSTIN however can be resolved with effective treatments. He consented for R-anum.   -Due to the location of the disease and bone marrow uptake, he may be at high risk of CNS involvement / relapse. Will also do head CT and intrathecal treatment, timing likely after 2 cycles of R-ANUM. He consented for IT Methotrexate and cytarabine.   - IT chemotherapy discussed with patient. Rationale and basic plan provided. Discussed with Dr. Peters. Will plan for \"at least 2 IT chemotherapies\" assuming CSF is negative for disease.   -In addition, consolidation using XRT will be discussed in the future.      Plan:  -Previously consented for R-Anum, and IT methotrexate and cytarabine. Will need head CT for the IR procedure. Scheduled 1/22/24  -R-ANUM c2d1 on 12/28. With GCSF support. C3 to begin on 1/25/24.   -RTC 1/18 with Dr. Peters.   "

## 2024-01-10 ENCOUNTER — TREATMENT (OUTPATIENT)
Dept: PHYSICAL THERAPY | Facility: CLINIC | Age: 49
End: 2024-01-10
Payer: COMMERCIAL

## 2024-01-10 DIAGNOSIS — D49.2 THORACIC SPINE TUMOR: Primary | ICD-10-CM

## 2024-01-10 PROCEDURE — 97110 THERAPEUTIC EXERCISES: CPT | Mod: GP | Performed by: PHYSICAL THERAPIST

## 2024-01-10 ASSESSMENT — BALANCE ASSESSMENTS
PLACE ALTERNATE FOOT ON STEP OR STOOL WHILE STANDING UNSUPPORTED: ABLE TO STAND INDEPENDENTLY AND SAFELY AND COMPLETE 8 STEPS IN 20 SECONDS
TRANSFERS: ABLE TO TRANSFER SAFELY WITH MINOR USE OF HANDS
LONG VERSION TOTAL SCORE (MAX 56): 50
STANDING UNSUPPORTED WITH FEET TOGETHER: ABLE TO PLACE FEET TOGETHER INDEPENDENTLY AND STAND 1 MINUTE SAFELY
STANDING UNSUPPORTED WITH EYES CLOSED: ABLE TO STAND 10 SECONDS SAFELY
PLACE ALTERNATE FOOT ON STEP OR STOOL WHILE STANDING UNSUPPORTED: LOOKS BEHIND FROM BOTH SIDES AND WEIGHT SHIFTS WELL
STANDING UNSUPPORTED ONE FOOT IN FRONT: ABLE TO PLACE FOOT TANDEM INDEPENDENTLY AND HOLD 30 SECONDS
STANDING UNSUPPORTED: ABLE TO STAND SAFELY FOR 2 MINUTES
STANDING ON ONE LEG: ABLE TO LIFT LEG INDEPENDENTLY AND HOLD 5-10 SECONDS
TURN 360 DEGREES: ABLE TO TURN 360 DEGREES SAFELY ONE SIDE ONLY 4 SECONDS OR LESS
STANDING TO SITTING: CONTROLS DESCENT BY USING HANDS
PICK UP OBJECT FROM THE FLOOR FROM A STANDING POSITION: ABLE TO PICK UP SLIPPER BUT NEEDS SUPERVISION
STANDING TO SITTING: ABLE TO STAND INDEPENDENTLY USING HANDS
SITTING WITH BACK UNSUPPORTED BUT FEET SUPPORTED ON FLOOR OR ON A STOOL: ABLE TO SIT SAFELY AND SECURELY FOR 2 MINUTES
REACHING FORWARD WITH OUTSTRETCHED ARM WHILE STANDING: CAN REACH FORWARD 12 CM (5 INCHES)

## 2024-01-10 ASSESSMENT — PAIN - FUNCTIONAL ASSESSMENT: PAIN_FUNCTIONAL_ASSESSMENT: 0-10

## 2024-01-10 ASSESSMENT — PAIN SCALES - GENERAL: PAINLEVEL_OUTOF10: 2

## 2024-01-10 NOTE — PROGRESS NOTES
PHYSICAL THERAPY TREATMENT    Patient name:  Dann Ni    MRN:  [id number]    :  1975    Today's Date:  01/10/24    Referral by:  Referred By: Dann Avila PA-C    Diagnoses:  Diagnosis and Precautions: Diagnosis D49.2 (ICD-10-CM) - Thoracic spine tumor    Goals:    By the end of 12 visits patient will be able to do the following with < 1/10 thoracic spine pain:    HEP:  Patient will consistently perform his home exercise program for 20-30 minute sessions, 1-2x/day, 3-4 days/week independently by the end of 12 visits.  Progressing    Basic ADL's:   Patient will perform bADL's/instrumental ADL's for 30 minutes moving between various closed kinetic chain postures. Progressing    ROM and Strength:  Patient will demonstrate 5/5 bilateral shoulder/hip strength in all planes to improve their ability to lift, stand, ambulate and perform basic ADL's.  Progressing    Stair Negotiation:  Patient will be able to ambulate up/down stairs for 1-2 flights at a time.  Progressing    Gait/Locomotion:  Patient will be able to ambulate for 30-60 minutes at a time.  Progressing  Minimal to no gait deviation across level ground/stairs.    Patient will demonstrate > 8 point increase on the Barajas Balance Scale and be a low fall risk.  Met    Work:  Patient will return to work and perform normal work duties as per MD clearance.  Met    Sleep:  Patient will sleep thru the night 4/7 nights/week.  Progressing    Participation restrictions:  Decrease Oswestry to < or = to 0% for increased functional ability.  Progressing    Pain:  Decrease pain at worst to < or = to 1/10 for improved QOL and ability to sleep.  Progressing    Assessment/Plan:   Patient presents today 14 weeks s/p Laminectomy Thoracic with Excision Lesion 10/2/2023, Dr. Marin.  Patient progressing well overall with regards to his back rehab after surgery.  Patient demonstrates need for continued skilled PT in order to improve overall endurance, strength, functional  activity level.  Therapeutic exercise performed in order to improve core strength/endurance.  Decreased intensity of exercise today with reduced volume after patient quite fatigued after having chemo therapy treatment late last week.  Patient challenged with exercises performed in clinic secondary to core fatigue.     PT Assessment  PT Assessment Results: Decreased strength, Decreased endurance  Rehab Prognosis: Good  Evaluation/Treatment Tolerance: Patient limited by fatigue  Plan:  Progress as tolerated. Continue 1x/week x 12 more visits    General Visit Information  PT  Visit  PT Received On: 01/10/24     General  Reason for Referral: PT Evaluate and Treat  Referred By: Dann Avila PA-C  General Comment: Diagnosis D49.2 (ICD-10-CM) - Thoracic spine tumor (Laminectomy Thoracic with Excision Lesion 10/2/2023, Dr. Marin)    Insurance Reviewed  Name of insurance:  [facility]  Visit #:   12/12  60 visits per calendar year  BACK SX 10/2; 500 DED IS MET 1000 FAM DED 2000 OOP IS MET 4000 FAM, OOP 0% COINSUR $20 COPAY (DO NOT COLLECT SINCE DED + OOP ALREADY MET FOR 2023) 60V CY (NONE USED FOR 2023) PER [email or web address] [id number] // Marilynn confirmed 11/13/23 5:41pm     Subjective:  Patient presents today 14 weeks s/p Laminectomy Thoracic with Excision Lesion 10/2/2023, Dr. Marin.    Precautions  STEADI Fall Risk Score (The score of 4 or more indicates an increased risk of falling): 8  LE Weight Bearing Status: Weight Bearing as Tolerated  Medical Precautions:  (Laminectomy Thoracic with Excision Lesion 10/2/2023, Dr. Marin, Tumor thoracic spine, Fractured L wrist as a kid, asthma, cancer)  Post-Surgical Precautions: Spinal precautions (20 lb. lifting restriction)    Pain Assessment  Pain Assessment: 0-10  Pain Score: 2  Pain Location: Back  Pain Orientation: Posterior    Objective:  Restrictions as per MD's Protocol if surgical:  As per patient no lifting > 10#, no doing dishes, no vacuuming, no laundry  (From Dann Avila PA-C from  secure chat) Maybe avoid significant bending and twisting. Also slowly ramp up lifting     Weightbearing Status:  WBAT    Any Pre-Cautions:  Low fall risk    Skin:  spinal surgical incision over thoracic spine healed and closed, no active signs of infection    Palpation:   no point tenderness over thoracic spine    Sensation:  Patient denies numbness/tingling of bilateral upper extremities.      Gait:  cane assist, WBAT    ROM:  AROM  Thoracic spine, lumbar spine not tested  KNEE AROM WNL'S R AND L, HIP AROM WNL'S R AND L, ANKLE AROM WNL'S R AND L, SHOULDER AROM WNLS R AND L, and ELBOW AROM WNLS' R AND L    Strength:    R hip 4+/5 all planes, L hip 4/5 all planes  R knee 4+/5 all planes, L knee 4+/5 all planes  R shoulder 4+/5 all planes, L shoulder 4+/5 all planes  R elbow 4+/5 all planes, L elbow 4+/5 all planes    Outcome measures     Barajas Balance Scale  1. Sitting to Standing: Able to stand independently using hands  2. Standing Unsupported: Able to stand safely for 2 minutes  3. Sitting with Back Unsupported but Feet Supported on Floor or on a Stool: Able to sit safely and securely for 2 minutes  4. Standing to Sitting: Controls descent by using hands  5.  Transfers: Able to transfer safely with minor use of hands  6. Standing Unsupported with Eyes Closed: Able to stand 10 seconds safely  7. Standing Unsupported with Feet Together: Able to place feet together independently and stand 1 minute safely  8. Reach Forward with Outstretched Arm While Standing: Can reach forward 12 cm (5 inches)  9.  Object from Floor from a Standing Position: Able to  slipper but needs supervision  10. Turning to Look Behind Over Left and Right Shoulders While Standing: Looks behind from both sides and weight shifts well  11. Turn 360 Degrees: Able to turn 360 degrees safely one side only 4 seconds or less  12. Place Alternate Foot on Step or Stool While Standing Unsupported: Able to stand independently and safely and  complete 8 steps in 20 seconds  13. Standing Unsupported One Foot in Front: Able to place foot tandem independently and hold 30 seconds  14. Standing on One Leg: Able to lift leg independently and hold 5-10 seconds  Barajas Balance Score: 50  Other Measures  Oswestry Disablity Index (JASMYN): 4%     Treatments  Therapeutic Exercise  Therapeutic Exercise Performed: Yes  Therapeutic Exercise Activity 1: UBE 4' FWD/4' BWD 2.0 resistance, sitting  Therapeutic Exercise Activity 2: TB rows blue x 30 reps  Therapeutic Exercise Activity 3: TB shoulder extension blue x 30 reps  Therapeutic Exercise Activity 4: TB Palloff press blue x 30 reps R and L  Therapeutic Exercise Activity 5: Standing Hip PRE's flexion/abduction/extension x 30 reps R and L  Therapeutic Exercise Activity 6: Marching x 30 reps  Therapeutic Exercise Activity 7: Mini-Squats x 30 reps    Time in clinic started at 4:15pm  Time in clinic ended at 5pm  Total time in clinic is .    45 minutes  Total timed code time is    38 minutes    Treatment Performed Today/Billing:  Therapeutic Exercise x 3 units

## 2024-01-15 ENCOUNTER — LAB (OUTPATIENT)
Dept: LAB | Facility: LAB | Age: 49
End: 2024-01-15
Payer: COMMERCIAL

## 2024-01-15 DIAGNOSIS — C85.80 MARGINAL ZONE LYMPHOMA (MULTI): ICD-10-CM

## 2024-01-15 LAB
ALBUMIN SERPL BCP-MCNC: 4.4 G/DL (ref 3.4–5)
ALP SERPL-CCNC: 100 U/L (ref 33–120)
ALT SERPL W P-5'-P-CCNC: 20 U/L (ref 10–52)
ANION GAP SERPL CALC-SCNC: 12 MMOL/L (ref 10–20)
AST SERPL W P-5'-P-CCNC: 25 U/L (ref 9–39)
BASOPHILS # BLD AUTO: 0.07 X10*3/UL (ref 0–0.1)
BASOPHILS NFR BLD AUTO: 1 %
BILIRUB SERPL-MCNC: 0.5 MG/DL (ref 0–1.2)
BUN SERPL-MCNC: 14 MG/DL (ref 6–23)
CALCIUM SERPL-MCNC: 9.1 MG/DL (ref 8.6–10.3)
CHLORIDE SERPL-SCNC: 103 MMOL/L (ref 98–107)
CO2 SERPL-SCNC: 27 MMOL/L (ref 21–32)
CREAT SERPL-MCNC: 0.97 MG/DL (ref 0.5–1.3)
EGFRCR SERPLBLD CKD-EPI 2021: >90 ML/MIN/1.73M*2
EOSINOPHIL # BLD AUTO: 0.15 X10*3/UL (ref 0–0.7)
EOSINOPHIL NFR BLD AUTO: 2.2 %
ERYTHROCYTE [DISTWIDTH] IN BLOOD BY AUTOMATED COUNT: 14.4 % (ref 11.5–14.5)
GLUCOSE SERPL-MCNC: 117 MG/DL (ref 74–99)
HCT VFR BLD AUTO: 40.3 % (ref 41–52)
HGB BLD-MCNC: 13.6 G/DL (ref 13.5–17.5)
IMM GRANULOCYTES # BLD AUTO: 0.03 X10*3/UL (ref 0–0.7)
IMM GRANULOCYTES NFR BLD AUTO: 0.4 % (ref 0–0.9)
LDH SERPL L TO P-CCNC: 204 U/L (ref 84–246)
LYMPHOCYTES # BLD AUTO: 0.45 X10*3/UL (ref 1.2–4.8)
LYMPHOCYTES NFR BLD AUTO: 6.5 %
MCH RBC QN AUTO: 28.3 PG (ref 26–34)
MCHC RBC AUTO-ENTMCNC: 33.7 G/DL (ref 32–36)
MCV RBC AUTO: 84 FL (ref 80–100)
MONOCYTES # BLD AUTO: 1.07 X10*3/UL (ref 0.1–1)
MONOCYTES NFR BLD AUTO: 15.5 %
NEUTROPHILS # BLD AUTO: 5.15 X10*3/UL (ref 1.2–7.7)
NEUTROPHILS NFR BLD AUTO: 74.4 %
NRBC BLD-RTO: 0 /100 WBCS (ref 0–0)
PLATELET # BLD AUTO: 252 X10*3/UL (ref 150–450)
POTASSIUM SERPL-SCNC: 4.1 MMOL/L (ref 3.5–5.3)
PROT SERPL-MCNC: 6.6 G/DL (ref 6.4–8.2)
RBC # BLD AUTO: 4.8 X10*6/UL (ref 4.5–5.9)
SODIUM SERPL-SCNC: 138 MMOL/L (ref 136–145)
URATE SERPL-MCNC: 6.3 MG/DL (ref 4–7.5)
WBC # BLD AUTO: 6.9 X10*3/UL (ref 4.4–11.3)

## 2024-01-15 PROCEDURE — 84550 ASSAY OF BLOOD/URIC ACID: CPT

## 2024-01-15 PROCEDURE — 80053 COMPREHEN METABOLIC PANEL: CPT

## 2024-01-15 PROCEDURE — 83615 LACTATE (LD) (LDH) ENZYME: CPT

## 2024-01-15 PROCEDURE — 85025 COMPLETE CBC W/AUTO DIFF WBC: CPT

## 2024-01-15 PROCEDURE — 36415 COLL VENOUS BLD VENIPUNCTURE: CPT

## 2024-01-18 ENCOUNTER — OFFICE VISIT (OUTPATIENT)
Dept: HEMATOLOGY/ONCOLOGY | Facility: HOSPITAL | Age: 49
End: 2024-01-18
Payer: COMMERCIAL

## 2024-01-18 VITALS
SYSTOLIC BLOOD PRESSURE: 133 MMHG | DIASTOLIC BLOOD PRESSURE: 72 MMHG | OXYGEN SATURATION: 99 % | BODY MASS INDEX: 38.61 KG/M2 | TEMPERATURE: 97.7 F | HEART RATE: 83 BPM | RESPIRATION RATE: 16 BRPM | WEIGHT: 253.9 LBS

## 2024-01-18 DIAGNOSIS — C85.80 MARGINAL ZONE LYMPHOMA (MULTI): Primary | ICD-10-CM

## 2024-01-18 PROCEDURE — 99215 OFFICE O/P EST HI 40 MIN: CPT | Performed by: INTERNAL MEDICINE

## 2024-01-18 PROCEDURE — 3008F BODY MASS INDEX DOCD: CPT | Performed by: INTERNAL MEDICINE

## 2024-01-18 PROCEDURE — 1036F TOBACCO NON-USER: CPT | Performed by: INTERNAL MEDICINE

## 2024-01-18 RX ORDER — EPINEPHRINE 0.3 MG/.3ML
0.3 INJECTION SUBCUTANEOUS EVERY 5 MIN PRN
Status: CANCELLED | OUTPATIENT
Start: 2024-02-22

## 2024-01-18 RX ORDER — DIPHENHYDRAMINE HYDROCHLORIDE 50 MG/ML
50 INJECTION INTRAMUSCULAR; INTRAVENOUS AS NEEDED
Status: CANCELLED | OUTPATIENT
Start: 2024-02-23

## 2024-01-18 RX ORDER — PROCHLORPERAZINE MALEATE 10 MG
10 TABLET ORAL EVERY 6 HOURS PRN
Status: CANCELLED | OUTPATIENT
Start: 2024-02-22

## 2024-01-18 RX ORDER — PROCHLORPERAZINE EDISYLATE 5 MG/ML
10 INJECTION INTRAMUSCULAR; INTRAVENOUS EVERY 6 HOURS PRN
Status: CANCELLED | OUTPATIENT
Start: 2024-02-23

## 2024-01-18 RX ORDER — PALONOSETRON 0.05 MG/ML
0.25 INJECTION, SOLUTION INTRAVENOUS ONCE
Status: CANCELLED | OUTPATIENT
Start: 2024-02-22

## 2024-01-18 RX ORDER — FAMOTIDINE 10 MG/ML
20 INJECTION INTRAVENOUS ONCE AS NEEDED
Status: CANCELLED | OUTPATIENT
Start: 2024-02-23

## 2024-01-18 RX ORDER — EPINEPHRINE 0.3 MG/.3ML
0.3 INJECTION SUBCUTANEOUS EVERY 5 MIN PRN
Status: CANCELLED | OUTPATIENT
Start: 2024-02-23

## 2024-01-18 RX ORDER — DIPHENHYDRAMINE HYDROCHLORIDE 50 MG/ML
50 INJECTION INTRAMUSCULAR; INTRAVENOUS AS NEEDED
Status: CANCELLED | OUTPATIENT
Start: 2024-02-22

## 2024-01-18 RX ORDER — ALBUTEROL SULFATE 0.83 MG/ML
3 SOLUTION RESPIRATORY (INHALATION) AS NEEDED
Status: CANCELLED | OUTPATIENT
Start: 2024-02-23

## 2024-01-18 RX ORDER — PROCHLORPERAZINE EDISYLATE 5 MG/ML
10 INJECTION INTRAMUSCULAR; INTRAVENOUS EVERY 6 HOURS PRN
Status: CANCELLED | OUTPATIENT
Start: 2024-02-22

## 2024-01-18 RX ORDER — DIPHENHYDRAMINE HCL 50 MG
50 CAPSULE ORAL ONCE
Status: CANCELLED | OUTPATIENT
Start: 2024-02-22

## 2024-01-18 RX ORDER — ALBUTEROL SULFATE 0.83 MG/ML
3 SOLUTION RESPIRATORY (INHALATION) AS NEEDED
Status: CANCELLED | OUTPATIENT
Start: 2024-02-22

## 2024-01-18 RX ORDER — FAMOTIDINE 10 MG/ML
20 INJECTION INTRAVENOUS ONCE AS NEEDED
Status: CANCELLED | OUTPATIENT
Start: 2024-02-22

## 2024-01-18 RX ORDER — ACETAMINOPHEN 325 MG/1
650 TABLET ORAL ONCE
Status: CANCELLED | OUTPATIENT
Start: 2024-02-22

## 2024-01-18 RX ORDER — PROCHLORPERAZINE MALEATE 10 MG
10 TABLET ORAL EVERY 6 HOURS PRN
Status: CANCELLED | OUTPATIENT
Start: 2024-02-23

## 2024-01-18 ASSESSMENT — COLUMBIA-SUICIDE SEVERITY RATING SCALE - C-SSRS
1. IN THE PAST MONTH, HAVE YOU WISHED YOU WERE DEAD OR WISHED YOU COULD GO TO SLEEP AND NOT WAKE UP?: NO
2. HAVE YOU ACTUALLY HAD ANY THOUGHTS OF KILLING YOURSELF?: NO
6. HAVE YOU EVER DONE ANYTHING, STARTED TO DO ANYTHING, OR PREPARED TO DO ANYTHING TO END YOUR LIFE?: NO

## 2024-01-18 ASSESSMENT — PAIN SCALES - GENERAL: PAINLEVEL: 0-NO PAIN

## 2024-01-18 ASSESSMENT — PATIENT HEALTH QUESTIONNAIRE - PHQ9
2. FEELING DOWN, DEPRESSED OR HOPELESS: NOT AT ALL
SUM OF ALL RESPONSES TO PHQ9 QUESTIONS 1 AND 2: 0
1. LITTLE INTEREST OR PLEASURE IN DOING THINGS: NOT AT ALL

## 2024-01-18 ASSESSMENT — ENCOUNTER SYMPTOMS
DEPRESSION: 0
OCCASIONAL FEELINGS OF UNSTEADINESS: 1
LOSS OF SENSATION IN FEET: 1

## 2024-01-22 ENCOUNTER — ANCILLARY PROCEDURE (OUTPATIENT)
Dept: RADIOLOGY | Facility: CLINIC | Age: 49
End: 2024-01-22
Payer: COMMERCIAL

## 2024-01-22 ENCOUNTER — TREATMENT (OUTPATIENT)
Dept: PHYSICAL THERAPY | Facility: CLINIC | Age: 49
End: 2024-01-22
Payer: COMMERCIAL

## 2024-01-22 DIAGNOSIS — D49.2 THORACIC SPINE TUMOR: Primary | ICD-10-CM

## 2024-01-22 DIAGNOSIS — C85.80 MARGINAL ZONE LYMPHOMA (MULTI): ICD-10-CM

## 2024-01-22 PROCEDURE — 70450 CT HEAD/BRAIN W/O DYE: CPT | Performed by: RADIOLOGY

## 2024-01-22 PROCEDURE — 97110 THERAPEUTIC EXERCISES: CPT | Mod: GP | Performed by: PHYSICAL THERAPIST

## 2024-01-22 PROCEDURE — 70450 CT HEAD/BRAIN W/O DYE: CPT

## 2024-01-22 ASSESSMENT — PAIN SCALES - GENERAL: PAINLEVEL_OUTOF10: 0 - NO PAIN

## 2024-01-22 ASSESSMENT — PAIN - FUNCTIONAL ASSESSMENT: PAIN_FUNCTIONAL_ASSESSMENT: 0-10

## 2024-01-22 NOTE — PROGRESS NOTES
PHYSICAL THERAPY TREATMENT    Patient name:  Dann Ni    MRN:  [id number]    :  1975    Today's Date:  24    Referral by:  Referred By: Dann Avila PA-C    Diagnoses:  Diagnosis and Precautions: Diagnosis D49.2 (ICD-10-CM) - Thoracic spine tumor    Assessment/Plan:   Therapeutic exercise performed in order to improve core strength/endurance.  Decreased intensity of exercise today with reduced volume after patient quite fatigued after having chemo therapy treatment late last week.  Patient challenged with exercises performed in clinic secondary to core fatigue.     PT Assessment  PT Assessment Results: Decreased strength, Decreased endurance  Rehab Prognosis: Good  Evaluation/Treatment Tolerance: Patient limited by fatigue  Plan:  Progress as tolerated.     General Visit Information  PT  Visit  PT Received On: 24     General  Reason for Referral: PT Evaluate and Treat  Referred By: Dann Avila PA-C  General Comment: Diagnosis D49.2 (ICD-10-CM) - Thoracic spine tumor (Laminectomy Thoracic with Excision Lesion 10/2/2023, Dr. Marin)    Insurance Reviewed  Name of insurance:  [facility]  Visit #:     60 visits per calendar year  BACK SX 10/2; 500 DED IS MET 1000 FAM DED 2000 OOP IS MET 4000 FAM, OOP 0% COINSUR $20 COPAY (DO NOT COLLECT SINCE DED + OOP ALREADY MET FOR ) 60V CY (NONE USED FOR ) PER [email or web address] [id number] // Marilynn confirmed 23 5:41pm     Subjective:  Patient reports that he's just a little tired from work all day.  Patient presents today 16 weeks s/p Laminectomy Thoracic with Excision Lesion 10/2/2023, Dr. Marin.    Precautions  STEADI Fall Risk Score (The score of 4 or more indicates an increased risk of falling): 8  LE Weight Bearing Status: Weight Bearing as Tolerated  Medical Precautions:  (Laminectomy Thoracic with Excision Lesion 10/2/2023, Dr. Marin, Tumor thoracic spine, Fractured L wrist as a kid, asthma, cancer)  Post-Surgical Precautions: Spinal precautions  (20 lb. lifting restriction)    Pain Assessment  Pain Assessment: 0-10  Pain Score: 0 - No pain  Pain Location: Back  Pain Orientation: Posterior        Treatments  Therapeutic Exercise  Therapeutic Exercise Performed: Yes  Therapeutic Exercise Activity 1: UBE 4' FWD/4' BWD 2.0 resistance, sitting  Therapeutic Exercise Activity 2: TB rows silver x 30 reps  Therapeutic Exercise Activity 3: TB shoulder extension silver x 30 reps  Therapeutic Exercise Activity 4: TB Palloff press silver x 30 reps R and L  Therapeutic Exercise Activity 5: TB Lifts grey x 30 reps R and L  Therapeutic Exercise Activity 6: TB chops silver x 30 reps R and L  Therapeutic Exercise Activity 7: Mini-Squats x 30 reps  Therapeutic Exercise Activity 8: Forward step-ups 6 inch step, x 20 reps R and L  Therapeutic Exercise Activity 9: Lateral step-ups 6 inch step, x 20 reps R and L  Therapeutic Exercise Activity 10: quadruped thoracic flexion/extension with rotation x 10 reps R and L  Therapeutic Exercise Activity 11: quadruped bird dog x 15 reps each diagonal  Therapeutic Exercise Activity 12: supine bridges x 30 reps  Therapeutic Exercise Activity 13: supine reverse crunch x 30 reps    Time in clinic started at 5:25pm  Time in clinic ended at 6:08pm  Total time in clinic is .     43 minutes  Total timed code time is    38 minutes    Treatment Performed Today/Billing:  Therapeutic Exercise x 3 units

## 2024-01-23 ENCOUNTER — TELEPHONE (OUTPATIENT)
Dept: ADMISSION | Facility: HOSPITAL | Age: 49
End: 2024-01-23
Payer: COMMERCIAL

## 2024-01-23 DIAGNOSIS — C85.80 MARGINAL ZONE LYMPHOMA (MULTI): Primary | ICD-10-CM

## 2024-01-23 NOTE — TELEPHONE ENCOUNTER
Call placed to patient and reviewed Dr. Peters's orders for his plan of care.   01/25 Chemo-Rituximab/bendamustine  01/26-Chemo-Bendamustine/Onpro  02/16- Labs  02/19-IT treatment with IR.   Patient is able to verbalize understanding, and transferred to the scheduling dept.

## 2024-01-23 NOTE — TELEPHONE ENCOUNTER
Patient called in regarding his plan of care. Patient had a CT scan on 01/22/24.  Patient was scheduled for IT chemo of the spine. Patient states that he received a call today from scheduling, stating that he no longer needed the appt.   Patient would like to discuss with the team.  Secure chat to team.

## 2024-01-25 ENCOUNTER — TELEPHONE (OUTPATIENT)
Dept: ADMISSION | Facility: HOSPITAL | Age: 49
End: 2024-01-25

## 2024-01-25 ENCOUNTER — APPOINTMENT (OUTPATIENT)
Dept: HEMATOLOGY/ONCOLOGY | Facility: HOSPITAL | Age: 49
End: 2024-01-25
Payer: COMMERCIAL

## 2024-01-25 ENCOUNTER — INFUSION (OUTPATIENT)
Dept: HEMATOLOGY/ONCOLOGY | Facility: HOSPITAL | Age: 49
End: 2024-01-25
Payer: COMMERCIAL

## 2024-01-25 VITALS
WEIGHT: 255 LBS | HEART RATE: 86 BPM | DIASTOLIC BLOOD PRESSURE: 88 MMHG | SYSTOLIC BLOOD PRESSURE: 126 MMHG | TEMPERATURE: 97.7 F | BODY MASS INDEX: 38.77 KG/M2 | OXYGEN SATURATION: 100 % | RESPIRATION RATE: 16 BRPM

## 2024-01-25 DIAGNOSIS — C85.80 MARGINAL ZONE LYMPHOMA (MULTI): ICD-10-CM

## 2024-01-25 DIAGNOSIS — C85.80 MARGINAL ZONE LYMPHOMA (MULTI): Primary | ICD-10-CM

## 2024-01-25 LAB
ALBUMIN SERPL BCP-MCNC: 4.2 G/DL (ref 3.4–5)
ALP SERPL-CCNC: 77 U/L (ref 33–120)
ALT SERPL W P-5'-P-CCNC: 30 U/L (ref 10–52)
ANION GAP SERPL CALC-SCNC: 14 MMOL/L (ref 10–20)
APTT PPP: 34 SECONDS (ref 27–38)
AST SERPL W P-5'-P-CCNC: 30 U/L (ref 9–39)
BASOPHILS # BLD AUTO: 0.06 X10*3/UL (ref 0–0.1)
BASOPHILS NFR BLD AUTO: 1.5 %
BILIRUB SERPL-MCNC: 0.5 MG/DL (ref 0–1.2)
BUN SERPL-MCNC: 15 MG/DL (ref 6–23)
CALCIUM SERPL-MCNC: 9 MG/DL (ref 8.6–10.3)
CHLORIDE SERPL-SCNC: 103 MMOL/L (ref 98–107)
CO2 SERPL-SCNC: 24 MMOL/L (ref 21–32)
CREAT SERPL-MCNC: 0.85 MG/DL (ref 0.5–1.3)
EGFRCR SERPLBLD CKD-EPI 2021: >90 ML/MIN/1.73M*2
EOSINOPHIL # BLD AUTO: 0.22 X10*3/UL (ref 0–0.7)
EOSINOPHIL NFR BLD AUTO: 5.6 %
ERYTHROCYTE [DISTWIDTH] IN BLOOD BY AUTOMATED COUNT: 14.4 % (ref 11.5–14.5)
GLUCOSE SERPL-MCNC: 168 MG/DL (ref 74–99)
HCT VFR BLD AUTO: 39.6 % (ref 41–52)
HGB BLD-MCNC: 13.3 G/DL (ref 13.5–17.5)
IMM GRANULOCYTES # BLD AUTO: 0.01 X10*3/UL (ref 0–0.7)
IMM GRANULOCYTES NFR BLD AUTO: 0.3 % (ref 0–0.9)
INR PPP: 1 (ref 0.9–1.1)
LDH SERPL L TO P-CCNC: 182 U/L (ref 84–246)
LYMPHOCYTES # BLD AUTO: 0.39 X10*3/UL (ref 1.2–4.8)
LYMPHOCYTES NFR BLD AUTO: 9.9 %
MCH RBC QN AUTO: 27.9 PG (ref 26–34)
MCHC RBC AUTO-ENTMCNC: 33.6 G/DL (ref 32–36)
MCV RBC AUTO: 83 FL (ref 80–100)
MONOCYTES # BLD AUTO: 0.81 X10*3/UL (ref 0.1–1)
MONOCYTES NFR BLD AUTO: 20.6 %
NEUTROPHILS # BLD AUTO: 2.44 X10*3/UL (ref 1.2–7.7)
NEUTROPHILS NFR BLD AUTO: 62.1 %
NRBC BLD-RTO: 0 /100 WBCS (ref 0–0)
PLATELET # BLD AUTO: 231 X10*3/UL (ref 150–450)
POTASSIUM SERPL-SCNC: 3.9 MMOL/L (ref 3.5–5.3)
PROT SERPL-MCNC: 6.7 G/DL (ref 6.4–8.2)
PROTHROMBIN TIME: 10.9 SECONDS (ref 9.8–12.8)
RBC # BLD AUTO: 4.77 X10*6/UL (ref 4.5–5.9)
SODIUM SERPL-SCNC: 137 MMOL/L (ref 136–145)
WBC # BLD AUTO: 3.9 X10*3/UL (ref 4.4–11.3)

## 2024-01-25 PROCEDURE — 2500000004 HC RX 250 GENERAL PHARMACY W/ HCPCS (ALT 636 FOR OP/ED): Mod: JZ | Performed by: INTERNAL MEDICINE

## 2024-01-25 PROCEDURE — 96411 CHEMO IV PUSH ADDL DRUG: CPT

## 2024-01-25 PROCEDURE — 83615 LACTATE (LD) (LDH) ENZYME: CPT

## 2024-01-25 PROCEDURE — 2500000004 HC RX 250 GENERAL PHARMACY W/ HCPCS (ALT 636 FOR OP/ED): Performed by: INTERNAL MEDICINE

## 2024-01-25 PROCEDURE — 85730 THROMBOPLASTIN TIME PARTIAL: CPT

## 2024-01-25 PROCEDURE — 96413 CHEMO IV INFUSION 1 HR: CPT

## 2024-01-25 PROCEDURE — 85025 COMPLETE CBC W/AUTO DIFF WBC: CPT

## 2024-01-25 PROCEDURE — 96375 TX/PRO/DX INJ NEW DRUG ADDON: CPT | Mod: INF

## 2024-01-25 PROCEDURE — 2500000001 HC RX 250 WO HCPCS SELF ADMINISTERED DRUGS (ALT 637 FOR MEDICARE OP): Performed by: INTERNAL MEDICINE

## 2024-01-25 PROCEDURE — 80053 COMPREHEN METABOLIC PANEL: CPT

## 2024-01-25 PROCEDURE — 96415 CHEMO IV INFUSION ADDL HR: CPT

## 2024-01-25 RX ORDER — PROCHLORPERAZINE MALEATE 10 MG
10 TABLET ORAL EVERY 6 HOURS PRN
Status: DISCONTINUED | OUTPATIENT
Start: 2024-01-25 | End: 2024-01-25 | Stop reason: HOSPADM

## 2024-01-25 RX ORDER — FAMOTIDINE 10 MG/ML
20 INJECTION INTRAVENOUS ONCE AS NEEDED
Status: DISCONTINUED | OUTPATIENT
Start: 2024-01-25 | End: 2024-01-25 | Stop reason: HOSPADM

## 2024-01-25 RX ORDER — PALONOSETRON 0.05 MG/ML
0.25 INJECTION, SOLUTION INTRAVENOUS ONCE
Status: COMPLETED | OUTPATIENT
Start: 2024-01-25 | End: 2024-01-25

## 2024-01-25 RX ORDER — ALBUTEROL SULFATE 0.83 MG/ML
3 SOLUTION RESPIRATORY (INHALATION) AS NEEDED
Status: DISCONTINUED | OUTPATIENT
Start: 2024-01-25 | End: 2024-01-25 | Stop reason: HOSPADM

## 2024-01-25 RX ORDER — DIPHENHYDRAMINE HYDROCHLORIDE 50 MG/ML
50 INJECTION INTRAMUSCULAR; INTRAVENOUS AS NEEDED
Status: DISCONTINUED | OUTPATIENT
Start: 2024-01-25 | End: 2024-01-25 | Stop reason: HOSPADM

## 2024-01-25 RX ORDER — EPINEPHRINE 0.3 MG/.3ML
0.3 INJECTION SUBCUTANEOUS EVERY 5 MIN PRN
Status: DISCONTINUED | OUTPATIENT
Start: 2024-01-25 | End: 2024-01-25 | Stop reason: HOSPADM

## 2024-01-25 RX ORDER — ACETAMINOPHEN 325 MG/1
650 TABLET ORAL ONCE
Status: COMPLETED | OUTPATIENT
Start: 2024-01-25 | End: 2024-01-25

## 2024-01-25 RX ORDER — DIPHENHYDRAMINE HCL 50 MG
50 CAPSULE ORAL ONCE
Status: COMPLETED | OUTPATIENT
Start: 2024-01-25 | End: 2024-01-25

## 2024-01-25 RX ORDER — PROCHLORPERAZINE EDISYLATE 5 MG/ML
10 INJECTION INTRAMUSCULAR; INTRAVENOUS EVERY 6 HOURS PRN
Status: DISCONTINUED | OUTPATIENT
Start: 2024-01-25 | End: 2024-01-25 | Stop reason: HOSPADM

## 2024-01-25 RX ADMIN — PALONOSETRON HYDROCHLORIDE 250 MCG: 0.25 INJECTION INTRAVENOUS at 11:12

## 2024-01-25 RX ADMIN — SODIUM CHLORIDE 855 MG: 9 INJECTION, SOLUTION INTRAVENOUS at 11:50

## 2024-01-25 RX ADMIN — ACETAMINOPHEN 650 MG: 325 TABLET ORAL at 11:12

## 2024-01-25 RX ADMIN — DEXAMETHASONE SODIUM PHOSPHATE 12 MG: 10 INJECTION, SOLUTION INTRAMUSCULAR; INTRAVENOUS at 11:12

## 2024-01-25 RX ADMIN — DIPHENHYDRAMINE HYDROCHLORIDE 50 MG: 50 CAPSULE ORAL at 11:12

## 2024-01-25 RX ADMIN — BENDAMUSTINE HYDROCHLORIDE 205 MG: 25 INJECTION, SOLUTION INTRAVENOUS at 11:33

## 2024-01-25 ASSESSMENT — PAIN SCALES - GENERAL: PAINLEVEL: 0-NO PAIN

## 2024-01-25 NOTE — TELEPHONE ENCOUNTER
Per Dr. Peters, a consult for IR was placed for intrathercal treatment for lymphoma to be done on 2/19. The patient has had multiple calls with IR who does not believe they can do this. Patient would like to know what to do from here. Message routed to Dr. Peters and his team

## 2024-01-25 NOTE — TELEPHONE ENCOUNTER
I called IR, the office was closed, I asked them to call our office to discuss or call patient to set up. Will try again tomorrow

## 2024-01-25 NOTE — PROGRESS NOTES
Patient ID: Dann Ni is a 48 y.o. male.  Referring Physician: No referring provider defined for this encounter.  Primary Care Provider: DO Leela Watson    The patient was in good health until July 2023. He developed back pain which did not respond to NSAID or steroid. By late 9/2023 he lost sensation below the diaphragm and were unable to walk. On 9/30 he had CT scan showing a 4.1x1.8x1cm paraspinal mass at the T7 level. MRI showed abnormal enhancing signals at the C5, T2-8 levels.  On 10/2/2023 he had a debulking surgery which later showed evidence of lymphoma. In the meantime he has improved sensation and is able to walk with a cane. 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 He was consented for rituximab bendamustine and started C1 on 11/30/2023. Tolerated relatively well, but had nausea and flu like symptoms for a few days after chemo. Since then he also completed C2 R stacey on 12/28, and is scheduled for C3 on Jan 25. No fever. Pain and leg strength improved.     Today the patient he feels walking is improving. Back pain also improves. No fever, wt loss, or nausea. He is back to work with some accomodation.            Objective   BSA: 2.35 meters squared  /72 (BP Location: Left arm, Patient Position: Sitting, BP Cuff Size: Large adult)   Pulse 83   Temp 36.5 °C (97.7 °F)   Resp 16   Wt 115 kg (253 lb 14.4 oz)   SpO2 99%   BMI 38.61 kg/m²     Family History   Problem Relation Name Age of Onset    Hip dysplasia Mother [place]     Arthritis Mother [place]     Other (htn) Father      Prostatitis Father      Lung cancer Maternal Grandmother      Kidney cancer Maternal Grandmother      Lung cancer Maternal Grandfather      Kidney cancer Maternal Grandfather      Dementia Other      Cancer Other      Dementia Other       Oncology History   Marginal zone lymphoma (CMS/HCC)   10/2/2023 Cancer Staged    Staging form: Hodgkin and Non-Hodgkin Lymphoma, AJCC 8th  Edition, Clinical stage from 10/2/2023: Stage IV (Marginal zone lymphoma) - Signed by Jeff Peters MD PhD on 11/25/2023 11/17/2023 Initial Diagnosis    Marginal zone lymphoma (CMS/HCC)     11/30/2023 -  Chemotherapy    Bendamustine + RiTUXimab, 28 Day Cycles         Dann Ni  reports that he has never smoked. He has never used smokeless tobacco.  He  reports that he does not currently use alcohol.  He  reports no history of drug use.    EXAM: No LAD. No splenomegaly. No skin lesions. Muscle power preserved in the UE but reduced in LLE; walk without assistance in slow pace; upper back surgical scar healed.  Other details below.  Physical Exam  Constitutional:       General: He is not in acute distress.     Appearance: He is not toxic-appearing.   HENT:      Head: Normocephalic.      Nose: Nose normal.      Mouth/Throat:      Mouth: Mucous membranes are moist.   Eyes:      Extraocular Movements: Extraocular movements intact.      Pupils: Pupils are equal, round, and reactive to light.   Cardiovascular:      Rate and Rhythm: Normal rate and regular rhythm.      Heart sounds: No murmur heard.  Pulmonary:      Effort: Pulmonary effort is normal.      Breath sounds: Normal breath sounds.   Abdominal:      General: Bowel sounds are normal.      Palpations: Abdomen is soft. There is no mass.      Tenderness: There is no abdominal tenderness. There is no rebound.   Musculoskeletal:         General: No swelling, tenderness, deformity or signs of injury.      Right lower leg: No edema.      Left lower leg: No edema.   Skin:     Coloration: Skin is not jaundiced.      Findings: No bruising, lesion or rash.   Neurological:      Mental Status: He is alert and oriented to person, place, and time.      Cranial Nerves: No cranial nerve deficit.      Motor: Weakness present.      Gait: Gait abnormal.   Psychiatric:         Mood and Affect: Mood normal.         Performance Status:  Symptomatic; fully  ambulatory    Assessment/Plan      #EMZL  -Reviewed PET/CT and MRI with the patient and wife. The results are consistent with stage IV disease, involving LN above and below the diaphragm, extensively axial and appendicular skeleton including bilateral scapulas, right clavicular head, T5 vertebral body, bilateral iliac bones, left ischium, and left femur without definite anatomic correlate (max SUV 7.6).  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   -Current data support treatment using multi-agent chemo. Bendamustine and rituximab have been approved and widely used, found to be safe and effective.   -Patient was disappointed to learn that EMZL such as his may not be curable. Was relieved to know that the treatment can bring multiple years of remission, improvement of strength / quality of life / work experience.   -Discussed the Aes of r-anum, which are numerous, including SAEs such as infection, or even death. Most AE and DUSTIN however can be resolved with effective treatments. He consented for R-anum.   -Due to the location of the disease and bone marrow uptake, he may be at high risk of CNS involvement / relapse. Schedule head CT and intrathecal treatment, timing likely after 2 cycles of R-ANUM. He consented for IT Methotrexate and cytarabine.   -In addition, consolidation using XRT will be discussed in the future.     Plan:  -C3 R-Anum on Jan 25 With GCSF support. Next due 2/22.  -head CT   - IT methotrexate and cytarabine on 2/19. Need PT PTT CBC on 2/16. Ordered.  -RTC 4wk      Time spent: 45min, >50% on counseling and care coordination.          Jeff Peters MD PhD

## 2024-01-26 ENCOUNTER — INFUSION (OUTPATIENT)
Dept: HEMATOLOGY/ONCOLOGY | Facility: HOSPITAL | Age: 49
End: 2024-01-26
Payer: COMMERCIAL

## 2024-01-26 VITALS
HEART RATE: 78 BPM | SYSTOLIC BLOOD PRESSURE: 120 MMHG | DIASTOLIC BLOOD PRESSURE: 86 MMHG | TEMPERATURE: 98.1 F | OXYGEN SATURATION: 100 % | RESPIRATION RATE: 18 BRPM | BODY MASS INDEX: 38.52 KG/M2 | WEIGHT: 253.31 LBS

## 2024-01-26 DIAGNOSIS — C85.80 MARGINAL ZONE LYMPHOMA (MULTI): ICD-10-CM

## 2024-01-26 PROCEDURE — 2500000004 HC RX 250 GENERAL PHARMACY W/ HCPCS (ALT 636 FOR OP/ED): Performed by: INTERNAL MEDICINE

## 2024-01-26 PROCEDURE — 96409 CHEMO IV PUSH SNGL DRUG: CPT

## 2024-01-26 PROCEDURE — 96377 APPLICATON ON-BODY INJECTOR: CPT

## 2024-01-26 PROCEDURE — 96372 THER/PROPH/DIAG INJ SC/IM: CPT | Performed by: INTERNAL MEDICINE

## 2024-01-26 RX ORDER — PROCHLORPERAZINE EDISYLATE 5 MG/ML
10 INJECTION INTRAMUSCULAR; INTRAVENOUS EVERY 6 HOURS PRN
Status: DISCONTINUED | OUTPATIENT
Start: 2024-01-26 | End: 2024-01-26 | Stop reason: HOSPADM

## 2024-01-26 RX ORDER — EPINEPHRINE 0.3 MG/.3ML
0.3 INJECTION SUBCUTANEOUS EVERY 5 MIN PRN
Status: DISCONTINUED | OUTPATIENT
Start: 2024-01-26 | End: 2024-01-26 | Stop reason: HOSPADM

## 2024-01-26 RX ORDER — DIPHENHYDRAMINE HYDROCHLORIDE 50 MG/ML
50 INJECTION INTRAMUSCULAR; INTRAVENOUS AS NEEDED
Status: DISCONTINUED | OUTPATIENT
Start: 2024-01-26 | End: 2024-01-26 | Stop reason: HOSPADM

## 2024-01-26 RX ORDER — PROCHLORPERAZINE MALEATE 10 MG
10 TABLET ORAL EVERY 6 HOURS PRN
Status: DISCONTINUED | OUTPATIENT
Start: 2024-01-26 | End: 2024-01-26 | Stop reason: HOSPADM

## 2024-01-26 RX ORDER — ALBUTEROL SULFATE 0.83 MG/ML
3 SOLUTION RESPIRATORY (INHALATION) AS NEEDED
Status: DISCONTINUED | OUTPATIENT
Start: 2024-01-26 | End: 2024-01-26 | Stop reason: HOSPADM

## 2024-01-26 RX ORDER — HEPARIN 100 UNIT/ML
500 SYRINGE INTRAVENOUS AS NEEDED
Status: CANCELLED | OUTPATIENT
Start: 2024-01-26

## 2024-01-26 RX ORDER — HEPARIN SODIUM,PORCINE/PF 10 UNIT/ML
50 SYRINGE (ML) INTRAVENOUS AS NEEDED
Status: CANCELLED | OUTPATIENT
Start: 2024-01-26

## 2024-01-26 RX ORDER — FAMOTIDINE 10 MG/ML
20 INJECTION INTRAVENOUS ONCE AS NEEDED
Status: DISCONTINUED | OUTPATIENT
Start: 2024-01-26 | End: 2024-01-26 | Stop reason: HOSPADM

## 2024-01-26 RX ADMIN — DEXAMETHASONE SODIUM PHOSPHATE 12 MG: 10 INJECTION, SOLUTION INTRAMUSCULAR; INTRAVENOUS at 15:24

## 2024-01-26 RX ADMIN — PEGFILGRASTIM 6 MG: KIT SUBCUTANEOUS at 16:41

## 2024-01-26 RX ADMIN — BENDAMUSTINE HYDROCHLORIDE 205 MG: 25 INJECTION, SOLUTION INTRAVENOUS at 15:58

## 2024-01-26 ASSESSMENT — PAIN SCALES - GENERAL: PAINLEVEL: 0-NO PAIN

## 2024-01-26 NOTE — PROGRESS NOTES
Patient arrived to infusion, accompanied by significant other and niece. IV access from 1/25 intact, blood return noted. Patient received bendamustine infusion, tolerated well. IV access removed, neulasta onpro administered to patient, patient and family educated about removal. Patient discharged from infusion with significant other and niece.

## 2024-02-07 ENCOUNTER — TREATMENT (OUTPATIENT)
Dept: PHYSICAL THERAPY | Facility: CLINIC | Age: 49
End: 2024-02-07
Payer: COMMERCIAL

## 2024-02-07 DIAGNOSIS — D49.2 THORACIC SPINE TUMOR: Primary | ICD-10-CM

## 2024-02-07 PROCEDURE — 97110 THERAPEUTIC EXERCISES: CPT | Mod: GP | Performed by: PHYSICAL THERAPIST

## 2024-02-07 ASSESSMENT — PAIN - FUNCTIONAL ASSESSMENT: PAIN_FUNCTIONAL_ASSESSMENT: 0-10

## 2024-02-07 NOTE — PROGRESS NOTES
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  Decreased intensity of exercise today with reduced volume after patient quite fatigued after having chemo therapy treatment late last week.  Patient challenged with exercises performed in clinic secondary to core fatigue.     PT Assessment  PT Assessment Results: Decreased strength, Decreased endurance  Rehab Prognosis: Good  Evaluation/Treatment Tolerance: Patient limited by fatigue  Plan:  Progress as tolerated.     General Visit Information  PT  Visit  PT Received On: 24     General  Reason for Referral: PT Evaluate and Treat  Referred By: Dann Avila PA-C  General Comment: Diagnosis D49.2 (ICD-10-CM) - Thoracic spine tumor (Laminectomy Thoracic with Excision Lesion 10/2/2023, Dr. Marin)    Insurance Reviewed  Name of insurance:  [facility]  Visit #:     60 visits per calendar year  BACK SX 10/2; 500 DED IS MET 1000 FAM DED 2000 OOP IS MET 4000 FAM, OOP 0% COINSUR $20 COPAY (DO NOT COLLECT SINCE DED + OOP ALREADY MET FOR ) 60V CY (NONE USED FOR ) PER [email or web address] [id number] // Marilynn confirmed 23 5:41pm     Subjective:    Precautions  STEADI Fall Risk Score (The score of 4 or more indicates an increased risk of falling): 8  LE Weight Bearing Status: Weight Bearing as Tolerated  Medical Precautions:  (Laminectomy Thoracic with Excision Lesion 10/2/2023, Dr. Marin, Tumor thoracic spine, Fractured L wrist as a kid, asthma, cancer)  Post-Surgical Precautions: Spinal precautions (20 lb. lifting restriction)    Pain Assessment  Pain Assessment: 0-10  Pain Location: Back  Pain Orientation: Posterior        Treatments  Therapeutic  "Exercise  Therapeutic Exercise Performed: Yes  Therapeutic Exercise Activity 1: UBE 4' FWD/4' BWD 2.0 resistance, sitting  Therapeutic Exercise Activity 2: TB rows silver x 30 reps  Therapeutic Exercise Activity 3: TB shoulder extension silver x 30 reps  Therapeutic Exercise Activity 4: TB Palloff press silver x 30 reps R and L  Therapeutic Exercise Activity 5: TB Lifts grey x 30 reps R and L  Therapeutic Exercise Activity 6: TB chops silver x 30 reps R and L  Therapeutic Exercise Activity 7: prone plank on knees 10\"x10 reps  Therapeutic Exercise Activity 8: side plank on knees 10\"x5 reps R and L  Therapeutic Exercise Activity 9: quadruped bird dog x 15 reps each diagonal  Therapeutic Exercise Activity 10: quadruped thoracic flexion/extension with rotation x 10 reps R and L    Time in clinic started at 4:15pm  Time in clinic ended at 4:55pm  Total time in clinic is .      40 minutes  Total timed code time is    38 minutes    Treatment Performed Today/Billing:  Therapeutic Exercise x 3 units  "

## 2024-02-08 DIAGNOSIS — C85.80 MARGINAL ZONE LYMPHOMA (MULTI): Primary | ICD-10-CM

## 2024-02-09 ENCOUNTER — TELEPHONE (OUTPATIENT)
Dept: ADMISSION | Facility: HOSPITAL | Age: 49
End: 2024-02-09
Payer: COMMERCIAL

## 2024-02-09 NOTE — TELEPHONE ENCOUNTER
Pt's employer is requesting interval documentation about him remaining on light duty rather than the form he received requesting light duty for 1 yr.  He will bring updated form to his 2/22/24 appt for completion.

## 2024-02-13 ENCOUNTER — APPOINTMENT (OUTPATIENT)
Dept: CARDIOLOGY | Facility: HOSPITAL | Age: 49
End: 2024-02-13
Payer: COMMERCIAL

## 2024-02-13 ENCOUNTER — APPOINTMENT (OUTPATIENT)
Dept: RADIOLOGY | Facility: HOSPITAL | Age: 49
End: 2024-02-13
Payer: COMMERCIAL

## 2024-02-13 ENCOUNTER — HOSPITAL ENCOUNTER (EMERGENCY)
Facility: HOSPITAL | Age: 49
Discharge: HOME | End: 2024-02-13
Attending: STUDENT IN AN ORGANIZED HEALTH CARE EDUCATION/TRAINING PROGRAM
Payer: COMMERCIAL

## 2024-02-13 VITALS
WEIGHT: 252 LBS | RESPIRATION RATE: 20 BRPM | HEIGHT: 68 IN | OXYGEN SATURATION: 96 % | SYSTOLIC BLOOD PRESSURE: 130 MMHG | HEART RATE: 111 BPM | DIASTOLIC BLOOD PRESSURE: 82 MMHG | BODY MASS INDEX: 38.19 KG/M2 | TEMPERATURE: 100.2 F

## 2024-02-13 DIAGNOSIS — J10.1 INFLUENZA A: Primary | ICD-10-CM

## 2024-02-13 LAB
ALBUMIN SERPL BCP-MCNC: 4.1 G/DL (ref 3.4–5)
ALP SERPL-CCNC: 89 U/L (ref 33–120)
ALT SERPL W P-5'-P-CCNC: 31 U/L (ref 10–52)
ANION GAP SERPL CALC-SCNC: 14 MMOL/L (ref 10–20)
APPEARANCE UR: CLEAR
AST SERPL W P-5'-P-CCNC: 36 U/L (ref 9–39)
BASOPHILS # BLD AUTO: 0.05 X10*3/UL (ref 0–0.1)
BASOPHILS NFR BLD AUTO: 0.6 %
BILIRUB SERPL-MCNC: 0.5 MG/DL (ref 0–1.2)
BILIRUB UR STRIP.AUTO-MCNC: NEGATIVE MG/DL
BUN SERPL-MCNC: 10 MG/DL (ref 6–23)
CALCIUM SERPL-MCNC: 9 MG/DL (ref 8.6–10.3)
CHLORIDE SERPL-SCNC: 103 MMOL/L (ref 98–107)
CO2 SERPL-SCNC: 24 MMOL/L (ref 21–32)
COLOR UR: YELLOW
CREAT SERPL-MCNC: 1.02 MG/DL (ref 0.5–1.3)
EGFRCR SERPLBLD CKD-EPI 2021: >90 ML/MIN/1.73M*2
EOSINOPHIL # BLD AUTO: 0.04 X10*3/UL (ref 0–0.7)
EOSINOPHIL NFR BLD AUTO: 0.5 %
ERYTHROCYTE [DISTWIDTH] IN BLOOD BY AUTOMATED COUNT: 14.6 % (ref 11.5–14.5)
FLUAV RNA RESP QL NAA+PROBE: DETECTED
FLUBV RNA RESP QL NAA+PROBE: NOT DETECTED
GLUCOSE SERPL-MCNC: 129 MG/DL (ref 74–99)
GLUCOSE UR STRIP.AUTO-MCNC: NEGATIVE MG/DL
HCT VFR BLD AUTO: 40.6 % (ref 41–52)
HGB BLD-MCNC: 13.3 G/DL (ref 13.5–17.5)
HOLD SPECIMEN: NORMAL
IMM GRANULOCYTES # BLD AUTO: 0.03 X10*3/UL (ref 0–0.7)
IMM GRANULOCYTES NFR BLD AUTO: 0.4 % (ref 0–0.9)
KETONES UR STRIP.AUTO-MCNC: NEGATIVE MG/DL
LACTATE SERPL-SCNC: 1.8 MMOL/L (ref 0.4–2)
LEUKOCYTE ESTERASE UR QL STRIP.AUTO: NEGATIVE
LYMPHOCYTES # BLD AUTO: 0.18 X10*3/UL (ref 1.2–4.8)
LYMPHOCYTES NFR BLD AUTO: 2.1 %
MCH RBC QN AUTO: 27.9 PG (ref 26–34)
MCHC RBC AUTO-ENTMCNC: 32.8 G/DL (ref 32–36)
MCV RBC AUTO: 85 FL (ref 80–100)
MONOCYTES # BLD AUTO: 1.03 X10*3/UL (ref 0.1–1)
MONOCYTES NFR BLD AUTO: 12.1 %
MRSA DNA SPEC QL NAA+PROBE: NOT DETECTED
NEUTROPHILS # BLD AUTO: 7.17 X10*3/UL (ref 1.2–7.7)
NEUTROPHILS NFR BLD AUTO: 84.3 %
NITRITE UR QL STRIP.AUTO: NEGATIVE
NRBC BLD-RTO: 0 /100 WBCS (ref 0–0)
PH UR STRIP.AUTO: 6 [PH]
PLATELET # BLD AUTO: 209 X10*3/UL (ref 150–450)
POTASSIUM SERPL-SCNC: 3.8 MMOL/L (ref 3.5–5.3)
PROT SERPL-MCNC: 7 G/DL (ref 6.4–8.2)
PROT UR STRIP.AUTO-MCNC: NEGATIVE MG/DL
RBC # BLD AUTO: 4.76 X10*6/UL (ref 4.5–5.9)
RBC # UR STRIP.AUTO: NEGATIVE /UL
RSV RNA RESP QL NAA+PROBE: NOT DETECTED
SARS-COV-2 RNA RESP QL NAA+PROBE: NOT DETECTED
SODIUM SERPL-SCNC: 137 MMOL/L (ref 136–145)
SP GR UR STRIP.AUTO: 1.02
UROBILINOGEN UR STRIP.AUTO-MCNC: <2 MG/DL
WBC # BLD AUTO: 8.5 X10*3/UL (ref 4.4–11.3)

## 2024-02-13 PROCEDURE — 80053 COMPREHEN METABOLIC PANEL: CPT | Performed by: STUDENT IN AN ORGANIZED HEALTH CARE EDUCATION/TRAINING PROGRAM

## 2024-02-13 PROCEDURE — 87075 CULTR BACTERIA EXCEPT BLOOD: CPT | Mod: 59 | Performed by: STUDENT IN AN ORGANIZED HEALTH CARE EDUCATION/TRAINING PROGRAM

## 2024-02-13 PROCEDURE — 96361 HYDRATE IV INFUSION ADD-ON: CPT

## 2024-02-13 PROCEDURE — 83605 ASSAY OF LACTIC ACID: CPT | Performed by: STUDENT IN AN ORGANIZED HEALTH CARE EDUCATION/TRAINING PROGRAM

## 2024-02-13 PROCEDURE — 87637 SARSCOV2&INF A&B&RSV AMP PRB: CPT | Performed by: EMERGENCY MEDICINE

## 2024-02-13 PROCEDURE — 93005 ELECTROCARDIOGRAM TRACING: CPT

## 2024-02-13 PROCEDURE — 71046 X-RAY EXAM CHEST 2 VIEWS: CPT | Performed by: RADIOLOGY

## 2024-02-13 PROCEDURE — 87640 STAPH A DNA AMP PROBE: CPT | Mod: 59 | Performed by: EMERGENCY MEDICINE

## 2024-02-13 PROCEDURE — 99285 EMERGENCY DEPT VISIT HI MDM: CPT | Performed by: STUDENT IN AN ORGANIZED HEALTH CARE EDUCATION/TRAINING PROGRAM

## 2024-02-13 PROCEDURE — 36415 COLL VENOUS BLD VENIPUNCTURE: CPT | Performed by: STUDENT IN AN ORGANIZED HEALTH CARE EDUCATION/TRAINING PROGRAM

## 2024-02-13 PROCEDURE — 96365 THER/PROPH/DIAG IV INF INIT: CPT

## 2024-02-13 PROCEDURE — 81003 URINALYSIS AUTO W/O SCOPE: CPT | Performed by: STUDENT IN AN ORGANIZED HEALTH CARE EDUCATION/TRAINING PROGRAM

## 2024-02-13 PROCEDURE — 99284 EMERGENCY DEPT VISIT MOD MDM: CPT | Mod: 25 | Performed by: STUDENT IN AN ORGANIZED HEALTH CARE EDUCATION/TRAINING PROGRAM

## 2024-02-13 PROCEDURE — 2500000004 HC RX 250 GENERAL PHARMACY W/ HCPCS (ALT 636 FOR OP/ED): Performed by: EMERGENCY MEDICINE

## 2024-02-13 PROCEDURE — 71046 X-RAY EXAM CHEST 2 VIEWS: CPT

## 2024-02-13 PROCEDURE — 96367 TX/PROPH/DG ADDL SEQ IV INF: CPT

## 2024-02-13 PROCEDURE — 85025 COMPLETE CBC W/AUTO DIFF WBC: CPT | Performed by: STUDENT IN AN ORGANIZED HEALTH CARE EDUCATION/TRAINING PROGRAM

## 2024-02-13 RX ORDER — VANCOMYCIN 2 GRAM/500 ML IN 0.9 % SODIUM CHLORIDE INTRAVENOUS
2 ONCE
Status: DISCONTINUED | OUTPATIENT
Start: 2024-02-13 | End: 2024-02-13

## 2024-02-13 RX ORDER — ACETAMINOPHEN 325 MG/1
650 TABLET ORAL ONCE
Status: COMPLETED | OUTPATIENT
Start: 2024-02-13 | End: 2024-02-13

## 2024-02-13 RX ORDER — OSELTAMIVIR PHOSPHATE 75 MG/1
75 CAPSULE ORAL EVERY 12 HOURS
Qty: 10 CAPSULE | Refills: 0 | Status: SHIPPED | OUTPATIENT
Start: 2024-02-13 | End: 2024-02-18

## 2024-02-13 RX ADMIN — SODIUM CHLORIDE 2052 ML: 9 INJECTION, SOLUTION INTRAVENOUS at 01:43

## 2024-02-13 RX ADMIN — PIPERACILLIN SODIUM AND TAZOBACTAM SODIUM 4.5 G: 4; .5 INJECTION, SOLUTION INTRAVENOUS at 01:45

## 2024-02-13 RX ADMIN — AZITHROMYCIN MONOHYDRATE 500 MG: 500 INJECTION, POWDER, LYOPHILIZED, FOR SOLUTION INTRAVENOUS at 03:41

## 2024-02-13 RX ADMIN — Medication 2 G: at 04:50

## 2024-02-13 RX ADMIN — ACETAMINOPHEN 650 MG: 325 TABLET ORAL at 01:44

## 2024-02-13 ASSESSMENT — COLUMBIA-SUICIDE SEVERITY RATING SCALE - C-SSRS
2. HAVE YOU ACTUALLY HAD ANY THOUGHTS OF KILLING YOURSELF?: NO
5. HAVE YOU STARTED TO WORK OUT OR WORKED OUT THE DETAILS OF HOW TO KILL YOURSELF? DO YOU INTEND TO CARRY OUT THIS PLAN?: NO
1. IN THE PAST MONTH, HAVE YOU WISHED YOU WERE DEAD OR WISHED YOU COULD GO TO SLEEP AND NOT WAKE UP?: NO
6. HAVE YOU EVER DONE ANYTHING, STARTED TO DO ANYTHING, OR PREPARED TO DO ANYTHING TO END YOUR LIFE?: NO

## 2024-02-13 ASSESSMENT — LIFESTYLE VARIABLES
EVER HAD A DRINK FIRST THING IN THE MORNING TO STEADY YOUR NERVES TO GET RID OF A HANGOVER: NO
EVER FELT BAD OR GUILTY ABOUT YOUR DRINKING: NO
HAVE PEOPLE ANNOYED YOU BY CRITICIZING YOUR DRINKING: NO
HAVE YOU EVER FELT YOU SHOULD CUT DOWN ON YOUR DRINKING: NO

## 2024-02-13 ASSESSMENT — PAIN SCALES - GENERAL: PAINLEVEL_OUTOF10: 0 - NO PAIN

## 2024-02-13 ASSESSMENT — PAIN - FUNCTIONAL ASSESSMENT: PAIN_FUNCTIONAL_ASSESSMENT: 0-10

## 2024-02-13 NOTE — ED TRIAGE NOTES
"Pt states,\" I have been having a dry cough, the lasted couple days, tonight I woke up feeling warm, checked temp 100.8. I am a cancer patient, lasted chemo treatments 1-25 & 26 -2024\".  "

## 2024-02-13 NOTE — DISCHARGE INSTRUCTIONS
Please return to the ER or seek immediate medical attention if you experience new or worsening chest pain, shortness of breath, fever of 38C (100.4) or higher, persistent vomiting, weakness, numbness, tingling, excessive sweating,  loss of motion in your arms or legs, fainting, vision changes, or any new or worsening symptoms.    You are welcome back any time. Thank you for entrusting your care to us, I hope we made your visit as pleasant as possible. Wishing you well!    Dr. Campbell

## 2024-02-13 NOTE — ED PROVIDER NOTES
"EMERGENCY DEPARTMENT ENCOUNTER      Pt Name: Dann Ni  MRN: [id number]  Birthdate 1975  Date of evaluation: 2/13/2024  Provider: Steven Campbell DO    CHIEF COMPLAINT       Chief Complaint   Patient presents with    Fever     Pt states,\" I have been having a dry cough, the lasted couple days, tonight I woke up feeling warm, checked temp 100.8. I am a cancer patient, lasted chemo treatments 1-25 & 26 -2024\".     HISTORY OF PRESENT ILLNESS    Dann Ni is a 48 y.o. year old male who presents to the ER for woke up hot. Clocked self at 100.8, called on call oncologist, was directed to ER.   Monday decided to not go to work  Sunday - cough, normal asthma cough  No nausea, vomiting, abdominal pain, chest pain, shortness of breath, syncope, appetite changes,, changes to urine or stool.  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    Loss of smell    Cough variant asthma 04/02/2019    Cough variant asthma    Diverticulitis 09/25/2023    Foot pain 09/25/2023    Other conditions influencing health status     No significant past surgical history    Other conditions influencing health status     No significant past medical history    Personal history of other diseases of the respiratory system 04/14/2019    History of bronchitis    Personal history of other drug therapy 10/23/2019    History of influenza vaccination    Personal history of other specified conditions 03/30/2019    History of persistent cough    Recurrent pneumonia 09/25/2023    Tendonitis 09/25/2023     CURRENT MEDICATIONS       Previous Medications    ACETAMINOPHEN (TYLENOL) 325 MG TABLET    Take as needed while having post operative pain    ALBUTEROL 90 MCG/ACTUATION INHALER   "  Inhale 2 puffs every 6 hours if needed for shortness of breath or wheezing.    BUDESONIDE-FORMOTEROL (SYMBICORT) 160-4.5 MCG/ACTUATION INHALER    Inhale 2 puffs 2 times a day.    CYCLOBENZAPRINE (FLEXERIL) 10 MG TABLET    Take 1 tablet (10 mg) by mouth 3 times a day as needed for muscle spasms.    ONDANSETRON (ZOFRAN) 8 MG TABLET    Take 1 tablet (8 mg) by mouth every 8 hours if needed for nausea or vomiting.    PROCHLORPERAZINE (COMPAZINE) 10 MG TABLET    Take 1 tablet (10 mg) by mouth every 6 hours if needed for nausea or vomiting.    ZOLPIDEM (AMBIEN) 10 MG TABLET    Take 1 tablet (10 mg) by mouth as needed at bedtime for sleep.     SURGICAL HISTORY       Past Surgical History:   Procedure Laterality Date    OTHER SURGICAL HISTORY  04/12/2019    No history of surgery     ALLERGIES     Fluocinolone and Ciprofloxacin  FAMILY HISTORY       Family History   Problem Relation Name Age of Onset    Hip dysplasia Mother [place]     Arthritis Mother [place]     Other (htn) Father      Prostatitis Father      Lung cancer Maternal Grandmother      Kidney cancer Maternal Grandmother      Lung cancer Maternal Grandfather      Kidney cancer Maternal Grandfather      Dementia Other      Cancer Other      Dementia Other       SOCIAL HISTORY       Social History     Tobacco Use    Smoking status: Never    Smokeless tobacco: Never   Substance Use Topics    Alcohol use: Not Currently     Comment: rarely    Drug use: Never     PHYSICAL EXAM  (up to 7 for level 4, 8 or more for level 5)     ED Triage Vitals [02/13/24 0055]   Temperature Heart Rate Respirations BP   (!) 39.1 °C (102.4 °F) (!) 117 (!) 24 (!) 185/84      Pulse Ox Temp Source Heart Rate Source Patient Position   96 % Tympanic Monitor Sitting      BP Location FiO2 (%)     Right arm --       Physical Exam  Vitals and nursing note reviewed.   Constitutional:       General: He is not in acute distress.     Appearance: Normal appearance. He is not ill-appearing.   HENT:      Head:  Normocephalic and atraumatic. No contusion or laceration.      Jaw: No trismus or malocclusion.      Right Ear: External ear normal.      Left Ear: External ear normal.      Mouth/Throat:      Mouth: Mucous membranes are moist.      Pharynx: Oropharynx is clear.   Eyes:      Extraocular Movements: Extraocular movements intact.      Pupils: Pupils are equal, round, and reactive to light.   Neck:      Trachea: No tracheal deviation.   Cardiovascular:      Rate and Rhythm: Regular rhythm. Tachycardia present.      Pulses: Normal pulses.   Pulmonary:      Effort: No respiratory distress.      Breath sounds: No wheezing, rhonchi or rales.   Chest:      Chest wall: No tenderness.   Abdominal:      General: Abdomen is flat. There is no distension.      Palpations: Abdomen is soft. There is no mass.      Tenderness: There is no abdominal tenderness. There is no right CVA tenderness or left CVA tenderness.   Musculoskeletal:         General: No tenderness or signs of injury.      Cervical back: Normal range of motion. No tenderness.      Right lower leg: No edema.      Left lower leg: No edema.   Skin:     Coloration: Skin is not jaundiced or pale.      Findings: No rash or wound.   Neurological:      General: No focal deficit present.      Mental Status: He is alert. Mental status is at baseline.      Sensory: Sensory deficit (dimished chest down) present.   Psychiatric:         Speech: Speech normal.         Behavior: Behavior normal.         Thought Content: Thought content does not include homicidal or suicidal ideation.         Judgment: Judgment normal.        DIAGNOSTIC RESULTS   LABS:  Labs Reviewed   CBC WITH AUTO DIFFERENTIAL - Abnormal       Result Value    WBC 8.5      nRBC 0.0      RBC 4.76      Hemoglobin 13.3 (*)     Hematocrit 40.6 (*)     MCV 85      MCH 27.9      MCHC 32.8      RDW 14.6 (*)     Platelets 209      Neutrophils % 84.3      Immature Granulocytes %, Automated 0.4      Lymphocytes % 2.1       Monocytes % 12.1      Eosinophils % 0.5      Basophils % 0.6      Neutrophils Absolute 7.17      Immature Granulocytes Absolute, Automated 0.03      Lymphocytes Absolute 0.18 (*)     Monocytes Absolute 1.03 (*)     Eosinophils Absolute 0.04      Basophils Absolute 0.05     COMPREHENSIVE METABOLIC PANEL - Abnormal    Glucose 129 (*)     Sodium 137      Potassium 3.8      Chloride 103      Bicarbonate 24      Anion Gap 14      Urea Nitrogen 10      Creatinine 1.02      eGFR >90      Calcium 9.0      Albumin 4.1      Alkaline Phosphatase 89      Total Protein 7.0      AST 36      Bilirubin, Total 0.5      ALT 31     SARS-COV-2 AND INFLUENZA A/B PCR - Abnormal    Flu A Result Detected (*)     Flu B Result Not Detected      Coronavirus 2019, PCR Not Detected      Narrative:     This assay has received FDA Emergency Use Authorization (EUA) and  is only authorized for the duration of time that circumstances exist to justify the authorization of the emergency use of in vitro diagnostic tests for the detection of SARS-CoV-2 virus and/or diagnosis of COVID-19 infection under section 564(b)(1) of the Act, 21 U.S.C. 360bbb-3(b)(1). Testing for SARS-CoV-2 is only recommended for patients who meet current clinical and/or epidemiological criteria as defined by federal, state, or local public health directives. This assay is an in vitro diagnostic nucleic acid amplification test for the qualitative detection of SARS-CoV-2, Influenza A, and Influenza B from nasopharyngeal specimens and has been validated for use at [facility]. Negative results do not preclude COVID-19 infections or Influenza A/B infections, and should not be used as the sole basis for diagnosis, treatment, or other management decisions. If Influenza A/B and RSV PCR results are negative, testing for Parainfluenza virus, Adenovirus and Metapneumovirus is routinely performed for [facility] pediatric oncology and intensive care inpatients, and is  available on other patients by placing an add-on request.    MRSA SURVEILLANCE FOR VANCOMYCIN DE-ESCALATION, PCR - Normal    MRSA PCR Not Detected      Narrative:     This assay is an FDA-approved in vitro diagnostic nucleic acid amplification test for the detection of methicillin-resistant Staphylococcus aureus (MRSA) DNA directly from nasal swabs in patients at risk for nasal colonization. MRSA NxG is intended to aid in the prevention and control of MRSA infections in healthcare settings. This assay is NOT intended to diagnose, guide, or monitor treatment for MRSA infections, or provide results of susceptibility to methicillin. A negative result does not preclude MRSA nasal colonization. Test performance has not been evaluated in patients less than two years of age.   LACTATE - Normal    Lactate 1.8      Narrative:     Venipuncture immediately after or during the administration of Metamizole may lead to falsely low results. Testing should be performed immediately  prior to Metamizole dosing.   URINALYSIS WITH REFLEX CULTURE AND MICROSCOPIC - Normal    Color, Urine Yellow      Appearance, Urine Clear      Specific Gravity, Urine 1.018      pH, Urine 6.0      Protein, Urine NEGATIVE      Glucose, Urine NEGATIVE      Blood, Urine NEGATIVE      Ketones, Urine NEGATIVE      Bilirubin, Urine NEGATIVE      Urobilinogen, Urine <2.0      Nitrite, Urine NEGATIVE      Leukocyte Esterase, Urine NEGATIVE     RSV PCR - Normal    RSV PCR Not Detected      Narrative:     This assay is an FDA-cleared, in vitro diagnostic nucleic acid amplification test for the detection of RSV from nasopharyngeal specimens, and has been validated for use at [facility]. Negative results do not preclude RSV infections, and should not be used as the sole basis for diagnosis, treatment, or other management decisions. If Influenza A/B and RSV PCR results are negative, testing for Parainfluenza virus, Adenovirus and  "Metapneumovirus is routinely performed for pediatric oncology and intensive care inpatients at [facility], and is available on other patients by placing an add-on request.       BLOOD CULTURE   BLOOD CULTURE   URINALYSIS WITH REFLEX CULTURE AND MICROSCOPIC    Narrative:     The following orders were created for panel order Urinalysis with Reflex Culture and Microscopic.  Procedure                               Abnormality         Status                     ---------                               -----------         ------                     Urinalysis with Reflex C...[[id number]]  Normal              Final result               Extra Urine Gray Tube[[id number]]                            In process                   Please view results for these tests on the individual orders.   EXTRA URINE GRAY TUBE     All other labs were within normal range or not returned as of this dictation.  Imaging  XR chest 2 views   Final Result   Asymmetric elevation of right diaphragm and mild basilar atelectasis.        MACRO:   None        Signed by: Shawn Bassett 2/13/2024 2:20 AM   Dictation workstation:   [id number]         Procedure  Procedures  EMERGENCY DEPARTMENT COURSE/MDM:   Medical Decision Making    Vitals:    Vitals:    02/13/24 0055   BP: (!) 185/84   BP Location: Right arm   Patient Position: Sitting   Pulse: (!) 117   Resp: (!) 24   Temp: (!) 39.1 °C (102.4 °F)   TempSrc: Tympanic   SpO2: 96%   Weight: 114 kg (252 lb)   Height: 1.727 m (5' 8\")     Dann Ni is a male 48 y.o. who presents to the ER for fever. On arrival the patients vital signs were: Febrile, Tachycardic, Hypertensive, Tachypneic, and Oxygenating well on room air. History obtained from: patient.  Septic workup initiated given SIRS with source of cough.  30 cc/kg NS provided along with empiric Zosyn azithromycin for pneumonia coverage.  60 mg acetaminophen provided for antipyresis.  Lab work notable for positive flu a result, remainder of lab work " unremarkable.  Chest x-ray did not show focal consolidation consistent with pneumonia.  UA negative for UTI.  On reassessment tachycardia improved, fever resolved.  Discussed with hematology oncology fellow who recommended shared decision making for final disposition, okay with discharge as long as the patient feels better after fluids and Tylenol.    ED Course as of 02/13/24 0500   Tue Feb 13, 2024   0330 On reassessment tachycardia improved, fever resolved [CB]   0347 Discussed with Heme-Onc fellow, Dr. Morales they recommended shared decision making for final disposition, okay with discharge if patient feels improved after fluids and Tylenol. [CB]      ED Course User Index  [CB] Steven M Adrian, DO         Diagnoses as of 02/13/24 0500   Influenza A       External Records Reviewed: I reviewed recent and relevant outside records including inpatient notes, outpatient records    Prescription Drug Consideration: Tamiflu offered, prescribed    Shared decision making for disposition  Patient and/or patient´s representative was counseled regarding labs, imaging, likely diagnosis. All questions were answered. Recommendation was made   for discharge home. The patient agreed and was discharged home in stable condition with appropriate relevant educational materials. Return precautions were provided which included new or worsening chest pain, shortness of breath, fever of 38C (100.4) or higher, persistent vomiting, weakness, numbness, tingling, excessive sweating,  loss of motion in your arms or legs, fainting, vision changes, or any new or worsening symptoms..     ED Medications administered this visit:    Medications   piperacillin-tazobactam-dextrose (Zosyn) IV 4.5 g (0 g intravenous Stopped 2/13/24 0215)   azithromycin (Zithromax) in dextrose 5 % in water (D5W) 250 mL  mg (0 mg intravenous Stopped 2/13/24 0441)   acetaminophen (Tylenol) tablet 650 mg (650 mg oral Given 2/13/24 0144)   sodium chloride 0.9 % bolus  2,052 mL (2,052 mL intravenous New Bag 2/13/24 0143)       New Prescriptions from this visit:    New Prescriptions    OSELTAMIVIR (TAMIFLU) 75 MG CAPSULE    Take 1 capsule (75 mg) by mouth every 12 hours for 5 days.       Follow-up:  Ventura Gonzalez DO  [address] [facility] [address] [phone]          Jeff Peters MD PhD  [address] [facility] [id number] [phone]              Final Impression:   1. Influenza A          I reviewed the case with the attending ED physician. The attending ED physician agrees with the plan.   Steven Campbell DO  PGY-2  Emergency Medicine      Please excuse any misspellings or unintended errors related to the Dragon speech recognition software used to dictate this note.       Steven Campbell DO  Resident  02/13/24 9200       Steven Campbell DO  Resident  02/13/24 3781

## 2024-02-14 ENCOUNTER — APPOINTMENT (OUTPATIENT)
Dept: PHYSICAL THERAPY | Facility: CLINIC | Age: 49
End: 2024-02-14
Payer: COMMERCIAL

## 2024-02-16 ENCOUNTER — LAB (OUTPATIENT)
Dept: LAB | Facility: LAB | Age: 49
End: 2024-02-16
Payer: COMMERCIAL

## 2024-02-16 DIAGNOSIS — C85.80 MARGINAL ZONE LYMPHOMA (MULTI): ICD-10-CM

## 2024-02-16 LAB
APTT PPP: 34 SECONDS (ref 27–38)
BASOPHILS # BLD AUTO: 0.03 X10*3/UL (ref 0–0.1)
BASOPHILS NFR BLD AUTO: 1 %
EOSINOPHIL # BLD AUTO: 0.11 X10*3/UL (ref 0–0.7)
EOSINOPHIL NFR BLD AUTO: 3.8 %
ERYTHROCYTE [DISTWIDTH] IN BLOOD BY AUTOMATED COUNT: 14.4 % (ref 11.5–14.5)
HCT VFR BLD AUTO: 41.6 % (ref 41–52)
HGB BLD-MCNC: 14.1 G/DL (ref 13.5–17.5)
IMM GRANULOCYTES # BLD AUTO: 0.01 X10*3/UL (ref 0–0.7)
IMM GRANULOCYTES NFR BLD AUTO: 0.3 % (ref 0–0.9)
INR PPP: 1.1 (ref 0.9–1.1)
LYMPHOCYTES # BLD AUTO: 0.31 X10*3/UL (ref 1.2–4.8)
LYMPHOCYTES NFR BLD AUTO: 10.8 %
MCH RBC QN AUTO: 28.4 PG (ref 26–34)
MCHC RBC AUTO-ENTMCNC: 33.9 G/DL (ref 32–36)
MCV RBC AUTO: 84 FL (ref 80–100)
MONOCYTES # BLD AUTO: 0.47 X10*3/UL (ref 0.1–1)
MONOCYTES NFR BLD AUTO: 16.3 %
NEUTROPHILS # BLD AUTO: 1.95 X10*3/UL (ref 1.2–7.7)
NEUTROPHILS NFR BLD AUTO: 67.8 %
NRBC BLD-RTO: 0 /100 WBCS (ref 0–0)
PLATELET # BLD AUTO: 210 X10*3/UL (ref 150–450)
PROTHROMBIN TIME: 11.9 SECONDS (ref 9.8–12.8)
RBC # BLD AUTO: 4.96 X10*6/UL (ref 4.5–5.9)
WBC # BLD AUTO: 2.9 X10*3/UL (ref 4.4–11.3)

## 2024-02-16 PROCEDURE — 85025 COMPLETE CBC W/AUTO DIFF WBC: CPT

## 2024-02-16 PROCEDURE — 85730 THROMBOPLASTIN TIME PARTIAL: CPT

## 2024-02-16 PROCEDURE — 36415 COLL VENOUS BLD VENIPUNCTURE: CPT

## 2024-02-16 PROCEDURE — 85610 PROTHROMBIN TIME: CPT

## 2024-02-17 LAB
BACTERIA BLD CULT: NORMAL
BACTERIA BLD CULT: NORMAL

## 2024-02-19 ENCOUNTER — APPOINTMENT (OUTPATIENT)
Dept: HEMATOLOGY/ONCOLOGY | Facility: HOSPITAL | Age: 49
End: 2024-02-19
Payer: COMMERCIAL

## 2024-02-19 ENCOUNTER — HOSPITAL ENCOUNTER (OUTPATIENT)
Dept: RADIOLOGY | Facility: HOSPITAL | Age: 49
Discharge: HOME | End: 2024-02-19
Payer: COMMERCIAL

## 2024-02-19 ENCOUNTER — PROCEDURE VISIT (OUTPATIENT)
Dept: HEMATOLOGY/ONCOLOGY | Facility: HOSPITAL | Age: 49
End: 2024-02-19
Payer: COMMERCIAL

## 2024-02-19 ENCOUNTER — APPOINTMENT (OUTPATIENT)
Dept: RADIOLOGY | Facility: HOSPITAL | Age: 49
End: 2024-02-19
Payer: COMMERCIAL

## 2024-02-19 VITALS
DIASTOLIC BLOOD PRESSURE: 74 MMHG | SYSTOLIC BLOOD PRESSURE: 137 MMHG | TEMPERATURE: 98.8 F | RESPIRATION RATE: 17 BRPM | HEART RATE: 79 BPM | OXYGEN SATURATION: 99 %

## 2024-02-19 DIAGNOSIS — C85.80 MARGINAL ZONE LYMPHOMA (MULTI): Primary | ICD-10-CM

## 2024-02-19 DIAGNOSIS — C85.80 MARGINAL ZONE LYMPHOMA (MULTI): ICD-10-CM

## 2024-02-19 LAB
APPEARANCE CSF: CLEAR
APPEARANCE CSF: CLEAR
BASOPHILS NFR CSF MANUAL: 0 %
BASOPHILS NFR CSF MANUAL: 0 %
BLASTS CSF MANUAL: 0 %
BLASTS CSF MANUAL: 0 %
COLOR CSF: COLORLESS
COLOR CSF: COLORLESS
COLOR SPUN CSF: COLORLESS
COLOR SPUN CSF: COLORLESS
EOSINOPHIL NFR CSF MANUAL: 0 %
EOSINOPHIL NFR CSF MANUAL: 0 %
GLUCOSE CSF-MCNC: 65 MG/DL (ref 40–70)
IMM GRANULOCYTES NFR CSF: 0 %
IMM GRANULOCYTES NFR CSF: 0 %
LYMPHOCYTES NFR CSF MANUAL: 37 % (ref 28–96)
LYMPHOCYTES NFR CSF MANUAL: 48 % (ref 28–96)
MONOS+MACROS NFR CSF MANUAL: 40 % (ref 16–56)
MONOS+MACROS NFR CSF MANUAL: 46 % (ref 16–56)
NEUTS SEG NFR CSF MANUAL: 5 % (ref 0–5)
NEUTS SEG NFR CSF MANUAL: 9 % (ref 0–5)
OTHER CELLS NFR CSF MANUAL: 7 %
OTHER CELLS NFR CSF MANUAL: 8 %
PLASMA CELLS NFR CSF MICRO: 0 %
PLASMA CELLS NFR CSF MICRO: 0 %
PROT CSF-MCNC: 48 MG/DL (ref 15–45)
RBC # CSF AUTO: 1000 /UL (ref 0–5)
RBC # CSF AUTO: 16 /UL (ref 0–5)
TOTAL CELLS COUNTED CSF: 100
TOTAL CELLS COUNTED CSF: 100
TUBE # CSF: ABNORMAL
TUBE # CSF: ABNORMAL
WBC # CSF AUTO: 2 /UL (ref 1–5)
WBC # CSF AUTO: 4 /UL (ref 1–5)

## 2024-02-19 PROCEDURE — 62328 DX LMBR SPI PNXR W/FLUOR/CT: CPT | Performed by: RADIOLOGY

## 2024-02-19 PROCEDURE — 82945 GLUCOSE OTHER FLUID: CPT | Performed by: PHYSICIAN ASSISTANT

## 2024-02-19 PROCEDURE — 84157 ASSAY OF PROTEIN OTHER: CPT | Performed by: PHYSICIAN ASSISTANT

## 2024-02-19 PROCEDURE — 96450 CHEMOTHERAPY INTO CNS: CPT

## 2024-02-19 PROCEDURE — 2500000004 HC RX 250 GENERAL PHARMACY W/ HCPCS (ALT 636 FOR OP/ED): Performed by: INTERNAL MEDICINE

## 2024-02-19 PROCEDURE — A4216 STERILE WATER/SALINE, 10 ML: HCPCS | Performed by: INTERNAL MEDICINE

## 2024-02-19 PROCEDURE — 88185 FLOWCYTOMETRY/TC ADD-ON: CPT | Mod: TC | Performed by: PHYSICIAN ASSISTANT

## 2024-02-19 PROCEDURE — 96450 CHEMOTHERAPY INTO CNS: CPT | Performed by: PHYSICIAN ASSISTANT

## 2024-02-19 PROCEDURE — 88187 FLOWCYTOMETRY/READ 2-8: CPT | Performed by: PHYSICIAN ASSISTANT

## 2024-02-19 PROCEDURE — 62328 DX LMBR SPI PNXR W/FLUOR/CT: CPT | Mod: 59

## 2024-02-19 PROCEDURE — 88104 CYTOPATH FL NONGYN SMEARS: CPT | Performed by: PHYSICIAN ASSISTANT

## 2024-02-19 PROCEDURE — 89050 BODY FLUID CELL COUNT: CPT | Performed by: PHYSICIAN ASSISTANT

## 2024-02-19 RX ORDER — LIDOCAINE HYDROCHLORIDE 10 MG/ML
5 INJECTION, SOLUTION EPIDURAL; INFILTRATION; INTRACAUDAL; PERINEURAL ONCE
Status: DISCONTINUED | OUTPATIENT
Start: 2024-02-19 | End: 2024-02-20 | Stop reason: HOSPADM

## 2024-02-19 RX ADMIN — SODIUM CHLORIDE 12 MG: 9 INJECTION INTRAMUSCULAR; INTRAVENOUS; SUBCUTANEOUS at 11:30

## 2024-02-19 ASSESSMENT — PAIN - FUNCTIONAL ASSESSMENT: PAIN_FUNCTIONAL_ASSESSMENT: 0-10

## 2024-02-19 ASSESSMENT — PAIN SCALES - GENERAL: PAINLEVEL_OUTOF10: 0 - NO PAIN

## 2024-02-19 NOTE — POST-PROCEDURE NOTE
Fluoroscopic Guided Lumbar Puncture Postprocedure Note    Attending: Joel Carroll MD    Fellow: Hitesh    Diagnosis:   1. Marginal zone lymphoma (CMS/HCC)  FL guided lumbar puncture    FL guided lumbar puncture          Description of procedure: Written and verbal informed consent was obtained from the patient. The patient was placed in the prone position on the exam table. A timeout was performed prior to the procedure. Using fluoroscopic guidance, appropriate anatomic landmarks were identified and surgical site was marked. Site was prepped and draped in the usual sterile fashion. Local anesthesia was obtained using approximately 4 mL 1% Lidocaine.  Under intermittent fluoroscopic guidance, a 20 Gauge  6 inch spinal needle was advanced into the thecal sac at the L3-L4 until return of cerebral spinal fluid.    Opening pressure was not obtained.    A total of 9 mL clear cerebral spinal  fluid was collected and submitted to the lab for analysis. Intrathecal chemotherapy was administered by the hematology/oncology team.     The stylet was replaced and the needle was removed.    The patient tolerated procedure well without any immediate or clinically apparent complications.      The collected CSF was then sent to the lab for appropriate lab tests as ordered by the referring physician.    Estimated Blood Loss: None.    IMPRESSION:   Successful fluoroscopic guided lumbar puncture. See detailed result report with images in PACS.    The patient tolerated the procedure well without incident or complication and is in stable condition.

## 2024-02-19 NOTE — PROGRESS NOTES
A time-out was completed, verifying the patient's name, date of birth, and the procedure to be performed.      {MVMITD:51064} *** mg was instilled intrathecally (IT) over {MVM#:84137} minutes following a lumbar puncture performed by the radiology MD under fluoroscopic guidance. CSF return was verified before instillation, after each 1 mL of instillation, and after instillation. CSF was collected prior to the instillation of IT chemotherapy and was sent for cell count and flow cytometric analysis. The patient tolerated procedure well without difficulty. Following the procedure, the patient {MVMRECOVER:76884} to recover in a supine position.

## 2024-02-19 NOTE — PROGRESS NOTES
PROCEDURE: Intrathecal instillation of chemotherapeutic agent  DATE: 02/19/24  TIME: 11:30 am    Patient's history and chemotherapy orders were reviewed, confirming that he met criteria for therapy. The drug, dose, route, and timing chemotherapy drug were then reviewed using a double-check process with a second provider, Maria Esther Lucero CNP.    Following a lumbar puncture performed by the radiology MD under fluoroscopic guidance, Methotrexate 12 mg was instilled intrathecally (IT) over 5 minutes. CSF return was verified before instillation, after each 1 mL of instillation, and after instillation.     CSF that had been collected by the Radiology MD prior to the instillation of IT chemotherapy and was sent for cell count and flow cytometric analysis and protein/glucose.     The patient tolerated procedure well without difficulty. Following the procedure, the patient remained in IR recovery area to recover in a supine position.

## 2024-02-21 ENCOUNTER — TELEPHONE (OUTPATIENT)
Dept: ADMISSION | Facility: HOSPITAL | Age: 49
End: 2024-02-21

## 2024-02-21 ENCOUNTER — LAB (OUTPATIENT)
Dept: LAB | Facility: LAB | Age: 49
End: 2024-02-21
Payer: COMMERCIAL

## 2024-02-21 ENCOUNTER — TREATMENT (OUTPATIENT)
Dept: PHYSICAL THERAPY | Facility: CLINIC | Age: 49
End: 2024-02-21
Payer: COMMERCIAL

## 2024-02-21 DIAGNOSIS — D49.2 THORACIC SPINE TUMOR: Primary | ICD-10-CM

## 2024-02-21 DIAGNOSIS — C85.80 MARGINAL ZONE LYMPHOMA (MULTI): ICD-10-CM

## 2024-02-21 LAB
ALBUMIN SERPL BCP-MCNC: 4.4 G/DL (ref 3.4–5)
ALP SERPL-CCNC: 88 U/L (ref 33–120)
ALT SERPL W P-5'-P-CCNC: 37 U/L (ref 10–52)
ANION GAP SERPL CALC-SCNC: 16 MMOL/L (ref 10–20)
AST SERPL W P-5'-P-CCNC: 44 U/L (ref 9–39)
ATRIAL RATE: 117 BPM
BASOPHILS # BLD AUTO: 0.04 X10*3/UL (ref 0–0.1)
BASOPHILS NFR BLD AUTO: 0.8 %
BILIRUB SERPL-MCNC: 0.6 MG/DL (ref 0–1.2)
BUN SERPL-MCNC: 13 MG/DL (ref 6–23)
CALCIUM SERPL-MCNC: 9.5 MG/DL (ref 8.6–10.3)
CELL POPULATIONS: NORMAL
CHLORIDE SERPL-SCNC: 103 MMOL/L (ref 98–107)
CO2 SERPL-SCNC: 24 MMOL/L (ref 21–32)
CREAT SERPL-MCNC: 0.96 MG/DL (ref 0.5–1.3)
DIAGNOSIS: NORMAL
EGFRCR SERPLBLD CKD-EPI 2021: >90 ML/MIN/1.73M*2
EOSINOPHIL # BLD AUTO: 0.19 X10*3/UL (ref 0–0.7)
EOSINOPHIL NFR BLD AUTO: 3.7 %
ERYTHROCYTE [DISTWIDTH] IN BLOOD BY AUTOMATED COUNT: 14.1 % (ref 11.5–14.5)
FLOW DIFFERENTIAL: NORMAL
FLOW TEST ORDERED: NORMAL
FLUID CELL COUNT: 4 /UL
GLUCOSE SERPL-MCNC: 119 MG/DL (ref 74–99)
HCT VFR BLD AUTO: 39.4 % (ref 41–52)
HGB BLD-MCNC: 13.5 G/DL (ref 13.5–17.5)
IMM GRANULOCYTES # BLD AUTO: 0.02 X10*3/UL (ref 0–0.7)
IMM GRANULOCYTES NFR BLD AUTO: 0.4 % (ref 0–0.9)
LAB TEST METHOD: NORMAL
LDH SERPL L TO P-CCNC: 234 U/L (ref 84–246)
LYMPHOCYTES # BLD AUTO: 0.3 X10*3/UL (ref 1.2–4.8)
LYMPHOCYTES NFR BLD AUTO: 5.9 %
MCH RBC QN AUTO: 28.5 PG (ref 26–34)
MCHC RBC AUTO-ENTMCNC: 34.3 G/DL (ref 32–36)
MCV RBC AUTO: 83 FL (ref 80–100)
MONOCYTES # BLD AUTO: 0.49 X10*3/UL (ref 0.1–1)
MONOCYTES NFR BLD AUTO: 9.6 %
NEUTROPHILS # BLD AUTO: 4.05 X10*3/UL (ref 1.2–7.7)
NEUTROPHILS NFR BLD AUTO: 79.6 %
NRBC BLD-RTO: 0 /100 WBCS (ref 0–0)
NUMBER OF CELLS COLLECTED: NORMAL
P AXIS: 50 DEGREES
P OFFSET: 197 MS
P ONSET: 143 MS
PATH REPORT.TOTAL CANCER: NORMAL
PATH REVIEW-CELL CT,CSF: NORMAL
PATH REVIEW-CELL CT,CSF: NORMAL
PLATELET # BLD AUTO: 296 X10*3/UL (ref 150–450)
POTASSIUM SERPL-SCNC: 4 MMOL/L (ref 3.5–5.3)
PR INTERVAL: 164 MS
PROT SERPL-MCNC: 6.6 G/DL (ref 6.4–8.2)
Q ONSET: 225 MS
QRS COUNT: 19 BEATS
QRS DURATION: 84 MS
QT INTERVAL: 308 MS
QTC CALCULATION(BAZETT): 429 MS
QTC FREDERICIA: 385 MS
R AXIS: 36 DEGREES
RBC # BLD AUTO: 4.73 X10*6/UL (ref 4.5–5.9)
SIGNATURE COMMENT: NORMAL
SODIUM SERPL-SCNC: 139 MMOL/L (ref 136–145)
SPECIMEN VIABILITY: NORMAL
T AXIS: 28 DEGREES
T OFFSET: 379 MS
URATE SERPL-MCNC: 5.6 MG/DL (ref 4–7.5)
VENTRICULAR RATE: 117 BPM
WBC # BLD AUTO: 5.1 X10*3/UL (ref 4.4–11.3)

## 2024-02-21 PROCEDURE — 97110 THERAPEUTIC EXERCISES: CPT | Mod: GP | Performed by: PHYSICAL THERAPIST

## 2024-02-21 PROCEDURE — 83615 LACTATE (LD) (LDH) ENZYME: CPT

## 2024-02-21 PROCEDURE — 84550 ASSAY OF BLOOD/URIC ACID: CPT

## 2024-02-21 PROCEDURE — 36415 COLL VENOUS BLD VENIPUNCTURE: CPT

## 2024-02-21 PROCEDURE — 85025 COMPLETE CBC W/AUTO DIFF WBC: CPT

## 2024-02-21 PROCEDURE — 80053 COMPREHEN METABOLIC PANEL: CPT

## 2024-02-21 ASSESSMENT — PAIN - FUNCTIONAL ASSESSMENT: PAIN_FUNCTIONAL_ASSESSMENT: 0-10

## 2024-02-21 ASSESSMENT — PAIN SCALES - GENERAL: PAINLEVEL_OUTOF10: 0 - NO PAIN

## 2024-02-21 NOTE — TELEPHONE ENCOUNTER
The patient called in regarding paperwork he needed filled out by Dr. Peters tomorrow, I spoke with JULIO C Hameed who will take care of it. Patient aware and appreciative.

## 2024-02-21 NOTE — TELEPHONE ENCOUNTER
Patient called in regarding his appt scheduled with MD for tomorrow being cancelled.   This RN spoke to JOSE Apodaca in scheduling who said Dr. Peters will be out sick tomorrow, and the patient will need to be scheduled with a malig/heme provider prior to his infusion appt.   JOSE Harper states she will follow up with the patient, regarding tomorrows time & provider.

## 2024-02-21 NOTE — PROGRESS NOTES
PHYSICAL THERAPY TREATMENT    Patient name:  Dann Ni    MRN:  [id number]    :  1975    Today's Date:  24    Time Calculation  Start Time: 1700  Stop Time: 1747  Time Calculation (min): 47 min     PT Therapeutic Procedures Time Entry  Therapeutic Exercise Time Entry: 40      Referral by:  Referred By: Dann Avila PA-C    Diagnoses:  Diagnosis and Precautions: Diagnosis D49.2 (ICD-10-CM) - Thoracic spine tumor    Assessment/Plan:   Therapeutic exercise performed in order to improve core strength/endurance.  Patient challenged with exercises performed in clinic secondary to increased overall total body fatigue.       PT Assessment  PT Assessment Results: Decreased strength, Decreased endurance  Rehab Prognosis: Good  Evaluation/Treatment Tolerance: Patient limited by fatigue  Plan:  Progress as tolerated.     General Visit Information  PT  Visit  PT Received On: 24     General  Reason for Referral: PT Evaluate and Treat  Referred By: Dann Avila PA-C  General Comment: Diagnosis D49.2 (ICD-10-CM) - Thoracic spine tumor (Laminectomy Thoracic with Excision Lesion 10/2/2023, Dr. Marin)    Insurance Reviewed  Name of insurance:  [employer]  Visit #:   15/24  60 visits per calendar year  BACK SX 10/2; 500 DED IS MET 1000 FAM DED 2000 OOP IS MET 4000 FAM, OOP 0% COINSUR $20 COPAY (DO NOT COLLECT SINCE DED + OOP ALREADY MET FOR ) 60V CY (NONE USED FOR ) PER [email or web address] [id number] // Marilynn confirmed 23 5:41pm     Subjective:  Patient reports he has chemo treatment tomorrow and is really tired today and stiff overall.  Precautions  STEADI Fall Risk Score (The score of 4 or more indicates an increased risk of falling): 8  LE Weight Bearing Status: Weight Bearing as Tolerated  Medical Precautions:  (Laminectomy Thoracic with Excision Lesion 10/2/2023, Dr. Marin, Tumor thoracic spine, Fractured L wrist as a kid, asthma, cancer)  Post-Surgical Precautions: Spinal precautions (20 lb. lifting  "restriction)    Pain Assessment  Pain Assessment: 0-10  Pain Score: 0 - No pain (patient feels stiff and tired today)  Pain Location: Back  Pain Orientation: Posterior        Treatments  Therapeutic Exercise  Therapeutic Exercise Performed: Yes  Therapeutic Exercise Activity 1: UBE 4' FWD/4' BWD 2.0 resistance, sitting  Therapeutic Exercise Activity 2: TB rows yellow x 30 reps  Therapeutic Exercise Activity 3: TB shoulder extension yellow x 30 reps  Therapeutic Exercise Activity 4: TB Palloff press yellow x 30 reps R and L  Therapeutic Exercise Activity 5: Standing marching x 20 reps  Therapeutic Exercise Activity 6: Mini-Squats x 20 reps  Therapeutic Exercise Activity 7: Lateral step-ups 6 inch step, x 20 reps R and L  Therapeutic Exercise Activity 8: Forward step-ups 6 inch step, x 20 reps R and L  Therapeutic Exercise Activity 9: seated prayer stretch 30\"x2 x 3 directions    Time in clinic started at  5pm  Time in clinic ended at  5:47pm  Total time in clinic is .        47 minutes  Total timed code time is     40 minutes    Treatment Performed Today/Billing:  Therapeutic Exercise x 3 units  "

## 2024-02-22 ENCOUNTER — APPOINTMENT (OUTPATIENT)
Dept: OTHER | Facility: HOSPITAL | Age: 49
End: 2024-02-22
Payer: COMMERCIAL

## 2024-02-22 ENCOUNTER — APPOINTMENT (OUTPATIENT)
Dept: HEMATOLOGY/ONCOLOGY | Facility: HOSPITAL | Age: 49
End: 2024-02-22
Payer: COMMERCIAL

## 2024-02-22 ENCOUNTER — OFFICE VISIT (OUTPATIENT)
Dept: HEMATOLOGY/ONCOLOGY | Facility: HOSPITAL | Age: 49
End: 2024-02-22
Payer: COMMERCIAL

## 2024-02-22 ENCOUNTER — INFUSION (OUTPATIENT)
Dept: HEMATOLOGY/ONCOLOGY | Facility: HOSPITAL | Age: 49
End: 2024-02-22
Payer: COMMERCIAL

## 2024-02-22 VITALS
SYSTOLIC BLOOD PRESSURE: 127 MMHG | HEART RATE: 85 BPM | BODY MASS INDEX: 38.52 KG/M2 | WEIGHT: 254.19 LBS | HEIGHT: 68 IN | TEMPERATURE: 97.9 F | OXYGEN SATURATION: 97 % | RESPIRATION RATE: 16 BRPM | DIASTOLIC BLOOD PRESSURE: 77 MMHG

## 2024-02-22 DIAGNOSIS — C85.80 MARGINAL ZONE LYMPHOMA (MULTI): ICD-10-CM

## 2024-02-22 PROCEDURE — 2500000004 HC RX 250 GENERAL PHARMACY W/ HCPCS (ALT 636 FOR OP/ED): Performed by: INTERNAL MEDICINE

## 2024-02-22 PROCEDURE — 3008F BODY MASS INDEX DOCD: CPT | Performed by: NURSE PRACTITIONER

## 2024-02-22 PROCEDURE — 99215 OFFICE O/P EST HI 40 MIN: CPT | Performed by: NURSE PRACTITIONER

## 2024-02-22 PROCEDURE — 96411 CHEMO IV PUSH ADDL DRUG: CPT

## 2024-02-22 PROCEDURE — 1036F TOBACCO NON-USER: CPT | Performed by: NURSE PRACTITIONER

## 2024-02-22 PROCEDURE — 2500000001 HC RX 250 WO HCPCS SELF ADMINISTERED DRUGS (ALT 637 FOR MEDICARE OP): Performed by: INTERNAL MEDICINE

## 2024-02-22 PROCEDURE — 96415 CHEMO IV INFUSION ADDL HR: CPT

## 2024-02-22 PROCEDURE — 96413 CHEMO IV INFUSION 1 HR: CPT

## 2024-02-22 PROCEDURE — 96375 TX/PRO/DX INJ NEW DRUG ADDON: CPT | Mod: INF

## 2024-02-22 RX ORDER — FAMOTIDINE 10 MG/ML
20 INJECTION INTRAVENOUS ONCE AS NEEDED
Status: DISCONTINUED | OUTPATIENT
Start: 2024-02-22 | End: 2024-02-22 | Stop reason: HOSPADM

## 2024-02-22 RX ORDER — ACETAMINOPHEN 325 MG/1
650 TABLET ORAL ONCE
Status: COMPLETED | OUTPATIENT
Start: 2024-02-22 | End: 2024-02-22

## 2024-02-22 RX ORDER — PROCHLORPERAZINE EDISYLATE 5 MG/ML
10 INJECTION INTRAMUSCULAR; INTRAVENOUS EVERY 6 HOURS PRN
Status: DISCONTINUED | OUTPATIENT
Start: 2024-02-22 | End: 2024-02-22 | Stop reason: HOSPADM

## 2024-02-22 RX ORDER — PROCHLORPERAZINE MALEATE 10 MG
10 TABLET ORAL EVERY 6 HOURS PRN
Status: DISCONTINUED | OUTPATIENT
Start: 2024-02-22 | End: 2024-02-22 | Stop reason: HOSPADM

## 2024-02-22 RX ORDER — DIPHENHYDRAMINE HYDROCHLORIDE 50 MG/ML
50 INJECTION INTRAMUSCULAR; INTRAVENOUS AS NEEDED
Status: DISCONTINUED | OUTPATIENT
Start: 2024-02-22 | End: 2024-02-22 | Stop reason: HOSPADM

## 2024-02-22 RX ORDER — EPINEPHRINE 0.3 MG/.3ML
0.3 INJECTION SUBCUTANEOUS EVERY 5 MIN PRN
Status: DISCONTINUED | OUTPATIENT
Start: 2024-02-22 | End: 2024-02-22 | Stop reason: HOSPADM

## 2024-02-22 RX ORDER — DIPHENHYDRAMINE HCL 50 MG
50 CAPSULE ORAL ONCE
Status: COMPLETED | OUTPATIENT
Start: 2024-02-22 | End: 2024-02-22

## 2024-02-22 RX ORDER — ALBUTEROL SULFATE 0.83 MG/ML
3 SOLUTION RESPIRATORY (INHALATION) AS NEEDED
Status: DISCONTINUED | OUTPATIENT
Start: 2024-02-22 | End: 2024-02-22 | Stop reason: HOSPADM

## 2024-02-22 RX ORDER — PALONOSETRON 0.05 MG/ML
0.25 INJECTION, SOLUTION INTRAVENOUS ONCE
Status: COMPLETED | OUTPATIENT
Start: 2024-02-22 | End: 2024-02-22

## 2024-02-22 RX ADMIN — DIPHENHYDRAMINE HYDROCHLORIDE 50 MG: 50 CAPSULE ORAL at 10:20

## 2024-02-22 RX ADMIN — DEXAMETHASONE SODIUM PHOSPHATE 12 MG: 10 INJECTION, SOLUTION INTRAMUSCULAR; INTRAVENOUS at 10:23

## 2024-02-22 RX ADMIN — PALONOSETRON 250 MCG: 0.05 INJECTION, SOLUTION INTRAVENOUS at 10:22

## 2024-02-22 RX ADMIN — ACETAMINOPHEN 650 MG: 325 TABLET ORAL at 10:20

## 2024-02-22 RX ADMIN — SODIUM CHLORIDE 855 MG: 9 INJECTION, SOLUTION INTRAVENOUS at 11:36

## 2024-02-22 RX ADMIN — BENDAMUSTINE HYDROCHLORIDE 205 MG: 25 INJECTION, SOLUTION INTRAVENOUS at 10:58

## 2024-02-22 NOTE — PROGRESS NOTES
Pt here for day 1 rituximab/bendamustine. Tolerated well. Discharged in stable condition, no further questions or concerns.

## 2024-02-23 ENCOUNTER — INFUSION (OUTPATIENT)
Dept: HEMATOLOGY/ONCOLOGY | Facility: HOSPITAL | Age: 49
End: 2024-02-23
Payer: COMMERCIAL

## 2024-02-23 VITALS
WEIGHT: 254.85 LBS | OXYGEN SATURATION: 98 % | TEMPERATURE: 98.1 F | RESPIRATION RATE: 18 BRPM | BODY MASS INDEX: 38.62 KG/M2 | DIASTOLIC BLOOD PRESSURE: 83 MMHG | HEART RATE: 104 BPM | SYSTOLIC BLOOD PRESSURE: 132 MMHG | HEIGHT: 68 IN

## 2024-02-23 DIAGNOSIS — C85.80 MARGINAL ZONE LYMPHOMA (MULTI): ICD-10-CM

## 2024-02-23 DIAGNOSIS — D84.9 IMMUNOSUPPRESSED STATUS (MULTI): ICD-10-CM

## 2024-02-23 PROCEDURE — 96372 THER/PROPH/DIAG INJ SC/IM: CPT | Mod: 59 | Performed by: INTERNAL MEDICINE

## 2024-02-23 PROCEDURE — 96375 TX/PRO/DX INJ NEW DRUG ADDON: CPT | Mod: INF

## 2024-02-23 PROCEDURE — 2500000004 HC RX 250 GENERAL PHARMACY W/ HCPCS (ALT 636 FOR OP/ED): Performed by: INTERNAL MEDICINE

## 2024-02-23 PROCEDURE — 96409 CHEMO IV PUSH SNGL DRUG: CPT

## 2024-02-23 PROCEDURE — 96377 APPLICATON ON-BODY INJECTOR: CPT

## 2024-02-23 RX ORDER — PROCHLORPERAZINE EDISYLATE 5 MG/ML
10 INJECTION INTRAMUSCULAR; INTRAVENOUS EVERY 6 HOURS PRN
Status: DISCONTINUED | OUTPATIENT
Start: 2024-02-23 | End: 2024-02-23 | Stop reason: HOSPADM

## 2024-02-23 RX ORDER — PROCHLORPERAZINE MALEATE 10 MG
10 TABLET ORAL EVERY 6 HOURS PRN
Status: DISCONTINUED | OUTPATIENT
Start: 2024-02-23 | End: 2024-02-23 | Stop reason: HOSPADM

## 2024-02-23 RX ORDER — HEPARIN SODIUM,PORCINE/PF 10 UNIT/ML
50 SYRINGE (ML) INTRAVENOUS AS NEEDED
Status: CANCELLED | OUTPATIENT
Start: 2024-02-23

## 2024-02-23 RX ORDER — ALBUTEROL SULFATE 0.83 MG/ML
3 SOLUTION RESPIRATORY (INHALATION) AS NEEDED
Status: DISCONTINUED | OUTPATIENT
Start: 2024-02-23 | End: 2024-02-23 | Stop reason: HOSPADM

## 2024-02-23 RX ORDER — FAMOTIDINE 10 MG/ML
20 INJECTION INTRAVENOUS ONCE AS NEEDED
Status: DISCONTINUED | OUTPATIENT
Start: 2024-02-23 | End: 2024-02-23 | Stop reason: HOSPADM

## 2024-02-23 RX ORDER — HEPARIN 100 UNIT/ML
500 SYRINGE INTRAVENOUS AS NEEDED
Status: CANCELLED | OUTPATIENT
Start: 2024-02-23

## 2024-02-23 RX ORDER — EPINEPHRINE 0.3 MG/.3ML
0.3 INJECTION SUBCUTANEOUS EVERY 5 MIN PRN
Status: DISCONTINUED | OUTPATIENT
Start: 2024-02-23 | End: 2024-02-23 | Stop reason: HOSPADM

## 2024-02-23 RX ORDER — DIPHENHYDRAMINE HYDROCHLORIDE 50 MG/ML
50 INJECTION INTRAMUSCULAR; INTRAVENOUS AS NEEDED
Status: DISCONTINUED | OUTPATIENT
Start: 2024-02-23 | End: 2024-02-23 | Stop reason: HOSPADM

## 2024-02-23 RX ADMIN — PEGFILGRASTIM 6 MG: KIT SUBCUTANEOUS at 12:49

## 2024-02-23 RX ADMIN — DEXAMETHASONE SODIUM PHOSPHATE 12 MG: 10 INJECTION, SOLUTION INTRAMUSCULAR; INTRAVENOUS at 12:16

## 2024-02-23 RX ADMIN — BENDAMUSTINE HYDROCHLORIDE 205 MG: 25 INJECTION, SOLUTION INTRAVENOUS at 12:45

## 2024-02-26 ENCOUNTER — TELEPHONE (OUTPATIENT)
Dept: HEMATOLOGY/ONCOLOGY | Facility: HOSPITAL | Age: 49
End: 2024-02-26
Payer: COMMERCIAL

## 2024-02-26 NOTE — TELEPHONE ENCOUNTER
Lisa, a  at [facility], calls because the patient contacted her to say that his CT scan of the lumbar spine was denied. The scan was denies for lack of supporting information. She states that no prior authorization was submitted.   She advises that [facility] [phone], use case number [id number].  Message sent to Dr. Peters and Corporate Pre Cert team.

## 2024-02-28 ENCOUNTER — APPOINTMENT (OUTPATIENT)
Dept: PHYSICAL THERAPY | Facility: CLINIC | Age: 49
End: 2024-02-28
Payer: COMMERCIAL

## 2024-02-28 NOTE — TELEPHONE ENCOUNTER
Per Dr. Peters:    I did not order the CT spine. When I called the number in the message below, I was told it was order by another MD whole first letter of last name is C. In any case, I am not involved in this precert or order.

## 2024-03-04 NOTE — PROGRESS NOTES
Patient ID: Dann Ni is a 48 y.o. male.    Subjective    HPI  Presents today for C4D1 Teresa + Ritux and routine follow up visit in [facility]. He is doing well overall today.    He tells me that he had a headache and some back stiffness after his last LP, which have resolved. He also tells me that he experienced lightheadedness/chills at the end of Rituximab infusion last cycle and the next day at home.     Review of Systems   All other systems reviewed and are negative.      Objective    BSA: There is no height or weight on file to calculate BSA.  There were no vitals taken for this visit.  Vital signs reviewed today.      Physical Exam  Constitutional:       Appearance: Normal appearance.   HENT:      Head: Normocephalic.      Nose: Nose normal.      Mouth/Throat:      Mouth: Mucous membranes are moist.      Pharynx: Oropharynx is clear.   Eyes:      Extraocular Movements: Extraocular movements intact.      Conjunctiva/sclera: Conjunctivae normal.      Pupils: Pupils are equal, round, and reactive to light.   Cardiovascular:      Rate and Rhythm: Normal rate and regular rhythm.      Pulses: Normal pulses.      Heart sounds: Normal heart sounds.   Pulmonary:      Effort: Pulmonary effort is normal.      Breath sounds: Normal breath sounds.   Abdominal:      General: Abdomen is flat. Bowel sounds are normal.      Palpations: Abdomen is soft.   Musculoskeletal:         General: Normal range of motion.      Cervical back: Normal range of motion.   Skin:     General: Skin is warm and dry.      Capillary Refill: Capillary refill takes less than 2 seconds.   Neurological:      General: No focal deficit present.      Mental Status: He is alert and oriented to person, place, and time. Mental status is at baseline.   Psychiatric:         Mood and Affect: Mood normal.         Performance Status:  Karnofsky Score: 80 - Normal activity with effort; some signs or symptoms of disease      Assessment/Plan         Oncology History   Marginal zone lymphoma (CMS/HCC)   10/2/2023 Cancer Staged    Staging form: Hodgkin and Non-Hodgkin Lymphoma, AJCC 8th Edition, Clinical stage from 10/2/2023: Stage IV (Marginal zone lymphoma) - Signed by Jeff Peters MD PhD on 11/25/2023 11/17/2023 Initial Diagnosis    Marginal zone lymphoma (CMS/HCC)     11/30/2023 -  Chemotherapy    Bendamustine + RiTUXimab, 28 Day Cycles          No problem-specific Assessment & Plan notes found for this encounter.    #EMZL  -Reviewed PET/CT and MRI with the patient and wife. The results are consistent with stage IV disease, involving LN above and below the diaphragm, extensively axial and appendicular skeleton including bilateral scapulas, right clavicular head, T5 vertebral body, bilateral iliac bones, left ischium, and left femur without definite anatomic correlate (max SUV 7.6).  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   -Current data support treatment using multi-agent chemo. Bendamustine and rituximab have been approved and widely used, found to be safe and effective.   -Patient was disappointed to learn that EMZL such as his may not be curable. Was relieved to know that the treatment can bring multiple years of remission, improvement of strength / quality of life / work experience.   -Discussed the Aes of r-anum, which are numerous, including SAEs such as infection, or even death. Most AE and DUSTIN however can be resolved with effective treatments. He consented for R-anum.   -Due to the location of the disease and bone marrow uptake, he may be at high risk of CNS involvement / relapse. Schedule head CT and intrathecal treatment, timing likely after 2 cycles of R-ANUM. He consented for IT Methotrexate and cytarabine.   - LP with IT Methotrexate (2/19); CSF flow cytometry negative for lymphoma  - 2nd LP planned for 3/18  -In addition, consolidation using XRT will  be discussed in the future.      Plan:  3/7 Rad Onc Consult  3/15 Follow up with Dr. Peters (Labs ordered for LP on Monday)  3/18 LP with IT Methotrexate (Scheduling pending)     STEPHANI Denney-CNP

## 2024-03-06 ENCOUNTER — TELEPHONE (OUTPATIENT)
Dept: RADIATION ONCOLOGY | Facility: HOSPITAL | Age: 49
End: 2024-03-06
Payer: COMMERCIAL

## 2024-03-06 ENCOUNTER — TREATMENT (OUTPATIENT)
Dept: PHYSICAL THERAPY | Facility: CLINIC | Age: 49
End: 2024-03-06
Payer: COMMERCIAL

## 2024-03-06 DIAGNOSIS — D49.2 THORACIC SPINE TUMOR: Primary | ICD-10-CM

## 2024-03-06 PROCEDURE — 97110 THERAPEUTIC EXERCISES: CPT | Mod: GP,CQ

## 2024-03-06 ASSESSMENT — PAIN - FUNCTIONAL ASSESSMENT: PAIN_FUNCTIONAL_ASSESSMENT: 0-10

## 2024-03-06 ASSESSMENT — PAIN SCALES - GENERAL: PAINLEVEL_OUTOF10: 0 - NO PAIN

## 2024-03-06 NOTE — PROGRESS NOTES
"PHYSICAL THERAPY TREATMENT    Patient name:  Dann Ni    MRN:  [id number]    :  1975    Today's Date:  24    Time Calculation  Start Time: 447  Stop Time: 538  Time Calculation (min): 51 min     PT Therapeutic Procedures Time Entry  Therapeutic Exercise Time Entry: 48      Referral by:  Referred By: Dann Avila PA-C    Diagnoses:  Diagnosis and Precautions: Diagnosis D49.2 (ICD-10-CM) - Thoracic spine tumor    Assessment/Plan:   Added stretches and increased DLS to ther ex and HEP to decrease LB tightness. Pt reports \"my back feels a little looser\" after tx. Therapeutic exercise performed in order to improve core strength/endurance.  Patient challenged with exercises performed in clinic secondary to increased overall total body fatigue.       PT Assessment  Evaluation/Treatment Tolerance: Patient limited by fatigue  Plan:  Progress as tolerated.     General Visit Information        General  Reason for Referral: PT Evaluate and Treat  Referred By: Dann Avila PA-C  General Comment: Diagnosis D49.2 (ICD-10-CM) - Thoracic spine tumor (Laminectomy Thoracic with Excision Lesion 10/2/2023, Dr. Marin)    Insurance Reviewed  Name of insurance:  [employer]  Visit #:     60 visits per calendar year  BACK SX 10/2; 500 DED IS MET 1000 FAM DED 2000 OOP IS MET 4000 FAM, OOP 0% COINSUR $20 COPAY (DO NOT COLLECT SINCE DED + OOP ALREADY MET FOR ) 60V CY (NONE USED FOR ) PER [email or web address] [id number] // Marilynn confirmed 23 5:41pm     Subjective:  Patient reports he was doing a lot at work and his low back has been feeling pretty tight lately.  Precautions  STEADI Fall Risk Score (The score of 4 or more indicates an increased risk of falling): 8  LE Weight Bearing Status: Weight Bearing as Tolerated  Medical Precautions:  (Laminectomy Thoracic with Excision Lesion 10/2/2023, Dr. Marin, Tumor thoracic spine, Fractured L wrist as a kid, asthma, cancer)  Post-Surgical Precautions: Spinal precautions (20 " "lb. lifting restriction)  Pain Assessment  Pain Assessment: 0-10  Pain Score: 0 - No pain        Treatments  UBE 3' FWD/3' BWD 2.0 resistance  supine marching with PPT x 20 reps  Step Ups 6\" F/L x10ea  Hip abd/ add vs Blk/ PB 5\"x12ea  SK2C B 3x30\"  Sitting hamstring w/stool B 3x30\"  Dynamic Amb march, abd, hsc x20'ea (CGA)   Recumb Bike (SH: 16) x5' L5  PPT 5\"x10  Biceps curl 2 way 5# (EC) (CGA)  Includes MET to decrease rotation.     HEP Issued and reviewed written HEP of:    3/6/24: Access Code: [email or web address]: [email or web address], hamstring stretch, hip abd/ add  "

## 2024-03-07 ENCOUNTER — HOSPITAL ENCOUNTER (OUTPATIENT)
Dept: RADIATION ONCOLOGY | Facility: HOSPITAL | Age: 49
Setting detail: RADIATION/ONCOLOGY SERIES
Discharge: HOME | End: 2024-03-07
Payer: COMMERCIAL

## 2024-03-07 VITALS
RESPIRATION RATE: 18 BRPM | WEIGHT: 259.26 LBS | TEMPERATURE: 96.8 F | DIASTOLIC BLOOD PRESSURE: 89 MMHG | HEART RATE: 96 BPM | SYSTOLIC BLOOD PRESSURE: 137 MMHG | BODY MASS INDEX: 39.8 KG/M2 | OXYGEN SATURATION: 97 %

## 2024-03-07 DIAGNOSIS — C85.80 MARGINAL ZONE LYMPHOMA (MULTI): Primary | ICD-10-CM

## 2024-03-07 PROCEDURE — 99204 OFFICE O/P NEW MOD 45 MIN: CPT | Performed by: RADIOLOGY

## 2024-03-07 PROCEDURE — 99214 OFFICE O/P EST MOD 30 MIN: CPT | Performed by: RADIOLOGY

## 2024-03-07 ASSESSMENT — PAIN SCALES - GENERAL: PAINLEVEL: 0-NO PAIN

## 2024-03-07 ASSESSMENT — PATIENT HEALTH QUESTIONNAIRE - PHQ9
2. FEELING DOWN, DEPRESSED OR HOPELESS: NOT AT ALL
1. LITTLE INTEREST OR PLEASURE IN DOING THINGS: NOT AT ALL
SUM OF ALL RESPONSES TO PHQ9 QUESTIONS 1 AND 2: 0

## 2024-03-07 ASSESSMENT — COLUMBIA-SUICIDE SEVERITY RATING SCALE - C-SSRS
1. IN THE PAST MONTH, HAVE YOU WISHED YOU WERE DEAD OR WISHED YOU COULD GO TO SLEEP AND NOT WAKE UP?: NO
2. HAVE YOU ACTUALLY HAD ANY THOUGHTS OF KILLING YOURSELF?: NO
6. HAVE YOU EVER DONE ANYTHING, STARTED TO DO ANYTHING, OR PREPARED TO DO ANYTHING TO END YOUR LIFE?: NO
5. HAVE YOU STARTED TO WORK OUT OR WORKED OUT THE DETAILS OF HOW TO KILL YOURSELF? DO YOU INTEND TO CARRY OUT THIS PLAN?: NO

## 2024-03-11 NOTE — PROGRESS NOTES
Radiation Oncology Outpatient Consult    Patient Name:  Dann Ni  MRN:  [id number]  :  1975    Referring Provider: Jeff Peters MD PhD  Primary Care Provider: Ventura Gonzalez DO  Care Team: Patient Care Team:  Ventura Gonzalez DO as PCP - General  Ventura Gonzalez DO as PCP - Mariposa HIDALGO PCP  Yesenia Marin MD as Surgeon (Neurosurgery)  Dann Avila PA-C as Physician Assistant (Neurosurgery)  Jeff Peters MD PhD as Consulting Physician (Hematology and Oncology)  DEBI Rodriguez as  (Hematology and Oncology)    Date of Service: 3/7/2024     History of Present Illness:  Dann Ni is a 48 y.o. male who was referred by Jeff Peters MD PhD, for a consultation to the [facility] Department of Radiation Oncology.  He is presenting for evaluation and management of Marginal zone lymphoma (CMS/HCC), Clinical: Stage IV (Marginal zone lymphoma) with spinal involvement and neurological symptoms at presentation.    His oncological work up and treatment history is as below:  - The patient was in good health until 2023. He developed back pain which did not respond to NSAID or steroid. By late 2023 he lost sensation below the diaphragm and were unable to walk.   WorkUp:  - On  he had CT scan showing a 4.1x1.8x1cm paraspinal mass at the T7 level.   - MRI 10/01/2023: Abnormal signal within the C5 vertebral body which is highly suspicious for neoplastic infiltration. No extension of tumor into  the spinal canal. Abnormal signal within the T2-T4 vertebral bodies and posterior elements consistent with neoplastic infiltration. There is tumoral extension into the spinal canal at the level of T1-T2 through T3 causing marked mass effect on the thoracic spinal cord with anterior and right lateral deviation of the cord. There is signal abnormality within the spinal cord which is most consistent with edema. There is extension of tumor into the left T2-T3  and T3-T4 neural foramina. There is soft tissue located within the right T7-T8 prevertebral soft tissues and within the right T7-T8 neural foramen which is most consistent tumor. Nonspecific mild signal abnormality located within the posterior  S1 vertebral body which reflect neoplastic infiltration. No abnormal mass or enhancement is seen within the lumbar spinal canal and there is no striking signal abnormality located within the lumbar osseous structures.      - On 10/2/2023 he had a debulking surgery which later showed evidence of lymphoma. In the meantime he has improved sensation and is able to walk with a cane.   Pathology: -- LOW GRADE B CELL LYMPHOMA WITH PLASMACYTIC DIFFERENTIATION MOST CONSISTENT WITH EXTRANODAL MARGINAL ZONE LYMPHOMA. No clonal B cell or T cell population identified.   NGS is positive for a clonal BCR gene rearrangement, supporting a clonal B cell proliferation. MYD88 L265P allele specific PCR is negative.   FISH for BCL2 and BCL6 rearrangements (performed at [facility]) and FISH for 1p36 abnormalities (send out test performed at [facility]) are all negative.     - PET/CT 11/13/2023: Multiple FDG avid subcentimeter pulmonary nodule in the left lower lobe (max SUV 3.4) and a FDG avid pulmonary nodule in the left upper lobe with (max SUV 4.4). Right lung is unremarkable *Multiple FDG avid bilateral axillary, mediastinal and bilateral hilar and infrahilar nodes (max SUV 14.9). *Postsurgical changes from T1-4 laminectomies and paraspinal mass excision with interval development  of a postoperative seroma and surrounding FDG avidity with (max SUV 5.4). *FDG avid portacaval lymph node with (max SUV 6.4). *Multiple FDG avid bone lesions throughout the axial and appendicular skeleton including bilateral scapulas, right clavicular head, T5 vertebral body, bilateral iliac bones, left ischium, and left femur without definite anatomic correlate (max SUV 7.6).  - MRI Spine 11/16/2023: There are  postoperative changes compatible with interval posterior laminectomies extending from the T1 through T4 levels. The previously noted abnormal enhancing epidural soft tissue mass concerning for epidural neoplasm through this region is not well-defined on the current study raising the possibility of interval surgical resection and/or sequelae of interval post therapy. There is nonspecific smooth abnormal epidural thickening and enhancement within the spinal canal extending from the C7/T1 level caudally to the T5 level with residual nonspecific abnormal enhancing soft tissue extending through the neural foramen at the T1/2, T2/3, T3/4, and T4/5 levels left greater than right which may be at least in part postsurgical in etiology with the possibility of a component of underlying residual neoplasm or a superimposed infectious/inflammatory process not entirely excluded. The nonspecific smooth epidural thickening through this region contributes to mild encroachment upon the thecal sac without evidence of spinal cord compression through this region on the current study. The previously noted infiltrative abnormal increased STIR bone marrow signal within the upper thoracic vertebrae including the T2, T3, and T4 vertebrae as well as the C5 vertebral body on the prior MRI dated 10/01/2023 most concerning for underlying infiltrative osseous neoplasm/lymphoma is less conspicuous on the current study.     Treatment:  - Initiated on rituximab bendamustine and started C1 on 11/30/2023. Tolerated relatively well, but had nausea and flu like symptoms for a few days after chemo. Since then he also completed C2 R stacey on 12/28, C3 on 01/25, C4 on 02/22/2024. C6 will likely finish by 04/18/2024.  -Due to the location of the disease and bone marrow uptake, he may be at high risk of CNS involvement / relapse. Initiated on IT Methotrexate and cytarabine.   - LP with IT Methotrexate (2/19); CSF flow cytometry negative for lymphoma  - 2nd LP  planned for 3/18    Review of Systems:   At this time, Mr. Ni is feeling well overall. The lower extremity weakness and sensory changes are improves, though some residual sensory deficits below xiphisternum level remain.  Denies cough, chest pain, chest tightness, breathing difficulty, wheezing, hemoptysis, cardiac symptoms.  Denies headaches, speech/swallow changes, acute vision/hearing changes.  Denies difficulty ambulating, imbalance or recent falls.  Denies abdominal pain, nausea, vomiting, diarrhea.  Denies fever, chills, night sweats, significant weight loss, loss of appetite.  Denies enlarged lymph nodes, pedal edema, calf tenderness.    The patient's current pain level was assessed.  They report currently having a pain of 0 out of 10.      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    Loss of smell    Cough variant asthma 04/02/2019    Cough variant asthma    Diverticulitis 09/25/2023    Foot pain 09/25/2023    Other conditions influencing health status     No significant past surgical history    Other conditions influencing health status     No significant past medical history    Personal history of other diseases of the respiratory system 04/14/2019    History of bronchitis    Personal history of other drug therapy 10/23/2019    History of influenza vaccination    Personal history of other specified conditions 03/30/2019    History of persistent cough    Recurrent pneumonia 09/25/2023    Tendonitis 09/25/2023        Past Surgical History:    Past Surgical History:   Procedure Laterality Date    OTHER SURGICAL HISTORY  04/12/2019    No history of surgery        Family History:  Cancer-related family history includes Cancer in an other family member; Kidney cancer in his maternal  grandfather and maternal grandmother; Lung cancer in his maternal grandfather and maternal grandmother.    Social History:    Social History     Tobacco Use    Smoking status: Never    Smokeless tobacco: Never   Vaping Use    Vaping Use: Never used   Substance Use Topics    Alcohol use: Not Currently     Comment: rarely    Drug use: Never       Allergies:    Allergies   Allergen Reactions    Ciprofloxacin Itching        Medications:    Current Outpatient Medications:     acetaminophen (Tylenol) 325 mg tablet, Take as needed while having post operative pain, Disp: , Rfl:     albuterol 90 mcg/actuation inhaler, Inhale 2 puffs every 6 hours if needed for shortness of breath or wheezing., Disp: , Rfl:     budesonide-formoteroL (Symbicort) 160-4.5 mcg/actuation inhaler, Inhale 2 puffs 2 times a day., Disp: , Rfl:     cyclobenzaprine (Flexeril) 10 mg tablet, Take 1 tablet (10 mg) by mouth 3 times a day as needed for muscle spasms., Disp: 30 tablet, Rfl: 2    ondansetron (Zofran) 8 mg tablet, Take 1 tablet (8 mg) by mouth every 8 hours if needed for nausea or vomiting., Disp: 30 tablet, Rfl: 5    prochlorperazine (Compazine) 10 mg tablet, Take 1 tablet (10 mg) by mouth every 6 hours if needed for nausea or vomiting., Disp: 30 tablet, Rfl: 5    zolpidem (Ambien) 10 mg tablet, Take 1 tablet (10 mg) by mouth as needed at bedtime for sleep., Disp: 30 tablet, Rfl: 1      Performance Status:  The Karnofsky performance scale today is 80, Normal activity with effort; some signs or symptoms of disease (ECOG equivalent 1).      OBJECTIVE  Physical Exam:  /89 (BP Location: Right arm, Patient Position: Sitting, BP Cuff Size: Large adult)   Pulse 96   Temp 36 °C (96.8 °F) (Skin)   Resp 18   Wt 118 kg (259 lb 4.2 oz)   SpO2 97%   BMI 39.80 kg/m²    Physical Exam  Constitutional:       General: He is not in acute distress.     Appearance: He is not ill-appearing.   HENT:      Head: Atraumatic.   Eyes:      General: No scleral  icterus.  Pulmonary:      Effort: Pulmonary effort is normal. No respiratory distress.      Breath sounds: No wheezing.   Musculoskeletal:      Right lower leg: No edema.      Left lower leg: No edema.   Neurological:      General: No focal deficit present.      Mental Status: He is alert and oriented to person, place, and time.      Cranial Nerves: No cranial nerve deficit.      Motor: No weakness.      Gait: Gait normal.   Psychiatric:         Mood and Affect: Mood normal.          Laboratory Review:  The pertinent lab results were reviewed and discussed with the patient.      Pathology Review:  The pertinent pathology results were reviewed from EMR and discussed with the patient.       Imaging:  The pertinent imaging results were reviewed from EMR/PACS with key results discussed with the patient.    ASSESSMENT:  Dann Ni is a 48 y.o. male with Marginal zone lymphoma (CMS/HCC), Clinical: Stage IV (Marginal zone lymphoma) with spinal involvement and neurological symptoms of loss of sensation below xiphisternum at presentation. Imaging findings were concerning for extensive spinal/epidural/neural foramina disease especially from T1-T8 levels. Additional foci of spinal involvement were seen at C5, L and S1 levels. PET-CT identified multiple FDG avid bilateral axillary, mediastinal and bilateral hilar, infrahilar nodes  and abdominal lymph nodes, and multiple FDG avid bone lesions throughout the axial and appendicular skeleton including bilateral scapulas, right clavicular head, T5 vertebral body, bilateral iliac bones, left ischium, and left femur. He  is s/p debulking spinal surgery and was started on R-Teresa 11/2023 with C6 will likely finish by 04/18/2024. He has also received IT Methotrexate and cytarabine.     He presents to the clinic to initiate discussions regarding the role of radiation therapy.    He is an active duty  back on office duty.    PLAN:    Mr. Ni's pertinent history,  exam, imaging and pathology details were reviewed.   Accompanied by his wife.    Treatment recommendations/Alternatives:  NCCN Guidelines were applicable to guide this patients treatment plan.   We reviewed the standard of care management for stage IV EMZL with backbone of the treatments being systemic therapy.  Given extranodal involvement at presentation due to spinal involvement, with neurological symptoms of sensory deficits below xiphisternum and vague motor symptoms, we reviewed the role of consolidation XRT to the thoracic spine to maximize local control and prevent recurrence of neurological symptoms.    Pros and cons of XRT were reviewed with clarification that overall survival will be dictated by the systemic disease burden, with goal of XRT only to provide local control in the T spine.    Treatment alternatives including deferred XRT were reviewed.    Radiation treatment logistics:  We then focused our attention regarding logistics, rationale and potential risks with radiation therapy. This will need an initial simulation/mapping that will involve a planning CT scan in treatment position followed by a 2-3 week planning period and then initiation of radiation treatments. He will be planned for 24-30 Gy in 12-15 fractions, daily sessions, Monday-Friday, as out-patient.    We broadly reviewed possible side effects (Acute and long-term). Common and uncommon side effects with risks as relevant for his case were discussed.    Patient was understanding of the rationale for XRT.    Pain Management:  No concerns.     Social Work:  No concerns.     Nutrition: No concerns.     Plan: Discussed with Dr. Peters.  MRI Spine end-April.  Dr. Peters will also plan PET-CT around that time.  We will review all iamging and clinical findings and make final recommendations regarding radiation.  Follow up afater MRI and PET CT.    The patient was provided my contact information.       Maren Queen MD, MMM  Senior Attending  Physician, [facility]  Professor, [employer]   Our [place]: “To Heal, To Teach, To Discover.”  RN partner: [email or web address]    Phone (scheduling): [phone]  Phone (office): [phone]  Phone (after hours): [phone]

## 2024-03-11 NOTE — ADDENDUM NOTE
Encounter addended by: Maren Queen MD on: 3/11/2024 7:57 PM   Actions taken: Care Teams modified, SmartForm saved, Pend clinical note, Level of Service modified, Clinical Note Signed

## 2024-03-13 ENCOUNTER — TREATMENT (OUTPATIENT)
Dept: PHYSICAL THERAPY | Facility: CLINIC | Age: 49
End: 2024-03-13
Payer: COMMERCIAL

## 2024-03-13 DIAGNOSIS — D49.2 THORACIC SPINE TUMOR: Primary | ICD-10-CM

## 2024-03-13 DIAGNOSIS — C85.80 MARGINAL ZONE LYMPHOMA (MULTI): Primary | ICD-10-CM

## 2024-03-13 PROCEDURE — 97110 THERAPEUTIC EXERCISES: CPT | Mod: GP | Performed by: PHYSICAL THERAPIST

## 2024-03-13 ASSESSMENT — PAIN - FUNCTIONAL ASSESSMENT: PAIN_FUNCTIONAL_ASSESSMENT: 0-10

## 2024-03-13 ASSESSMENT — PAIN SCALES - GENERAL: PAINLEVEL_OUTOF10: 0 - NO PAIN

## 2024-03-13 NOTE — PROGRESS NOTES
PHYSICAL THERAPY TREATMENT    Patient name:  Dann Ni    MRN:  [id number]    :  1975    Today's Date:  24    Time Calculation  Start Time:   Stop Time:   Time Calculation (min): 50 min     PT Therapeutic Procedures Time Entry  Therapeutic Exercise Time Entry: 45      Referral by:  Referred By: Dann Avila PA-C    Diagnoses:  Diagnosis and Precautions: Diagnosis D49.2 (ICD-10-CM) - Thoracic spine tumor    Assessment/Plan:   Therapeutic exercise performed in order to improve core strength/endurance.  Patient challenged with exercises performed in clinic secondary to increased overall total body fatigue.       PT Assessment  PT Assessment Results: Decreased strength, Decreased endurance  Rehab Prognosis: Good  Evaluation/Treatment Tolerance: Patient limited by fatigue  Plan:  Progress as tolerated.     General Visit Information  PT  Visit  PT Received On: 24     General  Reason for Referral: PT Evaluate and Treat  Referred By: Dann Avila PA-C  General Comment: Diagnosis D49.2 (ICD-10-CM) - Thoracic spine tumor (Laminectomy Thoracic with Excision Lesion 10/2/2023, Dr. Marin)    Insurance Reviewed  Name of insurance:  [employer]  Visit #:     60 visits per calendar year  BACK SX 10/2; 500 DED IS MET 1000 FAM DED 2000 OOP IS MET 4000 FAM, OOP 0% COINSUR $20 COPAY (DO NOT COLLECT SINCE DED + OOP ALREADY MET FOR ) 60V CY (NONE USED FOR ) PER [email or web address] [id number] // Marilynn confirmed 23 5:41pm     Subjective:  Patient reports that he has chemo treatment next week (Thursday, Friday).  Precautions  STEADI Fall Risk Score (The score of 4 or more indicates an increased risk of falling): 8  LE Weight Bearing Status: Weight Bearing as Tolerated  Medical Precautions:  (Laminectomy Thoracic with Excision Lesion 10/2/2023, Dr. Marin, Tumor thoracic spine, Fractured L wrist as a kid, asthma, cancer)  Post-Surgical Precautions: Spinal precautions (20 lb. lifting restriction)    Pain  "Assessment  Pain Assessment: 0-10  Pain Score: 0 - No pain (just stiffness)  Pain Location: Back  Pain Orientation: Posterior      Treatments  Therapeutic Exercise  Therapeutic Exercise Performed: Yes  Therapeutic Exercise Activity 1: UBE 4' FWD/4' BWD 2.0 resistance, sitting  Therapeutic Exercise Activity 2: TT rows silver x 30 reps  Therapeutic Exercise Activity 3: TT shoulder extension silver x 30 reps  Therapeutic Exercise Activity 4: TT Palloff press silver x 30 reps R and L  Therapeutic Exercise Activity 5: TT lifts gray x 30 reps R and L  Therapeutic Exercise Activity 6: TT chops pink x 30 reps R and L  Therapeutic Exercise Activity 7: seated prayer stretch 30\"x3 x 3 directions  Therapeutic Exercise Activity 8: standing HS stretch 30\"x3 R and L  Therapeutic Exercise Activity 9: Standing hip flexor stretch 30\"x3 R and L at steps    Time in clinic started at  4:58pm  Time in clinic ended at  5:48pm  Total time in clinic is .       50 minutes  Total timed code time is     45 minutes    Treatment Performed Today/Billing:  Therapeutic Exercise x 3 units  "

## 2024-03-14 ENCOUNTER — LAB (OUTPATIENT)
Dept: LAB | Facility: LAB | Age: 49
End: 2024-03-14
Payer: COMMERCIAL

## 2024-03-14 DIAGNOSIS — C85.80 MARGINAL ZONE LYMPHOMA (MULTI): ICD-10-CM

## 2024-03-14 LAB
ALBUMIN SERPL BCP-MCNC: 4.2 G/DL (ref 3.4–5)
ALP SERPL-CCNC: 86 U/L (ref 33–120)
ALT SERPL W P-5'-P-CCNC: 26 U/L (ref 10–52)
ANION GAP SERPL CALC-SCNC: 11 MMOL/L (ref 10–20)
APTT PPP: 32 SECONDS (ref 27–38)
AST SERPL W P-5'-P-CCNC: 30 U/L (ref 9–39)
BASOPHILS # BLD AUTO: 0.04 X10*3/UL (ref 0–0.1)
BASOPHILS NFR BLD AUTO: 0.7 %
BILIRUB SERPL-MCNC: 0.6 MG/DL (ref 0–1.2)
BUN SERPL-MCNC: 12 MG/DL (ref 6–23)
CALCIUM SERPL-MCNC: 9 MG/DL (ref 8.6–10.3)
CHLORIDE SERPL-SCNC: 104 MMOL/L (ref 98–107)
CO2 SERPL-SCNC: 27 MMOL/L (ref 21–32)
CREAT SERPL-MCNC: 0.99 MG/DL (ref 0.5–1.3)
EGFRCR SERPLBLD CKD-EPI 2021: >90 ML/MIN/1.73M*2
EOSINOPHIL # BLD AUTO: 0.11 X10*3/UL (ref 0–0.7)
EOSINOPHIL NFR BLD AUTO: 2 %
ERYTHROCYTE [DISTWIDTH] IN BLOOD BY AUTOMATED COUNT: 14.7 % (ref 11.5–14.5)
GLUCOSE SERPL-MCNC: 145 MG/DL (ref 74–99)
HCT VFR BLD AUTO: 37.4 % (ref 41–52)
HGB BLD-MCNC: 12.9 G/DL (ref 13.5–17.5)
IMM GRANULOCYTES # BLD AUTO: 0.03 X10*3/UL (ref 0–0.7)
IMM GRANULOCYTES NFR BLD AUTO: 0.5 % (ref 0–0.9)
INR PPP: 1 (ref 0.9–1.1)
LDH SERPL L TO P-CCNC: 209 U/L (ref 84–246)
LYMPHOCYTES # BLD AUTO: 0.28 X10*3/UL (ref 1.2–4.8)
LYMPHOCYTES NFR BLD AUTO: 5.1 %
MCH RBC QN AUTO: 29.5 PG (ref 26–34)
MCHC RBC AUTO-ENTMCNC: 34.5 G/DL (ref 32–36)
MCV RBC AUTO: 85 FL (ref 80–100)
MONOCYTES # BLD AUTO: 0.92 X10*3/UL (ref 0.1–1)
MONOCYTES NFR BLD AUTO: 16.7 %
NEUTROPHILS # BLD AUTO: 4.12 X10*3/UL (ref 1.2–7.7)
NEUTROPHILS NFR BLD AUTO: 75 %
NRBC BLD-RTO: 0 /100 WBCS (ref 0–0)
PLATELET # BLD AUTO: 202 X10*3/UL (ref 150–450)
POTASSIUM SERPL-SCNC: 3.7 MMOL/L (ref 3.5–5.3)
PROT SERPL-MCNC: 6.2 G/DL (ref 6.4–8.2)
PROTHROMBIN TIME: 11.8 SECONDS (ref 9.8–12.8)
RBC # BLD AUTO: 4.38 X10*6/UL (ref 4.5–5.9)
SODIUM SERPL-SCNC: 138 MMOL/L (ref 136–145)
WBC # BLD AUTO: 5.5 X10*3/UL (ref 4.4–11.3)

## 2024-03-14 PROCEDURE — 85610 PROTHROMBIN TIME: CPT

## 2024-03-14 PROCEDURE — 85730 THROMBOPLASTIN TIME PARTIAL: CPT

## 2024-03-14 PROCEDURE — 80053 COMPREHEN METABOLIC PANEL: CPT

## 2024-03-14 PROCEDURE — 83615 LACTATE (LD) (LDH) ENZYME: CPT

## 2024-03-14 PROCEDURE — 36415 COLL VENOUS BLD VENIPUNCTURE: CPT

## 2024-03-14 PROCEDURE — 85025 COMPLETE CBC W/AUTO DIFF WBC: CPT

## 2024-03-15 ENCOUNTER — OFFICE VISIT (OUTPATIENT)
Dept: HEMATOLOGY/ONCOLOGY | Facility: HOSPITAL | Age: 49
End: 2024-03-15
Payer: COMMERCIAL

## 2024-03-15 VITALS
SYSTOLIC BLOOD PRESSURE: 129 MMHG | HEART RATE: 84 BPM | TEMPERATURE: 95.2 F | WEIGHT: 259.8 LBS | RESPIRATION RATE: 16 BRPM | OXYGEN SATURATION: 97 % | BODY MASS INDEX: 39.88 KG/M2 | DIASTOLIC BLOOD PRESSURE: 85 MMHG

## 2024-03-15 DIAGNOSIS — C85.80 MARGINAL ZONE LYMPHOMA (MULTI): ICD-10-CM

## 2024-03-15 PROCEDURE — 3008F BODY MASS INDEX DOCD: CPT | Performed by: INTERNAL MEDICINE

## 2024-03-15 PROCEDURE — 99215 OFFICE O/P EST HI 40 MIN: CPT | Performed by: INTERNAL MEDICINE

## 2024-03-15 PROCEDURE — 1036F TOBACCO NON-USER: CPT | Performed by: INTERNAL MEDICINE

## 2024-03-15 ASSESSMENT — PAIN SCALES - GENERAL: PAINLEVEL_OUTOF10: 0-NO PAIN

## 2024-03-18 ENCOUNTER — APPOINTMENT (OUTPATIENT)
Dept: HEMATOLOGY/ONCOLOGY | Facility: HOSPITAL | Age: 49
End: 2024-03-18
Payer: COMMERCIAL

## 2024-03-18 ENCOUNTER — HOSPITAL ENCOUNTER (OUTPATIENT)
Dept: RADIOLOGY | Facility: HOSPITAL | Age: 49
Discharge: HOME | End: 2024-03-18
Payer: COMMERCIAL

## 2024-03-18 ENCOUNTER — APPOINTMENT (OUTPATIENT)
Dept: RADIOLOGY | Facility: HOSPITAL | Age: 49
End: 2024-03-18
Payer: COMMERCIAL

## 2024-03-18 VITALS
OXYGEN SATURATION: 97 % | DIASTOLIC BLOOD PRESSURE: 79 MMHG | SYSTOLIC BLOOD PRESSURE: 122 MMHG | HEART RATE: 90 BPM | RESPIRATION RATE: 20 BRPM

## 2024-03-18 DIAGNOSIS — C85.80 MARGINAL ZONE LYMPHOMA (MULTI): ICD-10-CM

## 2024-03-18 PROCEDURE — 62328 DX LMBR SPI PNXR W/FLUOR/CT: CPT | Mod: 59

## 2024-03-18 PROCEDURE — 62328 DX LMBR SPI PNXR W/FLUOR/CT: CPT | Performed by: RADIOLOGY

## 2024-03-18 RX ORDER — LIDOCAINE HYDROCHLORIDE 10 MG/ML
5 INJECTION, SOLUTION EPIDURAL; INFILTRATION; INTRACAUDAL; PERINEURAL ONCE
Status: DISCONTINUED | OUTPATIENT
Start: 2024-03-18 | End: 2024-03-19 | Stop reason: HOSPADM

## 2024-03-18 ASSESSMENT — PAIN - FUNCTIONAL ASSESSMENT
PAIN_FUNCTIONAL_ASSESSMENT: 0-10
PAIN_FUNCTIONAL_ASSESSMENT: 0-10

## 2024-03-18 ASSESSMENT — PAIN SCALES - GENERAL
PAINLEVEL_OUTOF10: 0 - NO PAIN
PAINLEVEL_OUTOF10: 0 - NO PAIN

## 2024-03-18 NOTE — POST-PROCEDURE NOTE
Fluoroscopic Guided Lumbar Puncture Postprocedure Note    Attending: Horacio    Fellow: Hitesh    Diagnosis: Marginal zone lymphoma      Description of procedure: Written and verbal informed consent was obtained from the patient. The patient was placed in the prone position on the exam table. A timeout was performed prior to the procedure. Using fluoroscopic guidance, appropriate anatomic landmarks were identified and surgical site was marked. Site was prepped and draped in the usual sterile fashion. Local anesthesia was obtained using approximately 5 mL 1% Lidocaine.  Under intermittent fluoroscopic guidance, a 20 Gauge  6 inch spinal needle was advanced into the thecal sac at the L2-L3 until return of cerebral spinal fluid.    Opening pressure was not obtained.    A total of 7 mL clear cerebral spinal fluid was collected and submitted to the lab for analysis.    Intrathecal chemotherapy was administered by the Hematology/Oncology service.    The stylet was replaced and the needle was removed.    The patient tolerated procedure well without any immediate or clinically apparent complications.      The collected CSF was then sent to the lab for appropriate lab tests as ordered by the referring physician.    Estimated Blood Loss: None.    IMPRESSION:   Successful fluoroscopic guided lumbar puncture. See detailed result report with images in PACS.    The patient tolerated the procedure well without incident or complication and is in stable condition.

## 2024-03-18 NOTE — DISCHARGE INSTRUCTIONS
Follow instructions on Patient Info Sheet # 926    - You need to have a responsible adult accompany you home.  - You may resume your normal diet.    For questions related to your procedure:  Please call [phone] between the hours of 7:00am-5:00pm Monday through Friday.  Please call [phone] after 5:00pm and on weekends and holidays.     In the event of an emergency call [phone] or go to your nearest emergency room.

## 2024-03-20 ENCOUNTER — TREATMENT (OUTPATIENT)
Dept: PHYSICAL THERAPY | Facility: CLINIC | Age: 49
End: 2024-03-20
Payer: COMMERCIAL

## 2024-03-20 DIAGNOSIS — D49.2 THORACIC SPINE TUMOR: Primary | ICD-10-CM

## 2024-03-20 PROCEDURE — 97110 THERAPEUTIC EXERCISES: CPT | Mod: GP | Performed by: PHYSICAL THERAPIST

## 2024-03-20 ASSESSMENT — PAIN - FUNCTIONAL ASSESSMENT: PAIN_FUNCTIONAL_ASSESSMENT: 0-10

## 2024-03-20 ASSESSMENT — PAIN SCALES - GENERAL: PAINLEVEL_OUTOF10: 0 - NO PAIN

## 2024-03-20 NOTE — PROGRESS NOTES
PHYSICAL THERAPY TREATMENT    Patient name:  Dann Ni    MRN:  [id number]    :  1975    Today's Date:  24    Time Calculation  Start Time: 1658  Stop Time:   Time Calculation (min): 39 min     PT Therapeutic Procedures Time Entry  Therapeutic Exercise Time Entry: 38      Referral by:  Referred By: Dann Avila PA-C    Diagnoses:  Diagnosis and Precautions: Diagnosis D49.2 (ICD-10-CM) - Thoracic spine tumor    Assessment/Plan:   Therapeutic exercise performed in order to improve core strength/endurance.  Patient challenged with exercises performed in clinic secondary to increased overall total body fatigue.       PT Assessment  PT Assessment Results: Decreased strength, Decreased endurance  Rehab Prognosis: Good  Evaluation/Treatment Tolerance: Patient limited by fatigue  Plan:  Progress as tolerated.     General Visit Information  PT  Visit  PT Received On: 24     General  Reason for Referral: PT Evaluate and Treat  Referred By: Dann Avila PA-C  General Comment: Diagnosis D49.2 (ICD-10-CM) - Thoracic spine tumor (Laminectomy Thoracic with Excision Lesion 10/2/2023, Dr. Marin)    Insurance Reviewed  Name of insurance:  [employer]  Visit #:     60 visits per calendar year  BACK SX 10/2; 500 DED IS MET 1000 FAM DED 2000 OOP IS MET 4000 FAM, OOP 0% COINSUR $20 COPAY (DO NOT COLLECT SINCE DED + OOP ALREADY MET FOR ) 60V CY (NONE USED FOR ) PER [email or web address] [id number] // Marilynn confirmed 23 5:41pm     Subjective:  Patient reports that he had a spinal tap this past Monday and will be having chemo treatments later this week.  Precautions  STEADI Fall Risk Score (The score of 4 or more indicates an increased risk of falling): 8  LE Weight Bearing Status: Weight Bearing as Tolerated  Medical Precautions:  (Laminectomy Thoracic with Excision Lesion 10/2/2023, Dr. Marin, Tumor thoracic spine, Fractured L wrist as a kid, asthma, cancer)  Post-Surgical Precautions: Spinal precautions (20  "lb. lifting restriction)    Pain Assessment  Pain Assessment: 0-10  Pain Score: 0 - No pain  Pain Location: Back  Pain Orientation: Posterior      Treatments    Therapeutic Exercise  Therapeutic Exercise Performed: Yes  Therapeutic Exercise Activity 1: UBE 4' FWD/4' BWD 2.0 resistance, sitting  Therapeutic Exercise Activity 2: Door pec stretch 30\"x5  Therapeutic Exercise Activity 3: supine LTR stretch 10\"x10 R and L  Therapeutic Exercise Activity 4: supine bridges x 30 reps  Therapeutic Exercise Activity 5: seated prayer stretch x 3 directions x 30\" each x 3 each  Therapeutic Exercise Activity 6: seated thoracic rotation x 10 reps R and L  Therapeutic Exercise Activity 7: seated thoracic extension x 20 reps    Time in clinic started at  4:58pm  Time in clinic ended at  5:37pm  Total time in clinic is .        39 minutes  Total timed code time is     38 minutes    Treatment Performed Today/Billing:  Therapeutic Exercise x 3 units  "

## 2024-03-21 ENCOUNTER — INFUSION (OUTPATIENT)
Dept: HEMATOLOGY/ONCOLOGY | Facility: HOSPITAL | Age: 49
End: 2024-03-21
Payer: COMMERCIAL

## 2024-03-21 VITALS
TEMPERATURE: 98.6 F | HEIGHT: 68 IN | BODY MASS INDEX: 39.16 KG/M2 | RESPIRATION RATE: 16 BRPM | WEIGHT: 258.38 LBS | OXYGEN SATURATION: 100 % | HEART RATE: 90 BPM | SYSTOLIC BLOOD PRESSURE: 118 MMHG | DIASTOLIC BLOOD PRESSURE: 80 MMHG

## 2024-03-21 DIAGNOSIS — C85.80 MARGINAL ZONE LYMPHOMA (MULTI): Primary | ICD-10-CM

## 2024-03-21 DIAGNOSIS — C85.80 MARGINAL ZONE LYMPHOMA (MULTI): ICD-10-CM

## 2024-03-21 PROCEDURE — 2500000004 HC RX 250 GENERAL PHARMACY W/ HCPCS (ALT 636 FOR OP/ED): Performed by: INTERNAL MEDICINE

## 2024-03-21 PROCEDURE — 2500000004 HC RX 250 GENERAL PHARMACY W/ HCPCS (ALT 636 FOR OP/ED): Mod: JZ | Performed by: INTERNAL MEDICINE

## 2024-03-21 PROCEDURE — 96375 TX/PRO/DX INJ NEW DRUG ADDON: CPT | Mod: INF

## 2024-03-21 PROCEDURE — 2500000001 HC RX 250 WO HCPCS SELF ADMINISTERED DRUGS (ALT 637 FOR MEDICARE OP): Performed by: INTERNAL MEDICINE

## 2024-03-21 PROCEDURE — 96409 CHEMO IV PUSH SNGL DRUG: CPT

## 2024-03-21 PROCEDURE — 96411 CHEMO IV PUSH ADDL DRUG: CPT

## 2024-03-21 RX ORDER — ALBUTEROL SULFATE 0.83 MG/ML
3 SOLUTION RESPIRATORY (INHALATION) AS NEEDED
Status: CANCELLED | OUTPATIENT
Start: 2024-03-22

## 2024-03-21 RX ORDER — HEPARIN 100 UNIT/ML
500 SYRINGE INTRAVENOUS AS NEEDED
Status: CANCELLED | OUTPATIENT
Start: 2024-03-21

## 2024-03-21 RX ORDER — ALBUTEROL SULFATE 0.83 MG/ML
3 SOLUTION RESPIRATORY (INHALATION) AS NEEDED
Status: CANCELLED | OUTPATIENT
Start: 2024-03-21

## 2024-03-21 RX ORDER — PALONOSETRON 0.05 MG/ML
0.25 INJECTION, SOLUTION INTRAVENOUS ONCE
Status: COMPLETED | OUTPATIENT
Start: 2024-03-21 | End: 2024-03-21

## 2024-03-21 RX ORDER — DIPHENHYDRAMINE HYDROCHLORIDE 50 MG/ML
50 INJECTION INTRAMUSCULAR; INTRAVENOUS AS NEEDED
Status: DISCONTINUED | OUTPATIENT
Start: 2024-03-21 | End: 2024-03-21 | Stop reason: HOSPADM

## 2024-03-21 RX ORDER — FAMOTIDINE 10 MG/ML
20 INJECTION INTRAVENOUS ONCE AS NEEDED
Status: CANCELLED | OUTPATIENT
Start: 2024-04-19

## 2024-03-21 RX ORDER — ACETAMINOPHEN 325 MG/1
650 TABLET ORAL ONCE
Status: COMPLETED | OUTPATIENT
Start: 2024-03-21 | End: 2024-03-21

## 2024-03-21 RX ORDER — ACETAMINOPHEN 325 MG/1
650 TABLET ORAL ONCE
Status: CANCELLED | OUTPATIENT
Start: 2024-04-18

## 2024-03-21 RX ORDER — PROCHLORPERAZINE EDISYLATE 5 MG/ML
10 INJECTION INTRAMUSCULAR; INTRAVENOUS EVERY 6 HOURS PRN
Status: CANCELLED | OUTPATIENT
Start: 2024-04-19

## 2024-03-21 RX ORDER — FAMOTIDINE 10 MG/ML
20 INJECTION INTRAVENOUS ONCE AS NEEDED
Status: CANCELLED | OUTPATIENT
Start: 2024-04-18

## 2024-03-21 RX ORDER — ACETAMINOPHEN 325 MG/1
650 TABLET ORAL ONCE
Status: CANCELLED | OUTPATIENT
Start: 2024-03-21

## 2024-03-21 RX ORDER — PALONOSETRON 0.05 MG/ML
0.25 INJECTION, SOLUTION INTRAVENOUS ONCE
Status: CANCELLED | OUTPATIENT
Start: 2024-04-18

## 2024-03-21 RX ORDER — DIPHENHYDRAMINE HCL 50 MG
50 CAPSULE ORAL ONCE
Status: COMPLETED | OUTPATIENT
Start: 2024-03-21 | End: 2024-03-21

## 2024-03-21 RX ORDER — PROCHLORPERAZINE EDISYLATE 5 MG/ML
10 INJECTION INTRAMUSCULAR; INTRAVENOUS EVERY 6 HOURS PRN
Status: CANCELLED | OUTPATIENT
Start: 2024-03-21

## 2024-03-21 RX ORDER — PROCHLORPERAZINE MALEATE 10 MG
10 TABLET ORAL EVERY 6 HOURS PRN
Status: CANCELLED | OUTPATIENT
Start: 2024-04-19

## 2024-03-21 RX ORDER — DIPHENHYDRAMINE HYDROCHLORIDE 50 MG/ML
50 INJECTION INTRAMUSCULAR; INTRAVENOUS AS NEEDED
Status: CANCELLED | OUTPATIENT
Start: 2024-04-19

## 2024-03-21 RX ORDER — EPINEPHRINE 0.3 MG/.3ML
0.3 INJECTION SUBCUTANEOUS EVERY 5 MIN PRN
Status: CANCELLED | OUTPATIENT
Start: 2024-03-21

## 2024-03-21 RX ORDER — PROCHLORPERAZINE MALEATE 10 MG
10 TABLET ORAL EVERY 6 HOURS PRN
Status: CANCELLED | OUTPATIENT
Start: 2024-03-22

## 2024-03-21 RX ORDER — EPINEPHRINE 0.3 MG/.3ML
0.3 INJECTION SUBCUTANEOUS EVERY 5 MIN PRN
Status: CANCELLED | OUTPATIENT
Start: 2024-04-18

## 2024-03-21 RX ORDER — PROCHLORPERAZINE MALEATE 10 MG
10 TABLET ORAL EVERY 6 HOURS PRN
Status: CANCELLED | OUTPATIENT
Start: 2024-03-21

## 2024-03-21 RX ORDER — PALONOSETRON 0.05 MG/ML
0.25 INJECTION, SOLUTION INTRAVENOUS ONCE
Status: CANCELLED | OUTPATIENT
Start: 2024-03-21

## 2024-03-21 RX ORDER — FAMOTIDINE 10 MG/ML
20 INJECTION INTRAVENOUS ONCE AS NEEDED
Status: DISCONTINUED | OUTPATIENT
Start: 2024-03-21 | End: 2024-03-21 | Stop reason: HOSPADM

## 2024-03-21 RX ORDER — PROCHLORPERAZINE MALEATE 10 MG
10 TABLET ORAL EVERY 6 HOURS PRN
Status: CANCELLED | OUTPATIENT
Start: 2024-04-18

## 2024-03-21 RX ORDER — EPINEPHRINE 0.3 MG/.3ML
0.3 INJECTION SUBCUTANEOUS EVERY 5 MIN PRN
Status: CANCELLED | OUTPATIENT
Start: 2024-04-19

## 2024-03-21 RX ORDER — HEPARIN SODIUM,PORCINE/PF 10 UNIT/ML
50 SYRINGE (ML) INTRAVENOUS AS NEEDED
Status: CANCELLED | OUTPATIENT
Start: 2024-03-21

## 2024-03-21 RX ORDER — DIPHENHYDRAMINE HCL 50 MG
50 CAPSULE ORAL ONCE
Status: CANCELLED | OUTPATIENT
Start: 2024-03-21

## 2024-03-21 RX ORDER — EPINEPHRINE 0.3 MG/.3ML
0.3 INJECTION SUBCUTANEOUS EVERY 5 MIN PRN
Status: DISCONTINUED | OUTPATIENT
Start: 2024-03-21 | End: 2024-03-21 | Stop reason: HOSPADM

## 2024-03-21 RX ORDER — FAMOTIDINE 10 MG/ML
20 INJECTION INTRAVENOUS ONCE AS NEEDED
Status: CANCELLED | OUTPATIENT
Start: 2024-03-21

## 2024-03-21 RX ORDER — FAMOTIDINE 10 MG/ML
20 INJECTION INTRAVENOUS ONCE AS NEEDED
Status: CANCELLED | OUTPATIENT
Start: 2024-03-22

## 2024-03-21 RX ORDER — DIPHENHYDRAMINE HYDROCHLORIDE 50 MG/ML
50 INJECTION INTRAMUSCULAR; INTRAVENOUS AS NEEDED
Status: CANCELLED | OUTPATIENT
Start: 2024-03-21

## 2024-03-21 RX ORDER — PROCHLORPERAZINE EDISYLATE 5 MG/ML
10 INJECTION INTRAMUSCULAR; INTRAVENOUS EVERY 6 HOURS PRN
Status: CANCELLED | OUTPATIENT
Start: 2024-03-22

## 2024-03-21 RX ORDER — ALBUTEROL SULFATE 0.83 MG/ML
3 SOLUTION RESPIRATORY (INHALATION) AS NEEDED
Status: DISCONTINUED | OUTPATIENT
Start: 2024-03-21 | End: 2024-03-21 | Stop reason: HOSPADM

## 2024-03-21 RX ORDER — PROCHLORPERAZINE EDISYLATE 5 MG/ML
10 INJECTION INTRAMUSCULAR; INTRAVENOUS EVERY 6 HOURS PRN
Status: CANCELLED | OUTPATIENT
Start: 2024-04-18

## 2024-03-21 RX ORDER — PROCHLORPERAZINE EDISYLATE 5 MG/ML
10 INJECTION INTRAMUSCULAR; INTRAVENOUS EVERY 6 HOURS PRN
Status: DISCONTINUED | OUTPATIENT
Start: 2024-03-21 | End: 2024-03-21 | Stop reason: HOSPADM

## 2024-03-21 RX ORDER — DIPHENHYDRAMINE HCL 50 MG
50 CAPSULE ORAL ONCE
Status: CANCELLED | OUTPATIENT
Start: 2024-04-18

## 2024-03-21 RX ORDER — ALBUTEROL SULFATE 0.83 MG/ML
3 SOLUTION RESPIRATORY (INHALATION) AS NEEDED
Status: CANCELLED | OUTPATIENT
Start: 2024-04-19

## 2024-03-21 RX ORDER — HEPARIN 100 UNIT/ML
500 SYRINGE INTRAVENOUS AS NEEDED
Status: DISCONTINUED | OUTPATIENT
Start: 2024-03-21 | End: 2024-03-21 | Stop reason: HOSPADM

## 2024-03-21 RX ORDER — DIPHENHYDRAMINE HYDROCHLORIDE 50 MG/ML
50 INJECTION INTRAMUSCULAR; INTRAVENOUS AS NEEDED
Status: CANCELLED | OUTPATIENT
Start: 2024-04-18

## 2024-03-21 RX ORDER — ALBUTEROL SULFATE 0.83 MG/ML
3 SOLUTION RESPIRATORY (INHALATION) AS NEEDED
Status: CANCELLED | OUTPATIENT
Start: 2024-04-18

## 2024-03-21 RX ORDER — EPINEPHRINE 0.3 MG/.3ML
0.3 INJECTION SUBCUTANEOUS EVERY 5 MIN PRN
Status: CANCELLED | OUTPATIENT
Start: 2024-03-22

## 2024-03-21 RX ORDER — DIPHENHYDRAMINE HYDROCHLORIDE 50 MG/ML
50 INJECTION INTRAMUSCULAR; INTRAVENOUS AS NEEDED
Status: CANCELLED | OUTPATIENT
Start: 2024-03-22

## 2024-03-21 RX ORDER — HEPARIN SODIUM,PORCINE/PF 10 UNIT/ML
50 SYRINGE (ML) INTRAVENOUS AS NEEDED
Status: DISCONTINUED | OUTPATIENT
Start: 2024-03-21 | End: 2024-03-21 | Stop reason: HOSPADM

## 2024-03-21 RX ORDER — PROCHLORPERAZINE MALEATE 10 MG
10 TABLET ORAL EVERY 6 HOURS PRN
Status: DISCONTINUED | OUTPATIENT
Start: 2024-03-21 | End: 2024-03-21 | Stop reason: HOSPADM

## 2024-03-21 RX ADMIN — DIPHENHYDRAMINE HYDROCHLORIDE 50 MG: 50 CAPSULE ORAL at 08:49

## 2024-03-21 RX ADMIN — SODIUM CHLORIDE 855 MG: 9 INJECTION, SOLUTION INTRAVENOUS at 10:01

## 2024-03-21 RX ADMIN — DEXAMETHASONE SODIUM PHOSPHATE 12 MG: 10 INJECTION, SOLUTION INTRAMUSCULAR; INTRAVENOUS at 08:49

## 2024-03-21 RX ADMIN — BENDAMUSTINE HYDROCHLORIDE 205 MG: 25 INJECTION, SOLUTION INTRAVENOUS at 09:44

## 2024-03-21 RX ADMIN — PALONOSETRON HYDROCHLORIDE 250 MCG: 0.25 INJECTION INTRAVENOUS at 08:49

## 2024-03-21 RX ADMIN — ACETAMINOPHEN 650 MG: 325 TABLET ORAL at 08:49

## 2024-03-21 NOTE — PROGRESS NOTES
Patient ID: Dann Ni is a 48 y.o. male.  Referring Physician: Jeff Peters MD PhD  [address] [address] [place] [id number]  Primary Care Provider: DO Leela Watson    The patient was in good health until July 2023. He developed back pain which did not respond to NSAID or steroid. By late 9/2023 he lost sensation below the diaphragm and were unable to walk. On 9/30 he had CT scan showing a 4.1x1.8x1cm paraspinal mass at the T7 level. MRI showed abnormal enhancing signals at the C5, T2-8 levels.  On 10/2/2023 he had a debulking surgery which later showed evidence of lymphoma. In the meantime he has improved sensation and is able to walk with a cane. 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 He was consented for rituximab bendamustine and started C1 on 11/30/2023. Tolerated relatively well, but had nausea and flu like symptoms for a few days after chemo. Since then he also completed C2 R stacey on 12/28, followed by C3 on Jan 25, and C4 on Feb 22. Intrathecal treatment #1 on 2/19.     Today the patient says walking is improving. Back pain is nearly resolved. No fever, wt loss, or nausea. He is back to work with some accomodation.            Objective   BSA: 2.37 meters squared  /85 (BP Location: Left arm, Patient Position: Sitting, BP Cuff Size: Large adult)   Pulse 84   Temp 35.1 °C (95.2 °F)   Resp 16   Wt 118 kg (259 lb 12.8 oz)   SpO2 97%   BMI 39.88 kg/m²     Family History   Problem Relation Name Age of Onset    Hip dysplasia Mother [place]     Arthritis Mother [place]     Other (htn) Father      Prostatitis Father      Lung cancer Maternal Grandmother      Kidney cancer Maternal Grandmother      Lung cancer Maternal Grandfather      Kidney cancer Maternal Grandfather      Dementia Other      Cancer Other      Dementia Other       Oncology History   Marginal zone lymphoma (CMS/HCC)   10/2/2023 Cancer Staged    Staging form: Hodgkin and Non-Hodgkin Lymphoma,  AJCC 8th Edition, Clinical stage from 10/2/2023: Stage IV (Marginal zone lymphoma) - Signed by Jeff Peters MD PhD on 11/25/2023 11/17/2023 Initial Diagnosis    Marginal zone lymphoma (CMS/HCC)     11/30/2023 -  Chemotherapy    Bendamustine + RiTUXimab, 28 Day Cycles         Dann Ni  reports that he has never smoked. He has never used smokeless tobacco.  He  reports that he does not currently use alcohol.  He  reports no history of drug use.    EXAM: No LAD. No splenomegaly. No skin lesions. Muscle power preserved in the UE but reduced in LLE; walk without assistance in slow pace; upper back surgical scar healed.  Other details below.  3/15: walk independently, better balance and speed.   Physical Exam  Constitutional:       General: He is not in acute distress.     Appearance: He is not toxic-appearing.   HENT:      Head: Normocephalic.      Nose: Nose normal.      Mouth/Throat:      Mouth: Mucous membranes are moist.   Eyes:      Extraocular Movements: Extraocular movements intact.      Pupils: Pupils are equal, round, and reactive to light.   Cardiovascular:      Rate and Rhythm: Normal rate and regular rhythm.      Heart sounds: No murmur heard.  Pulmonary:      Effort: Pulmonary effort is normal.      Breath sounds: Normal breath sounds.   Abdominal:      General: Bowel sounds are normal.      Palpations: Abdomen is soft. There is no mass.      Tenderness: There is no abdominal tenderness. There is no rebound.   Musculoskeletal:         General: No swelling, tenderness, deformity or signs of injury.      Right lower leg: No edema.      Left lower leg: No edema.   Skin:     Coloration: Skin is not jaundiced.      Findings: No bruising, lesion or rash.   Neurological:      Mental Status: He is alert and oriented to person, place, and time.      Cranial Nerves: No cranial nerve deficit.      Motor: Weakness present.      Gait: Gait abnormal.   Psychiatric:         Mood and Affect: Mood normal.        Performance Status:  Symptomatic; fully ambulatory    Assessment/Plan      #EMZL  -Reviewed PET/CT and MRI with the patient and wife. The results are consistent with stage IV disease, involving LN above and below the diaphragm, extensively axial and appendicular skeleton including bilateral scapulas, right clavicular head, T5 vertebral body, bilateral iliac bones, left ischium, and left femur without definite anatomic correlate (max SUV 7.6).  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   -Current data support treatment using multi-agent chemo. Bendamustine and rituximab have been approved and widely used, found to be safe and effective.   -Patient was disappointed to learn that EMZL such as his may not be curable. Was relieved to know that the treatment can bring multiple years of remission, improvement of strength / quality of life / work experience.   -Discussed the Aes of r-anum, which are numerous, including SAEs such as infection, or even death. Most AE and DUSTIN however can be resolved with effective treatments. He consented for R-anum.   -Due to the location of the disease and bone marrow uptake, he may be at high risk of CNS involvement / relapse. Schedule head CT and intrathecal treatment, timing likely after 2 cycles of R-ANUM. He consented for IT Methotrexate and cytarabine. First IT on 2/19/2024. CSF shows no lymphoma. Next IT on 3/18 tentatively.   -In addition, consolidation using XRT will be discussed in the future.     Plan:  -C5 R-Anum on 3/21 With GCSF support. Next due C6 on 4/18.  -PAULA on 4/18 to approve c6.  -head CT for IT.   - IT methotrexate #2 on 3/18. Need PT PTT CBC Ordered.  - MRI 4/30 spine.   - PET/CT 5/9.   - MD 5/10  - RADONC 5/13.       Time spent: 45min, >50% on counseling and care coordination.          Jeff Peters MD PhD

## 2024-03-21 NOTE — PROGRESS NOTES
Dann Ni is a 48 y.o. male who presents for Follow-up (INFUSION).    He is on the following chemotherapy regimen:     Treatment Plans       Name Type Plan Dates Plan Provider         Active    Bendamustine + RiTUXimab, 28 Day Cycles Oncology Treatment  11/29/2023 - Present eJff Peters MD PhD                    Since his last visit, he has been doing well.  Appetite is good and hydrating well. Overall, he states his energy level is stable.   He reports no complaints.  He has no other concerns at this time.     IV maintained for tomorrow's infusion as ordered by Dr Peters.    Patient has been educated with the overall plan of therapy. AVS, appointments & lab results provided. Patient was discharged in stable condition.     Reviewed and approved by SUSHANT MCKINNEY on 3/21/24 at 1:21 PM.

## 2024-03-22 ENCOUNTER — INFUSION (OUTPATIENT)
Dept: HEMATOLOGY/ONCOLOGY | Facility: HOSPITAL | Age: 49
End: 2024-03-22
Payer: COMMERCIAL

## 2024-03-22 VITALS
DIASTOLIC BLOOD PRESSURE: 89 MMHG | TEMPERATURE: 97.9 F | HEART RATE: 92 BPM | BODY MASS INDEX: 39.73 KG/M2 | SYSTOLIC BLOOD PRESSURE: 122 MMHG | RESPIRATION RATE: 18 BRPM | OXYGEN SATURATION: 100 % | WEIGHT: 258.8 LBS

## 2024-03-22 DIAGNOSIS — C85.80 MARGINAL ZONE LYMPHOMA (MULTI): ICD-10-CM

## 2024-03-22 PROCEDURE — 2500000004 HC RX 250 GENERAL PHARMACY W/ HCPCS (ALT 636 FOR OP/ED): Performed by: INTERNAL MEDICINE

## 2024-03-22 PROCEDURE — 96409 CHEMO IV PUSH SNGL DRUG: CPT

## 2024-03-22 PROCEDURE — 96372 THER/PROPH/DIAG INJ SC/IM: CPT | Mod: 59 | Performed by: INTERNAL MEDICINE

## 2024-03-22 PROCEDURE — 96375 TX/PRO/DX INJ NEW DRUG ADDON: CPT | Mod: INF

## 2024-03-22 PROCEDURE — 96377 APPLICATON ON-BODY INJECTOR: CPT

## 2024-03-22 RX ORDER — PROCHLORPERAZINE EDISYLATE 5 MG/ML
10 INJECTION INTRAMUSCULAR; INTRAVENOUS EVERY 6 HOURS PRN
Status: DISCONTINUED | OUTPATIENT
Start: 2024-03-22 | End: 2024-03-22 | Stop reason: HOSPADM

## 2024-03-22 RX ORDER — PROCHLORPERAZINE MALEATE 10 MG
10 TABLET ORAL EVERY 6 HOURS PRN
Status: DISCONTINUED | OUTPATIENT
Start: 2024-03-22 | End: 2024-03-22 | Stop reason: HOSPADM

## 2024-03-22 RX ORDER — HEPARIN 100 UNIT/ML
500 SYRINGE INTRAVENOUS AS NEEDED
Status: CANCELLED | OUTPATIENT
Start: 2024-03-22

## 2024-03-22 RX ORDER — EPINEPHRINE 0.3 MG/.3ML
0.3 INJECTION SUBCUTANEOUS EVERY 5 MIN PRN
Status: DISCONTINUED | OUTPATIENT
Start: 2024-03-22 | End: 2024-03-22 | Stop reason: HOSPADM

## 2024-03-22 RX ORDER — DIPHENHYDRAMINE HYDROCHLORIDE 50 MG/ML
50 INJECTION INTRAMUSCULAR; INTRAVENOUS AS NEEDED
Status: DISCONTINUED | OUTPATIENT
Start: 2024-03-22 | End: 2024-03-22 | Stop reason: HOSPADM

## 2024-03-22 RX ORDER — HEPARIN SODIUM,PORCINE/PF 10 UNIT/ML
50 SYRINGE (ML) INTRAVENOUS AS NEEDED
Status: CANCELLED | OUTPATIENT
Start: 2024-03-22

## 2024-03-22 RX ORDER — ALBUTEROL SULFATE 0.83 MG/ML
3 SOLUTION RESPIRATORY (INHALATION) AS NEEDED
Status: DISCONTINUED | OUTPATIENT
Start: 2024-03-22 | End: 2024-03-22 | Stop reason: HOSPADM

## 2024-03-22 RX ORDER — FAMOTIDINE 10 MG/ML
20 INJECTION INTRAVENOUS ONCE AS NEEDED
Status: DISCONTINUED | OUTPATIENT
Start: 2024-03-22 | End: 2024-03-22 | Stop reason: HOSPADM

## 2024-03-22 RX ADMIN — BENDAMUSTINE HYDROCHLORIDE 205 MG: 25 INJECTION, SOLUTION INTRAVENOUS at 15:55

## 2024-03-22 RX ADMIN — PEGFILGRASTIM 6 MG: KIT SUBCUTANEOUS at 16:16

## 2024-03-22 RX ADMIN — DEXAMETHASONE SODIUM PHOSPHATE 12 MG: 10 INJECTION, SOLUTION INTRAMUSCULAR; INTRAVENOUS at 15:20

## 2024-03-22 ASSESSMENT — PAIN SCALES - GENERAL: PAINLEVEL: 0-NO PAIN

## 2024-03-22 NOTE — PROGRESS NOTES
Tolerated treatment without incident. Discharged in stable condition at 1625 with Neulasta Onpro applied.

## 2024-03-27 ENCOUNTER — TREATMENT (OUTPATIENT)
Dept: PHYSICAL THERAPY | Facility: CLINIC | Age: 49
End: 2024-03-27
Payer: COMMERCIAL

## 2024-03-27 DIAGNOSIS — D49.2 THORACIC SPINE TUMOR: Primary | ICD-10-CM

## 2024-03-27 PROCEDURE — 97110 THERAPEUTIC EXERCISES: CPT | Mod: GP,CQ

## 2024-03-27 ASSESSMENT — PAIN SCALES - GENERAL: PAINLEVEL_OUTOF10: 0 - NO PAIN

## 2024-03-27 ASSESSMENT — PAIN - FUNCTIONAL ASSESSMENT: PAIN_FUNCTIONAL_ASSESSMENT: 0-10

## 2024-03-27 NOTE — PROGRESS NOTES
"PHYSICAL THERAPY TREATMENT    Patient name:  Dann Ni    MRN:  [id number]    :  1975    Today's Date:  24    Time Calculation  Start Time: 448  Stop Time: 530  Time Calculation (min): 42 min  Insurance Reviewed  Name of insurance:  [employer]  Visit #:     60 visits per calendar year  BACK SX 10/2; 500 DED IS MET 1000 FAM DED 2000 OOP IS MET 4000 FAM, OOP 0% COINSUR $20 COPAY (DO NOT COLLECT SINCE DED + OOP ALREADY MET FOR ) 60V CY (NONE USED FOR ) PER [email or web address] [id number] // Marilynn confirmed 23 5:41pm      PT Therapeutic Procedures Time Entry  Therapeutic Exercise Time Entry: 40      Referral by:  Referred By: Dann Avila PA-C    Diagnoses:  Diagnosis and Precautions: Diagnosis D49.2 (ICD-10-CM) - Thoracic spine tumor    Assessment:   Therapeutic exercise performed in order to improve core strength/endurance. Pt a little more fatigued this date than recent previous ones but pushes through.  No c/o during/ after tx otherwise. Patient would benefit from P.T. to continue to address impairments in order to improve strength, flexibility, posture, and  to decrease symptoms and increase overall function.    PT Assessment  Evaluation/Treatment Tolerance: Patient limited by fatigue, Patient limited by pain  Plan:  Progress as tolerated.     General Visit Information         Subjective:  Patient reports that he had 3 rounds of chemo last week so been a little \"wobbly\". Denies falling though.  Precautions  STEADI Fall Risk Score (The score of 4 or more indicates an increased risk of falling): 8  LE Weight Bearing Status: Weight Bearing as Tolerated  Medical Precautions:  (Laminectomy Thoracic with Excision Lesion 10/2/2023, Dr. Marin, Tumor thoracic spine, Fractured L wrist as a kid, asthma, cancer)  Post-Surgical Precautions: Spinal precautions (20 lb. lifting restriction)    Objective:  L long-short  Pain Assessment  Pain Assessment: 0-10  Pain Score: 0 - No pain Minor LOB " "noted x3, pt able to self correct.      Treatments:  UBE 3' FWD/ 3' BWD 2.0 resistance, sitting  Door pec stretch w/ hip flexor stretch 30\"x3 B mid and low   supine LTR stretch 10\"x10 R and L nt  seated thoracic rotation x 10 reps R and L nt  seated thoracic extension x 20 reps nt  UBE 3' FWD/3' BWD 2.0 resistance  supine marching with kickout with PPT x 10 reps  Hip abd/ add vs manual PB 5\"x5ea  SK2C B 3x30\" nt  Sitting hamstring w/stool B 5x30\"  Dynamic Amb march, abd, hsc x20'ea (CGA) nt   Recumb Bike (SH: 16) x5' L6  PPT 5\"x10  Biceps curl 2 way 5# (EC) (CGA) nt  Clamshells x15ea  Includes MET to decrease rotation.           "

## 2024-03-28 ENCOUNTER — TELEPHONE (OUTPATIENT)
Dept: ADMISSION | Facility: HOSPITAL | Age: 49
End: 2024-03-28
Payer: COMMERCIAL

## 2024-03-28 NOTE — TELEPHONE ENCOUNTER
Cecilia called regarding the authorization of his treatment, [facility] Her callback: [phone] ext: [phone]    He is authorized until 4/29 but if his treatment is to go beyond that, they will need to re-auth

## 2024-04-02 NOTE — TELEPHONE ENCOUNTER
Called to verify patient will receive cycle 6 on 4/18.  Authorization good thru 4/29.  She is closing the case and if any additional cycles are needed a reauthorization will be needed.    Message sent to team and [facility] precert team.

## 2024-04-03 ENCOUNTER — TREATMENT (OUTPATIENT)
Dept: PHYSICAL THERAPY | Facility: CLINIC | Age: 49
End: 2024-04-03
Payer: COMMERCIAL

## 2024-04-03 DIAGNOSIS — D49.2 THORACIC SPINE TUMOR: Primary | ICD-10-CM

## 2024-04-03 PROCEDURE — 97110 THERAPEUTIC EXERCISES: CPT | Mod: GP | Performed by: PHYSICAL THERAPIST

## 2024-04-03 ASSESSMENT — PAIN - FUNCTIONAL ASSESSMENT: PAIN_FUNCTIONAL_ASSESSMENT: 0-10

## 2024-04-03 ASSESSMENT — PAIN SCALES - GENERAL: PAINLEVEL_OUTOF10: 0 - NO PAIN

## 2024-04-03 NOTE — PROGRESS NOTES
PHYSICAL THERAPY TREATMENT    Patient name:  Dann Ni    MRN:  [id number]    :  1975    Today's Date:  24    Time Calculation  Start Time:   Stop Time: 172  Time Calculation (min): 30 min     PT Therapeutic Procedures Time Entry  Therapeutic Exercise Time Entry: 25      Referral by:  Referred By: Dann Avila PA-C    Diagnoses:  Diagnosis and Precautions: Diagnosis D49.2 (ICD-10-CM) - Thoracic spine tumor    Goals:    By the end of 12 visits patient will be able to do the following with < 1/10 thoracic spine pain:    HEP:  Patient will consistently perform his home exercise program for 20-30 minute sessions, 1-2x/day, 3-4 days/week independently by the end of 12 visits.  Met    Basic ADL's:   Patient will perform bADL's/instrumental ADL's for 30 minutes moving between various closed kinetic chain postures. Met    ROM and Strength:  Patient will demonstrate 5/5 bilateral shoulder/hip strength in all planes to improve their ability to lift, stand, ambulate and perform basic ADL's.  Met    Stair Negotiation:  Patient will be able to ambulate up/down stairs for 1-2 flights at a time.  Progressing    Gait/Locomotion:  Patient will be able to ambulate for 30-60 minutes at a time.  Met  Minimal to no gait deviation across level ground/stairs.    Patient will demonstrate > 8 point increase on the [place]ajas Balance Scale and be a low fall risk.  Met    Work:  Patient will return to work and perform normal work duties as per MD clearance.  Met    Sleep:  Patient will sleep thru the night 4/7 nights/week.  Progressing    Participation restrictions:  Decrease Oswestry to < or = to 0% for increased functional ability.  Met    Pain:  Decrease pain at worst to < or = to 1/10 for improved QOL and ability to sleep.  Met    Assessment/Plan:   Patient comes into clinic for PT Re-Evaluation secondary to being 26.5 weeks s/p Laminectomy Thoracic with Excision Lesion 10/2/2023, Dr. Marin.  Patient demonstrates very good  progress overall with his overall strength/endurance as he continues with chemotherapy at present time.  Patient has met nearly all of his PT goals, is independent with his HEP, has reached max therapeutic potential at this time and will be discharged from PT at this time.    PT Assessment  PT Assessment Results: Decreased strength, Decreased endurance  Rehab Prognosis: Good  Plan:  Discharge    General Visit Information  PT  Visit  PT Received On: 04/03/24     General  Reason for Referral: PT Evaluate and Treat  Referred By: Dann Avila PA-C  General Comment: Diagnosis D49.2 (ICD-10-CM) - Thoracic spine tumor (Laminectomy Thoracic with Excision Lesion 10/2/2023, Dr. Marin)    Insurance Reviewed  Name of insurance:  [facility]  Visit #:   20/24  60 visits per calendar year  BACK SX 10/2; 500 DED IS MET 1000 FAM DED 2000 OOP IS MET 4000 FAM, OOP 0% COINSUR $20 COPAY (DO NOT COLLECT SINCE DED + OOP ALREADY MET FOR 2023) 60V CY (NONE USED FOR 2023) PER [email or web address] [id number] // Marilynn confirmed 11/13/23 5:41pm     Subjective:  Patient comes into clinic for PT Re-Evaluation secondary to being 26.5 weeks s/p Laminectomy Thoracic with Excision Lesion 10/2/2023, Dr. Marin.  Patient reports that overall he feels about 85% improved since the surgery (still getting chemotherapy) and also still has a 20 lb. Lifting restriction as per the Oncologist.    Precautions  STEADI Fall Risk Score (The score of 4 or more indicates an increased risk of falling): 8  LE Weight Bearing Status: Weight Bearing as Tolerated  Medical Precautions:  (Laminectomy Thoracic with Excision Lesion 10/2/2023, Dr. Marin, Tumor thoracic spine, Fractured L wrist as a kid, asthma, cancer)  Post-Surgical Precautions: Spinal precautions (20 lb. lifting restriction)    Pain Assessment  Pain Assessment: 0-10  Pain Score: 0 - No pain      Objective:  Restrictions as per MD's Protocol if surgical:  As per patient no lifting > 20#,    Weightbearing Status:  FWB    Any  Pre-Cautions:  Low fall risk    Skin:  spinal surgical incision over thoracic spine healed and closed, no active signs of infection    Palpation:   no point tenderness over thoracic spine    Sensation:  Patient denies numbness/tingling of bilateral upper extremities.      Gait:  Normal gait    ROM:  AROM  Thoracic spine, lumbar spine WNL's all planes, no pain    Strength:    R hip 5/5 all planes, L hip 5/5 all planes  R knee 5/5 all planes, L knee 5/5 all planes  R shoulder 5/5 all planes, L shoulder 5/5 all planes  R elbow 5/5 all planes, L elbow 5/5 all planes    Outcome measures  Revised Oswestry Thoracic Pain Disability Questionnaire:  0%    Treatments    Therapeutic Exercise  Therapeutic Exercise Performed: Yes  Therapeutic Exercise Activity 1: UBE 4' FWD/4' BWD 2.0 resistance, sitting  Therapeutic Exercise Activity 2: Re-Evaluation    Patient instructed in a home exercise program, has been given handouts for each of the exercises performed and was given another sheet instructing patient in the amount of reps to perform and the joshua to follow while doing the exercises     Access Code: [email or web address]  URL: [email or web address]  Date: 04/03/2024  Prepared by: Dylan Iverson    Exercises  - Doorway Pec Stretch at 90 Degrees Abduction  - 1 x daily - 3-4 x weekly - 1 sets - 5 reps - 30 hold  - Supine Lower Trunk Rotation  - 1 x daily - 3-4 x weekly - 1 sets - 5 reps - 30 hold  - Supine Bridge  - 1 x daily - 3-4 x weekly - 2 sets - 10 reps - 5 hold  - Child's Pose with Sidebending  - 1 x daily - 3-4 x weekly - 1 sets - 5 reps - 30 hold  - Quadruped Thoracic Rotation - Reach Under  - 1 x daily - 3-4 x weekly - 2 sets - 10 reps - 5-10 hold  - Quadruped Thoracic Rotation Full Range with Hand on Neck  - 1 x daily - 3-4 x weekly - 2 sets - 10 reps - 5-10 hold  - Standing Row with Anchored Resistance  - 1 x daily - 3-4 x weekly - 2 sets - 10 reps - 5-10 hold  - Shoulder extension with resistance -  Neutral  - 1 x daily - 3-4 x weekly - 2 sets - 10 reps - 5-10 hold  - Standing Anti-Rotation Press with Anchored Resistance  - 1 x daily - 3-4 x weekly - 2 sets - 10 reps - 5-10 hold  - Standing Diagonal Chop  - 1 x daily - 3-4 x weekly - 2 sets - 10 reps - 5-10 hold  - Standing Diagonal Lift with Anchored Resistance  - 1 x daily - 3-4 x weekly - 2 sets - 10 reps - 5-10 hold    Time in clinic started at  4:56pm  Time in clinic ended at  5:26pm  Total time in clinic is .         30 minutes  Total timed code time is     25 minutes    Treatment Performed Today/Billing:  Therapeutic Exercise x 2 units

## 2024-04-10 ENCOUNTER — APPOINTMENT (OUTPATIENT)
Dept: PHYSICAL THERAPY | Facility: CLINIC | Age: 49
End: 2024-04-10
Payer: COMMERCIAL

## 2024-04-15 ENCOUNTER — DOCUMENTATION (OUTPATIENT)
Dept: HEMATOLOGY/ONCOLOGY | Facility: HOSPITAL | Age: 49
End: 2024-04-15

## 2024-04-15 ENCOUNTER — LAB (OUTPATIENT)
Dept: LAB | Facility: LAB | Age: 49
End: 2024-04-15
Payer: COMMERCIAL

## 2024-04-15 DIAGNOSIS — C85.80 MARGINAL ZONE LYMPHOMA (MULTI): ICD-10-CM

## 2024-04-15 LAB
ALBUMIN SERPL BCP-MCNC: 4.7 G/DL (ref 3.4–5)
ALP SERPL-CCNC: 92 U/L (ref 33–120)
ALT SERPL W P-5'-P-CCNC: 29 U/L (ref 10–52)
ANION GAP SERPL CALC-SCNC: 15 MMOL/L (ref 10–20)
AST SERPL W P-5'-P-CCNC: 29 U/L (ref 9–39)
BILIRUB SERPL-MCNC: 0.6 MG/DL (ref 0–1.2)
BUN SERPL-MCNC: 15 MG/DL (ref 6–23)
CALCIUM SERPL-MCNC: 9.2 MG/DL (ref 8.6–10.3)
CHLORIDE SERPL-SCNC: 104 MMOL/L (ref 98–107)
CO2 SERPL-SCNC: 23 MMOL/L (ref 21–32)
CREAT SERPL-MCNC: 1.06 MG/DL (ref 0.5–1.3)
EGFRCR SERPLBLD CKD-EPI 2021: 87 ML/MIN/1.73M*2
GLUCOSE SERPL-MCNC: 150 MG/DL (ref 74–99)
LDH SERPL L TO P-CCNC: 247 U/L (ref 84–246)
POTASSIUM SERPL-SCNC: 4.1 MMOL/L (ref 3.5–5.3)
PROT SERPL-MCNC: 6.9 G/DL (ref 6.4–8.2)
SODIUM SERPL-SCNC: 138 MMOL/L (ref 136–145)

## 2024-04-15 PROCEDURE — 80053 COMPREHEN METABOLIC PANEL: CPT

## 2024-04-15 PROCEDURE — 83615 LACTATE (LD) (LDH) ENZYME: CPT

## 2024-04-15 PROCEDURE — 36415 COLL VENOUS BLD VENIPUNCTURE: CPT

## 2024-04-17 ENCOUNTER — APPOINTMENT (OUTPATIENT)
Dept: PHYSICAL THERAPY | Facility: CLINIC | Age: 49
End: 2024-04-17
Payer: COMMERCIAL

## 2024-04-18 ENCOUNTER — OFFICE VISIT (OUTPATIENT)
Dept: HEMATOLOGY/ONCOLOGY | Facility: HOSPITAL | Age: 49
End: 2024-04-18
Payer: COMMERCIAL

## 2024-04-18 ENCOUNTER — INFUSION (OUTPATIENT)
Dept: HEMATOLOGY/ONCOLOGY | Facility: HOSPITAL | Age: 49
End: 2024-04-18
Payer: COMMERCIAL

## 2024-04-18 VITALS
HEIGHT: 68 IN | OXYGEN SATURATION: 100 % | RESPIRATION RATE: 16 BRPM | TEMPERATURE: 97.3 F | HEART RATE: 75 BPM | WEIGHT: 261.47 LBS | SYSTOLIC BLOOD PRESSURE: 118 MMHG | BODY MASS INDEX: 39.63 KG/M2 | DIASTOLIC BLOOD PRESSURE: 75 MMHG

## 2024-04-18 DIAGNOSIS — C85.80 MARGINAL ZONE LYMPHOMA (MULTI): ICD-10-CM

## 2024-04-18 LAB
BASOPHILS # BLD AUTO: 0.06 X10*3/UL (ref 0–0.1)
BASOPHILS NFR BLD AUTO: 1.1 %
EOSINOPHIL # BLD AUTO: 0.2 X10*3/UL (ref 0–0.7)
EOSINOPHIL NFR BLD AUTO: 3.8 %
ERYTHROCYTE [DISTWIDTH] IN BLOOD BY AUTOMATED COUNT: 14.5 % (ref 11.5–14.5)
HCT VFR BLD AUTO: 36.4 % (ref 41–52)
HGB BLD-MCNC: 12.8 G/DL (ref 13.5–17.5)
IMM GRANULOCYTES # BLD AUTO: 0.01 X10*3/UL (ref 0–0.7)
IMM GRANULOCYTES NFR BLD AUTO: 0.2 % (ref 0–0.9)
LYMPHOCYTES # BLD AUTO: 0.32 X10*3/UL (ref 1.2–4.8)
LYMPHOCYTES NFR BLD AUTO: 6.1 %
MCH RBC QN AUTO: 30.1 PG (ref 26–34)
MCHC RBC AUTO-ENTMCNC: 35.2 G/DL (ref 32–36)
MCV RBC AUTO: 86 FL (ref 80–100)
MONOCYTES # BLD AUTO: 0.73 X10*3/UL (ref 0.1–1)
MONOCYTES NFR BLD AUTO: 14 %
NEUTROPHILS # BLD AUTO: 3.91 X10*3/UL (ref 1.2–7.7)
NEUTROPHILS NFR BLD AUTO: 74.8 %
NRBC BLD-RTO: 0 /100 WBCS (ref 0–0)
PLATELET # BLD AUTO: 232 X10*3/UL (ref 150–450)
RBC # BLD AUTO: 4.25 X10*6/UL (ref 4.5–5.9)
WBC # BLD AUTO: 5.2 X10*3/UL (ref 4.4–11.3)

## 2024-04-18 PROCEDURE — 99215 OFFICE O/P EST HI 40 MIN: CPT | Performed by: NURSE PRACTITIONER

## 2024-04-18 PROCEDURE — 2500000001 HC RX 250 WO HCPCS SELF ADMINISTERED DRUGS (ALT 637 FOR MEDICARE OP): Performed by: INTERNAL MEDICINE

## 2024-04-18 PROCEDURE — 99215 OFFICE O/P EST HI 40 MIN: CPT | Mod: 25 | Performed by: NURSE PRACTITIONER

## 2024-04-18 PROCEDURE — 96413 CHEMO IV INFUSION 1 HR: CPT

## 2024-04-18 PROCEDURE — 2500000004 HC RX 250 GENERAL PHARMACY W/ HCPCS (ALT 636 FOR OP/ED): Performed by: INTERNAL MEDICINE

## 2024-04-18 PROCEDURE — 96415 CHEMO IV INFUSION ADDL HR: CPT

## 2024-04-18 PROCEDURE — 3008F BODY MASS INDEX DOCD: CPT | Performed by: NURSE PRACTITIONER

## 2024-04-18 PROCEDURE — 96411 CHEMO IV PUSH ADDL DRUG: CPT

## 2024-04-18 PROCEDURE — 96375 TX/PRO/DX INJ NEW DRUG ADDON: CPT | Mod: INF

## 2024-04-18 PROCEDURE — 85025 COMPLETE CBC W/AUTO DIFF WBC: CPT | Performed by: INTERNAL MEDICINE

## 2024-04-18 RX ORDER — EPINEPHRINE 0.3 MG/.3ML
0.3 INJECTION SUBCUTANEOUS EVERY 5 MIN PRN
Status: DISCONTINUED | OUTPATIENT
Start: 2024-04-18 | End: 2024-04-18 | Stop reason: HOSPADM

## 2024-04-18 RX ORDER — HEPARIN 100 UNIT/ML
500 SYRINGE INTRAVENOUS AS NEEDED
OUTPATIENT
Start: 2024-04-18

## 2024-04-18 RX ORDER — PROCHLORPERAZINE MALEATE 10 MG
10 TABLET ORAL EVERY 6 HOURS PRN
Status: DISCONTINUED | OUTPATIENT
Start: 2024-04-18 | End: 2024-04-18 | Stop reason: HOSPADM

## 2024-04-18 RX ORDER — ACETAMINOPHEN 325 MG/1
650 TABLET ORAL ONCE
Status: COMPLETED | OUTPATIENT
Start: 2024-04-18 | End: 2024-04-18

## 2024-04-18 RX ORDER — PROCHLORPERAZINE EDISYLATE 5 MG/ML
10 INJECTION INTRAMUSCULAR; INTRAVENOUS EVERY 6 HOURS PRN
Status: DISCONTINUED | OUTPATIENT
Start: 2024-04-18 | End: 2024-04-18 | Stop reason: HOSPADM

## 2024-04-18 RX ORDER — HEPARIN SODIUM,PORCINE/PF 10 UNIT/ML
50 SYRINGE (ML) INTRAVENOUS AS NEEDED
Status: DISCONTINUED | OUTPATIENT
Start: 2024-04-18 | End: 2024-04-18 | Stop reason: HOSPADM

## 2024-04-18 RX ORDER — HEPARIN 100 UNIT/ML
500 SYRINGE INTRAVENOUS AS NEEDED
Status: DISCONTINUED | OUTPATIENT
Start: 2024-04-18 | End: 2024-04-18 | Stop reason: HOSPADM

## 2024-04-18 RX ORDER — DIPHENHYDRAMINE HYDROCHLORIDE 50 MG/ML
50 INJECTION INTRAMUSCULAR; INTRAVENOUS AS NEEDED
Status: DISCONTINUED | OUTPATIENT
Start: 2024-04-18 | End: 2024-04-18 | Stop reason: HOSPADM

## 2024-04-18 RX ORDER — PALONOSETRON 0.05 MG/ML
0.25 INJECTION, SOLUTION INTRAVENOUS ONCE
Status: COMPLETED | OUTPATIENT
Start: 2024-04-18 | End: 2024-04-18

## 2024-04-18 RX ORDER — ALBUTEROL SULFATE 0.83 MG/ML
3 SOLUTION RESPIRATORY (INHALATION) AS NEEDED
Status: DISCONTINUED | OUTPATIENT
Start: 2024-04-18 | End: 2024-04-18 | Stop reason: HOSPADM

## 2024-04-18 RX ORDER — FAMOTIDINE 10 MG/ML
20 INJECTION INTRAVENOUS ONCE AS NEEDED
Status: DISCONTINUED | OUTPATIENT
Start: 2024-04-18 | End: 2024-04-18 | Stop reason: HOSPADM

## 2024-04-18 RX ORDER — HEPARIN SODIUM,PORCINE/PF 10 UNIT/ML
50 SYRINGE (ML) INTRAVENOUS AS NEEDED
OUTPATIENT
Start: 2024-04-18

## 2024-04-18 RX ORDER — DIPHENHYDRAMINE HCL 50 MG
50 CAPSULE ORAL ONCE
Status: COMPLETED | OUTPATIENT
Start: 2024-04-18 | End: 2024-04-18

## 2024-04-18 RX ADMIN — ACETAMINOPHEN 650 MG: 325 TABLET ORAL at 09:12

## 2024-04-18 RX ADMIN — DIPHENHYDRAMINE HYDROCHLORIDE 50 MG: 50 CAPSULE ORAL at 09:12

## 2024-04-18 RX ADMIN — BENDAMUSTINE HYDROCHLORIDE 205 MG: 25 INJECTION, SOLUTION INTRAVENOUS at 09:47

## 2024-04-18 RX ADMIN — SODIUM CHLORIDE 855 MG: 9 INJECTION, SOLUTION INTRAVENOUS at 10:02

## 2024-04-18 RX ADMIN — PALONOSETRON HYDROCHLORIDE 250 MCG: 0.25 INJECTION INTRAVENOUS at 09:12

## 2024-04-18 RX ADMIN — DEXAMETHASONE SODIUM PHOSPHATE 12 MG: 10 INJECTION, SOLUTION INTRAMUSCULAR; INTRAVENOUS at 09:12

## 2024-04-18 NOTE — PROGRESS NOTES
Dann Ni is a 48 y.o. male who presents in stable condition for C6D1 infusion    He is on the following chemotherapy regimen:     Treatment Plans       Name Type Plan Dates Plan Provider         Active    Bendamustine + RiTUXimab, 28 Day Cycles Oncology Treatment  11/29/2023 - Present Jeff Peters MD PhD             Since his last visit, he has been doing well.  Overall, he states his energy level is stable.  His appetite has been good.  He reports fatigue day after last infusion but resolves with rest.  He has no other question or concerns at this time.    IV left in placed for tomorrow's infusion.    Patient tolerated infusion well & has been educated with the overall plan of therapy. AVS, lab results & future appointments provided. Patient discharged in stable condition with wife.    Reviewed and approved by SUSHANT MCKINNEY on 4/18/24 at 1143

## 2024-04-19 ENCOUNTER — INFUSION (OUTPATIENT)
Dept: HEMATOLOGY/ONCOLOGY | Facility: HOSPITAL | Age: 49
End: 2024-04-19
Payer: COMMERCIAL

## 2024-04-19 VITALS
DIASTOLIC BLOOD PRESSURE: 86 MMHG | BODY MASS INDEX: 37.76 KG/M2 | SYSTOLIC BLOOD PRESSURE: 150 MMHG | TEMPERATURE: 97.9 F | OXYGEN SATURATION: 99 % | RESPIRATION RATE: 18 BRPM | HEART RATE: 94 BPM | WEIGHT: 246 LBS

## 2024-04-19 DIAGNOSIS — C85.80 MARGINAL ZONE LYMPHOMA (MULTI): ICD-10-CM

## 2024-04-19 PROCEDURE — 96409 CHEMO IV PUSH SNGL DRUG: CPT

## 2024-04-19 PROCEDURE — 96372 THER/PROPH/DIAG INJ SC/IM: CPT | Mod: 59

## 2024-04-19 PROCEDURE — 96375 TX/PRO/DX INJ NEW DRUG ADDON: CPT | Mod: INF

## 2024-04-19 PROCEDURE — 2500000004 HC RX 250 GENERAL PHARMACY W/ HCPCS (ALT 636 FOR OP/ED): Performed by: INTERNAL MEDICINE

## 2024-04-19 RX ORDER — FAMOTIDINE 10 MG/ML
20 INJECTION INTRAVENOUS ONCE AS NEEDED
Status: DISCONTINUED | OUTPATIENT
Start: 2024-04-19 | End: 2024-04-19 | Stop reason: HOSPADM

## 2024-04-19 RX ORDER — ALBUTEROL SULFATE 0.83 MG/ML
3 SOLUTION RESPIRATORY (INHALATION) AS NEEDED
Status: DISCONTINUED | OUTPATIENT
Start: 2024-04-19 | End: 2024-04-19 | Stop reason: HOSPADM

## 2024-04-19 RX ORDER — EPINEPHRINE 0.3 MG/.3ML
0.3 INJECTION SUBCUTANEOUS EVERY 5 MIN PRN
Status: DISCONTINUED | OUTPATIENT
Start: 2024-04-19 | End: 2024-04-19 | Stop reason: HOSPADM

## 2024-04-19 RX ORDER — PROCHLORPERAZINE EDISYLATE 5 MG/ML
10 INJECTION INTRAMUSCULAR; INTRAVENOUS EVERY 6 HOURS PRN
Status: DISCONTINUED | OUTPATIENT
Start: 2024-04-19 | End: 2024-04-19 | Stop reason: HOSPADM

## 2024-04-19 RX ORDER — DIPHENHYDRAMINE HYDROCHLORIDE 50 MG/ML
50 INJECTION INTRAMUSCULAR; INTRAVENOUS AS NEEDED
Status: DISCONTINUED | OUTPATIENT
Start: 2024-04-19 | End: 2024-04-19 | Stop reason: HOSPADM

## 2024-04-19 RX ORDER — PROCHLORPERAZINE MALEATE 10 MG
10 TABLET ORAL EVERY 6 HOURS PRN
Status: DISCONTINUED | OUTPATIENT
Start: 2024-04-19 | End: 2024-04-19 | Stop reason: HOSPADM

## 2024-04-19 RX ADMIN — BENDAMUSTINE HYDROCHLORIDE 205 MG: 25 INJECTION, SOLUTION INTRAVENOUS at 16:17

## 2024-04-19 RX ADMIN — DEXAMETHASONE SODIUM PHOSPHATE 12 MG: 10 INJECTION, SOLUTION INTRAMUSCULAR; INTRAVENOUS at 16:00

## 2024-04-19 RX ADMIN — PEGFILGRASTIM 6 MG: KIT SUBCUTANEOUS at 16:10

## 2024-04-19 ASSESSMENT — PAIN SCALES - GENERAL: PAINLEVEL: 0-NO PAIN

## 2024-04-19 NOTE — PROGRESS NOTES
Dann Ni is a 48 y.o. male who presents in stable condition for C6D2    He is on the following chemotherapy regimen:     Treatment Plans       Name Type Plan Dates Plan Provider         Active    Bendamustine + RiTUXimab, 28 Day Cycles Oncology Treatment  11/29/2023 - Present Jeff Peters MD PhD             Since his last visit, he has been doing well.  Overall, he states his energy level is stable.  His appetite & hydration status has been good.  He reports fatigue that is relieved by rest.  He has no other questions or concerns at this time.    Patient tolerated infusion & neulasta onpro application and has been educated with the overall plan of therapy. AVS provided. Patient discharged in stable condition with wife & niece.    Reviewed and approved by SUSHANT MCKINNEY on 4/19/24 at 1630

## 2024-04-23 ASSESSMENT — ENCOUNTER SYMPTOMS: NUMBNESS: 1

## 2024-04-23 NOTE — PROGRESS NOTES
Patient ID: Dann Ni is a 48 y.o. male.    Subjective    HPI  Presents today for C6D1 Bendamustine/Rituximab and routine follow up visit in the [facility]. He is accompanied by his wife today. Doing well overall. BLE numbness that extends downward from his chest is about the same. He met with [facility] last month & will follow up after C6 for planning.    Review of Systems   Neurological:  Positive for numbness (BLE Numbness).   All other systems reviewed and are negative.      Objective    BSA: There is no height or weight on file to calculate BSA.  There were no vitals taken for this visit.  Vital signs reviewed today.      Physical Exam  Constitutional:       Appearance: Normal appearance.   HENT:      Head: Normocephalic.      Nose: Nose normal.      Mouth/Throat:      Mouth: Mucous membranes are moist.      Pharynx: Oropharynx is clear.   Eyes:      Extraocular Movements: Extraocular movements intact.      Conjunctiva/sclera: Conjunctivae normal.      Pupils: Pupils are equal, round, and reactive to light.   Cardiovascular:      Rate and Rhythm: Normal rate and regular rhythm.      Pulses: Normal pulses.      Heart sounds: Normal heart sounds.   Pulmonary:      Effort: Pulmonary effort is normal.      Breath sounds: Normal breath sounds.   Abdominal:      General: Abdomen is flat. Bowel sounds are normal.      Palpations: Abdomen is soft.   Musculoskeletal:         General: Normal range of motion.      Cervical back: Normal range of motion.   Skin:     General: Skin is warm and dry.      Capillary Refill: Capillary refill takes less than 2 seconds.   Neurological:      General: No focal deficit present.      Mental Status: He is alert and oriented to person, place, and time. Mental status is at baseline.   Psychiatric:         Mood and Affect: Mood normal.       Performance Status:  Karnofsky Score: 70 - Cares for self; unable to carry on normal activity or do normal work       Assessment/Plan         Oncology History   Marginal zone lymphoma (Multi)   10/2/2023 Cancer Staged    Staging form: Hodgkin and Non-Hodgkin Lymphoma, AJCC 8th Edition, Clinical stage from 10/2/2023: Stage IV (Marginal zone lymphoma) - Signed by Jeff Peters MD PhD on 11/25/2023 11/17/2023 Initial Diagnosis    Marginal zone lymphoma (CMS/HCC)     11/30/2023 -  Chemotherapy    Bendamustine + RiTUXimab, 28 Day Cycles           #EMZL  -Reviewed PET/CT and MRI with the patient and wife. The results are consistent with stage IV disease, involving LN above and below the diaphragm, extensively axial and appendicular skeleton including bilateral scapulas, right clavicular head, T5 vertebral body, bilateral iliac bones, left ischium, and left femur without definite anatomic correlate (max SUV 7.6).  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   -Current data support treatment using multi-agent chemo. Bendamustine and rituximab have been approved and widely used, found to be safe and effective.   -Patient was disappointed to learn that EMZL such as his may not be curable. Was relieved to know that the treatment can bring multiple years of remission, improvement of strength / quality of life / work experience.   -Discussed the Aes of r-anum, which are numerous, including SAEs such as infection, or even death. Most AE and DUSTIN however can be resolved with effective treatments. He consented for R-anum.   -Due to the location of the disease and bone marrow uptake, he may be at high risk of CNS involvement / relapse. Schedule head CT and intrathecal treatment, timing likely after 2 cycles of R-ANUM. He consented for IT Methotrexate and cytarabine. LP with Intrathecal Methotrexate (2/19); CSF flow cytometry negative. LP with Intrathecal Methotrexate (3/18); CSF flow cytometry negative.  -Met with Rad Onc in March. Tentatively planning for 24-30Gy in 12-15  fractions. Will further plan following C6.     Plan:  -C6 R-Teresa on 4/18 with GCSF support.   - MRI Spine 4/30   - PET/CT 5/9.   - Follow up with Dr. Peters 5/10  - RADON 5/13.     STEPHANI Denney-CNP  [facility]

## 2024-04-26 ENCOUNTER — TELEPHONE (OUTPATIENT)
Dept: HEMATOLOGY/ONCOLOGY | Facility: HOSPITAL | Age: 49
End: 2024-04-26
Payer: COMMERCIAL

## 2024-04-26 NOTE — TELEPHONE ENCOUNTER
Patient is calling asking what he is supposed to schedule.  He got a [employer] message asking him to complete amb referral to malignant heme before his dr appt but he is unable to open the link.    States it came from an [facility] and that he has no other information.   He thinks it may be an old message but wants to make sure he is doing everything before his next appts.    Spoke to Maria Esther Lucero and feels that it is an old message.  Patient will go for blood work prior to his next vist and states to let him know if he needs anything else (besides his scans) prior to his next visit.

## 2024-04-30 ENCOUNTER — HOSPITAL ENCOUNTER (OUTPATIENT)
Dept: RADIOLOGY | Facility: HOSPITAL | Age: 49
Discharge: HOME | End: 2024-04-30
Payer: COMMERCIAL

## 2024-04-30 DIAGNOSIS — C85.80 MARGINAL ZONE LYMPHOMA (MULTI): ICD-10-CM

## 2024-04-30 PROCEDURE — A9575 INJ GADOTERATE MEGLUMI 0.1ML: HCPCS | Performed by: RADIOLOGY

## 2024-04-30 PROCEDURE — 2550000001 HC RX 255 CONTRASTS: Performed by: RADIOLOGY

## 2024-04-30 PROCEDURE — 72157 MRI CHEST SPINE W/O & W/DYE: CPT

## 2024-04-30 PROCEDURE — 72158 MRI LUMBAR SPINE W/O & W/DYE: CPT

## 2024-04-30 PROCEDURE — 72157 MRI CHEST SPINE W/O & W/DYE: CPT | Performed by: RADIOLOGY

## 2024-04-30 PROCEDURE — 72158 MRI LUMBAR SPINE W/O & W/DYE: CPT | Performed by: RADIOLOGY

## 2024-04-30 PROCEDURE — 72141 MRI NECK SPINE W/O DYE: CPT

## 2024-04-30 PROCEDURE — 72141 MRI NECK SPINE W/O DYE: CPT | Performed by: RADIOLOGY

## 2024-04-30 RX ORDER — GADOTERATE MEGLUMINE 376.9 MG/ML
0.2 INJECTION INTRAVENOUS
Status: COMPLETED | OUTPATIENT
Start: 2024-04-30 | End: 2024-04-30

## 2024-04-30 RX ADMIN — GADOTERATE MEGLUMINE 20 ML: 376.9 INJECTION INTRAVENOUS at 15:26

## 2024-05-03 ENCOUNTER — NURSE TRIAGE (OUTPATIENT)
Dept: HEMATOLOGY/ONCOLOGY | Facility: HOSPITAL | Age: 49
End: 2024-05-03
Payer: COMMERCIAL

## 2024-05-03 NOTE — TELEPHONE ENCOUNTER
After chemo in March, pt developed a red spot near the IV sit on dorsal aspect of his left wrist.   He wore his watch a few days ago which seems to have irritated the red spot and now has swelling of left hand.  The area is somewhat warm and he has moderate pain in the area.  There is no red streak from the site.  No fevers, chest pain or difficulty breathing.    He has been using ice prn which seems to help with swelling.  Pt sent photos via [employer] message for reference.    Additional Information   Commented on: Where is your swelling?     Left hand   Commented on: Do you have a mediport or catheter? Have you had any IVs recently?     IV in March   Commented on: What else do you want to tell me about this problem?     Afeb, no chest pain,  no difficulty breathing  Full ROM of left hand/wrist    Protocols used: Swelling/Deep Venous Thrombosis (DVT)

## 2024-05-03 NOTE — TELEPHONE ENCOUNTER
Per Dr. Peters, photos and pt's lack of other symptoms are reassuring.  He suggests pt clean the area a few times with betadine and use warm compress prn.  Discussed with spouse who verbalized understanding.  Instructed them to call back if patient develops fever, increasing pain, redness or other worsening symptoms.   on the discharge service for the patient. I have reviewed and made amendments to the documentation where necessary.

## 2024-05-09 ENCOUNTER — HOSPITAL ENCOUNTER (OUTPATIENT)
Dept: RADIOLOGY | Facility: CLINIC | Age: 49
Discharge: HOME | End: 2024-05-09
Payer: COMMERCIAL

## 2024-05-09 ENCOUNTER — TELEPHONE (OUTPATIENT)
Dept: RADIATION ONCOLOGY | Facility: HOSPITAL | Age: 49
End: 2024-05-09
Payer: COMMERCIAL

## 2024-05-09 DIAGNOSIS — C85.80 MARGINAL ZONE LYMPHOMA (MULTI): ICD-10-CM

## 2024-05-09 PROCEDURE — A9552 F18 FDG: HCPCS | Performed by: INTERNAL MEDICINE

## 2024-05-09 PROCEDURE — 78815 PET IMAGE W/CT SKULL-THIGH: CPT | Mod: PS

## 2024-05-09 PROCEDURE — 3430000001 HC RX 343 DIAGNOSTIC RADIOPHARMACEUTICALS: Performed by: INTERNAL MEDICINE

## 2024-05-09 PROCEDURE — 78815 PET IMAGE W/CT SKULL-THIGH: CPT | Mod: PET TUMOR SUBSQ TX STRATEGY | Performed by: RADIOLOGY

## 2024-05-09 RX ORDER — FLUDEOXYGLUCOSE F 18 200 MCI/ML
9.5 INJECTION, SOLUTION INTRAVENOUS
Status: COMPLETED | OUTPATIENT
Start: 2024-05-09 | End: 2024-05-09

## 2024-05-09 RX ADMIN — FLUDEOXYGLUCOSE F 18 9.5 MILLICURIE: 200 INJECTION, SOLUTION INTRAVENOUS at 14:06

## 2024-05-09 NOTE — TELEPHONE ENCOUNTER
Called pt to remind of FUV appointment on 05/13/24 at 3:00. Pt's phone went to voicemail left number if needs to reschedule.

## 2024-05-10 ENCOUNTER — OFFICE VISIT (OUTPATIENT)
Dept: HEMATOLOGY/ONCOLOGY | Facility: HOSPITAL | Age: 49
End: 2024-05-10
Payer: COMMERCIAL

## 2024-05-10 VITALS
BODY MASS INDEX: 40.54 KG/M2 | HEART RATE: 107 BPM | WEIGHT: 264.1 LBS | RESPIRATION RATE: 16 BRPM | SYSTOLIC BLOOD PRESSURE: 141 MMHG | OXYGEN SATURATION: 96 % | DIASTOLIC BLOOD PRESSURE: 80 MMHG | TEMPERATURE: 97.2 F

## 2024-05-10 DIAGNOSIS — C85.80 MARGINAL ZONE LYMPHOMA (MULTI): ICD-10-CM

## 2024-05-10 PROCEDURE — 99215 OFFICE O/P EST HI 40 MIN: CPT | Performed by: INTERNAL MEDICINE

## 2024-05-10 PROCEDURE — 3008F BODY MASS INDEX DOCD: CPT | Performed by: INTERNAL MEDICINE

## 2024-05-10 ASSESSMENT — PAIN SCALES - GENERAL: PAINLEVEL_OUTOF10: 0-NO PAIN

## 2024-05-10 NOTE — PATIENT INSTRUCTIONS
Dr Peters would like to schedule both the bone marrow biopsy and the CT guided lymph node biopsy next week.  We will see you again in 2 weeks to review results.

## 2024-05-13 ENCOUNTER — APPOINTMENT (OUTPATIENT)
Dept: RADIATION ONCOLOGY | Facility: HOSPITAL | Age: 49
End: 2024-05-13
Payer: COMMERCIAL

## 2024-05-13 ENCOUNTER — TELEPHONE (OUTPATIENT)
Dept: ADMISSION | Facility: HOSPITAL | Age: 49
End: 2024-05-13
Payer: COMMERCIAL

## 2024-05-13 DIAGNOSIS — Z01.818 PRE-OP TESTING: ICD-10-CM

## 2024-05-13 DIAGNOSIS — R59.0 MEDIASTINAL LYMPHADENOPATHY: Primary | ICD-10-CM

## 2024-05-13 DIAGNOSIS — C85.80 MARGINAL ZONE LYMPHOMA (MULTI): ICD-10-CM

## 2024-05-13 NOTE — PROGRESS NOTES
Bronchoscopy Scheduling Request    Pre-bronchoscopy visit: New patient visit with Bronchoscopy group provider  Please schedule procedure: Next available    Cytology on-site:  Yes  Location:  [facility]  Performing physician:  Advanced diagnostic bronchoscopist  Referring physician:   Jeff Peters MD , Ventura Gonzalez DO  Indication:  PET+ LN, h/o marginal zone lymphoma  Sedation / Anesthesia:  GA  Procedure:  Dx EBUS  Time:  Tier 1  Fluorscopy:   No  Imaging needed:  None  Labs:  CBC, BMP  Meds:  None  Special Considerations:  Core biopsy / CoreDx in addition to cytology  Reviewed by:  Latrell Browne MD

## 2024-05-13 NOTE — PROGRESS NOTES
Patient ID: Dann Ni is a 48 y.o. male.  Referring Physician: Jeff Peters MD PhD  [id number] [place] [id number]  Primary Care Provider: DO Leela Watson    The patient was in good health until July 2023. He developed back pain which did not respond to NSAID or steroid. By late 9/2023 he lost sensation below the diaphragm and were unable to walk. On 9/30 he had CT scan showing a 4.1x1.8x1cm paraspinal mass at the T7 level. MRI showed abnormal enhancing signals at the C5, T2-8 levels.  On 10/2/2023 he had a debulking surgery which later showed evidence of lymphoma. In the meantime he has improved sensation and is able to walk with a cane. 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 He was consented for rituximab bendamustine and started C1 on 11/30/2023. Tolerated relatively well, but had nausea and flu like symptoms for a few days after chemo. Since then he also completed C2 R stacey on 12/28, followed by C3 on Jan 25, C4 on Feb 22, C5D1 on 3/21, and c6d1 on 4/18. Intrathecal treatment #1 on 2/19, then on 3/18/2024.     Today the patient says walking continues to improve. Back pain is resolved. No fever, wt loss, or nausea. He is back to work with some accomodation.            Objective   BSA: 2.39 meters squared  /80 (BP Location: Left arm, Patient Position: Sitting, BP Cuff Size: Large adult)   Pulse 107   Temp 36.2 °C (97.2 °F) (Skin)   Resp 16   Wt 120 kg (264 lb 1.6 oz)   SpO2 96%   BMI 40.54 kg/m²     Family History   Problem Relation Name Age of Onset    Hip dysplasia Mother [place]     Arthritis Mother [place]     Other (htn) Father      Prostatitis Father      Lung cancer Maternal Grandmother      Kidney cancer Maternal Grandmother      Lung cancer Maternal Grandfather      Kidney cancer Maternal Grandfather      Dementia Other      Cancer Other      Dementia Other       Oncology History   Marginal zone lymphoma (Multi)   10/2/2023 Cancer Staged     Staging form: Hodgkin and Non-Hodgkin Lymphoma, AJCC 8th Edition, Clinical stage from 10/2/2023: Stage IV (Marginal zone lymphoma) - Signed by Jeff Peters MD PhD on 11/25/2023 11/17/2023 Initial Diagnosis    Marginal zone lymphoma (CMS/HCC)     11/30/2023 -  Chemotherapy    Bendamustine + RiTUXimab, 28 Day Cycles         Dann Ni  reports that he has never smoked. He has never used smokeless tobacco.  He  reports that he does not currently use alcohol.  He  reports no history of drug use.    EXAM: No LAD. No splenomegaly. No skin lesions. Muscle power preserved in the UE but reduced in LLE; walk without assistance in slow pace; upper back surgical scar healed.  Other details below.  5/10: walk independently with a very mild limp.     Physical Exam  Constitutional:       General: He is not in acute distress.     Appearance: He is not toxic-appearing.   HENT:      Head: Normocephalic.      Nose: Nose normal.      Mouth/Throat:      Mouth: Mucous membranes are moist.   Eyes:      Extraocular Movements: Extraocular movements intact.      Pupils: Pupils are equal, round, and reactive to light.   Cardiovascular:      Rate and Rhythm: Normal rate and regular rhythm.      Heart sounds: No murmur heard.  Pulmonary:      Effort: Pulmonary effort is normal.      Breath sounds: Normal breath sounds.   Abdominal:      General: Bowel sounds are normal.      Palpations: Abdomen is soft. There is no mass.      Tenderness: There is no abdominal tenderness. There is no rebound.   Musculoskeletal:         General: No swelling, tenderness, deformity or signs of injury.      Right lower leg: No edema.      Left lower leg: No edema.   Skin:     Coloration: Skin is not jaundiced.      Findings: No bruising, lesion or rash.   Neurological:      Mental Status: He is alert and oriented to person, place, and time.      Cranial Nerves: No cranial nerve deficit.      Motor: Weakness present.      Gait: Gait abnormal.    Psychiatric:         Mood and Affect: Mood normal.         Performance Status:  Symptomatic; fully ambulatory    Assessment/Plan      #EMZL  -Reviewed PET/CT and MRI with the patient and wife. The results are consistent with stage IV disease, involving LN above and below the diaphragm, extensively axial and appendicular skeleton including bilateral scapulas, right clavicular head, T5 vertebral body, bilateral iliac bones, left ischium, and left femur without definite anatomic correlate (max SUV 7.6).  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   -Current data support treatment using multi-agent chemo. Bendamustine and rituximab have been approved and widely used, found to be safe and effective.   -Patient was disappointed to learn that EMZL such as his may not be curable. Was relieved to know that the treatment can bring multiple years of remission, improvement of strength / quality of life / work experience.   -Discussed the Aes of r-anum, which are numerous, including SAEs such as infection, or even death. Most AE and DUSTIN however can be resolved with effective treatments. He consented for R-anum.   -Due to the location of the disease and bone marrow uptake, he may be at high risk of CNS involvement / relapse. Schedule head CT and intrathecal treatment, timing likely after 2 cycles of R-ANUM. He consented for IT Methotrexate and cytarabine. First IT on 2/19/2024. CSF shows no lymphoma. Next IT on 3/18 tentatively.   -In addition, consolidation using XRT will be discussed in the future.   -5/10/2024: PET/CT results at [facility] were reviewed. Unfortunately, there are a number of residual FDG avid signals involving bones and perihilar nodes. These raise the question of treatment failure, or possible large cell transformation. Will obtain BMBx and additional biopsy, including possibly:   **a subcarinal lymph node has a maximum SUV of 11.3  previously measuring up to 14.9 and a conglomerate of right paratracheal lymph nodes has a maximum  SUV of 8.9 previously measuring up to 14.9. (by interventional pulm)  **proximal left humerus there is a lytic lesion with maximum SUV of 7.5 SUV previously measuring up to 3.6 SUV. (By IR)      Plan:  -Likely treatment failure based of [facility] PET/CT.  -Biopsy ASAP by interventional pulm (subcarinal or paratrachael LAD) or IR (proximal left humerus lytic lesion). See above for details.   - Cancel XRT upon discussion with Dr. Hyde of [facility].   - RTC 2-3W.       Time spent: 55min, >50% on counseling and care coordination.          Jeff Peters MD PhD

## 2024-05-13 NOTE — TELEPHONE ENCOUNTER
The patient states that he wants Dr. Peters to be aware that his bmbx is one day prior to the follow up and he is also not sure if they can fit in the lymph node biopsy prior to apt with Dr. Peters. The patient prefers to keep the follow up as scheduled but wants Dr. Peters to be aware.

## 2024-05-15 ENCOUNTER — TELEPHONE (OUTPATIENT)
Dept: HEMATOLOGY/ONCOLOGY | Facility: HOSPITAL | Age: 49
End: 2024-05-15
Payer: COMMERCIAL

## 2024-05-15 NOTE — TELEPHONE ENCOUNTER
Dann calls today to ask if Fran Chapa knows why he has a bronchoscopy scheduled. He states that no one mentioned this procedure to him or why he needs it performed.    Message sent to provider,.

## 2024-05-16 NOTE — TELEPHONE ENCOUNTER
Called patient back. He states that he called pulmonology and was told that Dr. Peters conferred with them.    Patient states that he only sees Dr. Peters so he must have referred to Dr. Browen. He would like to know why he needs a bronchoscopy and if it is not needed, he would prefer not to have it.    Message sent to Dr. Peters and Dr. Browne for additional clarification.

## 2024-05-16 NOTE — TELEPHONE ENCOUNTER
This wasn't ordered by Dr. Peters   When bronch order is opened it states ordered by  Provider:   Latrell Browne MD        We would encourage he call that provider or discuss at his pulmonology appt 5/22/2024

## 2024-05-16 NOTE — TELEPHONE ENCOUNTER
This nurse called patient and discussed provider response.     All questions and concerns answered.     Only outstanding issue is biopsy of lymph node has not been scheduled. This nurse will follow up with IR to coordinate.

## 2024-05-17 NOTE — H&P (VIEW-ONLY)
Patient: Dann Ni    [id number]  : 1975 -- AGE 48 y.o.    Provider: COLLINS Robbins   Location [facility]   Service Date: 2024            [facility]  New Visit Note      HISTORY OF PRESENT ILLNESS     The patient's referring provider is: No ref. provider found    HISTORY OF PRESENT ILLNESS   Dann Ni is a 48 y.o. 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 Lung Nodule (Pre-bronchoscopy ). I have independently interviewed and examined the patient in the office and reviewed available records.    Current History    This is a 47 y/o male with stage IV disease, involving LN above and below the diaphragm, extensively axial and appendicular skeleton including bilateral scapulas, right clavicular head, T5 vertebral body, bilateral iliac bones, left ischium, and left femur without definite anatomic correlate (max SUV 7.6).  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 Who needs a bronchoscopy for subcarinal lymph node and right paratracheal requiring biopsy for diagnosis.    On today's visit, the patient reports denies dyspnea on exertion/at rest. They are active in her everyday life and carries loads and does strenuous exercise.  Denies orthopnea, pnd, or pawan.  Has gained  X 20 pounds in the last X 12 months. Denies  chronic cough or  wheezing or clear, green/blood streaks. No night cough. No hemoptysis. No fever occ night sweats. Has a runny nose, and a tingling sensation in the back of his throat. Denies chest pain or heartburn.     Previous pulmonary history:     previously was told they have asthma x multiple years, takes symbicort     Inhalers/nebulized medications:  albuterol - has not used in 2 months symbicort    Hospitalization History: Not been hospitalized over the last year for breathing related problem.    Sleep history:  Denies snoring, apnea, feeling tired during the day or taking naps during the day.       ALLERGIES AND MEDICATIONS     ALLERGIES  Allergies   Allergen Reactions    Ciprofloxacin Itching       MEDICATIONS  Current Outpatient Medications   Medication Sig Dispense Refill    albuterol 90 mcg/actuation inhaler Inhale 2 puffs every 6 hours if needed for shortness of breath or wheezing.      budesonide-formoteroL (Symbicort) 160-4.5 mcg/actuation inhaler Inhale 2 puffs 2 times a day.      cyclobenzaprine (Flexeril) 10 mg tablet Take 1 tablet (10 mg) by mouth 3 times a day as needed for muscle spasms. 30 tablet 2    ondansetron (Zofran) 8 mg tablet Take 1 tablet (8 mg) by mouth every 8 hours if needed for nausea or vomiting. 30 tablet 5    prochlorperazine (Compazine) 10 mg tablet Take 1 tablet (10 mg) by mouth every 6 hours if needed for nausea or vomiting. 30 tablet 5    zolpidem (Ambien) 10 mg tablet Take 1 tablet (10 mg) by mouth as needed at bedtime for sleep. 30 tablet 1     No current facility-administered medications for this visit.         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) (04/02/2019), Diverticulitis (09/25/2023), Foot pain (09/25/2023), Other conditions influencing health status, Other conditions influencing health status, Personal history of other diseases of the respiratory system (04/14/2019), Personal history of other drug therapy (10/23/2019), Personal history of other specified conditions (03/30/2019), Recurrent pneumonia (09/25/2023), and Tendonitis (09/25/2023).    PAST SURGICAL HISTORY  Past Surgical History:   Procedure Laterality Date    OTHER SURGICAL HISTORY  04/12/2019    No history of surgery       IMMUNIZATION HISTORY  Immunization History    Administered Date(s) Administered    Flu vaccine (IIV4), preservative free *Check age/dose* 10/23/2019, 11/05/2021    Influenza Whole 10/01/2014    Influenza, Unspecified 11/01/2016, 10/01/2017    Influenza, injectable, quadrivalent 10/08/2018    Tdap vaccine, age 7 year and older (BOOSTRIX, ADACEL) 01/07/2013       SOCIAL HISTORY  He  reports that he has never smoked. He has never used smokeless tobacco. He reports that he does not currently use alcohol. He reports that he does not use drugs. He Patient     OCCUPATIONAL/ENVIRONMENTAL HISTORY  Currently works as:    Unknown exposure to asbestos, silica, beryllium or inhaled metals.  DOES/DOES NOT EC: does not have exposure to birds or exotic animals. Has dogs and cats     FAMILY HISTORY  Family History   Problem Relation Name Age of Onset    Hip dysplasia Mother [place]     Arthritis Mother [place]     Other (htn) Father      Prostatitis Father      Lung cancer Maternal Grandmother      Kidney cancer Maternal Grandmother      Lung cancer Maternal Grandfather      Kidney cancer Maternal Grandfather      Dementia Other      Cancer Other      Dementia Other       DOES/DOES NOT EC: does have a family history of pulmonary disease.  DOES/DOES NOT EC: does have a family history of cancer.  DOES/DOES NOT EC: does not have a family history of autoimmune disorders.    RESULTS/DATA     Pulmonary Function Test Results     No testing done      Chest Radiograph     XR chest 2 views 02/13/2024    INDICATION:  Signs/Symptoms:cough, fever.    COMPARISON:  10/2/2023    FINDINGS:  The cardiac silhouette is stable in size. Asymmetric elevation of  right diaphragm and mild right basilar atelectasis. No significant  pleural effusion. No pneumothorax.    Impression  Asymmetric elevation of right diaphragm and mild basilar atelectasis.    Chest CT Scan     No testing       Echocardiogram     ECHOCARDIOGRAM     Narrative  Ordered by an unspecified provider.         REVIEW OF  SYSTEMS     REVIEW OF SYSTEMS  Review of Systems   Constitutional:  Positive for fatigue and unexpected weight change.   Psychiatric/Behavioral:  The patient is nervous/anxious.    All other systems reviewed and are negative.        PHYSICAL EXAM     VITAL SIGNS: There were no vitals taken for this visit.     CURRENT WEIGHT: [unfilled]  BMI: [unfilled]  PREVIOUS WEIGHTS:  Wt Readings from Last 3 Encounters:   05/10/24 120 kg (264 lb 1.6 oz)   04/19/24 112 kg (246 lb)   04/18/24 119 kg (261 lb 7.5 oz)       Physical Exam  Constitutional:       Appearance: Normal appearance.   Pulmonary:      Effort: Pulmonary effort is normal.   Neurological:      General: No focal deficit present.      Mental Status: He is alert and oriented to person, place, and time.   Psychiatric:         Mood and Affect: Mood normal.         Behavior: Behavior normal.         Thought Content: Thought content normal.         Judgment: Judgment normal.         ASSESSMENT/PLAN     Mr. Ni is a 48 y.o. 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 He was referred to the [facility] for evaluation of FDG uptake in the subcarinal lymph node/ right paratracheal lymph nodes in the history of Extranodal Marginal Zone Lymphoma     Problem List and Orders      Assessment and Plan / Recommendations:  Problem List Items Addressed This Visit       Pulmonary nodules - Primary    Hilar lymphadenopathy    Marginal zone lymphoma (Multi)        Patient's visit was converted to a  virtual visit.    1. FDG uptake in the subcarinal lymph node/ right paratracheal lymph nodes in the history of Extranodal Marginal Zone Lymphoma   - Plan for bronchoscopy with biopsy   - Lab work prior to procedure - creatinine 1.45, bicarb 25 mmol, hgb 13.4, plt 251  - Not on any anticoagulation   - difficult with IV access needs Us guided IV placement     I explained the procedure to the patient. We discussed that the bronchoscopy will be performed by a member of the Interventional Pulmonary Team; depending on scheduling and provider availability. We also discussed that the IP providers function as a team and not infrequently may have to fill in for one another if there are emergent issues that need attention at the same time. The patient / family expressed understanding and agreed to proceed. All questions were answered.     Patient's visit was converted to a virtual visit. Spoke with the patient via phone for 19 minutes.    Prep: 10 minutes  Phone: 19 minutes  Total: 29 minutes    Thank you for visiting the Pulmonary clinic today!     Emelyn Kellogg CNP  My office number is [phone] -     Best way to get a hold of me is to call my office --> Please do not send me follow my health messages  Any test results will be discussed at next visit -- please make sure to make a follow up appt after testing.

## 2024-05-17 NOTE — PROGRESS NOTES
Patient: Dann Ni    [id number]  : 1975 -- AGE 48 y.o.    Provider: COLLINS Robbins   Location [facility]   Service Date: 2024            [facility]  New Visit Note      HISTORY OF PRESENT ILLNESS     The patient's referring provider is: No ref. provider found    HISTORY OF PRESENT ILLNESS   Dann Ni is a 48 y.o. 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 Lung Nodule (Pre-bronchoscopy ). I have independently interviewed and examined the patient in the office and reviewed available records.    Current History    This is a 47 y/o male with stage IV disease, involving LN above and below the diaphragm, extensively axial and appendicular skeleton including bilateral scapulas, right clavicular head, T5 vertebral body, bilateral iliac bones, left ischium, and left femur without definite anatomic correlate (max SUV 7.6).  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 Who needs a bronchoscopy for subcarinal lymph node and right paratracheal requiring biopsy for diagnosis.    On today's visit, the patient reports denies dyspnea on exertion/at rest. They are active in her everyday life and carries loads and does strenuous exercise.  Denies orthopnea, pnd, or pawan.  Has gained  X 20 pounds in the last X 12 months. Denies  chronic cough or  wheezing or clear, green/blood streaks. No night cough. No hemoptysis. No fever occ night sweats. Has a runny nose, and a tingling sensation in the back of his throat. Denies chest pain or heartburn.     Previous pulmonary history:     previously was told they have asthma x multiple years, takes symbicort     Inhalers/nebulized medications:  albuterol - has not used in 2 months symbicort    Hospitalization History: Not been hospitalized over the last year for breathing related problem.    Sleep history:  Denies snoring, apnea, feeling tired during the day or taking naps during the day.       ALLERGIES AND MEDICATIONS     ALLERGIES  Allergies   Allergen Reactions    Ciprofloxacin Itching       MEDICATIONS  Current Outpatient Medications   Medication Sig Dispense Refill    albuterol 90 mcg/actuation inhaler Inhale 2 puffs every 6 hours if needed for shortness of breath or wheezing.      budesonide-formoteroL (Symbicort) 160-4.5 mcg/actuation inhaler Inhale 2 puffs 2 times a day.      cyclobenzaprine (Flexeril) 10 mg tablet Take 1 tablet (10 mg) by mouth 3 times a day as needed for muscle spasms. 30 tablet 2    ondansetron (Zofran) 8 mg tablet Take 1 tablet (8 mg) by mouth every 8 hours if needed for nausea or vomiting. 30 tablet 5    prochlorperazine (Compazine) 10 mg tablet Take 1 tablet (10 mg) by mouth every 6 hours if needed for nausea or vomiting. 30 tablet 5    zolpidem (Ambien) 10 mg tablet Take 1 tablet (10 mg) by mouth as needed at bedtime for sleep. 30 tablet 1     No current facility-administered medications for this visit.         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    PAST SURGICAL HISTORY  Past Surgical History:   Procedure Laterality Date    OTHER SURGICAL HISTORY  04/12/2019    No history of surgery       IMMUNIZATION HISTORY  Immunization History    Administered Date(s) Administered    Flu vaccine (IIV4), preservative free *Check age/dose* 10/23/2019, 11/05/2021    Influenza Whole 10/01/2014    Influenza, Unspecified 11/01/2016, 10/01/2017    Influenza, injectable, quadrivalent 10/08/2018    Tdap vaccine, age 7 year and older (BOOSTRIX, ADACEL) 01/07/2013       SOCIAL HISTORY  He  reports that he has never smoked. He has never used smokeless tobacco. He reports that he does not currently use alcohol. He reports that he does not use drugs. He Patient     OCCUPATIONAL/ENVIRONMENTAL HISTORY  Currently works as:    Unknown exposure to asbestos, silica, beryllium or inhaled metals.  DOES/DOES NOT EC: does not have exposure to birds or exotic animals. Has dogs and cats     FAMILY HISTORY  Family History   Problem Relation Name Age of Onset    Hip dysplasia Mother [place]     Arthritis Mother [place]     Other (htn) Father      Prostatitis Father      Lung cancer Maternal Grandmother      Kidney cancer Maternal Grandmother      Lung cancer Maternal Grandfather      Kidney cancer Maternal Grandfather      Dementia Other      Cancer Other      Dementia Other       DOES/DOES NOT EC: does have a family history of pulmonary disease.  DOES/DOES NOT EC: does have a family history of cancer.  DOES/DOES NOT EC: does not have a family history of autoimmune disorders.    RESULTS/DATA     Pulmonary Function Test Results     No testing done      Chest Radiograph     XR chest 2 views 02/13/2024    INDICATION:  Signs/Symptoms:cough, fever.    COMPARISON:  10/2/2023    FINDINGS:  The cardiac silhouette is stable in size. Asymmetric elevation of  right diaphragm and mild right basilar atelectasis. No significant  pleural effusion. No pneumothorax.    Impression  Asymmetric elevation of right diaphragm and mild basilar atelectasis.    Chest CT Scan     No testing       Echocardiogram     ECHOCARDIOGRAM     Narrative  Ordered by an unspecified provider.         REVIEW OF  SYSTEMS     REVIEW OF SYSTEMS  Review of Systems   Constitutional:  Positive for fatigue and unexpected weight change.   Psychiatric/Behavioral:  The patient is nervous/anxious.    All other systems reviewed and are negative.        PHYSICAL EXAM     VITAL SIGNS: There were no vitals taken for this visit.     CURRENT WEIGHT: [unfilled]  BMI: [unfilled]  PREVIOUS WEIGHTS:  Wt Readings from Last 3 Encounters:   05/10/24 120 kg (264 lb 1.6 oz)   04/19/24 112 kg (246 lb)   04/18/24 119 kg (261 lb 7.5 oz)       Physical Exam  Constitutional:       Appearance: Normal appearance.   Pulmonary:      Effort: Pulmonary effort is normal.   Neurological:      General: No focal deficit present.      Mental Status: He is alert and oriented to person, place, and time.   Psychiatric:         Mood and Affect: Mood normal.         Behavior: Behavior normal.         Thought Content: Thought content normal.         Judgment: Judgment normal.         ASSESSMENT/PLAN     Mr. Ni is a 48 y.o. 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 He was referred to the [facility] for evaluation of FDG uptake in the subcarinal lymph node/ right paratracheal lymph nodes in the history of Extranodal Marginal Zone Lymphoma     Problem List and Orders      Assessment and Plan / Recommendations:  Problem List Items Addressed This Visit       Pulmonary nodules - Primary    Hilar lymphadenopathy    Marginal zone lymphoma (Multi)        Patient's visit was converted to a  virtual visit.    1. FDG uptake in the subcarinal lymph node/ right paratracheal lymph nodes in the history of Extranodal Marginal Zone Lymphoma   - Plan for bronchoscopy with biopsy   - Lab work prior to procedure - creatinine 1.45, bicarb 25 mmol, hgb 13.4, plt 251  - Not on any anticoagulation   - difficult with IV access needs Us guided IV placement     I explained the procedure to the patient. We discussed that the bronchoscopy will be performed by a member of the Interventional Pulmonary Team; depending on scheduling and provider availability. We also discussed that the IP providers function as a team and not infrequently may have to fill in for one another if there are emergent issues that need attention at the same time. The patient / family expressed understanding and agreed to proceed. All questions were answered.     Patient's visit was converted to a virtual visit. Spoke with the patient via phone for 19 minutes.    Prep: 10 minutes  Phone: 19 minutes  Total: 29 minutes    Thank you for visiting the Pulmonary clinic today!     Emelyn Kellogg CNP  My office number is [phone] -     Best way to get a hold of me is to call my office --> Please do not send me follow my health messages  Any test results will be discussed at next visit -- please make sure to make a follow up appt after testing.

## 2024-05-21 ENCOUNTER — LAB (OUTPATIENT)
Dept: LAB | Facility: LAB | Age: 49
End: 2024-05-21
Payer: COMMERCIAL

## 2024-05-21 DIAGNOSIS — C85.80 MARGINAL ZONE LYMPHOMA (MULTI): ICD-10-CM

## 2024-05-21 DIAGNOSIS — R59.0 MEDIASTINAL LYMPHADENOPATHY: ICD-10-CM

## 2024-05-21 DIAGNOSIS — Z01.818 PRE-OP TESTING: ICD-10-CM

## 2024-05-21 LAB
ANION GAP SERPL CALC-SCNC: 14 MMOL/L (ref 10–20)
BUN SERPL-MCNC: 14 MG/DL (ref 6–23)
CALCIUM SERPL-MCNC: 9.3 MG/DL (ref 8.6–10.3)
CHLORIDE SERPL-SCNC: 102 MMOL/L (ref 98–107)
CO2 SERPL-SCNC: 25 MMOL/L (ref 21–32)
CREAT SERPL-MCNC: 1.45 MG/DL (ref 0.5–1.3)
EGFRCR SERPLBLD CKD-EPI 2021: 59 ML/MIN/1.73M*2
ERYTHROCYTE [DISTWIDTH] IN BLOOD BY AUTOMATED COUNT: 13.5 % (ref 11.5–14.5)
GLUCOSE SERPL-MCNC: 95 MG/DL (ref 74–99)
HCT VFR BLD AUTO: 39.2 % (ref 41–52)
HGB BLD-MCNC: 13.4 G/DL (ref 13.5–17.5)
MCH RBC QN AUTO: 30 PG (ref 26–34)
MCHC RBC AUTO-ENTMCNC: 34.2 G/DL (ref 32–36)
MCV RBC AUTO: 88 FL (ref 80–100)
NRBC BLD-RTO: 0 /100 WBCS (ref 0–0)
PLATELET # BLD AUTO: 251 X10*3/UL (ref 150–450)
POTASSIUM SERPL-SCNC: 4.2 MMOL/L (ref 3.5–5.3)
RBC # BLD AUTO: 4.46 X10*6/UL (ref 4.5–5.9)
SODIUM SERPL-SCNC: 137 MMOL/L (ref 136–145)
WBC # BLD AUTO: 6.1 X10*3/UL (ref 4.4–11.3)

## 2024-05-21 PROCEDURE — 85027 COMPLETE CBC AUTOMATED: CPT

## 2024-05-21 PROCEDURE — 36415 COLL VENOUS BLD VENIPUNCTURE: CPT

## 2024-05-21 PROCEDURE — 80048 BASIC METABOLIC PNL TOTAL CA: CPT

## 2024-05-21 NOTE — TELEPHONE ENCOUNTER
This nurse called patient discussed provider response.     Additional order for biopsy is not needed, Pulmonology is handling biopsy.       Patient verbalized understanding.

## 2024-05-22 ENCOUNTER — TELEMEDICINE (OUTPATIENT)
Dept: PULMONOLOGY | Facility: CLINIC | Age: 49
End: 2024-05-22
Payer: COMMERCIAL

## 2024-05-22 DIAGNOSIS — R59.0 HILAR LYMPHADENOPATHY: ICD-10-CM

## 2024-05-22 DIAGNOSIS — R91.8 PULMONARY NODULES: Primary | ICD-10-CM

## 2024-05-22 DIAGNOSIS — C85.80 MARGINAL ZONE LYMPHOMA (MULTI): ICD-10-CM

## 2024-05-22 PROCEDURE — 99203 OFFICE O/P NEW LOW 30 MIN: CPT | Performed by: NURSE PRACTITIONER

## 2024-05-22 PROCEDURE — 3008F BODY MASS INDEX DOCD: CPT | Performed by: NURSE PRACTITIONER

## 2024-05-22 ASSESSMENT — ENCOUNTER SYMPTOMS
NERVOUS/ANXIOUS: 1
FATIGUE: 1
UNEXPECTED WEIGHT CHANGE: 1

## 2024-05-23 ENCOUNTER — LAB (OUTPATIENT)
Dept: LAB | Facility: HOSPITAL | Age: 49
End: 2024-05-23
Payer: COMMERCIAL

## 2024-05-23 ENCOUNTER — PROCEDURE VISIT (OUTPATIENT)
Dept: OTHER | Facility: HOSPITAL | Age: 49
End: 2024-05-23
Payer: COMMERCIAL

## 2024-05-23 VITALS
WEIGHT: 261.25 LBS | BODY MASS INDEX: 40.1 KG/M2 | SYSTOLIC BLOOD PRESSURE: 128 MMHG | HEART RATE: 86 BPM | DIASTOLIC BLOOD PRESSURE: 87 MMHG | TEMPERATURE: 97 F | OXYGEN SATURATION: 100 % | RESPIRATION RATE: 18 BRPM

## 2024-05-23 DIAGNOSIS — C85.80 MARGINAL ZONE LYMPHOMA (MULTI): ICD-10-CM

## 2024-05-23 LAB
ALBUMIN SERPL BCP-MCNC: 4.6 G/DL (ref 3.4–5)
ALP SERPL-CCNC: 87 U/L (ref 33–120)
ALT SERPL W P-5'-P-CCNC: 24 U/L (ref 10–52)
ANION GAP SERPL CALC-SCNC: 16 MMOL/L (ref 10–20)
APTT PPP: 33 SECONDS (ref 27–38)
AST SERPL W P-5'-P-CCNC: 26 U/L (ref 9–39)
BASOPHILS # BLD AUTO: 0.05 X10*3/UL (ref 0–0.1)
BASOPHILS NFR BLD AUTO: 1.1 %
BILIRUB SERPL-MCNC: 0.7 MG/DL (ref 0–1.2)
BUN SERPL-MCNC: 13 MG/DL (ref 6–23)
CALCIUM SERPL-MCNC: 9 MG/DL (ref 8.6–10.3)
CHLORIDE SERPL-SCNC: 102 MMOL/L (ref 98–107)
CO2 SERPL-SCNC: 25 MMOL/L (ref 21–32)
CREAT SERPL-MCNC: 0.96 MG/DL (ref 0.5–1.3)
EGFRCR SERPLBLD CKD-EPI 2021: >90 ML/MIN/1.73M*2
EOSINOPHIL # BLD AUTO: 0.16 X10*3/UL (ref 0–0.7)
EOSINOPHIL NFR BLD AUTO: 3.4 %
ERYTHROCYTE [DISTWIDTH] IN BLOOD BY AUTOMATED COUNT: 13.3 % (ref 11.5–14.5)
GLUCOSE SERPL-MCNC: 112 MG/DL (ref 74–99)
HCT VFR BLD AUTO: 37.9 % (ref 41–52)
HGB BLD-MCNC: 13.3 G/DL (ref 13.5–17.5)
IMM GRANULOCYTES # BLD AUTO: 0.01 X10*3/UL (ref 0–0.7)
IMM GRANULOCYTES NFR BLD AUTO: 0.2 % (ref 0–0.9)
INR PPP: 1 (ref 0.9–1.1)
LDH SERPL L TO P-CCNC: 164 U/L (ref 84–246)
LYMPHOCYTES # BLD AUTO: 0.43 X10*3/UL (ref 1.2–4.8)
LYMPHOCYTES NFR BLD AUTO: 9.2 %
MCH RBC QN AUTO: 29.9 PG (ref 26–34)
MCHC RBC AUTO-ENTMCNC: 35.1 G/DL (ref 32–36)
MCV RBC AUTO: 85 FL (ref 80–100)
MONOCYTES # BLD AUTO: 0.74 X10*3/UL (ref 0.1–1)
MONOCYTES NFR BLD AUTO: 15.8 %
NEUTROPHILS # BLD AUTO: 3.29 X10*3/UL (ref 1.2–7.7)
NEUTROPHILS NFR BLD AUTO: 70.3 %
NRBC BLD-RTO: 0 /100 WBCS (ref 0–0)
PLATELET # BLD AUTO: 218 X10*3/UL (ref 150–450)
POTASSIUM SERPL-SCNC: 3.8 MMOL/L (ref 3.5–5.3)
PROT SERPL-MCNC: 6.7 G/DL (ref 6.4–8.2)
PROT SERPL-MCNC: 7 G/DL (ref 6.4–8.2)
PROTHROMBIN TIME: 11.1 SECONDS (ref 9.8–12.8)
RBC # BLD AUTO: 4.45 X10*6/UL (ref 4.5–5.9)
SODIUM SERPL-SCNC: 139 MMOL/L (ref 136–145)
URATE SERPL-MCNC: 6.5 MG/DL (ref 4–7.5)
WBC # BLD AUTO: 4.7 X10*3/UL (ref 4.4–11.3)

## 2024-05-23 PROCEDURE — 88237 TISSUE CULTURE BONE MARROW: CPT | Performed by: NURSE PRACTITIONER

## 2024-05-23 PROCEDURE — 84155 ASSAY OF PROTEIN SERUM: CPT | Mod: 59

## 2024-05-23 PROCEDURE — 83521 IG LIGHT CHAINS FREE EACH: CPT

## 2024-05-23 PROCEDURE — 85025 COMPLETE CBC W/AUTO DIFF WBC: CPT

## 2024-05-23 PROCEDURE — 86334 IMMUNOFIX E-PHORESIS SERUM: CPT

## 2024-05-23 PROCEDURE — 84550 ASSAY OF BLOOD/URIC ACID: CPT | Performed by: INTERNAL MEDICINE

## 2024-05-23 PROCEDURE — 85730 THROMBOPLASTIN TIME PARTIAL: CPT

## 2024-05-23 PROCEDURE — 36415 COLL VENOUS BLD VENIPUNCTURE: CPT

## 2024-05-23 PROCEDURE — 85097 BONE MARROW INTERPRETATION: CPT | Mod: TC | Performed by: NURSE PRACTITIONER

## 2024-05-23 PROCEDURE — 38222 DX BONE MARROW BX & ASPIR: CPT | Performed by: NURSE PRACTITIONER

## 2024-05-23 PROCEDURE — 80053 COMPREHEN METABOLIC PANEL: CPT | Performed by: INTERNAL MEDICINE

## 2024-05-23 PROCEDURE — 88305 TISSUE EXAM BY PATHOLOGIST: CPT | Mod: TC,SUR | Performed by: NURSE PRACTITIONER

## 2024-05-23 PROCEDURE — 88185 FLOWCYTOMETRY/TC ADD-ON: CPT | Mod: TC | Performed by: NURSE PRACTITIONER

## 2024-05-23 PROCEDURE — 36415 COLL VENOUS BLD VENIPUNCTURE: CPT | Performed by: NURSE PRACTITIONER

## 2024-05-23 PROCEDURE — 83615 LACTATE (LD) (LDH) ENZYME: CPT

## 2024-05-23 PROCEDURE — 85610 PROTHROMBIN TIME: CPT

## 2024-05-23 NOTE — PROGRESS NOTES
Patient arrived to ASCT unit via self-ambulation for bone marrow biopsy. He is alert and oriented x3, denies pain or any discomfort. Vital signs obtained. Blood drawn prior to arrival in outpatient lab. Maia Schwarz NP performed procedure. Dressing is clean, dry and intact. Education provided and PI sheet given. No adverse reactions, no complaints and no assistance needed.

## 2024-05-24 ENCOUNTER — APPOINTMENT (OUTPATIENT)
Dept: HEMATOLOGY/ONCOLOGY | Facility: HOSPITAL | Age: 49
End: 2024-05-24
Payer: COMMERCIAL

## 2024-05-24 LAB
CELL COUNT (BLOOD): 28.3 X10*3/UL
CELL POPULATIONS: NORMAL
DIAGNOSIS: NORMAL
FLOW DIFFERENTIAL: NORMAL
FLOW TEST ORDERED: NORMAL
KAPPA LC SERPL-MCNC: 0.72 MG/DL (ref 0.33–1.94)
KAPPA LC/LAMBDA SER: 0.95 {RATIO} (ref 0.26–1.65)
LAB TEST METHOD: NORMAL
LAMBDA LC SERPL-MCNC: 0.76 MG/DL (ref 0.57–2.63)
NUMBER OF CELLS COLLECTED: NORMAL PER TUBE
PATH REPORT.TOTAL CANCER: NORMAL
SIGNATURE COMMENT: NORMAL
SPECIMEN VIABILITY: NORMAL

## 2024-05-24 NOTE — PROGRESS NOTES
Bone Marrow Biopsy and Aspiration Procedure Note     Informed consent was obtained and potential risks including bleeding, infection and pain were reviewed with the patient. Note from 5/10/24 reviewed and no major changes confirmed.    The patient lay in the left lateral position and their right posterior iliac crest was prepped with chlorhexidine.     25 ml of lidocaine 1% local anesthesia infiltrated into the subcutaneous tissue.    Right bone marrow biopsy and right bone marrow aspirate was obtained.     The procedure was tolerated well and there were no complications.    Specimens sent for: routine histopathologic stains and sectioning, flow cytometry, cytogenetics, molecular analysis, and testing per heme path.    Patient had no immediate complications and tolerated the procedure well.    Procedure completed by: Maia Schwarz, STEPHANI-CNP

## 2024-05-29 LAB
CHROM ANALY OVERALL INTERP-IMP: NORMAL
ELECTRONICALLY SIGNED BY CYTOGENETICS: NORMAL
PATH REPORT.COMMENTS IMP SPEC: NORMAL
PATH REPORT.FINAL DX SPEC: NORMAL
PATH REPORT.GROSS SPEC: NORMAL
PATH REPORT.MICROSCOPIC SPEC OTHER STN: NORMAL
PATH REPORT.RELEVANT HX SPEC: NORMAL
PATH REPORT.TOTAL CANCER: NORMAL

## 2024-05-30 LAB
ALBUMIN: 4.3 G/DL (ref 3.4–5)
ALPHA 1 GLOBULIN: 0.3 G/DL (ref 0.2–0.6)
ALPHA 2 GLOBULIN: 0.7 G/DL (ref 0.4–1.1)
BETA GLOBULIN: 0.9 G/DL (ref 0.5–1.2)
GAMMA GLOBULIN: 0.5 G/DL (ref 0.5–1.4)
IMMUNOFIXATION COMMENT: ABNORMAL
M-PROTEIN 1: 0.1 G/DL
M-PROTEIN 2: 0.1 G/DL
PATH REVIEW - SERUM IMMUNOFIXATION: ABNORMAL
PATH REVIEW-SERUM PROTEIN ELECTROPHORESIS: ABNORMAL
PROTEIN ELECTROPHORESIS COMMENT: ABNORMAL

## 2024-05-31 ENCOUNTER — ANESTHESIA EVENT (OUTPATIENT)
Dept: GASTROENTEROLOGY | Facility: HOSPITAL | Age: 49
End: 2024-05-31
Payer: COMMERCIAL

## 2024-05-31 ENCOUNTER — HOSPITAL ENCOUNTER (OUTPATIENT)
Dept: GASTROENTEROLOGY | Facility: HOSPITAL | Age: 49
Discharge: HOME | End: 2024-05-31
Payer: COMMERCIAL

## 2024-05-31 ENCOUNTER — ANESTHESIA (OUTPATIENT)
Dept: GASTROENTEROLOGY | Facility: HOSPITAL | Age: 49
End: 2024-05-31
Payer: COMMERCIAL

## 2024-05-31 VITALS
SYSTOLIC BLOOD PRESSURE: 108 MMHG | BODY MASS INDEX: 39.4 KG/M2 | HEIGHT: 68 IN | DIASTOLIC BLOOD PRESSURE: 77 MMHG | OXYGEN SATURATION: 97 % | RESPIRATION RATE: 18 BRPM | WEIGHT: 260 LBS | HEART RATE: 76 BPM | TEMPERATURE: 97.5 F

## 2024-05-31 DIAGNOSIS — C85.80 MARGINAL ZONE LYMPHOMA (MULTI): ICD-10-CM

## 2024-05-31 DIAGNOSIS — R59.0 MEDIASTINAL LYMPHADENOPATHY: ICD-10-CM

## 2024-05-31 PROCEDURE — 88185 FLOWCYTOMETRY/TC ADD-ON: CPT | Mod: TC | Performed by: INTERNAL MEDICINE

## 2024-05-31 PROCEDURE — 3700000001 HC GENERAL ANESTHESIA TIME - INITIAL BASE CHARGE

## 2024-05-31 PROCEDURE — 88173 CYTOPATH EVAL FNA REPORT: CPT | Mod: TC,MCY | Performed by: INTERNAL MEDICINE

## 2024-05-31 PROCEDURE — 2720000007 HC OR 272 NO HCPCS

## 2024-05-31 PROCEDURE — 3700000002 HC GENERAL ANESTHESIA TIME - EACH INCREMENTAL 1 MINUTE

## 2024-05-31 PROCEDURE — 7100000009 HC PHASE TWO TIME - INITIAL BASE CHARGE

## 2024-05-31 PROCEDURE — 31652 BRONCH EBUS SAMPLNG 1/2 NODE: CPT | Performed by: INTERNAL MEDICINE

## 2024-05-31 PROCEDURE — 88305 TISSUE EXAM BY PATHOLOGIST: CPT | Mod: TC,SUR | Performed by: INTERNAL MEDICINE

## 2024-05-31 PROCEDURE — 7100000002 HC RECOVERY ROOM TIME - EACH INCREMENTAL 1 MINUTE

## 2024-05-31 PROCEDURE — 7100000010 HC PHASE TWO TIME - EACH INCREMENTAL 1 MINUTE

## 2024-05-31 PROCEDURE — 7100000001 HC RECOVERY ROOM TIME - INITIAL BASE CHARGE

## 2024-05-31 PROCEDURE — 2500000004 HC RX 250 GENERAL PHARMACY W/ HCPCS (ALT 636 FOR OP/ED): Performed by: ANESTHESIOLOGIST ASSISTANT

## 2024-05-31 PROCEDURE — 2500000005 HC RX 250 GENERAL PHARMACY W/O HCPCS: Performed by: ANESTHESIOLOGIST ASSISTANT

## 2024-05-31 RX ORDER — OXYCODONE HYDROCHLORIDE 5 MG/1
5 TABLET ORAL EVERY 4 HOURS PRN
Status: DISCONTINUED | OUTPATIENT
Start: 2024-05-31 | End: 2024-06-01 | Stop reason: HOSPADM

## 2024-05-31 RX ORDER — PROPOFOL 10 MG/ML
INJECTION, EMULSION INTRAVENOUS AS NEEDED
Status: DISCONTINUED | OUTPATIENT
Start: 2024-05-31 | End: 2024-05-31

## 2024-05-31 RX ORDER — ONDANSETRON HYDROCHLORIDE 2 MG/ML
INJECTION, SOLUTION INTRAVENOUS AS NEEDED
Status: DISCONTINUED | OUTPATIENT
Start: 2024-05-31 | End: 2024-05-31

## 2024-05-31 RX ORDER — HYDROMORPHONE HYDROCHLORIDE 1 MG/ML
0.5 INJECTION, SOLUTION INTRAMUSCULAR; INTRAVENOUS; SUBCUTANEOUS EVERY 5 MIN PRN
Status: DISCONTINUED | OUTPATIENT
Start: 2024-05-31 | End: 2024-06-01 | Stop reason: HOSPADM

## 2024-05-31 RX ORDER — ROCURONIUM BROMIDE 10 MG/ML
INJECTION, SOLUTION INTRAVENOUS AS NEEDED
Status: DISCONTINUED | OUTPATIENT
Start: 2024-05-31 | End: 2024-05-31

## 2024-05-31 RX ORDER — SODIUM CHLORIDE, SODIUM LACTATE, POTASSIUM CHLORIDE, CALCIUM CHLORIDE 600; 310; 30; 20 MG/100ML; MG/100ML; MG/100ML; MG/100ML
INJECTION, SOLUTION INTRAVENOUS CONTINUOUS PRN
Status: DISCONTINUED | OUTPATIENT
Start: 2024-05-31 | End: 2024-05-31

## 2024-05-31 RX ORDER — MIDAZOLAM HYDROCHLORIDE 1 MG/ML
INJECTION INTRAMUSCULAR; INTRAVENOUS AS NEEDED
Status: DISCONTINUED | OUTPATIENT
Start: 2024-05-31 | End: 2024-05-31

## 2024-05-31 RX ORDER — ONDANSETRON HYDROCHLORIDE 2 MG/ML
4 INJECTION, SOLUTION INTRAVENOUS ONCE AS NEEDED
Status: DISCONTINUED | OUTPATIENT
Start: 2024-05-31 | End: 2024-06-01 | Stop reason: HOSPADM

## 2024-05-31 RX ORDER — HYDROMORPHONE HYDROCHLORIDE 1 MG/ML
0.2 INJECTION, SOLUTION INTRAMUSCULAR; INTRAVENOUS; SUBCUTANEOUS EVERY 5 MIN PRN
Status: DISCONTINUED | OUTPATIENT
Start: 2024-05-31 | End: 2024-06-01 | Stop reason: HOSPADM

## 2024-05-31 RX ORDER — FENTANYL CITRATE 50 UG/ML
INJECTION, SOLUTION INTRAMUSCULAR; INTRAVENOUS AS NEEDED
Status: DISCONTINUED | OUTPATIENT
Start: 2024-05-31 | End: 2024-05-31

## 2024-05-31 RX ORDER — LIDOCAINE HCL/PF 100 MG/5ML
SYRINGE (ML) INTRAVENOUS AS NEEDED
Status: DISCONTINUED | OUTPATIENT
Start: 2024-05-31 | End: 2024-05-31

## 2024-05-31 RX ORDER — SODIUM CHLORIDE, SODIUM LACTATE, POTASSIUM CHLORIDE, CALCIUM CHLORIDE 600; 310; 30; 20 MG/100ML; MG/100ML; MG/100ML; MG/100ML
100 INJECTION, SOLUTION INTRAVENOUS CONTINUOUS
Status: DISCONTINUED | OUTPATIENT
Start: 2024-05-31 | End: 2024-06-01 | Stop reason: HOSPADM

## 2024-05-31 RX ORDER — LIDOCAINE HYDROCHLORIDE 10 MG/ML
0.1 INJECTION INFILTRATION; PERINEURAL ONCE
Status: DISCONTINUED | OUTPATIENT
Start: 2024-05-31 | End: 2024-06-01 | Stop reason: HOSPADM

## 2024-05-31 RX ADMIN — SODIUM CHLORIDE, POTASSIUM CHLORIDE, SODIUM LACTATE AND CALCIUM CHLORIDE: 600; 310; 30; 20 INJECTION, SOLUTION INTRAVENOUS at 07:50

## 2024-05-31 RX ADMIN — SUGAMMADEX 400 MG: 100 INJECTION, SOLUTION INTRAVENOUS at 08:53

## 2024-05-31 RX ADMIN — ONDANSETRON 4 MG: 2 INJECTION INTRAMUSCULAR; INTRAVENOUS at 08:53

## 2024-05-31 RX ADMIN — PROPOFOL 200 MG: 10 INJECTION, EMULSION INTRAVENOUS at 08:00

## 2024-05-31 RX ADMIN — DEXAMETHASONE SODIUM PHOSPHATE 4 MG: 4 INJECTION INTRA-ARTICULAR; INTRALESIONAL; INTRAMUSCULAR; INTRAVENOUS; SOFT TISSUE at 08:13

## 2024-05-31 RX ADMIN — LIDOCAINE HYDROCHLORIDE 60 MG: 20 INJECTION INTRAVENOUS at 08:00

## 2024-05-31 RX ADMIN — MIDAZOLAM HYDROCHLORIDE 2 MG: 1 INJECTION, SOLUTION INTRAMUSCULAR; INTRAVENOUS at 08:00

## 2024-05-31 RX ADMIN — ROCURONIUM BROMIDE 10 MG: 10 INJECTION INTRAVENOUS at 08:34

## 2024-05-31 RX ADMIN — FENTANYL CITRATE 100 MCG: 50 INJECTION, SOLUTION INTRAMUSCULAR; INTRAVENOUS at 08:00

## 2024-05-31 RX ADMIN — ROCURONIUM BROMIDE 50 MG: 10 INJECTION INTRAVENOUS at 08:00

## 2024-05-31 RX ADMIN — PROPOFOL 150 MCG/KG/MIN: 10 INJECTION, EMULSION INTRAVENOUS at 08:02

## 2024-05-31 SDOH — HEALTH STABILITY: MENTAL HEALTH: CURRENT SMOKER: 0

## 2024-05-31 ASSESSMENT — PAIN SCALES - GENERAL
PAINLEVEL_OUTOF10: 0 - NO PAIN

## 2024-05-31 ASSESSMENT — PAIN - FUNCTIONAL ASSESSMENT
PAIN_FUNCTIONAL_ASSESSMENT: 0-10

## 2024-05-31 NOTE — LETTER
Dear, Dann Ni      5/15/2024                                              INFORMATION FOR YOUR PROCEDURE     INSTRUCTIONS MUST BE FOLLOWED OR YOU RUN THE RISK OF YOUR CASE BEING CANCELED     Information is attached to this e-mail for your upcoming procedure. (Look for the paperclip in your email to access this information)  Lab requisitions are also included as well as procedural instructions.    You are scheduled for your Bronchoscopy on   5/31/24,   with Dr. Viral Umanzor    Arrival is at  7:00  AM,  for Check In prior to your actual procedure time. This allows us to prepare you for your procedure.  Location:   [place] [address]   Bronchoscopy / Endoscopy Suite   [facility] Room 1300.     Located on the 1st Floor close to the Main entrance of the hospital.  Enter through the front doors, turn left and we are the 1st hallway on the left hand side.(Room 1300 [facility]).  If you park in the visitor's garage you will come in on the second floor and will need to make your way down to the 1st level.     Patient information is right there if assistance is needed.    NPO (No food or drink) after midnight the night before your procedure. This includes coffee, water, and soda, hard candy, gum or mints.  If taking your morning medications, a small sip of water is allowed to get the medication down.    For your safety, you must have a responsible, adult  accompany you to your procedure.  You will not be permitted to drive yourself home if you have received any type of anesthesia or sedation.    Automated calls about your upcoming scheduled appointments can be quite confusing for patients.    The times may vary depending on what you have scheduled for procedure day. Follow the time/s I have given you  so there is no confusion.    Blood work will need to be done prior to your procedure, preferably at a  Facility.  There  are no restrictions for testing. I have attached a requisition for you.      Our Nurse Practitioner, Emelyn Kellogg CNP, will call you on 5/22/24, @ 8:30 AM    This is a phone visit to go over your medical history prior to your procedure, and is a necessary part of your workup.  The patient must be present at this phone visit.    Please reach out to me with any questions you may have Chanel @ [phone] or   Adrián @ [phone]    Have a nice day!    Chanel Reynolds    Bronchoscopy   Interventional Pulmonology    MD Dewayne Lewis MD Sameer Avasarala, MD Catalina Teba, MD Andrew Dunatchik, MD        [facility]  Pulmonary, Critical Care and Sleep Medicine  [address] [place] [address] -[phone]  - [phone]  [email or web address]".[email or web address]

## 2024-05-31 NOTE — INTERVAL H&P NOTE
H&P reviewed. The patient was examined and there are no changes to the H&P.  Images reviewed, will proceed with bronchoscopy for airway exam and EBUS FNA.

## 2024-05-31 NOTE — DISCHARGE INSTRUCTIONS
The anesthetics, sedatives or narcotics which were given to you today will be acting in your body for the next 24 hours, so you might feel a little sleepy or groggy. This feeling should slowly wear off.   Carefully read and follow the instructions below:   You received sedation today.   Do not drive or operate machinery or power tools of any kind.   No alcoholic beverages today, not even beer or wine.   No over the counter medications that contain alcohol or may cause drowsiness.   Do not make important decisions or sign legal documents.     Do not use Aspirin containing products or non-steroidal medications for the next 24 hours.  (Examples of these types of medications include: Advil, Aleve, Ecotrin, Ibuprofen, Motrin or Naprosyn.  This list is not all-inclusive.  Check with your physician or pharmacist before resuming these medications.   Tylenol, cough medicine, cough drops or throat lozenges may be used when you are allowed to resume eating and drinking.     Call your physician if any of these symptoms occur:   High fever over 101 degrees or chills (a low grade fever is common for 24 hours)   Rash or hives   Persistent nausea or vomiting   Inability to urinate within 8 hours after the procedure    Go directly to the emergency room if you notice any of the following:   Shortness of breath   Chest pain              Coughing up large amounts of bright red blood greater than a teaspoonful of blood clots (about a teaspoonful for the next 24-48 hours is normal, especially if you had a biopsy)    Resume all normal medications unless directed otherwise by your doctor.     Your doctor recommends these additional instructions:    Follow up with your referring physician as previously scheduled.    If you experience any problems or have any questions following discharge, please call:   Before 5 pm: [phone]   After 5pm and on weekends: [phone] / [phone] and ask for the Pulmonary Fellow on-call (Pager  Number: [phone])

## 2024-05-31 NOTE — ANESTHESIA PROCEDURE NOTES
Airway  Date/Time: 5/31/2024 8:02 AM  Urgency: elective    Airway not difficult    Staffing  Performed: resident   Authorized by: Feng Carrillo MD    Performed by: AMA Weaver  Patient location during procedure: OR    Indications and Patient Condition  Indications for airway management: anesthesia and airway protection  Spontaneous Ventilation: absent  Sedation level: deep  Preoxygenated: yes  Patient position: sniffing  Mask difficulty assessment: 2 - vent by mask + OA or adjuvant +/- NMBA    Final Airway Details  Final airway type: endotracheal airway      Successful airway: ETT  Cuffed: yes   Successful intubation technique: video laryngoscopy (insighter)  Facilitating devices/methods: intubating stylet  Endotracheal tube insertion site: oral  Blade type: insighter large.  Blade size: #3  ETT size (mm): 8.5  Cormack-Lehane Classification: grade I - full view of glottis  Placement verified by: chest auscultation and capnometry   Measured from: lips  ETT to lips (cm): 22  Number of attempts at approach: 1

## 2024-05-31 NOTE — ANESTHESIA PREPROCEDURE EVALUATION
Patient: Dann Ni    Procedure Information       Date/Time: 05/31/24 0730    Scheduled providers: Viral Umanzor MD; Omid Lucio RN    Procedure: BRONCHOSCOPY    Location: [facility]            Relevant Problems   Cardiac   (+) Hyperlipidemia      Pulmonary   (+) Cough variant asthma (HHS-HCC)   (+) Pulmonary nodules      Endocrine   (+) Class 2 obesity due to excess calories without serious comorbidity with body mass index (BMI) of 36.0 to 36.9 in adult      Hematology   (+) Marginal zone lymphoma (Multi)       Clinical information reviewed:   Tobacco  Allergies    Med Hx  Surg Hx   Fam Hx  Soc Hx        NPO Detail:  NPO/Void Status  Date of Last Liquid: 05/30/24  Date of Last Solid: 05/30/24         Physical Exam    Airway  Mallampati: I  TM distance: >3 FB  Neck ROM: full     Cardiovascular - normal exam     Dental - normal exam     Pulmonary - normal exam     Abdominal - normal exam         Anesthesia Plan    History of general anesthesia?: yes  History of complications of general anesthesia?: no    ASA 3     general     The patient is not a current smoker.  Patient was not previously instructed to abstain from smoking on day of procedure.  Patient did not smoke on day of procedure.    intravenous induction   Postoperative administration of opioids is intended.  Anesthetic plan and risks discussed with patient.  Use of blood products discussed with patient who.    Plan discussed with CRNA.

## 2024-05-31 NOTE — ANESTHESIA POSTPROCEDURE EVALUATION
Patient: Dann Ni    Procedure Summary       Date: 05/31/24 Room / Location: [facility]    Anesthesia Start: 0750 Anesthesia Stop: 0915    Procedure: BRONCHOSCOPY Diagnosis:       Mediastinal lymphadenopathy      Marginal zone lymphoma (Multi)    Scheduled Providers: Viral Umanzor MD; Omid Lucio RN; Feng Carrillo MD Responsible Provider: Feng Carrillo MD    Anesthesia Type: general ASA Status: 3            Anesthesia Type: general    Vitals Value Taken Time   /69 05/31/24 0915   Temp 36.4 05/31/24 0915   Pulse 91 05/31/24 0915   Resp 22 05/31/24 0915   SpO2 97 05/31/24 0915       Anesthesia Post Evaluation    Patient location during evaluation: PACU  Patient participation: complete - patient participated  Level of consciousness: awake  Pain management: adequate  Airway patency: patent  Cardiovascular status: stable  Respiratory status: face mask  Hydration status: stable  Postoperative Nausea and Vomiting: none        There were no known notable events for this encounter.

## 2024-05-31 NOTE — ANESTHESIA PROCEDURE NOTES
Peripheral IV  Date/Time: 5/31/2024 7:30 AM  Inserted by: AMA Weaver    Placement  Needle size: 22 G  Laterality: left  Location: wrist  Local anesthetic: none  Site prep: alcohol  Technique: anatomical landmarks  Attempts: 1  Difficult Venous Access: Yes

## 2024-06-03 LAB
LABORATORY COMMENT REPORT: NORMAL
LABORATORY COMMENT REPORT: NORMAL
PATH REPORT.FINAL DX SPEC: NORMAL
PATH REPORT.GROSS SPEC: NORMAL
PATH REPORT.INTRAOP OBS SPEC DOC: NORMAL
PATH REPORT.TOTAL CANCER: NORMAL

## 2024-06-05 LAB
LABORATORY COMMENT REPORT: NORMAL
PATH REPORT.COMMENTS IMP SPEC: NORMAL
PATH REPORT.FINAL DX SPEC: NORMAL
PATH REPORT.GROSS SPEC: NORMAL
PATH REPORT.TOTAL CANCER: NORMAL

## 2024-06-06 ENCOUNTER — APPOINTMENT (OUTPATIENT)
Dept: NEUROSURGERY | Facility: CLINIC | Age: 49
End: 2024-06-06
Payer: COMMERCIAL

## 2024-06-06 LAB
CELL COUNT (BLOOD): 0.01 X10*3/UL
CELL COUNT (BLOOD): 0.02 X10*3/UL
CELL POPULATIONS: NORMAL
CELL POPULATIONS: NORMAL
DIAGNOSIS: NORMAL
DIAGNOSIS: NORMAL
FLOW DIFFERENTIAL: NORMAL
FLOW DIFFERENTIAL: NORMAL
FLOW TEST ORDERED: NORMAL
FLOW TEST ORDERED: NORMAL
LAB TEST METHOD: NORMAL
LAB TEST METHOD: NORMAL
NUMBER OF CELLS COLLECTED: NORMAL
NUMBER OF CELLS COLLECTED: NORMAL
PATH REPORT.TOTAL CANCER: NORMAL
PATH REPORT.TOTAL CANCER: NORMAL
SIGNATURE COMMENT: NORMAL
SIGNATURE COMMENT: NORMAL
SPECIMEN VIABILITY: NORMAL
SPECIMEN VIABILITY: NORMAL

## 2024-06-07 ENCOUNTER — LAB (OUTPATIENT)
Dept: LAB | Facility: HOSPITAL | Age: 49
End: 2024-06-07
Payer: COMMERCIAL

## 2024-06-07 ENCOUNTER — OFFICE VISIT (OUTPATIENT)
Dept: HEMATOLOGY/ONCOLOGY | Facility: HOSPITAL | Age: 49
End: 2024-06-07
Payer: COMMERCIAL

## 2024-06-07 VITALS
SYSTOLIC BLOOD PRESSURE: 134 MMHG | TEMPERATURE: 96.1 F | HEART RATE: 93 BPM | DIASTOLIC BLOOD PRESSURE: 77 MMHG | WEIGHT: 268.1 LBS | RESPIRATION RATE: 16 BRPM | OXYGEN SATURATION: 97 % | BODY MASS INDEX: 40.76 KG/M2

## 2024-06-07 DIAGNOSIS — C85.80 MARGINAL ZONE LYMPHOMA (MULTI): ICD-10-CM

## 2024-06-07 LAB
ALBUMIN SERPL BCP-MCNC: 4.6 G/DL (ref 3.4–5)
ALP SERPL-CCNC: 96 U/L (ref 33–120)
ALT SERPL W P-5'-P-CCNC: 37 U/L (ref 10–52)
ANION GAP SERPL CALC-SCNC: 14 MMOL/L (ref 10–20)
AST SERPL W P-5'-P-CCNC: 29 U/L (ref 9–39)
BASOPHILS # BLD AUTO: 0.04 X10*3/UL (ref 0–0.1)
BASOPHILS NFR BLD AUTO: 0.8 %
BILIRUB SERPL-MCNC: 0.6 MG/DL (ref 0–1.2)
BUN SERPL-MCNC: 16 MG/DL (ref 6–23)
CALCIUM SERPL-MCNC: 9.3 MG/DL (ref 8.6–10.3)
CHLORIDE SERPL-SCNC: 102 MMOL/L (ref 98–107)
CO2 SERPL-SCNC: 28 MMOL/L (ref 21–32)
CREAT SERPL-MCNC: 1.17 MG/DL (ref 0.5–1.3)
EGFRCR SERPLBLD CKD-EPI 2021: 77 ML/MIN/1.73M*2
EOSINOPHIL # BLD AUTO: 0.16 X10*3/UL (ref 0–0.7)
EOSINOPHIL NFR BLD AUTO: 3.2 %
ERYTHROCYTE [DISTWIDTH] IN BLOOD BY AUTOMATED COUNT: 13.4 % (ref 11.5–14.5)
GLUCOSE SERPL-MCNC: 128 MG/DL (ref 74–99)
HCT VFR BLD AUTO: 39.4 % (ref 41–52)
HGB BLD-MCNC: 13.9 G/DL (ref 13.5–17.5)
IMM GRANULOCYTES # BLD AUTO: 0.01 X10*3/UL (ref 0–0.7)
IMM GRANULOCYTES NFR BLD AUTO: 0.2 % (ref 0–0.9)
LDH SERPL L TO P-CCNC: 165 U/L (ref 84–246)
LYMPHOCYTES # BLD AUTO: 0.31 X10*3/UL (ref 1.2–4.8)
LYMPHOCYTES NFR BLD AUTO: 6.1 %
MCH RBC QN AUTO: 30.3 PG (ref 26–34)
MCHC RBC AUTO-ENTMCNC: 35.3 G/DL (ref 32–36)
MCV RBC AUTO: 86 FL (ref 80–100)
MONOCYTES # BLD AUTO: 0.8 X10*3/UL (ref 0.1–1)
MONOCYTES NFR BLD AUTO: 15.8 %
NEUTROPHILS # BLD AUTO: 3.74 X10*3/UL (ref 1.2–7.7)
NEUTROPHILS NFR BLD AUTO: 73.9 %
NRBC BLD-RTO: 0 /100 WBCS (ref 0–0)
PLATELET # BLD AUTO: 253 X10*3/UL (ref 150–450)
POTASSIUM SERPL-SCNC: 3.8 MMOL/L (ref 3.5–5.3)
PROT SERPL-MCNC: 7.2 G/DL (ref 6.4–8.2)
RBC # BLD AUTO: 4.59 X10*6/UL (ref 4.5–5.9)
SODIUM SERPL-SCNC: 140 MMOL/L (ref 136–145)
WBC # BLD AUTO: 5.1 X10*3/UL (ref 4.4–11.3)

## 2024-06-07 PROCEDURE — 99215 OFFICE O/P EST HI 40 MIN: CPT | Performed by: INTERNAL MEDICINE

## 2024-06-07 PROCEDURE — 36415 COLL VENOUS BLD VENIPUNCTURE: CPT

## 2024-06-07 PROCEDURE — 3008F BODY MASS INDEX DOCD: CPT | Performed by: INTERNAL MEDICINE

## 2024-06-07 PROCEDURE — 80053 COMPREHEN METABOLIC PANEL: CPT

## 2024-06-07 PROCEDURE — 85025 COMPLETE CBC W/AUTO DIFF WBC: CPT

## 2024-06-07 PROCEDURE — 83615 LACTATE (LD) (LDH) ENZYME: CPT

## 2024-06-07 ASSESSMENT — PAIN SCALES - GENERAL: PAINLEVEL_OUTOF10: 0-NO PAIN

## 2024-06-07 NOTE — RESULT ENCOUNTER NOTE
Drake Aguilar and [place],    I called and discussed the results with Dann Ni on 06/07/24. I don't see any evidence of malignancy, but non-diagnostic specimens overall.    Jeff - He mentioned you saw him 06/07/24, discussed the results, and will be getting a follow up CT chest. That is a good plan. If the mediastinal disease is still present and warrants sampling, I recommend having the repeat EBUS versus mediastinoscopy discussion.     Feel free to let me know if there is anything else I can do to assist with their care.    Viral

## 2024-06-12 ENCOUNTER — TELEPHONE (OUTPATIENT)
Dept: HEMATOLOGY/ONCOLOGY | Facility: HOSPITAL | Age: 49
End: 2024-06-12
Payer: COMMERCIAL

## 2024-06-12 NOTE — TELEPHONE ENCOUNTER
Dann calls today because the Return to Work letter needs additional clarification/modifications. He states that the last two letters haven't been what his employer has been looking for but the letters provided in the past by Maria Esther Lucero CNP were accepted without incident.    Dann states that he is a  and that Dr. Peters had mentioned to him that he would like for him to be on desk duty until at least July 11, when he will see Dr. Peters and have his CT scan reviewed.    His employer needs more details - ie: what are the restrictions? Lifting, driving, the number of hours he is restricted, etc. Dann can be reached at [phone] and he can also email copies of Maria Esther's letter. He would like for the completed letter to be emailed back to him at [email or web address].    Message sent to provider, DEBI and Resource Nurse Pool.

## 2024-06-12 NOTE — TELEPHONE ENCOUNTER
Pt calling back to discuss what needs done with light duty work letter that he needs.   He has examples and previous letter that he can email to team to assist.   Please return his call to discuss

## 2024-06-13 ENCOUNTER — SOCIAL WORK (OUTPATIENT)
Dept: CASE MANAGEMENT | Facility: HOSPITAL | Age: 49
End: 2024-06-13
Payer: COMMERCIAL

## 2024-06-13 NOTE — TELEPHONE ENCOUNTER
Pt called back.  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  He reiterated that the letter needs to indicate restrictions about number of hours he can work, lifting and driving restrictions.

## 2024-06-14 NOTE — PROGRESS NOTES
LSW received request from triage line to assist with pt's return to work letter. LSW spoke with pt and received examples of previous forms that were accepted by his employer. Pt requested conformation he can work light duty. LSW and team completed documentation and emailed it to pt per patient's request.

## 2024-06-19 NOTE — PROGRESS NOTES
Patient ID: Dann Ni is a 48 y.o. male.  Referring Physician: No referring provider defined for this encounter.  Primary Care Provider: DO Leela Watson    The patient was in good health until July 2023. He developed back pain which did not respond to NSAID or steroid. By late 9/2023 he lost sensation below the diaphragm and were unable to walk. On 9/30 he had CT scan showing a 4.1x1.8x1cm paraspinal mass at the T7 level. MRI showed abnormal enhancing signals at the C5, T2-8 levels.  On 10/2/2023 he had a debulking surgery which later showed evidence of lymphoma. In the meantime he has improved sensation and is able to walk with a cane. 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 He was consented for rituximab bendamustine and started C1 on 11/30/2023. Tolerated relatively well, but had nausea and flu like symptoms for a few days after chemo. Since then he also completed C2 R stacey on 12/28, followed by C3 on Jan 25, C4 on Feb 22, C5D1 on 3/21, and c6d1 on 4/18. Intrathecal treatment #1 on 2/19, then on 3/18/2024. HE had a PET/CT 5/10/2024 showing persistent FDG avid lesions. Subsequently had EBUS biopsy of a subcarinal mass and Bone marrow bx.     Today the patient says walking continues to improve. Back pain is resolved. No fever, wt loss, or nausea. He is back to work with some accomodation. He is anxious to discuss the above bx results.            Objective   BSA: 2.42 meters squared  /77 (BP Location: Left arm, Patient Position: Sitting, BP Cuff Size: Large adult)   Pulse 93   Temp 35.6 °C (96.1 °F) (Skin)   Resp 16   Wt 122 kg (268 lb 1.6 oz)   SpO2 97%   BMI 40.76 kg/m²     Family History   Problem Relation Name Age of Onset    Hip dysplasia Mother [place]     Arthritis Mother [place]     Other (htn) Father      Prostatitis Father      Lung cancer Maternal Grandmother      Kidney cancer Maternal Grandmother      Lung cancer Maternal Grandfather      Kidney  cancer Maternal Grandfather      Dementia Other      Cancer Other      Dementia Other       Oncology History   Marginal zone lymphoma (Multi)   10/2/2023 Cancer Staged    Staging form: Hodgkin and Non-Hodgkin Lymphoma, AJCC 8th Edition, Clinical stage from 10/2/2023: Stage IV (Marginal zone lymphoma) - Signed by Jeff Peters MD PhD on 11/25/2023 11/17/2023 Initial Diagnosis    Marginal zone lymphoma (CMS/HCC)     11/30/2023 -  Chemotherapy    Bendamustine + RiTUXimab, 28 Day Cycles         Dann Ni  reports that he has never smoked. He has never used smokeless tobacco.  He  reports that he does not currently use alcohol.  He  reports no history of drug use.    EXAM: No LAD. No splenomegaly. No skin lesions. Muscle power preserved in the UE but reduced in LLE; walk without assistance in slow pace; upper back surgical scar healed.  Other details below.  5/10: walk independently with a very mild limp.     Physical Exam  Constitutional:       General: He is not in acute distress.     Appearance: He is not toxic-appearing.   HENT:      Head: Normocephalic.      Nose: Nose normal.      Mouth/Throat:      Mouth: Mucous membranes are moist.   Eyes:      Extraocular Movements: Extraocular movements intact.      Pupils: Pupils are equal, round, and reactive to light.   Cardiovascular:      Rate and Rhythm: Normal rate and regular rhythm.      Heart sounds: No murmur heard.  Pulmonary:      Effort: Pulmonary effort is normal.      Breath sounds: Normal breath sounds.   Abdominal:      General: Bowel sounds are normal.      Palpations: Abdomen is soft. There is no mass.      Tenderness: There is no abdominal tenderness. There is no rebound.   Musculoskeletal:         General: No swelling, tenderness, deformity or signs of injury.      Right lower leg: No edema.      Left lower leg: No edema.   Skin:     Coloration: Skin is not jaundiced.      Findings: No bruising, lesion or rash.   Neurological:      Mental  Status: He is alert and oriented to person, place, and time.      Cranial Nerves: No cranial nerve deficit.      Motor: Weakness present.      Gait: Gait abnormal.   Psychiatric:         Mood and Affect: Mood normal.         Performance Status:  Symptomatic; fully ambulatory    Assessment/Plan      #EMZL  -Reviewed PET/CT and MRI with the patient and wife. The results are consistent with stage IV disease, involving LN above and below the diaphragm, extensively axial and appendicular skeleton including bilateral scapulas, right clavicular head, T5 vertebral body, bilateral iliac bones, left ischium, and left femur without definite anatomic correlate (max SUV 7.6).  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   -Current data support treatment using multi-agent chemo. Bendamustine and rituximab have been approved and widely used, found to be safe and effective.   -Patient was disappointed to learn that EMZL such as his may not be curable. Was relieved to know that the treatment can bring multiple years of remission, improvement of strength / quality of life / work experience.   -Discussed the Aes of r-anum, which are numerous, including SAEs such as infection, or even death. Most AE and DUSTIN however can be resolved with effective treatments. He consented for R-anum.   -Due to the location of the disease and bone marrow uptake, he may be at high risk of CNS involvement / relapse. Schedule head CT and intrathecal treatment, timing likely after 2 cycles of R-ANUM. He consented for IT Methotrexate and cytarabine. First IT on 2/19/2024. CSF shows no lymphoma. Next IT on 3/18 tentatively.   -In addition, consolidation using XRT will be discussed in the future.   -5/10/2024: PET/CT results at [facility] were reviewed. Unfortunately, there are a number of residual FDG avid signals involving bones and perihilar nodes. These raise the question of treatment  failure, or possible large cell transformation. Will obtain BMBx and additional biopsy, including possibly:   **a subcarinal lymph node has a maximum SUV of 11.3 previously measuring up to 14.9 and a conglomerate of right paratracheal lymph nodes has a maximum  SUV of 8.9 previously measuring up to 14.9. (by interventional pulm)  **proximal left humerus there is a lytic lesion with maximum SUV of 7.5 SUV previously measuring up to 3.6 SUV. (By IR)  -6/7/2024: discussed the results below:  5/23 bmbx: A, B & C. BONE MARROW CLOT WITH ASPIRATE AND CORE WITH TOUCH PREP, LEFT ILIAC CREST:   -- NORMOCELLULAR BONE MARROW (40%) WITH MATURING TRILINEAGE HEMATOPOESIS AND NO MORPHOLOGIC EVIDENCE OF LYMPHOMA.  5/31: A. LYMPH NODE,  LEVEL 7; BIOPSY:      -- Non-diagnostic rare bronchial epithelium and blood.  Those negative results and his clinical improvement do not support relapse or refractoriness. Will monitor closely. CT Chest on 7/8.     Plan:  -CT Chest on 7/8.   -MD 7/11  -defer XRT.       Time spent: 45min, >50% on counseling and care coordination.          Jeff Peters MD PhD

## 2024-06-25 ENCOUNTER — TELEPHONE (OUTPATIENT)
Dept: NEUROSURGERY | Facility: CLINIC | Age: 49
End: 2024-06-25
Payer: COMMERCIAL

## 2024-06-27 ENCOUNTER — APPOINTMENT (OUTPATIENT)
Dept: NEUROSURGERY | Facility: CLINIC | Age: 49
End: 2024-06-27
Payer: COMMERCIAL

## 2024-07-08 ENCOUNTER — HOSPITAL ENCOUNTER (OUTPATIENT)
Dept: RADIOLOGY | Facility: HOSPITAL | Age: 49
Discharge: HOME | End: 2024-07-08
Payer: COMMERCIAL

## 2024-07-08 DIAGNOSIS — C85.80 MARGINAL ZONE LYMPHOMA (MULTI): ICD-10-CM

## 2024-07-08 PROCEDURE — 71260 CT THORAX DX C+: CPT

## 2024-07-08 PROCEDURE — 71260 CT THORAX DX C+: CPT | Performed by: RADIOLOGY

## 2024-07-08 PROCEDURE — 2550000001 HC RX 255 CONTRASTS: Performed by: INTERNAL MEDICINE

## 2024-07-11 ENCOUNTER — LAB (OUTPATIENT)
Dept: LAB | Facility: HOSPITAL | Age: 49
End: 2024-07-11
Payer: COMMERCIAL

## 2024-07-11 ENCOUNTER — OFFICE VISIT (OUTPATIENT)
Dept: HEMATOLOGY/ONCOLOGY | Facility: HOSPITAL | Age: 49
End: 2024-07-11
Payer: COMMERCIAL

## 2024-07-11 VITALS
SYSTOLIC BLOOD PRESSURE: 118 MMHG | OXYGEN SATURATION: 98 % | TEMPERATURE: 97 F | DIASTOLIC BLOOD PRESSURE: 70 MMHG | BODY MASS INDEX: 39.85 KG/M2 | RESPIRATION RATE: 16 BRPM | HEART RATE: 80 BPM | WEIGHT: 262.1 LBS

## 2024-07-11 DIAGNOSIS — C85.80 MARGINAL ZONE LYMPHOMA (MULTI): ICD-10-CM

## 2024-07-11 LAB
ALBUMIN SERPL BCP-MCNC: 4.2 G/DL (ref 3.4–5)
ALP SERPL-CCNC: 80 U/L (ref 33–120)
ALT SERPL W P-5'-P-CCNC: 33 U/L (ref 10–52)
ANION GAP SERPL CALC-SCNC: 15 MMOL/L (ref 10–20)
AST SERPL W P-5'-P-CCNC: 31 U/L (ref 9–39)
BASOPHILS # BLD AUTO: 0.06 X10*3/UL (ref 0–0.1)
BASOPHILS NFR BLD AUTO: 1 %
BILIRUB SERPL-MCNC: 0.5 MG/DL (ref 0–1.2)
BUN SERPL-MCNC: 11 MG/DL (ref 6–23)
CALCIUM SERPL-MCNC: 9.2 MG/DL (ref 8.6–10.3)
CHLORIDE SERPL-SCNC: 105 MMOL/L (ref 98–107)
CO2 SERPL-SCNC: 25 MMOL/L (ref 21–32)
CREAT SERPL-MCNC: 1.1 MG/DL (ref 0.5–1.3)
EGFRCR SERPLBLD CKD-EPI 2021: 83 ML/MIN/1.73M*2
EOSINOPHIL # BLD AUTO: 0.24 X10*3/UL (ref 0–0.7)
EOSINOPHIL NFR BLD AUTO: 4.1 %
ERYTHROCYTE [DISTWIDTH] IN BLOOD BY AUTOMATED COUNT: 12.7 % (ref 11.5–14.5)
GLUCOSE SERPL-MCNC: 111 MG/DL (ref 74–99)
HCT VFR BLD AUTO: 39.5 % (ref 41–52)
HGB BLD-MCNC: 13.6 G/DL (ref 13.5–17.5)
IMM GRANULOCYTES # BLD AUTO: 0.03 X10*3/UL (ref 0–0.7)
IMM GRANULOCYTES NFR BLD AUTO: 0.5 % (ref 0–0.9)
LDH SERPL L TO P-CCNC: 160 U/L (ref 84–246)
LYMPHOCYTES # BLD AUTO: 0.33 X10*3/UL (ref 1.2–4.8)
LYMPHOCYTES NFR BLD AUTO: 5.7 %
MCH RBC QN AUTO: 29.6 PG (ref 26–34)
MCHC RBC AUTO-ENTMCNC: 34.4 G/DL (ref 32–36)
MCV RBC AUTO: 86 FL (ref 80–100)
MONOCYTES # BLD AUTO: 0.95 X10*3/UL (ref 0.1–1)
MONOCYTES NFR BLD AUTO: 16.4 %
NEUTROPHILS # BLD AUTO: 4.2 X10*3/UL (ref 1.2–7.7)
NEUTROPHILS NFR BLD AUTO: 72.3 %
NRBC BLD-RTO: 0 /100 WBCS (ref 0–0)
PLATELET # BLD AUTO: 246 X10*3/UL (ref 150–450)
POTASSIUM SERPL-SCNC: 3.9 MMOL/L (ref 3.5–5.3)
PROT SERPL-MCNC: 6.7 G/DL (ref 6.4–8.2)
RBC # BLD AUTO: 4.6 X10*6/UL (ref 4.5–5.9)
SODIUM SERPL-SCNC: 141 MMOL/L (ref 136–145)
WBC # BLD AUTO: 5.8 X10*3/UL (ref 4.4–11.3)

## 2024-07-11 PROCEDURE — 84075 ASSAY ALKALINE PHOSPHATASE: CPT

## 2024-07-11 PROCEDURE — 99215 OFFICE O/P EST HI 40 MIN: CPT | Performed by: INTERNAL MEDICINE

## 2024-07-11 PROCEDURE — 36415 COLL VENOUS BLD VENIPUNCTURE: CPT

## 2024-07-11 PROCEDURE — 3008F BODY MASS INDEX DOCD: CPT | Performed by: INTERNAL MEDICINE

## 2024-07-11 PROCEDURE — 83615 LACTATE (LD) (LDH) ENZYME: CPT

## 2024-07-11 PROCEDURE — 85025 COMPLETE CBC W/AUTO DIFF WBC: CPT

## 2024-07-11 ASSESSMENT — PAIN SCALES - GENERAL: PAINLEVEL_OUTOF10: 0-NO PAIN

## 2024-07-16 NOTE — PROGRESS NOTES
Patient ID: Dann Ni is a 48 y.o. male.  Referring Physician: Jeff Peters MD PhD  [id number] [place] [address]  Primary Care Provider: DO Leela Watson    The patient was in good health until July 2023. He developed back pain which did not respond to NSAID or steroid. By late 9/2023 he lost sensation below the diaphragm and were unable to walk. On 9/30 he had CT scan showing a 4.1x1.8x1cm paraspinal mass at the T7 level. MRI showed abnormal enhancing signals at the C5, T2-8 levels.  On 10/2/2023 he had a debulking surgery which later showed evidence of lymphoma. In the meantime he has improved sensation and is able to walk with a cane. 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 He was consented for rituximab bendamustine and started C1 on 11/30/2023. Tolerated relatively well, but had nausea and flu like symptoms for a few days after chemo. Since then he also completed C2 R stacey on 12/28, followed by C3 on Jan 25, C4 on Feb 22, C5D1 on 3/21, and c6d1 on 4/18. Intrathecal treatment #1 on 2/19, then on 3/18/2024. HE had a PET/CT 5/10/2024 showing persistent FDG avid lesions. Subsequently had EBUS biopsy of a subcarinal mass and Bone marrow bx on 5/31 and 5/23/2024, respectively. There was no evidence of lymphoma in these studies.     Today the patient says walking continues to improve. Back pain is resolved. No fever, wt loss, or nausea. He is back to work with some accomodation. He is anxious to discuss the above bx results.            Objective   BSA: 2.39 meters squared  /70 (BP Location: Left arm, Patient Position: Sitting, BP Cuff Size: Large adult)   Pulse 80   Temp 36.1 °C (97 °F) (Skin)   Resp 16   Wt 119 kg (262 lb 1.6 oz)   SpO2 98%   BMI 39.85 kg/m²     Family History   Problem Relation Name Age of Onset    Hip dysplasia Mother [place]     Arthritis Mother [place]     Other (htn) Father      Prostatitis Father      Lung cancer Maternal  Grandmother      Kidney cancer Maternal Grandmother      Lung cancer Maternal Grandfather      Kidney cancer Maternal Grandfather      Dementia Other      Cancer Other      Dementia Other       Oncology History   Marginal zone lymphoma (Multi)   10/2/2023 Cancer Staged    Staging form: Hodgkin and Non-Hodgkin Lymphoma, AJCC 8th Edition, Clinical stage from 10/2/2023: Stage IV (Marginal zone lymphoma) - Signed by Jeff Peters MD PhD on 11/25/2023 11/17/2023 Initial Diagnosis    Marginal zone lymphoma (CMS/HCC)     11/30/2023 -  Chemotherapy    Bendamustine + RiTUXimab, 28 Day Cycles         Dann Ni  reports that he has never smoked. He has never used smokeless tobacco.  He  reports that he does not currently use alcohol.  He  reports no history of drug use.    EXAM: No LAD. No splenomegaly. No skin lesions. Muscle power preserved in the UE but reduced in LLE; walk without assistance in slow pace; upper back surgical scar healed.  Other details below.  5/10: walk independently with a very mild limp.     Physical Exam  Constitutional:       General: He is not in acute distress.     Appearance: He is not toxic-appearing.   HENT:      Head: Normocephalic.      Nose: Nose normal.      Mouth/Throat:      Mouth: Mucous membranes are moist.   Eyes:      Extraocular Movements: Extraocular movements intact.      Pupils: Pupils are equal, round, and reactive to light.   Cardiovascular:      Rate and Rhythm: Normal rate and regular rhythm.      Heart sounds: No murmur heard.  Pulmonary:      Effort: Pulmonary effort is normal.      Breath sounds: Normal breath sounds.   Abdominal:      General: Bowel sounds are normal.      Palpations: Abdomen is soft. There is no mass.      Tenderness: There is no abdominal tenderness. There is no rebound.   Musculoskeletal:         General: No swelling, tenderness, deformity or signs of injury.      Right lower leg: No edema.      Left lower leg: No edema.   Skin:      Coloration: Skin is not jaundiced.      Findings: No bruising, lesion or rash.   Neurological:      Mental Status: He is alert and oriented to person, place, and time.      Cranial Nerves: No cranial nerve deficit.      Motor: Weakness present.      Gait: Gait abnormal.   Psychiatric:         Mood and Affect: Mood normal.         Performance Status:  Symptomatic; fully ambulatory    Assessment/Plan      #EMZL  -Reviewed PET/CT and MRI with the patient and wife. The results are consistent with stage IV disease, involving LN above and below the diaphragm, extensively axial and appendicular skeleton including bilateral scapulas, right clavicular head, T5 vertebral body, bilateral iliac bones, left ischium, and left femur without definite anatomic correlate (max SUV 7.6).  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   -Current data support treatment using multi-agent chemo. Bendamustine and rituximab have been approved and widely used, found to be safe and effective.   -Patient was disappointed to learn that EMZL such as his may not be curable. Was relieved to know that the treatment can bring multiple years of remission, improvement of strength / quality of life / work experience.   -Discussed the Aes of r-anum, which are numerous, including SAEs such as infection, or even death. Most AE and DUSTIN however can be resolved with effective treatments. He consented for R-anum.   -Due to the location of the disease and bone marrow uptake, he may be at high risk of CNS involvement / relapse. Schedule head CT and intrathecal treatment, timing likely after 2 cycles of R-ANUM. He consented for IT Methotrexate and cytarabine. First IT on 2/19/2024. CSF shows no lymphoma. Next IT on 3/18 tentatively.   -In addition, consolidation using XRT will be discussed in the future.   -5/10/2024: PET/CT results at [facility] were reviewed. Unfortunately, there are a  number of residual FDG avid signals involving bones and perihilar nodes. These raise the question of treatment failure, or possible large cell transformation. Will obtain BMBx and additional biopsy, including possibly:   **a subcarinal lymph node has a maximum SUV of 11.3 previously measuring up to 14.9 and a conglomerate of right paratracheal lymph nodes has a maximum  SUV of 8.9 previously measuring up to 14.9. (by interventional pulm)  **proximal left humerus there is a lytic lesion with maximum SUV of 7.5 SUV previously measuring up to 3.6 SUV. (By IR)  -2024: discussed the results below:   bmbx: A, B & C. BONE MARROW CLOT WITH ASPIRATE AND CORE WITH TOUCH PREP, LEFT ILIAC CREST:   -- NORMOCELLULAR BONE MARROW (40%) WITH MATURING TRILINEAGE HEMATOPOESIS AND NO MORPHOLOGIC EVIDENCE OF LYMPHOMA.  : A. LYMPH NODE,  LEVEL 7; BIOPSY:      -- Non-diagnostic rare bronchial epithelium and blood.  Those negative results and his clinical improvement do not support relapse or refractoriness. Will monitor closely. CT Chest on .   -2024: reviewed CT of , showin.  Numerous lung nodules bilaterally measuring up to 5 mm in size, similar to the prior in size and number. 2. Redemonstration of multiple enlarged mediastinal lymph nodes, also unchanged from the prior.   -It is likely, but not proven, that the lung nodules and small mediastinal lymph nodes are reactive to inflammation. However, the exact source of inflammation is not identifiable. He has no evidence of Pneumonia. In the past ACE level was found to be Wnl, arguing against (but does not rule out) sarcoidosis.   -Given the above consideration, disease progression/refractoriness is questionable. Will therefore refer back to [facility] to discuss radiation of the soft tissue mass at the T1-5 level previously shown on MRI in 10/2023. Will also repeat PET/CT in 2024.    Plan:  -RTCUATE NUNN 8w  -PET/CT in 2024.  -refer back to [facility] to discuss  radiation of the soft tissue mass at the T1-5 level previously shown on MRI in 10/2023.      Time spent: 45min, >50% on counseling and care coordination.          Jeff Peters MD PhD

## 2024-07-17 NOTE — PROGRESS NOTES
Dann Ni is a 48 y.o. year old male s/p Laminectomy Thoracic with Excision Lesion is here for a 6 months follow up.    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    Loss of smell    Cough variant asthma ([facility]) 04/02/2019    Cough variant asthma    Diverticulitis 09/25/2023    Foot pain 09/25/2023    Other conditions influencing health status     No significant past surgical history    Other conditions influencing health status     No significant past medical history    Personal history of other diseases of the respiratory system 04/14/2019    History of bronchitis    Personal history of other drug therapy 10/23/2019    History of influenza vaccination    Personal history of other specified conditions 03/30/2019    History of persistent cough    Recurrent pneumonia 09/25/2023    Tendonitis 09/25/2023       Past Surgical History:   Procedure Laterality Date    OTHER SURGICAL HISTORY  04/12/2019    No history of surgery           Current Outpatient Medications:     albuterol 90 mcg/actuation inhaler, Inhale 2 puffs every 6 hours if needed for shortness of breath or wheezing., Disp: , Rfl:     budesonide-formoteroL (Symbicort) 160-4.5 mcg/actuation inhaler, Inhale 2 puffs 2 times a day., Disp: , Rfl:     cyclobenzaprine (Flexeril) 10 mg tablet, Take 1 tablet (10 mg) by mouth 3 times a day as needed for muscle spasms. (Patient not taking: Reported on 7/11/2024), Disp: 30 tablet, Rfl: 2    ondansetron (Zofran) 8 mg tablet, Take 1 tablet (8 mg) by mouth every 8 hours if needed for nausea or vomiting. (Patient not taking: Reported on 5/31/2024), Disp: 30 tablet, Rfl: 5    prochlorperazine (Compazine) 10 mg tablet, Take 1 tablet (10 mg) by mouth every 6 hours if needed for nausea or vomiting. (Patient not taking: Reported on 5/31/2024), Disp: 30 tablet, Rfl: 5    zolpidem (Ambien) 10 mg tablet,  Take 1 tablet (10 mg) by mouth as needed at bedtime for sleep., Disp: 30 tablet, Rfl: 1      There were no vitals filed for this visit.  Objective   General: Well developed, awake/alert/oriented x3, no distress, alert and cooperative  Skin: Warm and dry, no lesions, no rashes  ENMT: Mucous membranes moist, no apparent injury, no lesions seen  Head/Neck: Neck Supple, no apparent injury  Respiratory/Thorax: Normal breath sounds with good chest expansion, thorax symmetric  Cardiovascular: No pitting edema, no JVD    Motor Strength: 5/5 Throughout all extremities    Muscle Bulk: Normal and symmetric in all extremities    Posture:   -- Cervical: Normal  -- Thoracic: Normal  -- Lumbar : Normal  Paraspinal muscle spasm/tenderness absent.     Sensation: intact to light touch     Relevant Results    MRI C/T/L spine (04/2024): without any new lesions, stable enhancement of T1-4    Problem List Items Addressed This Visit    None         Assessment/Plan       Mr. Dann Ni is a very nice 48 y.o. year old male S/P Laminectomy Thoracic with Excision Lesion presenting for a 6 months follow up. He is improving well. His pre-surgical symptoms are improved drastically. He was using wheel chair before surgery but currently he is able to walk without support. He mentions numbness around his abdomen which has been present from before the surgery and is improving month by month His incision is healing well without any erythema or induration or discharge.     I have informed him that the numbness around the abdomen a may or may not persist forever. In surgical point of view, he is okay to go back to his work whenever he is ready to go.    He will continue with his physical therapy.    He will follow up with me in 6 months at his 1 year Daron.      Yesenia Marin MD    of Neurosurgery   [facility]   [facility]   Office: [phone]  Fax: [phone]      Scribe  Attestation  By signing my name below, I, Rylee Verde , Scribamparo   attest that this documentation has been prepared under the direction and in the presence of Yesenia Marin MD.

## 2024-07-18 ENCOUNTER — APPOINTMENT (OUTPATIENT)
Dept: NEUROSURGERY | Facility: CLINIC | Age: 49
End: 2024-07-18
Payer: COMMERCIAL

## 2024-07-18 VITALS
WEIGHT: 265 LBS | HEIGHT: 68 IN | BODY MASS INDEX: 40.16 KG/M2 | SYSTOLIC BLOOD PRESSURE: 124 MMHG | HEART RATE: 96 BPM | DIASTOLIC BLOOD PRESSURE: 80 MMHG

## 2024-07-18 DIAGNOSIS — D49.2 THORACIC SPINE TUMOR: Primary | ICD-10-CM

## 2024-07-18 PROCEDURE — 1036F TOBACCO NON-USER: CPT | Performed by: STUDENT IN AN ORGANIZED HEALTH CARE EDUCATION/TRAINING PROGRAM

## 2024-07-18 PROCEDURE — 3008F BODY MASS INDEX DOCD: CPT | Performed by: STUDENT IN AN ORGANIZED HEALTH CARE EDUCATION/TRAINING PROGRAM

## 2024-07-18 PROCEDURE — 99214 OFFICE O/P EST MOD 30 MIN: CPT | Performed by: STUDENT IN AN ORGANIZED HEALTH CARE EDUCATION/TRAINING PROGRAM

## 2024-07-26 ENCOUNTER — TELEPHONE (OUTPATIENT)
Dept: ADMISSION | Facility: HOSPITAL | Age: 49
End: 2024-07-26
Payer: COMMERCIAL

## 2024-07-26 DIAGNOSIS — C85.80 MARGINAL ZONE LYMPHOMA (MULTI): Primary | ICD-10-CM

## 2024-07-26 NOTE — TELEPHONE ENCOUNTER
The patient called in, states that per his follow up with Dr. Peters a few weeks ago, he was supposed to follow up with Dr. Queen regarding radiation. The patient has not heard anything so wants to confirm a plan if possible. Message routed to team. Below is the information from Dr. Mcclain note     Plan:  -RTC MD 8w  -PET/CT in 11/2024.  -refer back to [facility] to discuss radiation of the soft tissue mass at the T1-5 level previously shown on MRI in 10/2023.    No order for radiation was ever placed

## 2024-08-07 ENCOUNTER — TELEPHONE (OUTPATIENT)
Dept: RADIATION ONCOLOGY | Facility: HOSPITAL | Age: 49
End: 2024-08-07
Payer: COMMERCIAL

## 2024-08-12 ENCOUNTER — HOSPITAL ENCOUNTER (OUTPATIENT)
Dept: RADIATION ONCOLOGY | Facility: HOSPITAL | Age: 49
Setting detail: RADIATION/ONCOLOGY SERIES
Discharge: HOME | End: 2024-08-12
Payer: COMMERCIAL

## 2024-08-12 VITALS
BODY MASS INDEX: 39.89 KG/M2 | SYSTOLIC BLOOD PRESSURE: 136 MMHG | WEIGHT: 262.35 LBS | TEMPERATURE: 97.7 F | HEART RATE: 94 BPM | DIASTOLIC BLOOD PRESSURE: 92 MMHG | OXYGEN SATURATION: 96 % | RESPIRATION RATE: 18 BRPM

## 2024-08-12 DIAGNOSIS — C85.80 MARGINAL ZONE LYMPHOMA (MULTI): Primary | ICD-10-CM

## 2024-08-12 DIAGNOSIS — C72.0 MALIGNANT NEOPLASM OF SPINAL CORD (MULTI): ICD-10-CM

## 2024-08-12 PROCEDURE — 99214 OFFICE O/P EST MOD 30 MIN: CPT | Performed by: RADIOLOGY

## 2024-08-12 ASSESSMENT — ENCOUNTER SYMPTOMS
NUMBNESS: 1
SHORTNESS OF BREATH: 1
WHEEZING: 1
CARDIOVASCULAR NEGATIVE: 1
UNEXPECTED WEIGHT CHANGE: 1
MYALGIAS: 1
ENDOCRINE NEGATIVE: 1
GASTROINTESTINAL NEGATIVE: 1
ARTHRALGIAS: 1
HEMATOLOGIC/LYMPHATIC NEGATIVE: 1
EYE PROBLEMS: 1
PSYCHIATRIC NEGATIVE: 1

## 2024-08-12 NOTE — PROGRESS NOTES
Radiation Oncology Nursing Note    Pain: The patient's current pain level was assessed.  They report currently having a pain of 1 out of 10.  They feel their pain is under control without the use of pain medications.    Review of Systems:  Review of Systems   Constitutional:  Positive for unexpected weight change.        Gained weight during chemo. Last chemo April 2024       HENT:  Negative.     Eyes:  Positive for eye problems.        Glasses at baseline     Respiratory:  Positive for shortness of breath and wheezing.         Hx of Asthma     Cardiovascular: Negative.    Gastrointestinal: Negative.    Endocrine: Negative.    Genitourinary: Negative.     Musculoskeletal:  Positive for arthralgias and myalgias.        Muscles/spine in lower back     Skin: Negative.    Neurological:  Positive for numbness.        Lost most feeling from midchest to toes. Gaining some back not back to 100% feeling. Did not lose strength     Hematological: Negative.    Psychiatric/Behavioral: Negative.

## 2024-08-14 ENCOUNTER — APPOINTMENT (OUTPATIENT)
Dept: PRIMARY CARE | Facility: CLINIC | Age: 49
End: 2024-08-14
Payer: COMMERCIAL

## 2024-08-14 VITALS
WEIGHT: 263 LBS | RESPIRATION RATE: 18 BRPM | BODY MASS INDEX: 39.86 KG/M2 | HEART RATE: 80 BPM | SYSTOLIC BLOOD PRESSURE: 120 MMHG | HEIGHT: 68 IN | DIASTOLIC BLOOD PRESSURE: 80 MMHG | OXYGEN SATURATION: 95 %

## 2024-08-14 DIAGNOSIS — Z00.00 ANNUAL PHYSICAL EXAM: Primary | ICD-10-CM

## 2024-08-14 DIAGNOSIS — J45.20 MILD INTERMITTENT ASTHMA WITHOUT COMPLICATION (HHS-HCC): ICD-10-CM

## 2024-08-14 DIAGNOSIS — D84.9 IMMUNOSUPPRESSED STATUS (MULTI): ICD-10-CM

## 2024-08-14 DIAGNOSIS — E78.2 MODERATE MIXED HYPERLIPIDEMIA NOT REQUIRING STATIN THERAPY: ICD-10-CM

## 2024-08-14 DIAGNOSIS — Z12.11 SCREEN FOR COLON CANCER: ICD-10-CM

## 2024-08-14 DIAGNOSIS — C85.80 MARGINAL ZONE LYMPHOMA (MULTI): ICD-10-CM

## 2024-08-14 DIAGNOSIS — C72.0 MALIGNANT NEOPLASM OF SPINAL CORD (MULTI): ICD-10-CM

## 2024-08-14 DIAGNOSIS — E66.01 CLASS 2 SEVERE OBESITY DUE TO EXCESS CALORIES WITH SERIOUS COMORBIDITY AND BODY MASS INDEX (BMI) OF 39.0 TO 39.9 IN ADULT (MULTI): ICD-10-CM

## 2024-08-14 DIAGNOSIS — G47.09 OTHER INSOMNIA: ICD-10-CM

## 2024-08-14 PROCEDURE — 3008F BODY MASS INDEX DOCD: CPT | Performed by: FAMILY MEDICINE

## 2024-08-14 PROCEDURE — 99396 PREV VISIT EST AGE 40-64: CPT | Performed by: FAMILY MEDICINE

## 2024-08-14 RX ORDER — ZOLPIDEM TARTRATE 10 MG/1
10 TABLET ORAL NIGHTLY PRN
Qty: 30 TABLET | Refills: 1 | Status: SHIPPED | OUTPATIENT
Start: 2024-08-14 | End: 2024-10-13

## 2024-08-14 NOTE — LETTER
September 4, 2024     Dann Ni  [id number] [id number]      Dear Mr. Ni:    Below are the results from your recent visit:    Resulted Orders   Cologuard® colon cancer screening   Result Value Ref Range    NONINV COLON CA DNA+OCC BLD SCRN STL QL Negative Negative      Comment:        NEGATIVE TEST RESULT. A negative Cologuard result indicates a low likelihood that a colorectal cancer (CRC) or advanced adenoma (adenomatous polyps with more advanced pre-malignant features)  is present. The chance that a person with a negative Cologuard test has a colorectal cancer is less than 1 in 1500 (negative predictive value >99.9%) or has an  advanced adenoma is less than  5.3% (negative predictive value 94.7%). These data are based on a prospective cross-sectional study of 10,000 individuals at average risk for colorectal cancer who were screened with both Cologuard and colonoscopy. (Danielito MELTON et al, N Engl J Med 2014;370(14):9918-8826) The normal value (reference range) for this assay is negative.    COLOGUARD RE-SCREENING RECOMMENDATION: Periodic colorectal cancer screening is an important part of preventive healthcare for asymptomatic individuals at average risk for colorectal cancer.  Following a negative Cologuard result, the American Cancer Society and U.S.  Multi-Society Task Force screening guidelines recommend a Cologuard re-screening interval of 3 years.   References: American Cancer Society Guideline for Colorectal Cancer Screening: https://www.cancer.org/cancer/colon-rectal-cancer/obhxzzwuk-opcoykkpm-ojboprs/acs-recommendations.html.; Nolan DK, Joie BOYER, Carter FARIAK, Colorectal Cancer Screening: Recommendations for Physicians and Patients from the U.S. Multi-Society Task Force on Colorectal Cancer Screening , Am J Gastroenterology 2017; 112:7651-6901.    TEST DESCRIPTION: Composite algorithmic analysis of stool DNA-biomarkers with hemoglobin immunoassay.   Quantitative  values of individual biomarkers are not reportable and are not associated with individual biomarker result reference ranges. Cologuard is intended for colorectal cancer screening of adults of either sex, 45 years or older, who are at average-risk for colorectal cancer (CRC). Cologuard has been approved for use by the U.S. FDA. The performance of Cologuard was  established in a cross sectional study of average-risk adults aged 50-84. Cologuard performance in patients ages 45 to 49 years was estimated by sub-group analysis of near-age groups. Colonoscopies performed for a positive result may find as the most clinically significant lesion: colorectal cancer [4.0%], advanced adenoma (including sessile serrated polyps greater than or equal to 1cm diameter) [20%] or non- advanced adenoma [31%]; or no colorectal neoplasia [45%]. These estimates are derived from a prospective cross-sectional screening study of 10,000 individuals at average risk for colorectal cancer who were screened with both Cologuard and colonoscopy. (Danielito ELLIOTT. et al, N Engl J Med 2014;370(14):7886-0752.) Cologuard may produce a false negative or false positive result (no colorectal cancer or precancerous polyp present at colonoscopy follow up). A negative Cologuard test result does not guarantee the absence of CRC or advanced adenoma (pre-cancer). The current Cologuard  screening interval is every 3 years. (American Cancer Society and U.S. Multi-Society Task Force). Cologuard performance data in a 10,000 patient pivotal study using colonoscopy as the reference method can be accessed at the following location: www.[employer].Corpsolv/results. Additional description of the Cologuard test process, warnings and precautions can be found at www.[facility].com.       Cologuard stool test is negative, repeat in 3 years.  If you have any questions or concerns, please don't hesitate to call.         Sincerely,        Ventura Gonzalez, DO

## 2024-08-14 NOTE — PROGRESS NOTES
"Subjective   Patient ID: Dann Ni is a 48 y.o. male who presents for Annual Exam.    HPI    Patient presents in office today for an Annual physical. Last eye exam 2 weeks ago, last dental exam unknown. No new family h/o of cancer or heart disease. Does not need paperwork filled out today.      Patient presents in office today for ankle swelling. Wife is concerned about swelling in his left ankle. This is on and off. Admits that this started between when he had his back surgery and when he started his chemo. He does have stiffness in the areas where spots were seen on the scans.     He states the numbness and tingling around chest is better.   He states his aunt was diagnosed with lymphoma.     Review of systems  ; Patient seen today for exam denies any problems with headaches or vision, denies any shortness of breath chest pain nausea or vomiting, no black stool no blood in the stool no heartburn type symptoms denies any problems with constipation or diarrhea, and no dysuria-type symptoms    The patient's allergies medications were reviewed with them today    The patient's social family and surgical history or also reviewed here today, along with her past medical history.     Objective     Alert and active in  no acute distress  HEENT TMs clear oropharynx negative nares clear no drainage noted neck supple  With no adenopathy   Heart regular rate and rhythm without murmur and no carotid bruits  Lungs- clear to auscultation bilaterally, no wheeze or rhonchi noted  Thyroid -negative masses or nodularity  Abdomen- soft times four quadrants , bowel sounds positive no masses or organomegaly, negative tenderness guarding or rebound  Neurological exam unremarkable- DTRs in upper and lower extremities within normal limits.   skin -no lesions noted      /80   Pulse 80   Resp 18   Ht 1.727 m (5' 8\")   Wt 119 kg (263 lb)   SpO2 95%   BMI 39.99 kg/m²     Allergies   Allergen Reactions    Ciprofloxacin Itching "       Assessment/Plan   Problem List Items Addressed This Visit       Hyperlipidemia    Marginal zone lymphoma (Multi)    Immunosuppressed status (Multi)    Malignant neoplasm of spinal cord (Multi)     Other Visit Diagnoses       Annual physical exam    -  Primary    Other insomnia        Relevant Medications    zolpidem (Ambien) 10 mg tablet    Mild intermittent asthma without complication ([facility])        BMI 39.0-39.9,adult        Class 2 severe obesity due to excess calories with serious comorbidity and body mass index (BMI) of 39.0 to 39.9 in adult (Multi)        Screen for colon cancer        Relevant Orders    Cologuard® colon cancer screening          Discussed patient's BMI and to institute calorie reduction and increase exercise to decrease risk of diabetes and heart disease in the future.    I personally reviewed the OARRS report for this patient. I have considered the risks of abuse, dependence, addiction, and diversion.  CSA 8/14/24    Refill Ambien.   Recommend taking half tablet.     Reviewed records.     Recommend protein with every meal.   Recommend less pop, less salt.     Move feet up and down to prevent blood clots.     Cologuard ordered.    If anything worsens or changes please call us at once, follow up in the office as planned.    Scribe Attestation  By signing my name below, I, Maude Mehta MA, Scribe   attest that this documentation has been prepared under the direction and in the presence of Ventura Gonzalez DO.

## 2024-08-19 DIAGNOSIS — C85.80 MARGINAL ZONE LYMPHOMA (MULTI): ICD-10-CM

## 2024-08-19 NOTE — PROGRESS NOTES
Radiation Oncology Follow-Up    Patient Name:  Dann Ni  MRN:  [id number]  :  1975    Referring Provider: Veronica Trevizo MD  Primary Care Provider: Ventura Gonzalez DO  Care Team: Patient Care Team:  Ventura Gonzalez DO as PCP - General  Ventura Gonzalez DO as PCP - Mariposa HIDALGO PCP  Yesenia Marin MD as Surgeon (Neurosurgery)  Dann Avila PA-C as Physician Assistant (Neurosurgery)  Jeff Peters MD PhD as Consulting Physician (Hematology and Oncology)  DEBI Rodriguez as  (Hematology and Oncology)  Veronica Trevizo MD as Consulting Physician (Hematology and Oncology)  Maren Queen MD as Radiation Oncologist (Radiation Oncology)    Date of Service: 2024    SUBJECTIVE  History of Present Illness:  Dann Ni is a 48 y.o. 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 He was seen for initial consultation in 2024. At that point, it was decided to complete post-treatment imaging and lab surveillance to be followed by reassessment.     Treatment Rendered:   His oncological work up and treatment history is as below:  - The patient was in good health until 2023. He developed back pain which did not respond to NSAID or steroid. By late 2023 he lost sensation below the diaphragm and were unable to walk.   WorkUp:  - On  he had CT scan showing a 4.1x1.8x1cm paraspinal mass at the T7 level.   - MRI 10/01/2023: Abnormal signal within the C5 vertebral body which is highly suspicious for neoplastic infiltration. No extension of tumor into  the spinal canal. Abnormal signal within the T2-T4 vertebral bodies and posterior elements consistent with neoplastic infiltration. There is tumoral extension into the spinal canal at the level of  T1-T2 through T3 causing marked mass effect on the thoracic spinal cord with anterior and right lateral deviation of the cord. There is signal abnormality within the spinal cord which is most consistent with edema. There is extension of tumor into the left T2-T3 and T3-T4 neural foramina. There is soft tissue located within the right T7-T8 prevertebral soft tissues and within the right T7-T8 neural foramen which is most consistent tumor. Nonspecific mild signal abnormality located within the posterior  S1 vertebral body which reflect neoplastic infiltration. No abnormal mass or enhancement is seen within the lumbar spinal canal and there is no striking signal abnormality located within the lumbar osseous structures.      - On 10/2/2023 he had a debulking surgery which later showed evidence of lymphoma. In the meantime he has improved sensation and is able to walk with a cane.   Pathology: -- LOW GRADE B CELL LYMPHOMA WITH PLASMACYTIC DIFFERENTIATION MOST CONSISTENT WITH EXTRANODAL MARGINAL ZONE LYMPHOMA. No clonal B cell or T cell population identified.   NGS is positive for a clonal BCR gene rearrangement, supporting a clonal B cell proliferation. MYD88 L265P allele specific PCR is negative.   FISH for BCL2 and BCL6 rearrangements (performed at [facility]) and FISH for 1p36 abnormalities (send out test performed at [facility]) are all negative.   - PET/CT 11/13/2023: Multiple FDG avid subcentimeter pulmonary nodule in the left lower lobe (max SUV 3.4) and a FDG avid pulmonary nodule in the left upper lobe with (max SUV 4.4). Right lung is unremarkable *Multiple FDG avid bilateral axillary, mediastinal and bilateral hilar and infrahilar nodes (max SUV 14.9). *Postsurgical changes from T1-4 laminectomies and paraspinal mass excision with interval development  of a postoperative seroma and surrounding FDG avidity with (max SUV 5.4). *FDG avid portacaval lymph node with (max SUV 6.4). *Multiple FDG avid bone lesions  throughout the axial and appendicular skeleton including bilateral scapulas, right clavicular head, T5 vertebral body, bilateral iliac bones, left ischium, and left femur without definite anatomic correlate (max SUV 7.6).  - MRI Spine 11/16/2023: There are postoperative changes compatible with interval posterior laminectomies extending from the T1 through T4 levels. The previously noted abnormal enhancing epidural soft tissue mass concerning for epidural neoplasm through this region is not well-defined on the current study raising the possibility of interval surgical resection and/or sequelae of interval post therapy. There is nonspecific smooth abnormal epidural thickening and enhancement within the spinal canal extending from the C7/T1 level caudally to the T5 level with residual nonspecific abnormal enhancing soft tissue extending through the neural foramen at the T1/2, T2/3, T3/4, and T4/5 levels left greater than right which may be at least in part postsurgical in etiology with the possibility of a component of underlying residual neoplasm or a superimposed infectious/inflammatory process not entirely excluded. The nonspecific smooth epidural thickening through this region contributes to mild encroachment upon the thecal sac without evidence of spinal cord compression through this region on the current study. The previously noted infiltrative abnormal increased STIR bone marrow signal within the upper thoracic vertebrae including the T2, T3, and T4 vertebrae as well as the C5 vertebral body on the prior MRI dated 10/01/2023 most concerning for underlying infiltrative osseous neoplasm/lymphoma is less conspicuous on the current study.      Treatment:  - Initiated on rituximab bendamustine and started C1 on 11/30/2023. Tolerated relatively well, but had nausea and flu like symptoms for a few days after chemo. Since then he also completed C2 R stacey on 12/28, C3 on 01/25, C4 on 02/22/2024. C6 on 04/18/2024.  -Due  to the location of the disease and bone marrow uptake, he may be at high risk of CNS involvement / relapse. Initiated on IT Methotrexate and cytarabine.   - LP with IT Methotrexate (2/19); CSF flow cytometry negative for lymphoma  - 2nd LP planned for 3/18      Interval History:  MR spine 4/30/2024 revealed evolving post-surgical changes in thoracic spine with no new or enlarging enhancing components. There were mixed changes in L spine that raised suspicion for posttreatment change or arachnoiditis although an element of neoplastic involvement is not completely excluded.  CSF cytology 3/18 and BM Bx 5/23/2024 were negative for lymphomatous involvement.  PET-CT 5/9/2024 showed osseous lesions and an enlarging portacaval lymph node concerning for disease progression, with avidity in mediastinal nodes and pulmonary nodules also raising suspicion for residual disease.  Bronch/EBUS 5/31/2024 was negative for disease.  Evaluated by Dr. Peters. It was felt that given negative CSF and BM BX, the lung nodules and mediastinal nodes are reactive. Hence, he has bene off systemic therapy and presents back to discuss role of radiation to T spine.    Symptomatically he is doing very well and gradually regaining sensory dysfunction below xiphisternum including lower extremities, which has helped with his ambulation. He now walks without support but has to be cautious. Will get occasional low back muscle spasms.    Denies other systemic symptoms.      Imaging:  MR CERVICAL SPINE WO IV CONTRAST; 4/30/2024   Stable MR appearance of the cervical spine. As on prior examination there is nonspecific, relatively diffuse ill-defined fatty marrow replacement throughout the cervical region without focal STIR hyperintensity/marrow edema signal. Remainder of the cervical spine is normal in MR appearance with trace degenerative changes.     MR THORACIC SPINE W AND WO IV CONTRAST; 4/30/2024   Evolving postsurgical/posttreatment change suggested at  the T1-T4 level status post laminectomy and enhancing epidural mass debulking/resection. There is residual nonspecific mild epidural enhancement within the operative regions as well as asymmetric involvement of the left left-greater-than-right T2 neural foraminal region that could reflect residual neoplasm or evolving posttreatment change. No new or enlarging enhancing components. No significant compressive affect on the thecal sac.     Residual marrow heterogeneity within the T6 vertebral body as well as mild diffusely decreased T1 fatty marrow signal throughout the thoracic spine, nonspecific. There is no focal abnormal enhancement or edema within the thoracic vertebra.    MR LUMBAR SPINE W AND WO IV CONTRAST; 4/30/2024   In comparison with prior lumbar spine MRI examination the cauda equina appear diffusely, mildly enlarged in the interim with probable subtle enhancement on fat-suppressed postcontrast axial imaging. There is also thickening and enhancement of the epidural venous plexus that has developed in the interim. Findings are nonspecific and could reflect an element of posttreatment change or arachnoiditis although an element of neoplastic involvement is not completely excluded. This could be further assessed with lumbar puncture as clinically warranted. No focal abnormal osseous enhancement.    PET CT LYMPHOMA STAGING; 5/9/2024   1. Interval increased metabolic activity within multiple axial and appendicular osseous lesions with increased metabolic activity within an enlarging portacaval lymph node compatible with disease progression.  2. Interval decreased hypermetabolic activity within multiple mediastinal lymph nodes and decreased metabolic activity within multiple left lower lobe lung nodules, again favored to represent mediastinal soraya disease with lung metastasis.  3. Postsurgical changes from prior T1 through T4 laminectomy without evidence of local residual or recurrent disease.    CT CHEST W IV  CONTRAST; 7/8/2024   1.  Numerous lung nodules bilaterally measuring up to 5 mm in size, similar to the prior in size and number  2. Redemonstration of multiple enlarged mediastinal lymph nodes, also unchanged from the prior.      Pathology/Cytology:    CEREBROSPINAL FLUID, CYTOLOGY 3/18/2024:   --  No malignant cells identified.   --  Few lymphocytes and a rare monocyte present.     FINAL DIAGNOSIS 5/23/2024   BONE MARROW CLOT WITH ASPIRATE AND CORE WITH TOUCH PREP, LEFT ILIAC CREST:   -- NORMOCELLULAR BONE MARROW (40%) WITH MATURING TRILINEAGE HEMATOPOESIS AND NO MORPHOLOGIC EVIDENCE OF LYMPHOMA.     Bronch/EBUS 5/31/2024 was negative for disease.  RAPID ONSITE EVALUATION (R.O.S.E.):  - Station 7 TBNA : non-diagnostic  - Station 4R TBNA : scant lymphoid  FINAL DIAGNOSIS   A. LYMPH NODE,  LEVEL 7; BIOPSY:      -- Non-diagnostic rare bronchial epithelium and blood.       Review of Systems:     Pls review note entered by RN.    Performance Status:   The Karnofsky performance scale today is 80, Normal activity with effort; some signs or symptoms of disease (ECOG equivalent 1).       OBJECTIVE  Vital Signs:  BP (!) 136/92   Pulse 94   Temp 36.5 °C (97.7 °F) (Skin)   Resp 18   Wt 119 kg (262 lb 5.6 oz)   SpO2 96%   BMI 39.89 kg/m²   Physical Exam  Constitutional:       General: He is not in acute distress.     Appearance: He is not ill-appearing.   HENT:      Head: Atraumatic.   Eyes:      General: No scleral icterus.  Pulmonary:      Effort: Pulmonary effort is normal. No respiratory distress.      Breath sounds: No wheezing.   Musculoskeletal:         General: No tenderness.      Right lower leg: No edema.      Left lower leg: No edema.      Comments: Well healed thoracic spinal surgery scar. No tenderness along entire spine.   Neurological:      General: No focal deficit present.      Mental Status: He is alert and oriented to person, place, and time.      Cranial Nerves: No cranial nerve deficit.      Sensory:  Sensory deficit (Reduced sensation below Xiphoid) present.      Motor: No weakness.      Gait: Gait abnormal (Broad based gait as caution. Otherwise walking well.).   Psychiatric:         Mood and Affect: Mood normal.          ASSESSMENT:   Dann Ni is a 48 y.o. 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 Imaging findings were concerning for extensive spinal/epidural/neural foramina disease especially from T1-T8 levels. Additional foci of spinal involvement were seen at C5, L and S1 levels. PET-CT identified multiple FDG avid bilateral axillary, mediastinal and bilateral hilar, infrahilar nodes  and abdominal lymph nodes, and multiple FDG avid bone lesions throughout the axial and appendicular skeleton including bilateral scapulas, right clavicular head, T5 vertebral body, bilateral iliac bones, left ischium, and left femur. He  is s/p debulking spinal surgery and was started on R-Teresa 11/2023 with C6 completed 04/18/2024. He has also received IT Methotrexate and cytarabine, LD 3/18/2024. Post-treatment CSF 03/2024 and BM Bx 05/2024 are negative with PET-CT 05/2024 showing concerns for activity in bones and enlarging portacaval lymph node concerning for disease progression, with avidity in mediastinal nodes and pulmonary nodules also raising suspicion for residual disease. Bronch/EBUS 5/31/2024 was negative for disease.  He is otherwise regaining neurological functions with significantly improved gait.    PLAN:    Mr. Ni's pertinent history, exam, imaging and pathology details were reviewed.   Accompanied by his wife.    Given improving neurological symptoms, negative CSF, Marrow and Imaging findings in spine, we discussed options of continued surveillance and deferring XRT to the T spine versus option of proceeding with consolidation XRT, likely to a  dose of 24-30 Gy in 12-15 fractions. Pros and cons of both approaches reviewed. Side effects from radiation and treatment logistics reviewed. I again clarified that overall survival will be dictated by the systemic disease burden, with goal of XRT only to provide local control in the T spine.    At this point, he is nearly 4 months since last systemic therapy. PET-CT is raising concern for active disease in osseous lesions and a portacaval lymph node but thoracic spine is relatively clear. As such I have suggested deferring XRT at this time. Should there be a progression in spine, we could revisit the discussion in future.    Will plan close surveillance and will add MR Thoracic and Lumbar spine for October (6 months from last MRI). Dr. Peters will likely plan PET-CT in November.  Schedule follow up at that time.    Mr. Ni and his wife are agreeable and prefer this route of surveillance vs active radiation.    NCCN Guidelines were applicable to guide this patients treatment plan.      Maren uQeen MD, MMM  Senior Attending Physician, [facility]  Professor, [employer]   Our [place]: “To Heal, To Teach, To Discover.”  RN partner: [phone]/ [email or web address]    Phone (scheduling): [phone]/ [email or web address]  Proton Therapy (scheduling): [phone]/ [email or web address]  Phone (after hours): [phone]

## 2024-08-30 ENCOUNTER — TELEPHONE (OUTPATIENT)
Dept: HEMATOLOGY/ONCOLOGY | Facility: HOSPITAL | Age: 49
End: 2024-08-30
Payer: COMMERCIAL

## 2024-08-30 LAB — NONINV COLON CA DNA+OCC BLD SCRN STL QL: NEGATIVE

## 2024-08-30 NOTE — TELEPHONE ENCOUNTER
Pt was just notified that his 9/13 appt is being rescheduled to 10/10/24.  He will need to have paperwork for his job updated prior to the 10/10/24 appt to continue his leave.  He was planning to discuss return to work vs continue his leave at the next visit.

## 2024-09-03 DIAGNOSIS — C85.80 MARGINAL ZONE LYMPHOMA (MULTI): ICD-10-CM

## 2024-09-03 NOTE — TELEPHONE ENCOUNTER
I called Dann back and let him know he now has an appointment in the [facility] on 9/19/24 @3:00. He confirmed he will be there. No further questions from Dann at this time.

## 2024-09-03 NOTE — TELEPHONE ENCOUNTER
Dann called nurse line and said he was supposed to get 9/13 appt. Rescheduled to 10/10 but didn't see that changed in my chart. He said he has paperwork until 9/30/24 to be out of work but wants to go back to work starting 9/30 with no restrictions. Is he able to do a virtual visit or see an NP as Deysi asked to be able to be seen and get the paperwork needed to go back to work? He said regardless he wants to see Dr. Peters on 10/10 also even if gets an earlier appt. He is emailing form needed to be filled out to Deysi now. Please advise? Messaged team, NOE.

## 2024-09-04 ENCOUNTER — TELEPHONE (OUTPATIENT)
Dept: PRIMARY CARE | Facility: CLINIC | Age: 49
End: 2024-09-04
Payer: COMMERCIAL

## 2024-09-04 NOTE — TELEPHONE ENCOUNTER
----- Message from Ventura Gonzalez sent at 9/4/2024 10:21 AM EDT -----  Cologuard stool test is negative, repeat in 3 years

## 2024-09-13 ENCOUNTER — APPOINTMENT (OUTPATIENT)
Dept: HEMATOLOGY/ONCOLOGY | Facility: HOSPITAL | Age: 49
End: 2024-09-13
Payer: COMMERCIAL

## 2024-09-19 ENCOUNTER — OFFICE VISIT (OUTPATIENT)
Dept: HEMATOLOGY/ONCOLOGY | Facility: HOSPITAL | Age: 49
End: 2024-09-19
Payer: COMMERCIAL

## 2024-09-19 ENCOUNTER — LAB (OUTPATIENT)
Dept: HEMATOLOGY/ONCOLOGY | Facility: HOSPITAL | Age: 49
End: 2024-09-19
Payer: COMMERCIAL

## 2024-09-19 VITALS
RESPIRATION RATE: 18 BRPM | HEART RATE: 101 BPM | TEMPERATURE: 98.8 F | HEIGHT: 67 IN | OXYGEN SATURATION: 97 % | SYSTOLIC BLOOD PRESSURE: 133 MMHG | DIASTOLIC BLOOD PRESSURE: 80 MMHG | WEIGHT: 262.13 LBS | BODY MASS INDEX: 41.14 KG/M2

## 2024-09-19 DIAGNOSIS — C85.80 MARGINAL ZONE LYMPHOMA (MULTI): ICD-10-CM

## 2024-09-19 DIAGNOSIS — C85.80 MARGINAL ZONE LYMPHOMA (MULTI): Primary | ICD-10-CM

## 2024-09-19 LAB
ALBUMIN SERPL BCP-MCNC: 4.5 G/DL (ref 3.4–5)
ALP SERPL-CCNC: 75 U/L (ref 33–120)
ALT SERPL W P-5'-P-CCNC: 24 U/L (ref 10–52)
ANION GAP SERPL CALC-SCNC: 16 MMOL/L (ref 10–20)
AST SERPL W P-5'-P-CCNC: 28 U/L (ref 9–39)
BASOPHILS # BLD AUTO: 0.05 X10*3/UL (ref 0–0.1)
BASOPHILS NFR BLD AUTO: 0.8 %
BILIRUB SERPL-MCNC: 0.6 MG/DL (ref 0–1.2)
BUN SERPL-MCNC: 11 MG/DL (ref 6–23)
CALCIUM SERPL-MCNC: 9.6 MG/DL (ref 8.6–10.3)
CHLORIDE SERPL-SCNC: 104 MMOL/L (ref 98–107)
CO2 SERPL-SCNC: 22 MMOL/L (ref 21–32)
CREAT SERPL-MCNC: 1.01 MG/DL (ref 0.5–1.3)
EGFRCR SERPLBLD CKD-EPI 2021: >90 ML/MIN/1.73M*2
EOSINOPHIL # BLD AUTO: 0.18 X10*3/UL (ref 0–0.7)
EOSINOPHIL NFR BLD AUTO: 2.8 %
ERYTHROCYTE [DISTWIDTH] IN BLOOD BY AUTOMATED COUNT: 13.4 % (ref 11.5–14.5)
GLUCOSE SERPL-MCNC: 112 MG/DL (ref 74–99)
HCT VFR BLD AUTO: 40.8 % (ref 41–52)
HGB BLD-MCNC: 14 G/DL (ref 13.5–17.5)
IMM GRANULOCYTES # BLD AUTO: 0.03 X10*3/UL (ref 0–0.7)
IMM GRANULOCYTES NFR BLD AUTO: 0.5 % (ref 0–0.9)
LDH SERPL L TO P-CCNC: 176 U/L (ref 84–246)
LYMPHOCYTES # BLD AUTO: 0.31 X10*3/UL (ref 1.2–4.8)
LYMPHOCYTES NFR BLD AUTO: 4.8 %
MCH RBC QN AUTO: 30 PG (ref 26–34)
MCHC RBC AUTO-ENTMCNC: 34.3 G/DL (ref 32–36)
MCV RBC AUTO: 87 FL (ref 80–100)
MONOCYTES # BLD AUTO: 0.94 X10*3/UL (ref 0.1–1)
MONOCYTES NFR BLD AUTO: 14.5 %
NEUTROPHILS # BLD AUTO: 4.96 X10*3/UL (ref 1.2–7.7)
NEUTROPHILS NFR BLD AUTO: 76.6 %
NRBC BLD-RTO: 0 /100 WBCS (ref 0–0)
PLATELET # BLD AUTO: 244 X10*3/UL (ref 150–450)
POTASSIUM SERPL-SCNC: 3.6 MMOL/L (ref 3.5–5.3)
PROT SERPL-MCNC: 7 G/DL (ref 6.4–8.2)
RBC # BLD AUTO: 4.67 X10*6/UL (ref 4.5–5.9)
SODIUM SERPL-SCNC: 138 MMOL/L (ref 136–145)
WBC # BLD AUTO: 6.5 X10*3/UL (ref 4.4–11.3)

## 2024-09-19 PROCEDURE — 83615 LACTATE (LD) (LDH) ENZYME: CPT

## 2024-09-19 PROCEDURE — 99214 OFFICE O/P EST MOD 30 MIN: CPT | Performed by: PHYSICIAN ASSISTANT

## 2024-09-19 PROCEDURE — 36415 COLL VENOUS BLD VENIPUNCTURE: CPT

## 2024-09-19 PROCEDURE — 85025 COMPLETE CBC W/AUTO DIFF WBC: CPT

## 2024-09-19 PROCEDURE — 3008F BODY MASS INDEX DOCD: CPT | Performed by: PHYSICIAN ASSISTANT

## 2024-09-19 PROCEDURE — 80053 COMPREHEN METABOLIC PANEL: CPT

## 2024-09-19 ASSESSMENT — ENCOUNTER SYMPTOMS
ABDOMINAL PAIN: 0
COUGH: 0
SHORTNESS OF BREATH: 0
EYE PROBLEMS: 1
CONSTIPATION: 0
CHILLS: 0
FREQUENCY: 0
DIAPHORESIS: 0
APPETITE CHANGE: 0
NAUSEA: 0
FEVER: 0
ARTHRALGIAS: 0
CHEST TIGHTNESS: 0
DIARRHEA: 0
ADENOPATHY: 0
HEMATURIA: 0
VOMITING: 0
DIZZINESS: 0
NUMBNESS: 1
LIGHT-HEADEDNESS: 0
DYSURIA: 0
FATIGUE: 0
DIFFICULTY URINATING: 0
HEADACHES: 0
PALPITATIONS: 0

## 2024-09-19 NOTE — PROGRESS NOTES
Patient ID: Dann Ni is a 48 y.o. male.    Diagnosis: Marginal zone lymphoma (Multi), Clinical: Stage IV (Marginal zone lymphoma),    Treatment:   Oncology History   Marginal zone lymphoma (Multi)   10/2/2023 Cancer Staged    Staging form: Hodgkin and Non-Hodgkin Lymphoma, AJCC 8th Edition, Clinical stage from 10/2/2023: Stage IV (Marginal zone lymphoma) - Signed by Jeff Peters MD PhD on 2023 Initial Diagnosis    Presentation: (2023): Back pain, loss of sensation below the diaphragm, gait disturbance    Imaging:  - CT (23): 4.1x1.8x1cm paraspinal mass at the T7 level  - MRI: showed abnormal enhancing signals at the C5, T2-8 levels.     Surgery:  - 10/2/2023 he had a debulking surgery which later showed evidence of lymphoma. In the meantime he has improved sensation and is able to walk with a cane.     - Work up including PET/CT results consistent with stage IV MZL.      2023 -  Chemotherapy    Tolerated relatively well, but had nausea and flu like symptoms for a few days after chemo. Since then he also completed C2 R stacey on , followed by C3 on , C4 on , C5D1 on 3/21, and c6d1 on .     Intrathecal treatment #1 on , then on 3/18/2024.   Bendamustine + RiTUXimab, 28 Day Cycles     2024 Imaging    PET/CT 2024 showing persistent FDG avid lesions.      2024 Biopsy    Bone marrow bx 2024 -- no evidence of lymphoma     2024 Biopsy    EBUS biopsy of a subcarinal mass; There was no evidence of lymphoma      2024 Imaging    CT Chest w contrast (24): 2024: reviewed CT of , showin.  Numerous lung nodules bilaterally measuring up to 5 mm in size, similar to the prior in size and number. 2. Redemonstration of multiple enlarged mediastinal lymph nodes, also unchanged from the prior.   -It is likely, but not proven, that the lung nodules and small mediastinal lymph nodes are reactive to inflammation. However, the  exact source of inflammation is not identifiable.         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    Loss of smell    Cough variant asthma ([facility]) 04/02/2019    Cough variant asthma    Diverticulitis 09/25/2023    Foot pain 09/25/2023    Other conditions influencing health status     No significant past surgical history    Other conditions influencing health status     No significant past medical history    Personal history of other diseases of the respiratory system 04/14/2019    History of bronchitis    Personal history of other drug therapy 10/23/2019    History of influenza vaccination    Personal history of other specified conditions 03/30/2019    History of persistent cough    Recurrent pneumonia 09/25/2023    Tendonitis 09/25/2023       Surgical History:     Past Surgical History:   Procedure Laterality Date    OTHER SURGICAL HISTORY  04/12/2019    No history of surgery        Family History:     Family History   Problem Relation Name Age of Onset    Hip dysplasia Mother [place]     Arthritis Mother [place]     Other (htn) Father      Prostatitis Father      Lung cancer Maternal Grandmother      Kidney cancer Maternal Grandmother      Lung cancer Maternal Grandfather      Kidney cancer Maternal Grandfather      Dementia Other      Cancer Other      Dementia Other         Social History:     Social History     Tobacco Use    Smoking status: Never    Smokeless tobacco: Never   Vaping Use    Vaping status: Never Used   Substance Use Topics    Alcohol use: Not Currently     Comment: rarely    Drug use: Never      -------------------------------------------------------------------------------------------------------  Subjective       Dann Raine is a 48 y.o patient with PMH of asthma, hyperlipidemia, and marginal zone lymphoma s/p 2 cycles of R-ANUM, IT methotrexate, and cytarabine presents for follow-up and evaluation.      Patient reports doing well. He reports  "seeing an increase in energy and overall health. Reports eating well and staying hydrated. Endorses increasing exercise; currently going on longer walks. No longer uses can for mobility. States he is able to walk around better over the past few months. Has been working with activity restrictions for the past few weeks. Feels as though he is ready to go back to work full time. Reports recently getting over a sinus infection/URI. Denies sick contacts and recent travel. He denies B symptoms, CP, palpitations, SOB, cough. Reports no new concerns for today's visit.      Review of Systems   Constitutional:  Negative for appetite change, chills, diaphoresis, fatigue and fever.   HENT:   Negative for hearing loss, mouth sores and nosebleeds.    Eyes:  Positive for eye problems (woresned by chemo; currently stable).   Respiratory:  Negative for chest tightness, cough and shortness of breath.    Cardiovascular:  Negative for chest pain and palpitations.   Gastrointestinal:  Negative for abdominal pain, constipation, diarrhea, nausea and vomiting.   Genitourinary:  Negative for difficulty urinating, dysuria, frequency and hematuria.    Musculoskeletal:  Negative for arthralgias.   Skin:  Negative for itching and rash.   Neurological:  Positive for numbness (stable; being followed by neuro.). Negative for dizziness, headaches and light-headedness.   Hematological:  Negative for adenopathy.   -------------------------------------------------------------------------------------------------------  Objective   BSA: 2.38 meters squared  /80 (BP Location: Right arm, Patient Position: Sitting, BP Cuff Size: Large adult)   Pulse 101   Temp 37.1 °C (98.8 °F) (Temporal)   Resp 18   Ht 1.709 m (5' 7.28\")   Wt 119 kg (262 lb 2 oz)   SpO2 97%   BMI 40.71 kg/m²     Physical Exam  Constitutional:       General: He is not in acute distress.     Appearance: Normal appearance. He is not ill-appearing, toxic-appearing or diaphoretic. "   HENT:      Head: Normocephalic and atraumatic.      Right Ear: External ear normal.      Left Ear: External ear normal.      Nose: Nose normal.      Mouth/Throat:      Mouth: Mucous membranes are moist.   Eyes:      General: No scleral icterus.     Pupils: Pupils are equal, round, and reactive to light.   Cardiovascular:      Rate and Rhythm: Normal rate and regular rhythm.      Heart sounds: Normal heart sounds. No murmur heard.     No friction rub. No gallop.   Pulmonary:      Effort: Pulmonary effort is normal.      Breath sounds: Normal breath sounds. No stridor. No wheezing, rhonchi or rales.   Abdominal:      General: Bowel sounds are normal.      Tenderness: There is no abdominal tenderness. There is no guarding.   Musculoskeletal:         General: No swelling.      Cervical back: No tenderness.      Right lower leg: No edema.      Left lower leg: No edema.   Lymphadenopathy:      Cervical: No cervical adenopathy.   Skin:     Coloration: Skin is not pale.      Findings: No bruising, erythema or lesion.   Neurological:      General: No focal deficit present.      Mental Status: He is alert and oriented to person, place, and time.      Sensory: No sensory deficit.      Motor: No weakness.   Psychiatric:         Mood and Affect: Mood normal.         Behavior: Behavior normal.        Performance Status:  Symptomatic; fully ambulatory  -------------------------------------------------------------------------------------------------------  Assessment/Plan    Assessment & Plan  Marginal zone lymphoma (Multi)  - currently stable, no symptoms/concerns; continue close monitoring for disease progression  - seen by [facility] on 8/12; recs = deferring XRT however, should there be progression in the spine they will revisit the idea of therapy. Plan for close surveillance w/ MRI and PET/CT.   - We cleared to return to work on 9/19 based on evaluation by Neurosurgery (7/18/24)    *Discussed that this may change depending on  additional treatments that may be needed in the future.   - MRI scheduled 10/21/24 (requested by Rad/Onc)  - Appt. With Dr. Peters moved to 10/24/24 (after MRI)  - PET/CT will be scheduled by primary onc. Team for Nov.   - Appt. With [facility] scheduled in Dec.     RTC:  - MRI 10/21/24  - appt. Dr. Peters 10/24/24  - PET/CT to be scheduled in Nov.   - appt. w/ [facility] in Dec.      -------------------------------------------------------------------------------------------------------  Kathy BARRIOS  [facility]      I was present with the physician assistant student who participated in the documentation of this note. I have personally seen and examined the patient and performed the medical decision-making components. I have reviewed the medical student documentation and verified the findings in the note as written with additions or exceptions as stated in the body of the note, made by me personally.     Joel Diaz PA-C

## 2024-09-19 NOTE — ASSESSMENT & PLAN NOTE
- currently stable, no symptoms/concerns; continue close monitoring for disease progression  - seen by [facility] on 8/12; recs = deferring XRT however, should there be progression in the spine they will revisit the idea of therapy. Plan for close surveillance w/ MRI and PET/CT.   - We cleared to return to work on 9/19 based on evaluation by Neurosurgery (7/18/24)    *Discussed that this may change depending on additional treatments that may be needed in the future.   - MRI scheduled 10/21/24 (requested by Rad/Onc)  - Appt. With Dr. Peters moved to 10/24/24 (after MRI)  - PET/CT will be scheduled by primary onc. Team for Nov.   - Appt. With [facility] scheduled in Dec.

## 2024-09-19 NOTE — PROGRESS NOTES
Dann Grahamer presents to [facility] for count check and mal-heme appointment.  He required no infusions.  He was discharged home in stable condition accompanied by his wife.

## 2024-09-19 NOTE — PROGRESS NOTES
Patient ID: Dann Ni is a 48 y.o. male.    Diagnosis: Marginal zone lymphoma (Multi), Clinical: Stage IV (Marginal zone lymphoma),    Treatment:   Oncology History   Marginal zone lymphoma (Multi)   10/2/2023 Cancer Staged    Staging form: Hodgkin and Non-Hodgkin Lymphoma, AJCC 8th Edition, Clinical stage from 10/2/2023: Stage IV (Marginal zone lymphoma) - Signed by Jeff Peters MD PhD on 2023 Initial Diagnosis    Presentation: (2023): Back pain, loss of sensation below the diaphragm, gait disturbance    Imaging:  - CT (23): 4.1x1.8x1cm paraspinal mass at the T7 level  - MRI: showed abnormal enhancing signals at the C5, T2-8 levels.     Surgery:  - 10/2/2023 he had a debulking surgery which later showed evidence of lymphoma. In the meantime he has improved sensation and is able to walk with a cane.     - Work up including PET/CT results consistent with stage IV MZL.      2023 -  Chemotherapy    Tolerated relatively well, but had nausea and flu like symptoms for a few days after chemo. Since then he also completed C2 R stacey on , followed by C3 on , C4 on , C5D1 on 3/21, and c6d1 on .     Intrathecal treatment #1 on , then on 3/18/2024.   Bendamustine + RiTUXimab, 28 Day Cycles     2024 Imaging    PET/CT 2024 showing persistent FDG avid lesions.      2024 Biopsy    Bone marrow bx 2024 -- no evidence of lymphoma     2024 Biopsy    EBUS biopsy of a subcarinal mass; There was no evidence of lymphoma      2024 Imaging    CT Chest w contrast (24): 2024: reviewed CT of , showin.  Numerous lung nodules bilaterally measuring up to 5 mm in size, similar to the prior in size and number. 2. Redemonstration of multiple enlarged mediastinal lymph nodes, also unchanged from the prior.   -It is likely, but not proven, that the lung nodules and small mediastinal lymph nodes are reactive to inflammation. However, the  exact source of inflammation is not identifiable.         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    Loss of smell    Cough variant asthma ([facility]) 04/02/2019    Cough variant asthma    Diverticulitis 09/25/2023    Foot pain 09/25/2023    Other conditions influencing health status     No significant past surgical history    Other conditions influencing health status     No significant past medical history    Personal history of other diseases of the respiratory system 04/14/2019    History of bronchitis    Personal history of other drug therapy 10/23/2019    History of influenza vaccination    Personal history of other specified conditions 03/30/2019    History of persistent cough    Recurrent pneumonia 09/25/2023    Tendonitis 09/25/2023       Surgical History:     Past Surgical History:   Procedure Laterality Date    OTHER SURGICAL HISTORY  04/12/2019    No history of surgery        Family History:     Family History   Problem Relation Name Age of Onset    Hip dysplasia Mother [place]     Arthritis Mother [place]     Other (htn) Father      Prostatitis Father      Lung cancer Maternal Grandmother      Kidney cancer Maternal Grandmother      Lung cancer Maternal Grandfather      Kidney cancer Maternal Grandfather      Dementia Other      Cancer Other      Dementia Other         Social History:     Social History     Tobacco Use    Smoking status: Never    Smokeless tobacco: Never   Vaping Use    Vaping status: Never Used   Substance Use Topics    Alcohol use: Not Currently     Comment: rarely    Drug use: Never      -------------------------------------------------------------------------------------------------------  Subjective     Dann Raine is a 48 y.o patient with PMH of asthma, hyperlipidemia, and marginal zone lymphoma s/p 2 cycles of R-ANUM, IT methotrexate, and cytarabine presents for follow-up and evaluation.     Patient reports doing well. He reports seeing  "an increase in energy and overall health. Reports eating well and staying hydrated. Endorses increasing exercise; currently going on longer walks. No longer uses can for mobility. States he is able to walk around better over the past few months. Has been working with activity restrictions for the past few weeks. Feels as though he is ready to go back to work full time. Reports recently getting over a sinus infection/URI. Denies sick contacts and recent travel. He denies B symptoms, CP, palpitations, SOB, cough. Reports no new concerns for today's visit.     Review of Systems   Constitutional:  Negative for appetite change, chills, diaphoresis, fatigue and fever.   HENT:   Negative for hearing loss, mouth sores and nosebleeds.    Eyes:  Positive for eye problems (woresned by chemo; currently stable).   Respiratory:  Negative for chest tightness, cough and shortness of breath.    Cardiovascular:  Negative for chest pain and palpitations.   Gastrointestinal:  Negative for abdominal pain, constipation, diarrhea, nausea and vomiting.   Genitourinary:  Negative for difficulty urinating, dysuria, frequency and hematuria.    Musculoskeletal:  Negative for arthralgias.   Skin:  Negative for itching and rash.   Neurological:  Positive for numbness (stable; being followed by neuro.). Negative for dizziness, headaches and light-headedness.   Hematological:  Negative for adenopathy.      -------------------------------------------------------------------------------------------------------  Objective   BSA: 2.38 meters squared  /80 (BP Location: Right arm, Patient Position: Sitting, BP Cuff Size: Large adult)   Pulse 101   Temp 37.1 °C (98.8 °F) (Temporal)   Resp 18   Ht 1.709 m (5' 7.28\")   Wt 119 kg (262 lb 2 oz)   SpO2 97%   BMI 40.71 kg/m²     Physical Exam  Constitutional:       General: He is not in acute distress.     Appearance: Normal appearance. He is not ill-appearing, toxic-appearing or diaphoretic. "   HENT:      Head: Normocephalic and atraumatic.      Right Ear: External ear normal.      Left Ear: External ear normal.      Nose: Nose normal.      Mouth/Throat:      Mouth: Mucous membranes are moist.   Eyes:      General: No scleral icterus.     Pupils: Pupils are equal, round, and reactive to light.   Cardiovascular:      Rate and Rhythm: Normal rate and regular rhythm.      Heart sounds: Normal heart sounds. No murmur heard.     No friction rub. No gallop.   Pulmonary:      Effort: Pulmonary effort is normal.      Breath sounds: Normal breath sounds. No stridor. No wheezing, rhonchi or rales.   Abdominal:      General: Bowel sounds are normal.      Tenderness: There is no abdominal tenderness. There is no guarding.   Musculoskeletal:         General: No swelling.      Cervical back: No tenderness.      Right lower leg: No edema.      Left lower leg: No edema.   Lymphadenopathy:      Cervical: No cervical adenopathy.   Skin:     Coloration: Skin is not pale.      Findings: No bruising, erythema or lesion.   Neurological:      General: No focal deficit present.      Mental Status: He is alert and oriented to person, place, and time.      Sensory: No sensory deficit.      Motor: No weakness.   Psychiatric:         Mood and Affect: Mood normal.         Behavior: Behavior normal.       Performance Status:  Asymptomatic  -------------------------------------------------------------------------------------------------------  Assessment/Plan    Dann Ni is a 48 y.o patient with PMH of asthma, hyperlipidemia, and marginal zone lymphoma s/p 2 cycles of R-ANUM, IT methotrexate, and cytarabine presents for follow-up and evaluation.     Marginal zone lymphoma (Multi)  - currently stable, no symptoms/concerns; continue close monitoring for disease progression  - seen by [facility] on 8/12; recs = deferring XRT however, should there be progression in the spine they will revisit the idea of therapy. Plan for close  surveillance w/ MRI and PET/CT.   - We cleared to return to work on 9/19 based on evaluation by Neurosurgery (7/18/24)    *Discussed that this may change depending on additional treatments that may be needed in the future.   - MRI scheduled 10/21/24 (requested by Rad/Onc)  - Appt. With Dr. Peters moved to 10/24/24 (after MRI)  - PET/CT will be scheduled by primary onc. Team for Nov.   - Appt. With [facility] scheduled in Dec.     RTC:  - MRI 10/21/24  - appt. Dr. Peters 10/24/24  - PET/CT to be scheduled in Nov.   - appt. w/ [facility] in Dec.     -------------------------------------------------------------------------------------------------------  Kathy BARRIOS  [facility]     I was present with the physician assistant student who participated in the documentation of this note. I have personally seen and examined the patient and performed the medical decision-making components. I have reviewed the medical student documentation and verified the findings in the note as written with additions or exceptions as stated in the body of the note, made by me personally.    Joel Diaz PA-C

## 2024-10-10 ENCOUNTER — APPOINTMENT (OUTPATIENT)
Dept: HEMATOLOGY/ONCOLOGY | Facility: HOSPITAL | Age: 49
End: 2024-10-10
Payer: COMMERCIAL

## 2024-10-21 ENCOUNTER — HOSPITAL ENCOUNTER (OUTPATIENT)
Dept: RADIOLOGY | Facility: HOSPITAL | Age: 49
Discharge: HOME | End: 2024-10-21
Payer: COMMERCIAL

## 2024-10-21 VITALS — WEIGHT: 262.35 LBS | HEIGHT: 67 IN | BODY MASS INDEX: 41.18 KG/M2

## 2024-10-21 DIAGNOSIS — C85.80 MARGINAL ZONE LYMPHOMA (MULTI): ICD-10-CM

## 2024-10-21 DIAGNOSIS — C72.0 MALIGNANT NEOPLASM OF SPINAL CORD (MULTI): ICD-10-CM

## 2024-10-21 PROCEDURE — 72158 MRI LUMBAR SPINE W/O & W/DYE: CPT

## 2024-10-21 PROCEDURE — A9575 INJ GADOTERATE MEGLUMI 0.1ML: HCPCS | Performed by: RADIOLOGY

## 2024-10-21 PROCEDURE — 72157 MRI CHEST SPINE W/O & W/DYE: CPT

## 2024-10-21 PROCEDURE — 72158 MRI LUMBAR SPINE W/O & W/DYE: CPT | Performed by: RADIOLOGY

## 2024-10-21 PROCEDURE — 2550000001 HC RX 255 CONTRASTS: Performed by: RADIOLOGY

## 2024-10-21 PROCEDURE — 72157 MRI CHEST SPINE W/O & W/DYE: CPT | Performed by: RADIOLOGY

## 2024-10-21 RX ORDER — GADOTERATE MEGLUMINE 376.9 MG/ML
24 INJECTION INTRAVENOUS
Status: COMPLETED | OUTPATIENT
Start: 2024-10-21 | End: 2024-10-21

## 2024-10-23 ENCOUNTER — LAB (OUTPATIENT)
Dept: LAB | Facility: LAB | Age: 49
End: 2024-10-23
Payer: COMMERCIAL

## 2024-10-23 DIAGNOSIS — C85.80 MARGINAL ZONE LYMPHOMA (MULTI): ICD-10-CM

## 2024-10-23 LAB
ALBUMIN SERPL BCP-MCNC: 4.5 G/DL (ref 3.4–5)
ALP SERPL-CCNC: 91 U/L (ref 33–120)
ALT SERPL W P-5'-P-CCNC: 23 U/L (ref 10–52)
ANION GAP SERPL CALC-SCNC: 12 MMOL/L (ref 10–20)
AST SERPL W P-5'-P-CCNC: 23 U/L (ref 9–39)
BASOPHILS # BLD AUTO: 0.06 X10*3/UL (ref 0–0.1)
BASOPHILS NFR BLD AUTO: 0.9 %
BILIRUB SERPL-MCNC: 0.5 MG/DL (ref 0–1.2)
BUN SERPL-MCNC: 10 MG/DL (ref 6–23)
CALCIUM SERPL-MCNC: 9.3 MG/DL (ref 8.6–10.3)
CHLORIDE SERPL-SCNC: 104 MMOL/L (ref 98–107)
CO2 SERPL-SCNC: 27 MMOL/L (ref 21–32)
CREAT SERPL-MCNC: 1.05 MG/DL (ref 0.5–1.3)
EGFRCR SERPLBLD CKD-EPI 2021: 88 ML/MIN/1.73M*2
EOSINOPHIL # BLD AUTO: 0.25 X10*3/UL (ref 0–0.7)
EOSINOPHIL NFR BLD AUTO: 3.9 %
ERYTHROCYTE [DISTWIDTH] IN BLOOD BY AUTOMATED COUNT: 12.7 % (ref 11.5–14.5)
GLUCOSE SERPL-MCNC: 104 MG/DL (ref 74–99)
HCT VFR BLD AUTO: 42 % (ref 41–52)
HGB BLD-MCNC: 14.2 G/DL (ref 13.5–17.5)
IMM GRANULOCYTES # BLD AUTO: 0.04 X10*3/UL (ref 0–0.7)
IMM GRANULOCYTES NFR BLD AUTO: 0.6 % (ref 0–0.9)
LDH SERPL L TO P-CCNC: 170 U/L (ref 84–246)
LYMPHOCYTES # BLD AUTO: 0.33 X10*3/UL (ref 1.2–4.8)
LYMPHOCYTES NFR BLD AUTO: 5.1 %
MCH RBC QN AUTO: 29.3 PG (ref 26–34)
MCHC RBC AUTO-ENTMCNC: 33.8 G/DL (ref 32–36)
MCV RBC AUTO: 87 FL (ref 80–100)
MONOCYTES # BLD AUTO: 0.73 X10*3/UL (ref 0.1–1)
MONOCYTES NFR BLD AUTO: 11.4 %
NEUTROPHILS # BLD AUTO: 5.02 X10*3/UL (ref 1.2–7.7)
NEUTROPHILS NFR BLD AUTO: 78.1 %
NRBC BLD-RTO: 0 /100 WBCS (ref 0–0)
PLATELET # BLD AUTO: 266 X10*3/UL (ref 150–450)
POTASSIUM SERPL-SCNC: 4.2 MMOL/L (ref 3.5–5.3)
PROT SERPL-MCNC: 6.5 G/DL (ref 6.4–8.2)
RBC # BLD AUTO: 4.84 X10*6/UL (ref 4.5–5.9)
SODIUM SERPL-SCNC: 139 MMOL/L (ref 136–145)
WBC # BLD AUTO: 6.4 X10*3/UL (ref 4.4–11.3)

## 2024-10-23 PROCEDURE — 85025 COMPLETE CBC W/AUTO DIFF WBC: CPT

## 2024-10-23 PROCEDURE — 83615 LACTATE (LD) (LDH) ENZYME: CPT

## 2024-10-23 PROCEDURE — 80053 COMPREHEN METABOLIC PANEL: CPT

## 2024-10-23 PROCEDURE — 36415 COLL VENOUS BLD VENIPUNCTURE: CPT

## 2024-10-24 ENCOUNTER — OFFICE VISIT (OUTPATIENT)
Dept: HEMATOLOGY/ONCOLOGY | Facility: HOSPITAL | Age: 49
End: 2024-10-24
Payer: COMMERCIAL

## 2024-10-24 VITALS
DIASTOLIC BLOOD PRESSURE: 93 MMHG | SYSTOLIC BLOOD PRESSURE: 136 MMHG | BODY MASS INDEX: 39.87 KG/M2 | TEMPERATURE: 98.1 F | WEIGHT: 256.7 LBS | HEART RATE: 86 BPM | OXYGEN SATURATION: 100 %

## 2024-10-24 DIAGNOSIS — C85.80 MARGINAL ZONE LYMPHOMA (MULTI): ICD-10-CM

## 2024-10-24 PROCEDURE — 99214 OFFICE O/P EST MOD 30 MIN: CPT | Performed by: INTERNAL MEDICINE

## 2024-10-24 ASSESSMENT — PAIN SCALES - GENERAL: PAINLEVEL_OUTOF10: 0-NO PAIN

## 2024-10-24 NOTE — PROGRESS NOTES
Patient ID: Dann Ni is a 48 y.o. male.  Referring Physician: Jeff Peters MD PhD  [address] [place] [address]  Primary Care Provider: DO Leela Watson    The patient was in good health until July 2023. He developed back pain which did not respond to NSAID or steroid. By late 9/2023 he lost sensation below the diaphragm and were unable to walk. On 9/30 he had CT scan showing a 4.1x1.8x1cm paraspinal mass at the T7 level. MRI showed abnormal enhancing signals at the C5, T2-8 levels.  On 10/2/2023 he had a debulking surgery which later showed evidence of lymphoma. In the meantime he has improved sensation and is able to walk with a cane. 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 He was consented for rituximab bendamustine and started C1 on 11/30/2023. Tolerated relatively well, but had nausea and flu like symptoms for a few days after chemo. Since then he also completed C2 R stacey on 12/28, followed by C3 on Jan 25, C4 on Feb 22, C5D1 on 3/21, and c6d1 on 4/18. Intrathecal treatment #1 on 2/19, then on 3/18/2024. HE had a PET/CT 5/10/2024 showing persistent FDG avid lesions. Subsequently had EBUS biopsy of a subcarinal mass and Bone marrow bx on 5/31 and 5/23/2024, respectively. There was no evidence of lymphoma in these studies.     Today the patient says walking continues to improve. Back pain is resolved. No fever, wt loss, or nausea. He is back to work with some accomodation.            Objective   BSA: 2.35 meters squared  BP (!) 136/93 (BP Location: Left arm, Patient Position: Sitting, BP Cuff Size: Large adult)   Pulse 86   Temp 36.7 °C (98.1 °F) (Temporal)   Wt 116 kg (256 lb 11.2 oz)   SpO2 100%   BMI 39.87 kg/m²     Family History   Problem Relation Name Age of Onset    Hip dysplasia Mother [place]     Arthritis Mother [place]     Other (htn) Father      Prostatitis Father      Lung cancer Maternal Grandmother      Kidney cancer Maternal  Grandmother      Lung cancer Maternal Grandfather      Kidney cancer Maternal Grandfather      Dementia Other      Cancer Other      Dementia Other       Oncology History   Marginal zone lymphoma (Multi)   10/2/2023 Cancer Staged    Staging form: Hodgkin and Non-Hodgkin Lymphoma, AJCC 8th Edition, Clinical stage from 10/2/2023: Stage IV (Marginal zone lymphoma) - Signed by Jeff Peters MD PhD on 2023 Initial Diagnosis    Presentation: (2023): Back pain, loss of sensation below the diaphragm, gait disturbance    Imaging:  - CT (23): 4.1x1.8x1cm paraspinal mass at the T7 level  - MRI: showed abnormal enhancing signals at the C5, T2-8 levels.     Surgery:  - 10/2/2023 he had a debulking surgery which later showed evidence of lymphoma. In the meantime he has improved sensation and is able to walk with a cane.     - Work up including PET/CT results consistent with stage IV MZL.      2023 -  Chemotherapy    Tolerated relatively well, but had nausea and flu like symptoms for a few days after chemo. Since then he also completed C2 R stacey on , followed by C3 on , C4 on , C5D1 on 3/21, and c6d1 on .     Intrathecal treatment #1 on , then on 3/18/2024.   Bendamustine + RiTUXimab, 28 Day Cycles     2024 Imaging    PET/CT 2024 showing persistent FDG avid lesions.      2024 Biopsy    Bone marrow bx 2024 -- no evidence of lymphoma     2024 Biopsy    EBUS biopsy of a subcarinal mass; There was no evidence of lymphoma      2024 Imaging    CT Chest w contrast (24): 2024: reviewed CT of , showin.  Numerous lung nodules bilaterally measuring up to 5 mm in size, similar to the prior in size and number. 2. Redemonstration of multiple enlarged mediastinal lymph nodes, also unchanged from the prior.   -It is likely, but not proven, that the lung nodules and small mediastinal lymph nodes are reactive to inflammation. However, the  exact source of inflammation is not identifiable.         Dann Ni  reports that he has never smoked. He has never used smokeless tobacco.  He  reports that he does not currently use alcohol.  He  reports no history of drug use.    EXAM: No LAD. No splenomegaly. No skin lesions. Muscle power preserved in the UE but reduced in LLE; walk without assistance in slow pace; upper back surgical scar healed.  Other details below.  5/10: walk independently with a very mild limp.     Physical Exam  Constitutional:       General: He is not in acute distress.     Appearance: He is not toxic-appearing.   HENT:      Head: Normocephalic.      Nose: Nose normal.      Mouth/Throat:      Mouth: Mucous membranes are moist.   Eyes:      Extraocular Movements: Extraocular movements intact.      Pupils: Pupils are equal, round, and reactive to light.   Cardiovascular:      Rate and Rhythm: Normal rate and regular rhythm.      Heart sounds: No murmur heard.  Pulmonary:      Effort: Pulmonary effort is normal.      Breath sounds: Normal breath sounds.   Abdominal:      General: Bowel sounds are normal.      Palpations: Abdomen is soft. There is no mass.      Tenderness: There is no abdominal tenderness. There is no rebound.   Musculoskeletal:         General: No swelling, tenderness, deformity or signs of injury.      Right lower leg: No edema.      Left lower leg: No edema.   Skin:     Coloration: Skin is not jaundiced.      Findings: No bruising, lesion or rash.   Neurological:      Mental Status: He is alert and oriented to person, place, and time.      Cranial Nerves: No cranial nerve deficit.      Motor: Weakness present.      Gait: Gait abnormal.   Psychiatric:         Mood and Affect: Mood normal.       Performance Status:  Symptomatic; fully ambulatory    Assessment/Plan      #EMZL  -Reviewed PET/CT and MRI with the patient and wife. The results are consistent with stage IV disease, involving LN above and below the  diaphragm, extensively axial and appendicular skeleton including bilateral scapulas, right clavicular head, T5 vertebral body, bilateral iliac bones, left ischium, and left femur without definite anatomic correlate (max SUV 7.6).  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   -Current data support treatment using multi-agent chemo. Bendamustine and rituximab have been approved and widely used, found to be safe and effective.   -Patient was disappointed to learn that EMZL such as his may not be curable. Was relieved to know that the treatment can bring multiple years of remission, improvement of strength / quality of life / work experience.   -Discussed the Aes of r-anum, which are numerous, including SAEs such as infection, or even death. Most AE and DUSTIN however can be resolved with effective treatments. He consented for R-anum.   -Due to the location of the disease and bone marrow uptake, he may be at high risk of CNS involvement / relapse. Schedule head CT and intrathecal treatment, timing likely after 2 cycles of R-ANUM. He consented for IT Methotrexate and cytarabine. First IT on 2/19/2024. CSF shows no lymphoma. Next IT on 3/18 tentatively.   -In addition, consolidation using XRT will be discussed in the future.   -5/10/2024: PET/CT results at [facility] were reviewed. Unfortunately, there are a number of residual FDG avid signals involving bones and perihilar nodes. These raise the question of treatment failure, or possible large cell transformation. Will obtain BMBx and additional biopsy, including possibly:   **a subcarinal lymph node has a maximum SUV of 11.3 previously measuring up to 14.9 and a conglomerate of right paratracheal lymph nodes has a maximum  SUV of 8.9 previously measuring up to 14.9. (by interventional pulm)  **proximal left humerus there is a lytic lesion with maximum SUV of 7.5 SUV previously measuring up to 3.6 SUV.  (By IR)  -2024: discussed the results below:   bmbx: A, B & C. BONE MARROW CLOT WITH ASPIRATE AND CORE WITH TOUCH PREP, LEFT ILIAC CREST:   -- NORMOCELLULAR BONE MARROW (40%) WITH MATURING TRILINEAGE HEMATOPOESIS AND NO MORPHOLOGIC EVIDENCE OF LYMPHOMA.  : A. LYMPH NODE,  LEVEL 7; BIOPSY:      -- Non-diagnostic rare bronchial epithelium and blood.  Those negative results and his clinical improvement do not support relapse or refractoriness. Will monitor closely. CT Chest on .   -2024: reviewed CT of , showin.  Numerous lung nodules bilaterally measuring up to 5 mm in size, similar to the prior in size and number. 2. Redemonstration of multiple enlarged mediastinal lymph nodes, also unchanged from the prior.   -It is likely, but not proven, that the lung nodules and small mediastinal lymph nodes are reactive to inflammation. However, the exact source of inflammation is not identifiable. He has no evidence of Pneumonia. In the past ACE level was found to be Wnl, arguing against (but does not rule out) sarcoidosis.   -Given the above consideration, disease progression/refractoriness is questionable. Will therefore refer back to [facility] to discuss radiation of the soft tissue mass at the T1-5 level previously shown on MRI in 10/2023. Will also repeat PET/CT in 2024.    -10/24/2024: clinically he continues to do well without symptoms associated with lymphoma. MRI TL spines continues to show abnormal epidural enhancement. Lymphoma relapse remains a risk despite recent negative work up. Will order PET/CT.     Plan 10/24:  -RTC MD 8w  -PET/CT in 2024.  -refer back to [facility] to discuss radiation of the soft tissue mass at the T1-5 level previously shown on MRI in 10/2023.      Time spent: 35min, >50% on counseling and care coordination.          Jeff Peters MD PhD

## 2024-11-01 ENCOUNTER — TELEPHONE (OUTPATIENT)
Dept: HEMATOLOGY/ONCOLOGY | Facility: HOSPITAL | Age: 49
End: 2024-11-01
Payer: COMMERCIAL

## 2024-11-04 ENCOUNTER — HOSPITAL ENCOUNTER (OUTPATIENT)
Dept: RADIOLOGY | Facility: HOSPITAL | Age: 49
Discharge: HOME | End: 2024-11-04
Payer: COMMERCIAL

## 2024-11-04 ENCOUNTER — APPOINTMENT (OUTPATIENT)
Dept: RADIOLOGY | Facility: HOSPITAL | Age: 49
End: 2024-11-04
Payer: COMMERCIAL

## 2024-11-04 DIAGNOSIS — C85.80 MARGINAL ZONE LYMPHOMA (MULTI): ICD-10-CM

## 2024-11-04 LAB — GLUCOSE BLD MANUAL STRIP-MCNC: 116 MG/DL (ref 74–99)

## 2024-11-04 PROCEDURE — 3430000001 HC RX 343 DIAGNOSTIC RADIOPHARMACEUTICALS: Performed by: INTERNAL MEDICINE

## 2024-11-04 PROCEDURE — 78815 PET IMAGE W/CT SKULL-THIGH: CPT | Mod: PS

## 2024-11-04 PROCEDURE — 78815 PET IMAGE W/CT SKULL-THIGH: CPT | Mod: PET TUMOR SUBSQ TX STRATEGY | Performed by: NUCLEAR MEDICINE

## 2024-11-04 PROCEDURE — 82947 ASSAY GLUCOSE BLOOD QUANT: CPT

## 2024-11-04 PROCEDURE — A9552 F18 FDG: HCPCS | Performed by: INTERNAL MEDICINE

## 2024-11-04 RX ORDER — FLUDEOXYGLUCOSE F 18 200 MCI/ML
10.2 INJECTION, SOLUTION INTRAVENOUS
Status: COMPLETED | OUTPATIENT
Start: 2024-11-04 | End: 2024-11-04

## 2024-11-15 ENCOUNTER — OFFICE VISIT (OUTPATIENT)
Dept: HEMATOLOGY/ONCOLOGY | Facility: HOSPITAL | Age: 49
End: 2024-11-15
Payer: COMMERCIAL

## 2024-11-15 ENCOUNTER — LAB (OUTPATIENT)
Dept: LAB | Facility: LAB | Age: 49
End: 2024-11-15
Payer: COMMERCIAL

## 2024-11-15 VITALS
WEIGHT: 256.2 LBS | HEART RATE: 87 BPM | DIASTOLIC BLOOD PRESSURE: 80 MMHG | BODY MASS INDEX: 39.79 KG/M2 | OXYGEN SATURATION: 98 % | SYSTOLIC BLOOD PRESSURE: 139 MMHG | TEMPERATURE: 97.5 F

## 2024-11-15 DIAGNOSIS — C85.80 MARGINAL ZONE LYMPHOMA (MULTI): ICD-10-CM

## 2024-11-15 LAB
ALBUMIN SERPL BCP-MCNC: 4.4 G/DL (ref 3.4–5)
ALP SERPL-CCNC: 96 U/L (ref 33–120)
ALT SERPL W P-5'-P-CCNC: 18 U/L (ref 10–52)
ANION GAP SERPL CALC-SCNC: 9 MMOL/L (ref 10–20)
AST SERPL W P-5'-P-CCNC: 18 U/L (ref 9–39)
BASOPHILS # BLD AUTO: 0.04 X10*3/UL (ref 0–0.1)
BASOPHILS NFR BLD AUTO: 0.9 %
BILIRUB SERPL-MCNC: 0.8 MG/DL (ref 0–1.2)
BUN SERPL-MCNC: 13 MG/DL (ref 6–23)
CALCIUM SERPL-MCNC: 8.8 MG/DL (ref 8.6–10.3)
CHLORIDE SERPL-SCNC: 104 MMOL/L (ref 98–107)
CO2 SERPL-SCNC: 30 MMOL/L (ref 21–32)
CREAT SERPL-MCNC: 1.15 MG/DL (ref 0.5–1.3)
EGFRCR SERPLBLD CKD-EPI 2021: 78 ML/MIN/1.73M*2
EOSINOPHIL # BLD AUTO: 0.25 X10*3/UL (ref 0–0.7)
EOSINOPHIL NFR BLD AUTO: 5.5 %
ERYTHROCYTE [DISTWIDTH] IN BLOOD BY AUTOMATED COUNT: 13.1 % (ref 11.5–14.5)
GLUCOSE SERPL-MCNC: 117 MG/DL (ref 74–99)
HCT VFR BLD AUTO: 40.7 % (ref 41–52)
HGB BLD-MCNC: 13.8 G/DL (ref 13.5–17.5)
IMM GRANULOCYTES # BLD AUTO: 0.03 X10*3/UL (ref 0–0.7)
IMM GRANULOCYTES NFR BLD AUTO: 0.7 % (ref 0–0.9)
LDH SERPL L TO P-CCNC: 143 U/L (ref 84–246)
LYMPHOCYTES # BLD AUTO: 0.32 X10*3/UL (ref 1.2–4.8)
LYMPHOCYTES NFR BLD AUTO: 7.1 %
MCH RBC QN AUTO: 29.4 PG (ref 26–34)
MCHC RBC AUTO-ENTMCNC: 33.9 G/DL (ref 32–36)
MCV RBC AUTO: 87 FL (ref 80–100)
MONOCYTES # BLD AUTO: 0.58 X10*3/UL (ref 0.1–1)
MONOCYTES NFR BLD AUTO: 12.9 %
NEUTROPHILS # BLD AUTO: 3.29 X10*3/UL (ref 1.2–7.7)
NEUTROPHILS NFR BLD AUTO: 72.9 %
NRBC BLD-RTO: 0 /100 WBCS (ref 0–0)
PLATELET # BLD AUTO: 239 X10*3/UL (ref 150–450)
POTASSIUM SERPL-SCNC: 4.2 MMOL/L (ref 3.5–5.3)
PROT SERPL-MCNC: 6.4 G/DL (ref 6.4–8.2)
PROT SERPL-MCNC: 6.5 G/DL (ref 6.4–8.2)
RBC # BLD AUTO: 4.69 X10*6/UL (ref 4.5–5.9)
SODIUM SERPL-SCNC: 139 MMOL/L (ref 136–145)
WBC # BLD AUTO: 4.5 X10*3/UL (ref 4.4–11.3)

## 2024-11-15 PROCEDURE — 84165 PROTEIN E-PHORESIS SERUM: CPT

## 2024-11-15 PROCEDURE — 84165 PROTEIN E-PHORESIS SERUM: CPT | Performed by: INTERNAL MEDICINE

## 2024-11-15 PROCEDURE — 84153 ASSAY OF PSA TOTAL: CPT

## 2024-11-15 PROCEDURE — 83615 LACTATE (LD) (LDH) ENZYME: CPT

## 2024-11-15 PROCEDURE — 86320 SERUM IMMUNOELECTROPHORESIS: CPT | Performed by: INTERNAL MEDICINE

## 2024-11-15 PROCEDURE — 85025 COMPLETE CBC W/AUTO DIFF WBC: CPT

## 2024-11-15 PROCEDURE — 36415 COLL VENOUS BLD VENIPUNCTURE: CPT

## 2024-11-15 PROCEDURE — 99215 OFFICE O/P EST HI 40 MIN: CPT | Performed by: INTERNAL MEDICINE

## 2024-11-15 PROCEDURE — 82164 ANGIOTENSIN I ENZYME TEST: CPT

## 2024-11-15 PROCEDURE — 84154 ASSAY OF PSA FREE: CPT

## 2024-11-15 PROCEDURE — 80053 COMPREHEN METABOLIC PANEL: CPT

## 2024-11-15 PROCEDURE — 84155 ASSAY OF PROTEIN SERUM: CPT

## 2024-11-15 PROCEDURE — 83521 IG LIGHT CHAINS FREE EACH: CPT

## 2024-11-15 PROCEDURE — 86334 IMMUNOFIX E-PHORESIS SERUM: CPT

## 2024-11-15 ASSESSMENT — PAIN SCALES - GENERAL: PAINLEVEL_OUTOF10: 0-NO PAIN

## 2024-11-15 NOTE — PROGRESS NOTES
Patient ID: Dann Ni is a 49 y.o. male.  Referring Physician: Jeff Peters MD PhD  [address] [place] [address]  Primary Care Provider: DO Leela Watson    The patient was in good health until July 2023. He developed back pain which did not respond to NSAID or steroid. By late 9/2023 he lost sensation below the diaphragm and were unable to walk. On 9/30 he had CT scan showing a 4.1x1.8x1cm paraspinal mass at the T7 level. MRI showed abnormal enhancing signals at the C5, T2-8 levels.  On 10/2/2023 he had a debulking surgery which later showed evidence of lymphoma. In the meantime he has improved sensation and is able to walk with a cane. 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 He was consented for rituximab bendamustine and started C1 on 11/30/2023. Tolerated relatively well, but had nausea and flu like symptoms for a few days after chemo. Since then he also completed C2 R stacey on 12/28, followed by C3 on Jan 25, C4 on Feb 22, C5D1 on 3/21, and c6d1 on 4/18. Intrathecal treatment #1 on 2/19, then on 3/18/2024. HE had a PET/CT 5/10/2024 showing persistent FDG avid lesions. Subsequently had EBUS biopsy of a subcarinal mass and Bone marrow bx on 5/31 and 5/23/2024, respectively. There was no evidence of lymphoma in these studies.     Today the patient says he feels well. Walking continues to improve. Back pain is resolved. No fever, wt loss, or nausea. He is back to work with some accomodation. He is anxious to discuss PET/CT results.          Objective   BSA: 2.35 meters squared  /80 (BP Location: Left arm, Patient Position: Sitting, BP Cuff Size: Large adult)   Pulse 87   Temp 36.4 °C (97.5 °F) (Temporal)   Wt 116 kg (256 lb 3.2 oz)   SpO2 98%   BMI 39.79 kg/m²     Family History   Problem Relation Name Age of Onset    Hip dysplasia Mother [place]     Arthritis Mother [place]     Other (htn) Father      Prostatitis Father      Lung cancer Maternal  Grandmother      Kidney cancer Maternal Grandmother      Lung cancer Maternal Grandfather      Kidney cancer Maternal Grandfather      Dementia Other      Cancer Other      Dementia Other       Oncology History   Marginal zone lymphoma (Multi)   10/2/2023 Cancer Staged    Staging form: Hodgkin and Non-Hodgkin Lymphoma, AJCC 8th Edition, Clinical stage from 10/2/2023: Stage IV (Marginal zone lymphoma) - Signed by Jeff Peters MD PhD on 2023 Initial Diagnosis    Presentation: (2023): Back pain, loss of sensation below the diaphragm, gait disturbance    Imaging:  - CT (23): 4.1x1.8x1cm paraspinal mass at the T7 level  - MRI: showed abnormal enhancing signals at the C5, T2-8 levels.     Surgery:  - 10/2/2023 he had a debulking surgery which later showed evidence of lymphoma. In the meantime he has improved sensation and is able to walk with a cane.     - Work up including PET/CT results consistent with stage IV MZL.      2023 -  Chemotherapy    Tolerated relatively well, but had nausea and flu like symptoms for a few days after chemo. Since then he also completed C2 R stacey on , followed by C3 on , C4 on , C5D1 on 3/21, and c6d1 on .     Intrathecal treatment #1 on , then on 3/18/2024.   Bendamustine + RiTUXimab, 28 Day Cycles     2024 Imaging    PET/CT 2024 showing persistent FDG avid lesions.      2024 Biopsy    Bone marrow bx 2024 -- no evidence of lymphoma     2024 Biopsy    EBUS biopsy of a subcarinal mass; There was no evidence of lymphoma      2024 Imaging    CT Chest w contrast (24): 2024: reviewed CT of , showin.  Numerous lung nodules bilaterally measuring up to 5 mm in size, similar to the prior in size and number. 2. Redemonstration of multiple enlarged mediastinal lymph nodes, also unchanged from the prior.   -It is likely, but not proven, that the lung nodules and small mediastinal lymph nodes are  reactive to inflammation. However, the exact source of inflammation is not identifiable.         Dann Ni  reports that he has never smoked. He has never used smokeless tobacco.  He  reports that he does not currently use alcohol.  He  reports no history of drug use.    EXAM: No LAD. No splenomegaly. No skin lesions. Muscle power preserved in the UE but reduced in LLE; walk without assistance in slow pace; upper back surgical scar healed.  Other details below.  5/10: walk independently with a very mild limp.     Physical Exam  Constitutional:       General: He is not in acute distress.     Appearance: He is not toxic-appearing.   HENT:      Head: Normocephalic.      Nose: Nose normal.      Mouth/Throat:      Mouth: Mucous membranes are moist.   Eyes:      Extraocular Movements: Extraocular movements intact.      Pupils: Pupils are equal, round, and reactive to light.   Cardiovascular:      Rate and Rhythm: Normal rate and regular rhythm.      Heart sounds: No murmur heard.  Pulmonary:      Effort: Pulmonary effort is normal.      Breath sounds: Normal breath sounds.   Abdominal:      General: Bowel sounds are normal.      Palpations: Abdomen is soft. There is no mass.      Tenderness: There is no abdominal tenderness. There is no rebound.   Musculoskeletal:         General: No swelling, tenderness, deformity or signs of injury.      Right lower leg: No edema.      Left lower leg: No edema.   Skin:     Coloration: Skin is not jaundiced.      Findings: No bruising, lesion or rash.   Neurological:      Mental Status: He is alert and oriented to person, place, and time.      Cranial Nerves: No cranial nerve deficit.      Motor: Weakness present.      Gait: Gait abnormal.   Psychiatric:         Mood and Affect: Mood normal.       Performance Status:  Symptomatic; fully ambulatory    Assessment/Plan      #EMZL  -Reviewed PET/CT and MRI with the patient and wife. The results are consistent with stage IV disease,  involving LN above and below the diaphragm, extensively axial and appendicular skeleton including bilateral scapulas, right clavicular head, T5 vertebral body, bilateral iliac bones, left ischium, and left femur without definite anatomic correlate (max SUV 7.6).  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   -Current data support treatment using multi-agent chemo. Bendamustine and rituximab have been approved and widely used, found to be safe and effective.   -Patient was disappointed to learn that EMZL such as his may not be curable. Was relieved to know that the treatment can bring multiple years of remission, improvement of strength / quality of life / work experience.   -Discussed the Aes of r-anum, which are numerous, including SAEs such as infection, or even death. Most AE and DUSTIN however can be resolved with effective treatments. He consented for R-anum.   -Due to the location of the disease and bone marrow uptake, he may be at high risk of CNS involvement / relapse. Schedule head CT and intrathecal treatment, timing likely after 2 cycles of R-ANUM. He consented for IT Methotrexate and cytarabine. First IT on 2/19/2024. CSF shows no lymphoma. Next IT on 3/18 tentatively.   -In addition, consolidation using XRT will be discussed in the future.   -5/10/2024: PET/CT results at [facility] were reviewed. Unfortunately, there are a number of residual FDG avid signals involving bones and perihilar nodes. These raise the question of treatment failure, or possible large cell transformation. Will obtain BMBx and additional biopsy, including possibly:   **a subcarinal lymph node has a maximum SUV of 11.3 previously measuring up to 14.9 and a conglomerate of right paratracheal lymph nodes has a maximum  SUV of 8.9 previously measuring up to 14.9. (by interventional pulm)  **proximal left humerus there is a lytic lesion with maximum SUV of 7.5 SUV  previously measuring up to 3.6 SUV. (By IR)  -2024: discussed the results below:   bmbx: A, B & C. BONE MARROW CLOT WITH ASPIRATE AND CORE WITH TOUCH PREP, LEFT ILIAC CREST:   -- NORMOCELLULAR BONE MARROW (40%) WITH MATURING TRILINEAGE HEMATOPOESIS AND NO MORPHOLOGIC EVIDENCE OF LYMPHOMA.  : A. LYMPH NODE,  LEVEL 7; BIOPSY:      -- Non-diagnostic rare bronchial epithelium and blood.  Those negative results and his clinical improvement do not support relapse or refractoriness. Will monitor closely. CT Chest on .   -2024: reviewed CT of , showin.  Numerous lung nodules bilaterally measuring up to 5 mm in size, similar to the prior in size and number. 2. Redemonstration of multiple enlarged mediastinal lymph nodes, also unchanged from the prior.   -It is likely, but not proven, that the lung nodules and small mediastinal lymph nodes are reactive to inflammation. However, the exact source of inflammation is not identifiable. He has no evidence of Pneumonia. In the past ACE level was found to be Wnl, arguing against (but does not rule out) sarcoidosis.   -Given the above consideration, disease progression/refractoriness is questionable. Will therefore refer back to [facility] to discuss radiation of the soft tissue mass at the T1-5 level previously shown on MRI in 10/2023. Will also repeat PET/CT in 2024.  -10/24/2024: clinically he continues to do well without symptoms associated with lymphoma. MRI TL spines continues to show abnormal epidural enhancement. Lymphoma relapse remains a risk despite recent negative work up. Will order PET/CT.   -11/15: reviewed reports and images of PET/CT thoroughly with the patient and his wife. Overall the PET/CT results did not demonstrate compelling evidence of progression or relapse. Fore example, “Interval increase in metabolic activity within multiple axial and appendicular osseous lesions”, but no CT correlates were present for any of the above FDG avid  bony lesions. In addition, he has no bony symptoms at all. The low FDG avid small lymph nodes have been stable in size, arguing against relapsed FL. These FDG avid lesions (body, small and stable LAD) may thus reflect inflammatory etiology (e.g. sarcoidosis), but less likely FL. Nevertheless, FL cannot be ruled out but will require close surveillance.     Plan 11/15:  -RTC MD 4w  -work up bony pathology, including ACE level for sarcoidosis, SPEP.      Time spent: 45min, >50% on counseling and care coordination.          Jeff Peters MD PhD

## 2024-11-17 LAB
KAPPA LC SERPL-MCNC: 0.82 MG/DL (ref 0.33–1.94)
KAPPA LC/LAMBDA SER: 1.44 {RATIO} (ref 0.26–1.65)
LAMBDA LC SERPL-MCNC: 0.57 MG/DL (ref 0.57–2.63)

## 2024-11-18 LAB
ACE SERPL-CCNC: 86 U/L (ref 16–85)
PSA FREE MFR SERPL: <50 %
PSA FREE SERPL-MCNC: <0.1 NG/ML
PSA SERPL IA-MCNC: 0.2 NG/ML (ref 0–4)

## 2024-11-19 LAB
ALBUMIN: 4.3 G/DL (ref 3.4–5)
ALPHA 1 GLOBULIN: 0.3 G/DL (ref 0.2–0.6)
ALPHA 2 GLOBULIN: 0.7 G/DL (ref 0.4–1.1)
BETA GLOBULIN: 0.8 G/DL (ref 0.5–1.2)
GAMMA GLOBULIN: 0.5 G/DL (ref 0.5–1.4)
IMMUNOFIXATION COMMENT: ABNORMAL
M-PROTEIN 1: 0.1 G/DL
M-PROTEIN 2: 0.1 G/DL
PATH REVIEW - SERUM IMMUNOFIXATION: ABNORMAL
PATH REVIEW-SERUM PROTEIN ELECTROPHORESIS: ABNORMAL
PROTEIN ELECTROPHORESIS COMMENT: ABNORMAL

## 2024-12-05 ENCOUNTER — TELEPHONE (OUTPATIENT)
Dept: RADIATION ONCOLOGY | Facility: HOSPITAL | Age: 49
End: 2024-12-05
Payer: COMMERCIAL

## 2024-12-09 ENCOUNTER — HOSPITAL ENCOUNTER (OUTPATIENT)
Dept: RADIATION ONCOLOGY | Facility: HOSPITAL | Age: 49
Setting detail: RADIATION/ONCOLOGY SERIES
Discharge: HOME | End: 2024-12-09
Payer: COMMERCIAL

## 2024-12-09 VITALS
RESPIRATION RATE: 18 BRPM | TEMPERATURE: 96.6 F | HEART RATE: 84 BPM | DIASTOLIC BLOOD PRESSURE: 86 MMHG | SYSTOLIC BLOOD PRESSURE: 128 MMHG | WEIGHT: 257.2 LBS | OXYGEN SATURATION: 96 % | BODY MASS INDEX: 39.95 KG/M2

## 2024-12-09 DIAGNOSIS — C85.80 MARGINAL ZONE LYMPHOMA (MULTI): Primary | ICD-10-CM

## 2024-12-09 DIAGNOSIS — C72.0 MALIGNANT NEOPLASM OF SPINAL CORD (MULTI): ICD-10-CM

## 2024-12-09 PROCEDURE — G2211 COMPLEX E/M VISIT ADD ON: HCPCS | Performed by: RADIOLOGY

## 2024-12-09 PROCEDURE — 99213 OFFICE O/P EST LOW 20 MIN: CPT | Performed by: RADIOLOGY

## 2024-12-09 ASSESSMENT — ENCOUNTER SYMPTOMS
CARDIOVASCULAR NEGATIVE: 1
EYES NEGATIVE: 1
ARTHRALGIAS: 1
RESPIRATORY NEGATIVE: 1
GASTROINTESTINAL NEGATIVE: 1
HEMATOLOGIC/LYMPHATIC NEGATIVE: 1
ENDOCRINE NEGATIVE: 1
PSYCHIATRIC NEGATIVE: 1
CONSTITUTIONAL NEGATIVE: 1
NEUROLOGICAL NEGATIVE: 1

## 2024-12-09 ASSESSMENT — PAIN SCALES - GENERAL: PAINLEVEL_OUTOF10: 0-NO PAIN

## 2024-12-09 NOTE — PROGRESS NOTES
Radiation Oncology Nursing Note    Pain: The patient's current pain level was assessed.  They report currently having a pain of 0 out of 10.  They feel their pain is under control with the use of pain medications.    Review of Systems:  Review of Systems   Constitutional: Negative.    HENT:  Negative.     Eyes: Negative.    Respiratory: Negative.     Cardiovascular: Negative.    Gastrointestinal: Negative.    Endocrine: Negative.    Genitourinary: Negative.     Musculoskeletal:  Positive for arthralgias (c/o pain at back surgical site).   Skin: Negative.    Neurological: Negative.    Hematological: Negative.    Psychiatric/Behavioral: Negative.

## 2024-12-10 NOTE — PROGRESS NOTES
Radiation Oncology Follow-Up    Patient Name:  Dann Ni  MRN:  [id number]  :  1975    Primary Care Provider: Ventura Gonzalez DO  Care Team: Patient Care Team:  Ventura Gonzalez DO as PCP - General  Ventura Gonzalez DO as PCP - Mariposa HIDALGO PCP  Yesenia Marin MD as Surgeon (Neurosurgery)  Dann Avila PA-C as Physician Assistant (Neurosurgery)  Jeff Peters MD PhD as Consulting Physician (Hematology and Oncology)  DEBI Rodriguez as  (Hematology and Oncology)  Veronica Trevizo MD as Consulting Physician (Hematology and Oncology)  Maren Queen MD as Radiation Oncologist (Radiation Oncology)    Date of Service: 2024    SUBJECTIVE  History of Present Illness:  Dann Ni is a 49 y.o. 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 He was seen for initial consultation in 2024. At that point, it was decided to complete post-treatment imaging and lab surveillance to be followed by reassessment.     Treatment Rendered:   His oncological work up and treatment history is as below:  - The patient was in good health until 2023. He developed back pain which did not respond to NSAID or steroid. By late 2023 he lost sensation below the diaphragm and were unable to walk.     Treatment:  - On 10/2/2023 he had a debulking surgery which later showed evidence of lymphoma. In the meantime he has improved sensation and is able to walk with a cane.   - Initiated on rituximab bendamustine and started C1 on 2023. Tolerated relatively well, but had nausea and flu like symptoms for a few days after chemo. Since then he also completed C2 R stacey on , C3 on , C4 on 2024. C6 on 2024.  -Due to the location of the disease and  bone marrow uptake, he may be at high risk of CNS involvement / relapse. Initiated on IT Methotrexate and cytarabine.   - LP with IT Methotrexate (2/19); CSF flow cytometry negative for lymphoma  - 2nd LP planned for 3/18  - Evaluated by Dr. Peters. It was felt that given negative CSF and BM BX, the lung nodules and mediastinal nodes are reactive. Hence, he has bene off systemic therapy and presents back to discuss role of radiation to T spine.    Interval History:  MR THORACIC SPINE W AND WO IV CONTRAST; MR LUMBAR SPINE W AND WO IV CONTRAST;  10/21/2024   1. Redemonstration of postoperative changes with laminectomies from  T1 through T4. There is persistent abnormal epidural enhancement from  T1 through T5 with intraforaminal extension most pronounced on the  left at T2-3. This remains nonspecific and could reflect posttherapy  change or conceivably neoplasm. Enhancement within the posterior  paraspinous soft tissues is also nonspecific and could be  postoperative/therapy related. Neoplastic involvement is difficult to  entirely exclude, however.  2. Indistinct appearance of the nerve roots of the cauda equina maybe  due to imaging technique, at least in part. However, there does  appear to be some subtle thickening and enhancement of the nerve  roots. Again, this could reflect arachnoiditis or lymphoma. There is  persistent epidural enhancement particularly from L4 through the  sacrum which is slightly less conspicuous compared to April 30, 2024.  3. Partially imaged focal lesion in the right ilium most worrisome  for lymphoma.  4. Subtle inhomogeneity of the marrow signal is otherwise nonspecific.    PET CT LYMPHOMA STAGING; 11/4/2024   1. Interval increase in metabolic activity within multiple axial and  appendicular osseous lesions with in increased metabolic activity  within cervical, right supraclavicular, mediastinal, hilar, reed  hepatis and precaval lymph nodes, consistent with disease  progression.  Deauville score 5.  2. Mildly hypermetabolic subcentimeter pulmonary nodules bilaterally,  likely infective/inflammatory, although disease involvement can not  be excluded.    Symptomatically he is doing very well and continues to have normalized functionality of bilateral lower extremities, which has helped with his ambulation. He now walks without support but has to be cautious. Will get occasional low back muscle spasms. He has also rejoined work in 's office.    Denies other systemic symptoms.    Imaging:  - On 9/30 he had CT scan showing a 4.1x1.8x1cm paraspinal mass at the T7 level.   - MRI 10/01/2023: Abnormal signal within the C5 vertebral body which is highly suspicious for neoplastic infiltration. No extension of tumor into the spinal canal. Abnormal signal within the T2-T4 vertebral bodies and posterior elements consistent with neoplastic infiltration. There is tumoral extension into the spinal canal at the level of T1-T2 through T3 causing marked mass effect on the thoracic spinal cord with anterior and right lateral deviation of the cord. There is signal abnormality within the spinal cord which is most consistent with edema. There is extension of tumor into the left T2-T3 and T3-T4 neural foramina. There is soft tissue located within the right T7-T8 prevertebral soft tissues and within the right T7-T8 neural foramen which is most consistent tumor. Nonspecific mild signal abnormality located within the posterior S1 vertebral body which reflect neoplastic infiltration. No abnormal mass or enhancement is seen within the lumbar spinal canal and there is no striking signal abnormality located within the lumbar osseous structures.     PET/CT 11/13/2023: Multiple FDG avid subcentimeter pulmonary nodule in the left lower lobe (max SUV 3.4) and a FDG avid pulmonary nodule in the left upper lobe with (max SUV 4.4). Right lung is unremarkable *Multiple FDG avid bilateral axillary, mediastinal and bilateral  hilar and infrahilar nodes (max SUV 14.9). *Postsurgical changes from T1-4 laminectomies and paraspinal mass excision with interval development of a postoperative seroma and surrounding FDG avidity with (max SUV 5.4). *FDG avid portacaval lymph node with (max SUV 6.4). *Multiple FDG avid bone lesions throughout the axial and appendicular skeleton including bilateral scapulas, right clavicular head, T5 vertebral body, bilateral iliac bones, left ischium, and left femur without definite anatomic correlate (max SUV 7.6).  MRI Spine 11/16/2023: There are postoperative changes compatible with interval posterior laminectomies extending from the T1 through T4 levels. The previously noted abnormal enhancing epidural soft tissue mass concerning for epidural neoplasm through this region is not well-defined on the current study raising the possibility of interval surgical resection and/or sequelae of interval post therapy. There is nonspecific smooth abnormal epidural thickening and enhancement within the spinal canal extending from the C7/T1 level caudally to the T5 level with residual nonspecific abnormal enhancing soft tissue extending through the neural foramen at the T1/2, T2/3, T3/4, and T4/5 levels left greater than right which may be at least in part postsurgical in etiology with the possibility of a component of underlying residual neoplasm or a superimposed infectious/inflammatory process not entirely excluded. The nonspecific smooth epidural thickening through this region contributes to mild encroachment upon the thecal sac without evidence of spinal cord compression through this region on the current study. The previously noted infiltrative abnormal increased STIR bone marrow signal within the upper thoracic vertebrae including the T2, T3, and T4 vertebrae as well as the C5 vertebral body on the prior MRI dated 10/01/2023 most concerning for underlying infiltrative osseous neoplasm/lymphoma is less conspicuous on  the current study.   MR spine 4/30/2024 revealed evolving post-surgical changes in thoracic spine with no new or enlarging enhancing components. There were mixed changes in L spine that raised suspicion for posttreatment change or arachnoiditis although an element of neoplastic involvement is not completely excluded.  MR CERVICAL SPINE WO IV CONTRAST; 4/30/2024   Stable MR appearance of the cervical spine. As on prior examination there is nonspecific, relatively diffuse ill-defined fatty marrow replacement throughout the cervical region without focal STIR hyperintensity/marrow edema signal. Remainder of the cervical spine is normal in MR appearance with trace degenerative changes.     MR THORACIC SPINE W AND WO IV CONTRAST; 4/30/2024   Evolving postsurgical/posttreatment change suggested at the T1-T4 level status post laminectomy and enhancing epidural mass debulking/resection. There is residual nonspecific mild epidural enhancement within the operative regions as well as asymmetric involvement of the left left-greater-than-right T2 neural foraminal region that could reflect residual neoplasm or evolving posttreatment change. No new or enlarging enhancing components. No significant compressive affect on the thecal sac.     Residual marrow heterogeneity within the T6 vertebral body as well as mild diffusely decreased T1 fatty marrow signal throughout the thoracic spine, nonspecific. There is no focal abnormal enhancement or edema within the thoracic vertebra.    MR LUMBAR SPINE W AND WO IV CONTRAST; 4/30/2024   In comparison with prior lumbar spine MRI examination the cauda equina appear diffusely, mildly enlarged in the interim with probable subtle enhancement on fat-suppressed postcontrast axial imaging. There is also thickening and enhancement of the epidural venous plexus that has developed in the interim. Findings are nonspecific and could reflect an element of posttreatment change or arachnoiditis although an  element of neoplastic involvement is not completely excluded. This could be further assessed with lumbar puncture as clinically warranted. No focal abnormal osseous enhancement.    PET CT LYMPHOMA STAGING; 5/9/2024   1. Interval increased metabolic activity within multiple axial and appendicular osseous lesions with increased metabolic activity within an enlarging portacaval lymph node compatible with disease progression.  2. Interval decreased hypermetabolic activity within multiple mediastinal lymph nodes and decreased metabolic activity within multiple left lower lobe lung nodules, again favored to represent mediastinal soraya disease with lung metastasis.  3. Postsurgical changes from prior T1 through T4 laminectomy without evidence of local residual or recurrent disease.    CT CHEST W IV CONTRAST; 7/8/2024   1.  Numerous lung nodules bilaterally measuring up to 5 mm in size, similar to the prior in size and number  2. Redemonstration of multiple enlarged mediastinal lymph nodes, also unchanged from the prior.    Pathology/Cytology:    Surgical Pathology 10/2/2023: -- LOW GRADE B CELL LYMPHOMA WITH PLASMACYTIC DIFFERENTIATION MOST CONSISTENT WITH EXTRANODAL MARGINAL ZONE LYMPHOMA. No clonal B cell or T cell population identified.   NGS is positive for a clonal BCR gene rearrangement, supporting a clonal B cell proliferation. MYD88 L265P allele specific PCR is negative.   FISH for BCL2 and BCL6 rearrangements (performed at [facility]) and FISH for 1p36 abnormalities (send out test performed at [facility]) are all negative.     CEREBROSPINAL FLUID, CYTOLOGY 3/18/2024:   --  No malignant cells identified.   --  Few lymphocytes and a rare monocyte present.     FINAL DIAGNOSIS 5/23/2024   BONE MARROW CLOT WITH ASPIRATE AND CORE WITH TOUCH PREP, LEFT ILIAC CREST:   -- NORMOCELLULAR BONE MARROW (40%) WITH MATURING TRILINEAGE HEMATOPOESIS AND NO MORPHOLOGIC EVIDENCE OF LYMPHOMA.     Bronch/EBUS 5/31/2024 was negative for  disease.  RAPID ONSITE EVALUATION (R.O.S.E.):  - Station 7 TBNA : non-diagnostic  - Station 4R TBNA : scant lymphoid  FINAL DIAGNOSIS   A. LYMPH NODE,  LEVEL 7; BIOPSY:      -- Non-diagnostic rare bronchial epithelium and blood.     Review of Systems:     Pls review note entered by RN.    Performance Status:   The Karnofsky performance scale today is 80, Normal activity with effort; some signs or symptoms of disease (ECOG equivalent 1).     OBJECTIVE  Vital Signs:  /86   Pulse 84   Temp 35.9 °C (96.6 °F) (Temporal)   Resp 18   Wt 117 kg (257 lb 3.2 oz)   SpO2 96%   BMI 39.95 kg/m²   Physical Exam  Constitutional:       General: He is not in acute distress.     Appearance: He is not ill-appearing.   HENT:      Head: Atraumatic.   Eyes:      General: No scleral icterus.  Pulmonary:      Effort: Pulmonary effort is normal. No respiratory distress.      Breath sounds: No wheezing.   Musculoskeletal:         General: No tenderness.      Right lower leg: No edema.      Left lower leg: No edema.      Comments: Well healed thoracic spinal surgery scar. No tenderness along entire spine.   Neurological:      General: No focal deficit present.      Mental Status: He is alert and oriented to person, place, and time.      Cranial Nerves: No cranial nerve deficit.      Motor: No weakness.      Gait: Gait abnormal (Broad based gait as caution. Otherwise walking well.).   Psychiatric:         Mood and Affect: Mood normal.          ASSESSMENT:   Dann Ni is a 49 y.o. 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 Imaging findings were concerning for extensive spinal/epidural/neural foramina disease especially from T1-T8 levels. Additional foci of spinal involvement were seen at C5, L and S1 levels. PET-CT identified multiple FDG avid bilateral axillary, mediastinal and  bilateral hilar, infrahilar nodes  and abdominal lymph nodes, and multiple FDG avid bone lesions throughout the axial and appendicular skeleton including bilateral scapulas, right clavicular head, T5 vertebral body, bilateral iliac bones, left ischium, and left femur. He  is s/p debulking spinal surgery and was started on R-Teresa 11/2023 with C6 completed 04/18/2024. He has also received IT Methotrexate and cytarabine, LD 3/18/2024. Post-treatment CSF 03/2024 and BM Bx 05/2024 are negative with PET-CT 05/2024 showing concerns for activity in bones and enlarging portacaval lymph node concerning for disease progression, with avidity in mediastinal/upper abdominal nodes and pulmonary nodules also raising suspicion for residual disease. Bronch/EBUS 5/31/2024 was negative for disease. Serial PET CT has continued to show progressive avidity in mediastinal nodes and bones. He is otherwise regaining neurological functions with significantly improved gait.    PLAN:    Mr. Ni's pertinent history, exam, imaging and pathology details were reviewed.     Accompanied by his wife.    He has discussed imaging findings on PET CT of increased avidity. Dr. Peters is anticipating sarcoidosis as cause of the mediastinal and upper abdominal adenopathy. His BM BX was negative in May, so the bony areas of avidity are also not felt to be related to the lymphoma. He might consider a bone biopsy. However, continued surveillance will be needed.    Given stable findings on MRI and PET CT regarding the T spine, we discussed that we will continue to hold off XRT to T spine.    Will plan close surveillance and will add MR Thoracic and Lumbar spine for Feb 2024 (4 months from last MRI). Schedule follow up at that time.    Mr. Ni and his wife are agreeable and prefer this route of surveillance vs active radiation.    LONGITUDINAL CARE, EXTRA EFFORT/ : The patient will be followed longitudinally by providers (including APPs) in the  department of radiation oncology for monitoring treatment effects during and after radiation. Additional effort needed in the setting of complex presentation and coordination of care with Dr. Peters.     NCCN Guidelines were applicable to guide this patients treatment plan.      Maren Queen MD, MMM  Senior Attending Physician, [facility]  Professor, [employer]   Our [place]: “To Heal, To Teach, To Discover.”  RN partner: [phone]/ [email or web address]    Phone (scheduling): [phone]/ [email or web address]  Proton Therapy (scheduling): [phone]/ [email or web address]  Phone (after hours): [phone]

## 2024-12-11 NOTE — ADDENDUM NOTE
Encounter addended by: Maren Queen MD on: 12/11/2024 1:30 PM   Actions taken: Visit diagnoses modified, Order list changed, Diagnosis association updated

## 2024-12-18 ENCOUNTER — LAB (OUTPATIENT)
Dept: LAB | Facility: LAB | Age: 49
End: 2024-12-18
Payer: COMMERCIAL

## 2024-12-18 DIAGNOSIS — C85.80 MARGINAL ZONE LYMPHOMA (MULTI): ICD-10-CM

## 2024-12-18 LAB
ALBUMIN SERPL BCP-MCNC: 4.6 G/DL (ref 3.4–5)
ALP SERPL-CCNC: 89 U/L (ref 33–120)
ALT SERPL W P-5'-P-CCNC: 23 U/L (ref 10–52)
ANION GAP SERPL CALC-SCNC: 11 MMOL/L (ref 10–20)
AST SERPL W P-5'-P-CCNC: 24 U/L (ref 9–39)
BASOPHILS # BLD AUTO: 0.08 X10*3/UL (ref 0–0.1)
BASOPHILS NFR BLD AUTO: 1.2 %
BILIRUB SERPL-MCNC: 0.5 MG/DL (ref 0–1.2)
BUN SERPL-MCNC: 13 MG/DL (ref 6–23)
CALCIUM SERPL-MCNC: 9.3 MG/DL (ref 8.6–10.3)
CHLORIDE SERPL-SCNC: 104 MMOL/L (ref 98–107)
CO2 SERPL-SCNC: 29 MMOL/L (ref 21–32)
CREAT SERPL-MCNC: 1.08 MG/DL (ref 0.5–1.3)
EGFRCR SERPLBLD CKD-EPI 2021: 84 ML/MIN/1.73M*2
EOSINOPHIL # BLD AUTO: 0.24 X10*3/UL (ref 0–0.7)
EOSINOPHIL NFR BLD AUTO: 3.5 %
ERYTHROCYTE [DISTWIDTH] IN BLOOD BY AUTOMATED COUNT: 12.9 % (ref 11.5–14.5)
GLUCOSE SERPL-MCNC: 100 MG/DL (ref 74–99)
HCT VFR BLD AUTO: 41.8 % (ref 41–52)
HGB BLD-MCNC: 14.1 G/DL (ref 13.5–17.5)
IMM GRANULOCYTES # BLD AUTO: 0.07 X10*3/UL (ref 0–0.7)
IMM GRANULOCYTES NFR BLD AUTO: 1 % (ref 0–0.9)
LDH SERPL L TO P-CCNC: 170 U/L (ref 84–246)
LYMPHOCYTES # BLD AUTO: 0.58 X10*3/UL (ref 1.2–4.8)
LYMPHOCYTES NFR BLD AUTO: 8.4 %
MCH RBC QN AUTO: 29.4 PG (ref 26–34)
MCHC RBC AUTO-ENTMCNC: 33.7 G/DL (ref 32–36)
MCV RBC AUTO: 87 FL (ref 80–100)
MONOCYTES # BLD AUTO: 0.81 X10*3/UL (ref 0.1–1)
MONOCYTES NFR BLD AUTO: 11.7 %
NEUTROPHILS # BLD AUTO: 5.14 X10*3/UL (ref 1.2–7.7)
NEUTROPHILS NFR BLD AUTO: 74.2 %
NRBC BLD-RTO: 0 /100 WBCS (ref 0–0)
PLATELET # BLD AUTO: 246 X10*3/UL (ref 150–450)
POTASSIUM SERPL-SCNC: 4.2 MMOL/L (ref 3.5–5.3)
PROT SERPL-MCNC: 6.6 G/DL (ref 6.4–8.2)
RBC # BLD AUTO: 4.79 X10*6/UL (ref 4.5–5.9)
SODIUM SERPL-SCNC: 140 MMOL/L (ref 136–145)
WBC # BLD AUTO: 6.9 X10*3/UL (ref 4.4–11.3)

## 2024-12-18 PROCEDURE — 85025 COMPLETE CBC W/AUTO DIFF WBC: CPT

## 2024-12-18 PROCEDURE — 83615 LACTATE (LD) (LDH) ENZYME: CPT

## 2024-12-18 PROCEDURE — 80053 COMPREHEN METABOLIC PANEL: CPT

## 2024-12-18 PROCEDURE — 36415 COLL VENOUS BLD VENIPUNCTURE: CPT

## 2024-12-19 ENCOUNTER — OFFICE VISIT (OUTPATIENT)
Dept: HEMATOLOGY/ONCOLOGY | Facility: HOSPITAL | Age: 49
End: 2024-12-19
Payer: COMMERCIAL

## 2024-12-19 VITALS
HEART RATE: 88 BPM | SYSTOLIC BLOOD PRESSURE: 126 MMHG | RESPIRATION RATE: 22 BRPM | WEIGHT: 258.6 LBS | DIASTOLIC BLOOD PRESSURE: 82 MMHG | BODY MASS INDEX: 40.16 KG/M2 | OXYGEN SATURATION: 100 % | TEMPERATURE: 97.5 F

## 2024-12-19 DIAGNOSIS — C85.80 MARGINAL ZONE LYMPHOMA (MULTI): ICD-10-CM

## 2024-12-19 PROCEDURE — 99214 OFFICE O/P EST MOD 30 MIN: CPT | Performed by: INTERNAL MEDICINE

## 2024-12-19 ASSESSMENT — PAIN SCALES - GENERAL: PAINLEVEL_OUTOF10: 0-NO PAIN

## 2024-12-19 NOTE — PROGRESS NOTES
Patient ID: Dann Ni is a 49 y.o. male.  Referring Physician: Jeff Peters MD PhD  [address] [place] [address]  Primary Care Provider: DO Leela Watson    The patient was in good health until July 2023. He developed back pain which did not respond to NSAID or steroid. By late 9/2023 he lost sensation below the diaphragm and were unable to walk. On 9/30 he had CT scan showing a 4.1x1.8x1cm paraspinal mass at the T7 level. MRI showed abnormal enhancing signals at the C5, T2-8 levels.  On 10/2/2023 he had a debulking surgery which later showed evidence of lymphoma. In the meantime he has improved sensation and is able to walk with a cane. 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 He was consented for rituximab bendamustine and started C1 on 11/30/2023. Tolerated relatively well, but had nausea and flu like symptoms for a few days after chemo. Since then he also completed C2 R tsacey on 12/28, followed by C3 on Jan 25, C4 on Feb 22, C5D1 on 3/21, and c6d1 on 4/18. Intrathecal treatment #1 on 2/19, then on 3/18/2024. HE had a PET/CT 5/10/2024 showing persistent FDG avid lesions. Subsequently had EBUS biopsy of a subcarinal mass and Bone marrow bx on 5/31 and 5/23/2024, respectively. There was no evidence of lymphoma in these studies.     Today the patient says he feels well. Walking continues to improve. Back pain is resolved. No bone or hip pain. No fever, wt loss, or nausea. He is back to work with some accomodation.            Objective   BSA: 2.36 meters squared  /82 (BP Location: Left arm, Patient Position: Sitting, BP Cuff Size: Large adult)   Pulse 88   Temp 36.4 °C (97.5 °F) (Temporal)   Resp 22   Wt 117 kg (258 lb 9.6 oz)   SpO2 100%   BMI 40.16 kg/m²     Family History   Problem Relation Name Age of Onset    Hip dysplasia Mother [place]     Arthritis Mother [place]     Other (htn) Father      Prostatitis Father      Lung cancer Maternal  Grandmother      Kidney cancer Maternal Grandmother      Lung cancer Maternal Grandfather      Kidney cancer Maternal Grandfather      Dementia Other      Cancer Other      Dementia Other       Oncology History   Marginal zone lymphoma (Multi)   10/2/2023 Cancer Staged    Staging form: Hodgkin and Non-Hodgkin Lymphoma, AJCC 8th Edition, Clinical stage from 10/2/2023: Stage IV (Marginal zone lymphoma) - Signed by Jeff Peters MD PhD on 2023 Initial Diagnosis    Presentation: (2023): Back pain, loss of sensation below the diaphragm, gait disturbance    Imaging:  - CT (23): 4.1x1.8x1cm paraspinal mass at the T7 level  - MRI: showed abnormal enhancing signals at the C5, T2-8 levels.     Surgery:  - 10/2/2023 he had a debulking surgery which later showed evidence of lymphoma. In the meantime he has improved sensation and is able to walk with a cane.     - Work up including PET/CT results consistent with stage IV MZL.      2023 -  Chemotherapy    Tolerated relatively well, but had nausea and flu like symptoms for a few days after chemo. Since then he also completed C2 R stacey on , followed by C3 on , C4 on , C5D1 on 3/21, and c6d1 on .     Intrathecal treatment #1 on , then on 3/18/2024.   Bendamustine + RiTUXimab, 28 Day Cycles     2024 Imaging    PET/CT 2024 showing persistent FDG avid lesions.      2024 Biopsy    Bone marrow bx 2024 -- no evidence of lymphoma     2024 Biopsy    EBUS biopsy of a subcarinal mass; There was no evidence of lymphoma      2024 Imaging    CT Chest w contrast (24): 2024: reviewed CT of , showin.  Numerous lung nodules bilaterally measuring up to 5 mm in size, similar to the prior in size and number. 2. Redemonstration of multiple enlarged mediastinal lymph nodes, also unchanged from the prior.   -It is likely, but not proven, that the lung nodules and small mediastinal lymph nodes are  reactive to inflammation. However, the exact source of inflammation is not identifiable.         Dann Ni  reports that he has never smoked. He has never used smokeless tobacco.  He  reports that he does not currently use alcohol.  He  reports no history of drug use.    EXAM: No LAD. No splenomegaly. No skin lesions. Muscle power preserved in the UE but reduced in LLE; walk without assistance in slow pace; upper back surgical scar healed.  Other details below.  5/10: walk independently with a very mild limp.     Physical Exam  Constitutional:       General: He is not in acute distress.     Appearance: He is not toxic-appearing.   HENT:      Head: Normocephalic.      Nose: Nose normal.      Mouth/Throat:      Mouth: Mucous membranes are moist.   Eyes:      Extraocular Movements: Extraocular movements intact.      Pupils: Pupils are equal, round, and reactive to light.   Cardiovascular:      Rate and Rhythm: Normal rate and regular rhythm.      Heart sounds: No murmur heard.  Pulmonary:      Effort: Pulmonary effort is normal.      Breath sounds: Normal breath sounds.   Abdominal:      General: Bowel sounds are normal.      Palpations: Abdomen is soft. There is no mass.      Tenderness: There is no abdominal tenderness. There is no rebound.   Musculoskeletal:         General: No swelling, tenderness, deformity or signs of injury.      Right lower leg: No edema.      Left lower leg: No edema.   Skin:     Coloration: Skin is not jaundiced.      Findings: No bruising, lesion or rash.   Neurological:      Mental Status: He is alert and oriented to person, place, and time.      Cranial Nerves: No cranial nerve deficit.      Motor: Weakness present.      Gait: Gait abnormal.   Psychiatric:         Mood and Affect: Mood normal.         Performance Status:  Symptomatic; fully ambulatory    Assessment/Plan      #EMZL  -Reviewed PET/CT and MRI with the patient and wife. The results are consistent with stage IV disease,  involving LN above and below the diaphragm, extensively axial and appendicular skeleton including bilateral scapulas, right clavicular head, T5 vertebral body, bilateral iliac bones, left ischium, and left femur without definite anatomic correlate (max SUV 7.6).  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   -Current data support treatment using multi-agent chemo. Bendamustine and rituximab have been approved and widely used, found to be safe and effective.   -Patient was disappointed to learn that EMZL such as his may not be curable. Was relieved to know that the treatment can bring multiple years of remission, improvement of strength / quality of life / work experience.   -Discussed the Aes of r-anum, which are numerous, including SAEs such as infection, or even death. Most AE and DUSTIN however can be resolved with effective treatments. He consented for R-anum.   -Due to the location of the disease and bone marrow uptake, he may be at high risk of CNS involvement / relapse. Schedule head CT and intrathecal treatment, timing likely after 2 cycles of R-ANUM. He consented for IT Methotrexate and cytarabine. First IT on 2/19/2024. CSF shows no lymphoma. Next IT on 3/18 tentatively.   -In addition, consolidation using XRT will be discussed in the future.   -5/10/2024: PET/CT results at [facility] were reviewed. Unfortunately, there are a number of residual FDG avid signals involving bones and perihilar nodes. These raise the question of treatment failure, or possible large cell transformation. Will obtain BMBx and additional biopsy, including possibly:   **a subcarinal lymph node has a maximum SUV of 11.3 previously measuring up to 14.9 and a conglomerate of right paratracheal lymph nodes has a maximum  SUV of 8.9 previously measuring up to 14.9. (by interventional pulm)  **proximal left humerus there is a lytic lesion with maximum SUV of 7.5 SUV  previously measuring up to 3.6 SUV. (By IR)  -2024: discussed the results below:   bmbx: A, B & C. BONE MARROW CLOT WITH ASPIRATE AND CORE WITH TOUCH PREP, LEFT ILIAC CREST:   -- NORMOCELLULAR BONE MARROW (40%) WITH MATURING TRILINEAGE HEMATOPOESIS AND NO MORPHOLOGIC EVIDENCE OF LYMPHOMA.  : A. LYMPH NODE,  LEVEL 7; BIOPSY:      -- Non-diagnostic rare bronchial epithelium and blood.  Those negative results and his clinical improvement do not support relapse or refractoriness. Will monitor closely. CT Chest on .   -2024: reviewed CT of , showin.  Numerous lung nodules bilaterally measuring up to 5 mm in size, similar to the prior in size and number. 2. Redemonstration of multiple enlarged mediastinal lymph nodes, also unchanged from the prior.   -It is likely, but not proven, that the lung nodules and small mediastinal lymph nodes are reactive to inflammation. However, the exact source of inflammation is not identifiable. He has no evidence of Pneumonia. In the past ACE level was found to be Wnl, arguing against (but does not rule out) sarcoidosis.   -Given the above consideration, disease progression/refractoriness is questionable. Will therefore refer back to [facility] to discuss radiation of the soft tissue mass at the T1-5 level previously shown on MRI in 10/2023. Will also repeat PET/CT in 2024.  -10/24/2024: clinically he continues to do well without symptoms associated with lymphoma. MRI TL spines continues to show abnormal epidural enhancement. Lymphoma relapse remains a risk despite recent negative work up. Will order PET/CT.   -11/15: reviewed reports and images of PET/CT thoroughly with the patient and his wife. Overall the PET/CT results did not demonstrate compelling evidence of progression or relapse. Fore example, “Interval increase in metabolic activity within multiple axial and appendicular osseous lesions”, but no CT correlates were present for any of the above FDG avid  bony lesions. In addition, he has no bony symptoms at all. The low FDG avid small lymph nodes have been stable in size, arguing against relapsed FL. These FDG avid lesions (body, small and stable LAD) may thus reflect inflammatory etiology (e.g. sarcoidosis), but less likely FL. Nevertheless, FL cannot be ruled out but will require close surveillance.   -12/19/2024: no new symptoms to suggest relapse or progression. Discussed lab results: ACE level is elevated, monoclonal IgG is 0.2 same as May 2024. Given the negative BMBx in May 2024, the above does not suggest myeloma either. Elevated ACE level is suggestive of sarcoidosis, but that will require further confirmation of biopsy showing non-necrotizing granuloma. Mediastinal and hilar nodes are small, and previous biopsy was not diagnostic. FDG uptakes in the axial bones do not have CT correlates, which may reduce the diagnostic value of the biopsy. Will repeat pelvic bone MRI to guide biopsy. Otherwise, may refer to IR for biopsy of the following abnormal bony signals in the recent PET: right coracoid (maximum SUV 7.9, previously  7.5), right medial clavicle (maximum SUV 12.2, previously 7.3), right  5th rib (maximum SUV 7.5).     Plan 12/19/2024  -MRI pelvis in 4 w to follow up Abnormal FDG uptake n the iliac bones. Need further evaluation to decide on biopsy.   -RTC 4-5w after MRI.       Time spent: 35min, >50% on counseling and care coordination.          Jeff Peters MD PhD

## 2024-12-26 ENCOUNTER — TELEPHONE (OUTPATIENT)
Dept: HEMATOLOGY/ONCOLOGY | Facility: CLINIC | Age: 49
End: 2024-12-26
Payer: COMMERCIAL

## 2024-12-26 ENCOUNTER — TELEPHONE (OUTPATIENT)
Dept: HEMATOLOGY/ONCOLOGY | Facility: HOSPITAL | Age: 49
End: 2024-12-26
Payer: COMMERCIAL

## 2024-12-26 NOTE — TELEPHONE ENCOUNTER
Pt called again, he explained he will need a peer to peer. He needs to use the ultrasound to get his IV in order to use contrast. His insurance company needs proof of medical necessity for the IV or they want him to go to a outsource radiology center. His insurance company should be reaching out, he just wanted to give Dr. Peters a heads up.

## 2024-12-26 NOTE — TELEPHONE ENCOUNTER
Patient calls he scheduled an MRI pelvic at [facility] since they usually cannot get an IV on him.    The insurance is telling him he needs to go to a different facility.  He needs to go to a  facility because they have an ultrasound machine.  he needs an ultrasound to get an IV so he needs to be scheduled.      The insurance is also questioning why he needs the MRI.  He also has testing from another dr that the insurance said was put in different and they approved at [facility].      They want him to go to a free standing facility that is approved under his plan.  ([facility] or [facility])  the phone number for those are [facility] - [phone] and [facility] is [phone] - if he decides to go to another facility he needs a new case number.    Insurance is denying to let him go to [place].  The dr would need to appeal where he can go.    His MRI is on January 10th  Dr. Peters is 1/24.    There needs to be a peer to peer done.  Case number [id number]  Phone [phone] to schedule the peer to peer.      Please advise     Message sent

## 2024-12-26 NOTE — TELEPHONE ENCOUNTER
Pt called back to update that he spoke to [facility] and they no longer have a facility in [place], the closest is in [place].  He called [facility] and they did not answer.

## 2024-12-27 ENCOUNTER — TELEPHONE (OUTPATIENT)
Dept: HEMATOLOGY/ONCOLOGY | Facility: HOSPITAL | Age: 49
End: 2024-12-27
Payer: COMMERCIAL

## 2024-12-30 NOTE — TELEPHONE ENCOUNTER
Pt has issues with vein access for the contrast. He is requesting team do a peer to peer so that insurance will allow him to have scan completed at a facility that has ultrasound capability to find a vein for the contrast.

## 2024-12-30 NOTE — TELEPHONE ENCOUNTER
Pt checking to see if MRI has been approved via Peer to Peer- Pt has vein issues so he needs to have Ultrasound to have IV access.   Pt scheduled 1/10 MRI, with FUV 1/24.

## 2024-12-31 NOTE — TELEPHONE ENCOUNTER
I think his MRI is scheduled at [place] based on his appointment list....Speedy can you call [facility] to see if this is something they can do or what the issue is? Per KW: Please notify pt she had severe osteopenia on her DEXA today. Almost osteoporosis. I see she had normal labs in August and had normal Vit D level, calcium and thyroid screening. Is she taking vit d and getting Calcium in her diet??? She should eat 4-5 servings daily of calcium and if she's not getting that add on a supplement to total daily intake to 1200mg. Definitely repeat dexa in 2 years. Also weight bearing exercises like walking and weights. If not improving, we will need to do meds.     [facility]

## 2025-01-02 NOTE — TELEPHONE ENCOUNTER
Patient calling for update. Notified team working on p2p for tomorrow. Patient requesting an update on outcome when p2p complete

## 2025-01-02 NOTE — TELEPHONE ENCOUNTER
RN called patient - states peer to peer will be completed tomorrow. Duplicate entry. See previous phone note.

## 2025-01-07 ENCOUNTER — TELEPHONE (OUTPATIENT)
Dept: ADMISSION | Facility: HOSPITAL | Age: 50
End: 2025-01-07
Payer: COMMERCIAL

## 2025-01-07 NOTE — TELEPHONE ENCOUNTER
Pt states he thinks he passed a kidney stone today- he had intense pressure with urination, passed small amt bright red blood then urine became clear.   Pt plans to go to ED (either  or [facility] ) after work today, or sooner if he has new onset pain.   Last FUV 12/19, Next FUV 1/24

## 2025-01-10 ENCOUNTER — HOSPITAL ENCOUNTER (OUTPATIENT)
Dept: RADIOLOGY | Facility: HOSPITAL | Age: 50
Discharge: HOME | End: 2025-01-10
Payer: COMMERCIAL

## 2025-01-10 DIAGNOSIS — C85.80 MARGINAL ZONE LYMPHOMA (MULTI): ICD-10-CM

## 2025-01-10 PROCEDURE — A9575 INJ GADOTERATE MEGLUMI 0.1ML: HCPCS | Performed by: INTERNAL MEDICINE

## 2025-01-10 PROCEDURE — 2550000001 HC RX 255 CONTRASTS: Performed by: INTERNAL MEDICINE

## 2025-01-10 PROCEDURE — 72197 MRI PELVIS W/O & W/DYE: CPT

## 2025-01-10 RX ORDER — GADOTERATE MEGLUMINE 376.9 MG/ML
20 INJECTION INTRAVENOUS
Status: COMPLETED | OUTPATIENT
Start: 2025-01-10 | End: 2025-01-10

## 2025-01-10 RX ADMIN — GADOTERATE MEGLUMINE 20 ML: 376.9 INJECTION INTRAVENOUS at 08:19

## 2025-01-17 ENCOUNTER — APPOINTMENT (OUTPATIENT)
Dept: NEUROSURGERY | Facility: CLINIC | Age: 50
End: 2025-01-17
Payer: COMMERCIAL

## 2025-01-21 ENCOUNTER — LAB (OUTPATIENT)
Dept: LAB | Facility: LAB | Age: 50
End: 2025-01-21
Payer: COMMERCIAL

## 2025-01-21 DIAGNOSIS — C85.80 MARGINAL ZONE LYMPHOMA (MULTI): ICD-10-CM

## 2025-01-21 LAB
BASOPHILS # BLD AUTO: 0.09 X10*3/UL (ref 0–0.1)
BASOPHILS NFR BLD AUTO: 1.3 %
EOSINOPHIL # BLD AUTO: 0.24 X10*3/UL (ref 0–0.7)
EOSINOPHIL NFR BLD AUTO: 3.3 %
ERYTHROCYTE [DISTWIDTH] IN BLOOD BY AUTOMATED COUNT: 12.8 % (ref 11.5–14.5)
HCT VFR BLD AUTO: 42.2 % (ref 41–52)
HGB BLD-MCNC: 14.4 G/DL (ref 13.5–17.5)
IMM GRANULOCYTES # BLD AUTO: 0.03 X10*3/UL (ref 0–0.7)
IMM GRANULOCYTES NFR BLD AUTO: 0.4 % (ref 0–0.9)
LYMPHOCYTES # BLD AUTO: 0.84 X10*3/UL (ref 1.2–4.8)
LYMPHOCYTES NFR BLD AUTO: 11.7 %
MCH RBC QN AUTO: 29.3 PG (ref 26–34)
MCHC RBC AUTO-ENTMCNC: 34.1 G/DL (ref 32–36)
MCV RBC AUTO: 86 FL (ref 80–100)
MONOCYTES # BLD AUTO: 0.95 X10*3/UL (ref 0.1–1)
MONOCYTES NFR BLD AUTO: 13.2 %
NEUTROPHILS # BLD AUTO: 5.04 X10*3/UL (ref 1.2–7.7)
NEUTROPHILS NFR BLD AUTO: 70.1 %
NRBC BLD-RTO: 0 /100 WBCS (ref 0–0)
PLATELET # BLD AUTO: 210 X10*3/UL (ref 150–450)
RBC # BLD AUTO: 4.91 X10*6/UL (ref 4.5–5.9)
WBC # BLD AUTO: 7.2 X10*3/UL (ref 4.4–11.3)

## 2025-01-21 PROCEDURE — 80053 COMPREHEN METABOLIC PANEL: CPT

## 2025-01-21 PROCEDURE — 83615 LACTATE (LD) (LDH) ENZYME: CPT

## 2025-01-21 PROCEDURE — 85025 COMPLETE CBC W/AUTO DIFF WBC: CPT

## 2025-01-22 LAB
ALBUMIN SERPL BCP-MCNC: 4.8 G/DL (ref 3.4–5)
ALP SERPL-CCNC: 89 U/L (ref 33–120)
ALT SERPL W P-5'-P-CCNC: 28 U/L (ref 10–52)
ANION GAP SERPL CALC-SCNC: 13 MMOL/L (ref 10–20)
AST SERPL W P-5'-P-CCNC: 24 U/L (ref 9–39)
BILIRUB SERPL-MCNC: 0.5 MG/DL (ref 0–1.2)
BUN SERPL-MCNC: 19 MG/DL (ref 6–23)
CALCIUM SERPL-MCNC: 9.6 MG/DL (ref 8.6–10.3)
CHLORIDE SERPL-SCNC: 102 MMOL/L (ref 98–107)
CO2 SERPL-SCNC: 25 MMOL/L (ref 21–32)
CREAT SERPL-MCNC: 0.98 MG/DL (ref 0.5–1.3)
EGFRCR SERPLBLD CKD-EPI 2021: >90 ML/MIN/1.73M*2
GLUCOSE SERPL-MCNC: 102 MG/DL (ref 74–99)
LDH SERPL L TO P-CCNC: 192 U/L (ref 84–246)
POTASSIUM SERPL-SCNC: 4.4 MMOL/L (ref 3.5–5.3)
PROT SERPL-MCNC: 6.9 G/DL (ref 6.4–8.2)
SODIUM SERPL-SCNC: 136 MMOL/L (ref 136–145)

## 2025-01-23 ENCOUNTER — APPOINTMENT (OUTPATIENT)
Dept: NEUROSURGERY | Facility: CLINIC | Age: 50
End: 2025-01-23
Payer: COMMERCIAL

## 2025-01-23 VITALS
WEIGHT: 250 LBS | BODY MASS INDEX: 37.89 KG/M2 | HEIGHT: 68 IN | SYSTOLIC BLOOD PRESSURE: 110 MMHG | DIASTOLIC BLOOD PRESSURE: 70 MMHG | HEART RATE: 80 BPM

## 2025-01-23 DIAGNOSIS — D49.2 THORACIC SPINE TUMOR: Primary | ICD-10-CM

## 2025-01-23 PROCEDURE — 1036F TOBACCO NON-USER: CPT | Performed by: STUDENT IN AN ORGANIZED HEALTH CARE EDUCATION/TRAINING PROGRAM

## 2025-01-23 PROCEDURE — 99214 OFFICE O/P EST MOD 30 MIN: CPT | Performed by: STUDENT IN AN ORGANIZED HEALTH CARE EDUCATION/TRAINING PROGRAM

## 2025-01-23 PROCEDURE — 3008F BODY MASS INDEX DOCD: CPT | Performed by: STUDENT IN AN ORGANIZED HEALTH CARE EDUCATION/TRAINING PROGRAM

## 2025-01-23 ASSESSMENT — ENCOUNTER SYMPTOMS
DEPRESSION: 0
LOSS OF SENSATION IN FEET: 1
OCCASIONAL FEELINGS OF UNSTEADINESS: 0

## 2025-01-23 NOTE — PROGRESS NOTES
Dann Ni is a 49 y.o. year old male s/p Laminectomy Thoracic with Excision Lesion is here for a 6 months follow up.    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    Loss of smell    Cough variant asthma ([facility]) 04/02/2019    Cough variant asthma    Diverticulitis 09/25/2023    Foot pain 09/25/2023    Other conditions influencing health status     No significant past surgical history    Other conditions influencing health status     No significant past medical history    Personal history of other diseases of the respiratory system 04/14/2019    History of bronchitis    Personal history of other drug therapy 10/23/2019    History of influenza vaccination    Personal history of other specified conditions 03/30/2019    History of persistent cough    Recurrent pneumonia 09/25/2023    Tendonitis 09/25/2023       Past Surgical History:   Procedure Laterality Date    OTHER SURGICAL HISTORY  04/12/2019    No history of surgery           Current Outpatient Medications:     albuterol 90 mcg/actuation inhaler, Inhale 2 puffs every 6 hours if needed for shortness of breath or wheezing., Disp: , Rfl:     budesonide-formoteroL (Symbicort) 160-4.5 mcg/actuation inhaler, Inhale 2 puffs 2 times a day., Disp: , Rfl:     zolpidem (Ambien) 10 mg tablet, Take 1 tablet (10 mg) by mouth as needed at bedtime for sleep., Disp: 30 tablet, Rfl: 1      There were no vitals filed for this visit.  Objective   General: Well developed, awake/alert/oriented x3, no distress, alert and cooperative  Skin: Warm and dry, no lesions, no rashes  ENMT: Mucous membranes moist, no apparent injury, no lesions seen  Head/Neck: Neck Supple, no apparent injury  Respiratory/Thorax: Normal breath sounds with good chest expansion, thorax symmetric  Cardiovascular: No pitting edema, no JVD    Motor Strength: 5/5 Throughout all  extremities    Muscle Bulk: Normal and symmetric in all extremities    Posture:   -- Cervical: Normal  -- Thoracic: Normal  -- Lumbar : Normal  Paraspinal muscle spasm/tenderness absent.     Sensation: intact to light touch     Relevant Results    MRI C/T/L spine (04/2024): without any new lesions, stable enhancement of T1-4    Problem List Items Addressed This Visit    None         Assessment/Plan       Mr. Dann Ni is a very nice 48 y.o. year old male S/P Laminectomy Thoracic with Excision Lesion presenting for a 6 months follow up. He is improving well. His pre-surgical symptoms are improved drastically. He was using wheel chair before surgery but currently he is able to walk without support. He mentions numbness around his abdomen which has been present from before the surgery and is improving month by month His incision is healing well without any erythema or induration or discharge.     I have informed him that the numbness around the abdomen a may or may not persist forever. In surgical point of view, he is okay to go back to his work whenever he is ready to go.    He will continue with his physical therapy.    He will follow up with me in 6 months at his 1 year Daron.      Yesenia Marin MD    of Neurosurgery   [facility]   [facility]   Office: [phone]  Fax: [phone]      Scribe Attestation  By signing my name below, IRylee , Scribamparo   attest that this documentation has been prepared under the direction and in the presence of Yesenia Marin MD.

## 2025-01-23 NOTE — PROGRESS NOTES
Dann Ni is a 49 y.o. year old male s/p Laminectomy Thoracic with Excision Lesion is here for a 6 months follow up.    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    Loss of smell    Cough variant asthma ([facility]) 04/02/2019    Cough variant asthma    Diverticulitis 09/25/2023    Foot pain 09/25/2023    Other conditions influencing health status     No significant past surgical history    Other conditions influencing health status     No significant past medical history    Personal history of other diseases of the respiratory system 04/14/2019    History of bronchitis    Personal history of other drug therapy 10/23/2019    History of influenza vaccination    Personal history of other specified conditions 03/30/2019    History of persistent cough    Recurrent pneumonia 09/25/2023    Tendonitis 09/25/2023       Past Surgical History:   Procedure Laterality Date    OTHER SURGICAL HISTORY  04/12/2019    No history of surgery           Current Outpatient Medications:     albuterol 90 mcg/actuation inhaler, Inhale 2 puffs every 6 hours if needed for shortness of breath or wheezing., Disp: , Rfl:     budesonide-formoteroL (Symbicort) 160-4.5 mcg/actuation inhaler, Inhale 2 puffs 2 times a day., Disp: , Rfl:     zolpidem (Ambien) 10 mg tablet, Take 1 tablet (10 mg) by mouth as needed at bedtime for sleep., Disp: 30 tablet, Rfl: 1      Vitals:    01/23/25 0827   BP: 110/70   Pulse: 80     Objective   General: Well developed, awake/alert/oriented x3, no distress, alert and cooperative  Skin: Warm and dry, no lesions, no rashes  ENMT: Mucous membranes moist, no apparent injury, no lesions seen  Head/Neck: Neck Supple, no apparent injury  Respiratory/Thorax: Normal breath sounds with good chest expansion, thorax symmetric  Cardiovascular: No pitting edema, no JVD    Motor Strength: 5/5 Throughout all  extremities    Muscle Bulk: Normal and symmetric in all extremities    Posture:   -- Cervical: Normal  -- Thoracic: Normal  -- Lumbar : Normal  Paraspinal muscle spasm/tenderness absent.     Sensation: intact to light touch     Relevant Results    MRI C/T/L spine (04/2024): without any new lesions, stable enhancement of T1-4    Problem List Items Addressed This Visit    None         Assessment/Plan       Mr. Dann Ni is a very nice 49 y.o. year old male S/P Laminectomy Thoracic with Excision Lesion presenting for a 6 months follow up. He continues to do well. His pre-surgical symptoms are improved drastically. He continues to walk unassisted. He has returned to work.     He has graduated from needing to see me. I am happy to see him back on an as needed basis.       Yesenia Marin MD    of Neurosurgery   [facility]   [facility]   Office: [phone]  Fax: [phone]      Scribe Attestation  By signing my name below, Rylee NEWMAN Scribe   attest that this documentation has been prepared under the direction and in the presence of Yesenia Marin MD.      Scribe Attestation  By signing my name below, Maia NEWMAN Scribe   attest that this documentation has been prepared under the direction and in the presence of Yesenia Marin MD.

## 2025-01-24 ENCOUNTER — OFFICE VISIT (OUTPATIENT)
Dept: HEMATOLOGY/ONCOLOGY | Facility: HOSPITAL | Age: 50
End: 2025-01-24
Payer: COMMERCIAL

## 2025-01-24 VITALS
TEMPERATURE: 97.9 F | DIASTOLIC BLOOD PRESSURE: 80 MMHG | HEART RATE: 89 BPM | BODY MASS INDEX: 39.09 KG/M2 | RESPIRATION RATE: 18 BRPM | WEIGHT: 257.1 LBS | OXYGEN SATURATION: 100 % | SYSTOLIC BLOOD PRESSURE: 121 MMHG

## 2025-01-24 DIAGNOSIS — C85.80 MARGINAL ZONE LYMPHOMA (MULTI): ICD-10-CM

## 2025-01-24 PROCEDURE — 99214 OFFICE O/P EST MOD 30 MIN: CPT | Performed by: INTERNAL MEDICINE

## 2025-01-24 ASSESSMENT — PAIN SCALES - GENERAL: PAINLEVEL_OUTOF10: 0-NO PAIN

## 2025-01-24 NOTE — PROGRESS NOTES
Patient ID: Dann Ni is a 49 y.o. male.  Referring Physician: Jeff Peters MD PhD  [address] [place] [address]  Primary Care Provider: DO Leela Watson    The patient was in good health until July 2023. He developed back pain which did not respond to NSAID or steroid. By late 9/2023 he lost sensation below the diaphragm and were unable to walk. On 9/30 he had CT scan showing a 4.1x1.8x1cm paraspinal mass at the T7 level. MRI showed abnormal enhancing signals at the C5, T2-8 levels.  On 10/2/2023 he had a debulking surgery which later showed evidence of lymphoma. In the meantime he has improved sensation and is able to walk with a cane. 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 He was consented for rituximab bendamustine and started C1 on 11/30/2023. Tolerated relatively well, but had nausea and flu like symptoms for a few days after chemo. Since then he also completed C2 R stacey on 12/28, followed by C3 on Jan 25, C4 on Feb 22, C5D1 on 3/21, and c6d1 on 4/18. Intrathecal treatment #1 on 2/19, then on 3/18/2024. HE had a PET/CT 5/10/2024 showing persistent FDG avid lesions. Subsequently had EBUS biopsy of a subcarinal mass and Bone marrow bx on 5/31 and 5/23/2024, respectively. There was no evidence of lymphoma in these studies.     Today the patient says he feels well. Walking continues to improve. Back pain is resolved. No bone or hip pain. No fever, wt loss, or nausea. He is back to work with some accomodation.            Objective   BSA: 2.37 meters squared  /80 (BP Location: Left arm, Patient Position: Sitting, BP Cuff Size: Adult)   Pulse 89   Temp 36.6 °C (97.9 °F) (Temporal)   Resp 18   Wt 117 kg (257 lb 1.6 oz)   SpO2 100%   BMI 39.09 kg/m²     Family History   Problem Relation Name Age of Onset    Hip dysplasia Mother [place]     Arthritis Mother [place]     Other (htn) Father      Prostatitis Father      Lung cancer Maternal Grandmother       Kidney cancer Maternal Grandmother      Lung cancer Maternal Grandfather      Kidney cancer Maternal Grandfather      Dementia Other      Cancer Other      Dementia Other       Oncology History   Marginal zone lymphoma (Multi)   10/2/2023 Cancer Staged    Staging form: Hodgkin and Non-Hodgkin Lymphoma, AJCC 8th Edition, Clinical stage from 10/2/2023: Stage IV (Marginal zone lymphoma) - Signed by Jeff Peters MD PhD on 2023 Initial Diagnosis    Presentation: (2023): Back pain, loss of sensation below the diaphragm, gait disturbance    Imaging:  - CT (23): 4.1x1.8x1cm paraspinal mass at the T7 level  - MRI: showed abnormal enhancing signals at the C5, T2-8 levels.     Surgery:  - 10/2/2023 he had a debulking surgery which later showed evidence of lymphoma. In the meantime he has improved sensation and is able to walk with a cane.     - Work up including PET/CT results consistent with stage IV MZL.      2023 -  Chemotherapy    Tolerated relatively well, but had nausea and flu like symptoms for a few days after chemo. Since then he also completed C2 R stacey on , followed by C3 on , C4 on , C5D1 on 3/21, and c6d1 on .     Intrathecal treatment #1 on , then on 3/18/2024.   Bendamustine + RiTUXimab, 28 Day Cycles     2024 Imaging    PET/CT 2024 showing persistent FDG avid lesions.      2024 Biopsy    Bone marrow bx 2024 -- no evidence of lymphoma     2024 Biopsy    EBUS biopsy of a subcarinal mass; There was no evidence of lymphoma      2024 Imaging    CT Chest w contrast (24): 2024: reviewed CT of , showin.  Numerous lung nodules bilaterally measuring up to 5 mm in size, similar to the prior in size and number. 2. Redemonstration of multiple enlarged mediastinal lymph nodes, also unchanged from the prior.   -It is likely, but not proven, that the lung nodules and small mediastinal lymph nodes are reactive to  inflammation. However, the exact source of inflammation is not identifiable.         Dann Ni  reports that he has never smoked. He has never used smokeless tobacco.  He  reports that he does not currently use alcohol.  He  reports no history of drug use.    EXAM: No LAD. No splenomegaly. No skin lesions. Muscle power preserved in the UE but reduced in LLE; walk without assistance in slow pace; upper back surgical scar healed.  Other details below.  5/10: walk independently with a very mild limp.     Physical Exam  Constitutional:       General: He is not in acute distress.     Appearance: He is not toxic-appearing.   HENT:      Head: Normocephalic.      Nose: Nose normal.      Mouth/Throat:      Mouth: Mucous membranes are moist.   Eyes:      Extraocular Movements: Extraocular movements intact.      Pupils: Pupils are equal, round, and reactive to light.   Cardiovascular:      Rate and Rhythm: Normal rate and regular rhythm.      Heart sounds: No murmur heard.  Pulmonary:      Effort: Pulmonary effort is normal.      Breath sounds: Normal breath sounds.   Abdominal:      General: Bowel sounds are normal.      Palpations: Abdomen is soft. There is no mass.      Tenderness: There is no abdominal tenderness. There is no rebound.   Musculoskeletal:         General: No swelling, tenderness, deformity or signs of injury.      Right lower leg: No edema.      Left lower leg: No edema.   Skin:     Coloration: Skin is not jaundiced.      Findings: No bruising, lesion or rash.   Neurological:      Mental Status: He is alert and oriented to person, place, and time.      Cranial Nerves: No cranial nerve deficit.      Motor: Weakness present.      Gait: Gait abnormal.   Psychiatric:         Mood and Affect: Mood normal.         Performance Status:  Symptomatic; fully ambulatory    Assessment/Plan      #EMZL  -Reviewed PET/CT and MRI with the patient and wife. The results are consistent with stage IV disease, involving LN  above and below the diaphragm, extensively axial and appendicular skeleton including bilateral scapulas, right clavicular head, T5 vertebral body, bilateral iliac bones, left ischium, and left femur without definite anatomic correlate (max SUV 7.6).  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   -Current data support treatment using multi-agent chemo. Bendamustine and rituximab have been approved and widely used, found to be safe and effective.   -Patient was disappointed to learn that EMZL such as his may not be curable. Was relieved to know that the treatment can bring multiple years of remission, improvement of strength / quality of life / work experience.   -Discussed the Aes of r-anum, which are numerous, including SAEs such as infection, or even death. Most AE and DUSTIN however can be resolved with effective treatments. He consented for R-anum.   -Due to the location of the disease and bone marrow uptake, he may be at high risk of CNS involvement / relapse. Schedule head CT and intrathecal treatment, timing likely after 2 cycles of R-ANUM. He consented for IT Methotrexate and cytarabine. First IT on 2/19/2024. CSF shows no lymphoma. Next IT on 3/18 tentatively.   -In addition, consolidation using XRT will be discussed in the future.   -5/10/2024: PET/CT results at [facility] were reviewed. Unfortunately, there are a number of residual FDG avid signals involving bones and perihilar nodes. These raise the question of treatment failure, or possible large cell transformation. Will obtain BMBx and additional biopsy, including possibly:   **a subcarinal lymph node has a maximum SUV of 11.3 previously measuring up to 14.9 and a conglomerate of right paratracheal lymph nodes has a maximum  SUV of 8.9 previously measuring up to 14.9. (by interventional pulm)  **proximal left humerus there is a lytic lesion with maximum SUV of 7.5 SUV previously  measuring up to 3.6 SUV. (By IR)  -2024: discussed the results below:   bmbx: A, B & C. BONE MARROW CLOT WITH ASPIRATE AND CORE WITH TOUCH PREP, LEFT ILIAC CREST:   -- NORMOCELLULAR BONE MARROW (40%) WITH MATURING TRILINEAGE HEMATOPOESIS AND NO MORPHOLOGIC EVIDENCE OF LYMPHOMA.  : A. LYMPH NODE,  LEVEL 7; BIOPSY:      -- Non-diagnostic rare bronchial epithelium and blood.  Those negative results and his clinical improvement do not support relapse or refractoriness. Will monitor closely. CT Chest on .   -2024: reviewed CT of , showin.  Numerous lung nodules bilaterally measuring up to 5 mm in size, similar to the prior in size and number. 2. Redemonstration of multiple enlarged mediastinal lymph nodes, also unchanged from the prior.   -It is likely, but not proven, that the lung nodules and small mediastinal lymph nodes are reactive to inflammation. However, the exact source of inflammation is not identifiable. He has no evidence of Pneumonia. In the past ACE level was found to be Wnl, arguing against (but does not rule out) sarcoidosis.   -Given the above consideration, disease progression/refractoriness is questionable. Will therefore refer back to [facility] to discuss radiation of the soft tissue mass at the T1-5 level previously shown on MRI in 10/2023. Will also repeat PET/CT in 2024.  -10/24/2024: clinically he continues to do well without symptoms associated with lymphoma. MRI TL spines continues to show abnormal epidural enhancement. Lymphoma relapse remains a risk despite recent negative work up. Will order PET/CT.   -11/15: reviewed reports and images of PET/CT thoroughly with the patient and his wife. Overall the PET/CT results did not demonstrate compelling evidence of progression or relapse. Fore example, “Interval increase in metabolic activity within multiple axial and appendicular osseous lesions”, but no CT correlates were present for any of the above FDG avid bony lesions.  In addition, he has no bony symptoms at all. The low FDG avid small lymph nodes have been stable in size, arguing against relapsed FL. These FDG avid lesions (body, small and stable LAD) may thus reflect inflammatory etiology (e.g. sarcoidosis), but less likely FL. Nevertheless, FL cannot be ruled out but will require close surveillance.   -12/19/2024: no new symptoms to suggest relapse or progression. Discussed lab results: ACE level is elevated, monoclonal IgG is 0.2 same as May 2024. Given the negative BMBx in May 2024, the above does not suggest myeloma either. Elevated ACE level is suggestive of sarcoidosis, but that will require further confirmation of biopsy showing non-necrotizing granuloma. Mediastinal and hilar nodes are small, and previous biopsy was not diagnostic. FDG uptakes in the axial bones do not have CT correlates, which may reduce the diagnostic value of the biopsy. Will repeat pelvic bone MRI to guide biopsy. Otherwise, may refer to IR for biopsy of the following abnormal bony signals in the recent PET: right coracoid (maximum SUV 7.9, previously  7.5), right medial clavicle (maximum SUV 12.2, previously 7.3), right  5th rib (maximum SUV 7.5).     Plan 1/24/2025  -Remains asymptomatic.   -MRI shows abnormal enhancement in the pelvic bone.   -Will refer to IR for STAT biopsy, most likely right posterior iliac bone as suggest in MRI report. This does correlate with PET in 11/2024.   -RTC 4w.    12/19/2024  -MRI pelvis in 4 w to follow up Abnormal FDG uptake n the iliac bones. Need further evaluation to decide on biopsy.   -RTC 4-5w after MRI.       Time spent: 35min, >50% on counseling and care coordination.          Jeff Peters MD PhD

## 2025-02-07 ENCOUNTER — HOSPITAL ENCOUNTER (OUTPATIENT)
Dept: RADIOLOGY | Facility: HOSPITAL | Age: 50
Discharge: HOME | End: 2025-02-07
Payer: COMMERCIAL

## 2025-02-07 VITALS
SYSTOLIC BLOOD PRESSURE: 120 MMHG | RESPIRATION RATE: 16 BRPM | OXYGEN SATURATION: 96 % | WEIGHT: 246 LBS | HEART RATE: 72 BPM | DIASTOLIC BLOOD PRESSURE: 74 MMHG | TEMPERATURE: 99.9 F | BODY MASS INDEX: 37.28 KG/M2 | HEIGHT: 68 IN

## 2025-02-07 DIAGNOSIS — C85.80 MARGINAL ZONE LYMPHOMA (MULTI): ICD-10-CM

## 2025-02-07 LAB
INR PPP: 1 (ref 0.9–1.1)
PROTHROMBIN TIME: 11.7 SECONDS (ref 9.8–12.8)

## 2025-02-07 PROCEDURE — 7100000009 HC PHASE TWO TIME - INITIAL BASE CHARGE

## 2025-02-07 PROCEDURE — 77012 CT SCAN FOR NEEDLE BIOPSY: CPT

## 2025-02-07 PROCEDURE — 99152 MOD SED SAME PHYS/QHP 5/>YRS: CPT | Performed by: RADIOLOGY

## 2025-02-07 PROCEDURE — 85610 PROTHROMBIN TIME: CPT | Performed by: RADIOLOGY

## 2025-02-07 PROCEDURE — 99152 MOD SED SAME PHYS/QHP 5/>YRS: CPT

## 2025-02-07 PROCEDURE — 36415 COLL VENOUS BLD VENIPUNCTURE: CPT | Performed by: RADIOLOGY

## 2025-02-07 PROCEDURE — 7100000010 HC PHASE TWO TIME - EACH INCREMENTAL 1 MINUTE

## 2025-02-07 PROCEDURE — 2500000004 HC RX 250 GENERAL PHARMACY W/ HCPCS (ALT 636 FOR OP/ED): Performed by: RADIOLOGY

## 2025-02-07 PROCEDURE — 2500000005 HC RX 250 GENERAL PHARMACY W/O HCPCS: Performed by: RADIOLOGY

## 2025-02-07 PROCEDURE — 2720000007 HC OR 272 NO HCPCS

## 2025-02-07 PROCEDURE — C1751 CATH, INF, PER/CENT/MIDLINE: HCPCS

## 2025-02-07 RX ORDER — LIDOCAINE HYDROCHLORIDE 10 MG/ML
INJECTION, SOLUTION EPIDURAL; INFILTRATION; INTRACAUDAL; PERINEURAL
Status: COMPLETED | OUTPATIENT
Start: 2025-02-07 | End: 2025-02-07

## 2025-02-07 RX ORDER — MIDAZOLAM HYDROCHLORIDE 1 MG/ML
INJECTION, SOLUTION INTRAMUSCULAR; INTRAVENOUS
Status: COMPLETED | OUTPATIENT
Start: 2025-02-07 | End: 2025-02-07

## 2025-02-07 RX ORDER — FENTANYL CITRATE 50 UG/ML
INJECTION, SOLUTION INTRAMUSCULAR; INTRAVENOUS
Status: COMPLETED | OUTPATIENT
Start: 2025-02-07 | End: 2025-02-07

## 2025-02-07 RX ADMIN — Medication 3 L/MIN: at 11:17

## 2025-02-07 RX ADMIN — LIDOCAINE HYDROCHLORIDE 10 ML: 10 INJECTION, SOLUTION EPIDURAL; INFILTRATION; INTRACAUDAL; PERINEURAL at 11:29

## 2025-02-07 RX ADMIN — FENTANYL CITRATE 50 MCG: 50 INJECTION, SOLUTION INTRAMUSCULAR; INTRAVENOUS at 11:29

## 2025-02-07 RX ADMIN — MIDAZOLAM 2 MG: 1 INJECTION INTRAMUSCULAR; INTRAVENOUS at 11:19

## 2025-02-07 RX ADMIN — MIDAZOLAM 1 MG: 1 INJECTION INTRAMUSCULAR; INTRAVENOUS at 11:29

## 2025-02-07 RX ADMIN — FENTANYL CITRATE 50 MCG: 50 INJECTION, SOLUTION INTRAMUSCULAR; INTRAVENOUS at 11:19

## 2025-02-07 ASSESSMENT — PAIN SCALES - GENERAL
PAINLEVEL_OUTOF10: 0 - NO PAIN

## 2025-02-07 ASSESSMENT — PAIN - FUNCTIONAL ASSESSMENT: PAIN_FUNCTIONAL_ASSESSMENT: 0-10

## 2025-02-07 NOTE — Clinical Note
Right posterior iliac bone biopsy completed. Sample sent in formalin for surgical pathology and in RPMI for flow cytometry. Right posterior hip biopsy puncture approximated with derma bond. Pt tolerated procedure well.

## 2025-02-07 NOTE — PRE-PROCEDURE NOTE
Interventional Radiology Preprocedure Note    Indication for procedure: The encounter diagnosis was Marginal zone lymphoma (Multi).    Relevant review of systems: NA    Relevant Labs:   Lab Results   Component Value Date    CREATININE 0.98 01/21/2025    EGFR >90 01/21/2025    INR 1.0 02/07/2025    PROTIME 11.7 02/07/2025       Planned Sedation/Anesthesia: Moderate    Airway assessment: normal    Directed physical examination:    Awake and alert, oriented  No acute distress  Regular rate, rhythm  Breathing non-labored    Mallampati: II (hard and soft palate, upper portion of tonsils and uvula visible)    ASA Score: ASA 2 - Patient with mild systemic disease with no functional limitations    Benefits, risks and alternatives of procedure and planned sedation have been discussed with the patient and/or their representative. All questions answered and they agree to proceed.

## 2025-02-07 NOTE — POST-PROCEDURE NOTE
Interventional Radiology Brief Postprocedure Note    Attending: Óscar Bucio MD    Assistant: none    Diagnosis:   1. Marginal zone lymphoma (Multi)  CT guided percutaneous biopsy bone deep    CT guided percutaneous biopsy bone deep    Surgical Pathology Exam    Surgical Pathology Exam    Flow Cytometry Test    Flow Cytometry Test          Description of procedure: CT guided right posterior iliac bone lesion core biopsy     Anesthesia:  MAC Moderate    Complications: None    Estimated Blood Loss: minimal    Medications (Filter: Administrations occurring from 1100 to 1143 on 02/07/25) As of 02/07/25 1143      oxygen (O2) therapy (L/min) Total volume:  Not documented*   *Total volume has not been documented. View each administration to see the amount administered.     Date/Time Rate/Dose/Volume Action       02/07/25  1117 3 L/min - 180,000 mL/hr New Bag               midazolam (Versed) injection (mg) Total dose:  3 mg      Date/Time Rate/Dose/Volume Action       02/07/25  1119 2 mg Given      1129 1 mg Given               fentaNYL PF (Sublimaze) injection (mcg) Total dose:  100 mcg      Date/Time Rate/Dose/Volume Action       02/07/25  1119 50 mcg Given      1129 50 mcg Given               lidocaine PF (Xylocaine) 10 mg/mL (1 %) injection (mL) Total volume:  10 mL      Date/Time Rate/Dose/Volume Action       02/07/25  1129 10 mL Given                   ID Type Source Tests Collected by Time   1 : Right Iliac Bone Biopsy Tissue BONE BIOPSY (DECAL) SURGICAL PATHOLOGY EXAM Óscar Bucio MD 2/7/2025 1121   2 : Right Iliac Bone Biopsy Tissue BONE BIOPSY (DECAL) FLOW CYTOMETRY TEST Óscar Bucio MD 2/7/2025 1122         See detailed result report with images in PACS.    The patient tolerated the procedure well without incident or complication and is in stable condition.

## 2025-02-14 LAB
CELL COUNT (BLOOD): 0.03 X10*3/UL
CELL POPULATIONS: NORMAL
DIAGNOSIS: NORMAL
FLOW DIFFERENTIAL: NORMAL
FLOW TEST ORDERED: NORMAL
LAB TEST METHOD: NORMAL
LABORATORY COMMENT REPORT: NORMAL
NUMBER OF CELLS COLLECTED: NORMAL
PATH REPORT.FINAL DX SPEC: NORMAL
PATH REPORT.GROSS SPEC: NORMAL
PATH REPORT.RELEVANT HX SPEC: NORMAL
PATH REPORT.TOTAL CANCER: NORMAL
PATH REPORT.TOTAL CANCER: NORMAL
SIGNATURE COMMENT: NORMAL
SPECIMEN VIABILITY: NORMAL

## 2025-02-19 ENCOUNTER — LAB (OUTPATIENT)
Dept: LAB | Facility: HOSPITAL | Age: 50
End: 2025-02-19
Payer: COMMERCIAL

## 2025-02-19 DIAGNOSIS — C85.80 MARGINAL ZONE LYMPHOMA (MULTI): ICD-10-CM

## 2025-02-19 LAB
ALBUMIN SERPL BCP-MCNC: 4.6 G/DL (ref 3.4–5)
ALP SERPL-CCNC: 94 U/L (ref 33–120)
ALT SERPL W P-5'-P-CCNC: 23 U/L (ref 10–52)
ANION GAP SERPL CALC-SCNC: 14 MMOL/L (ref 10–20)
AST SERPL W P-5'-P-CCNC: 21 U/L (ref 9–39)
BASOPHILS # BLD AUTO: 0.05 X10*3/UL (ref 0–0.1)
BASOPHILS NFR BLD AUTO: 0.8 %
BILIRUB SERPL-MCNC: 0.6 MG/DL (ref 0–1.2)
BUN SERPL-MCNC: 18 MG/DL (ref 6–23)
CALCIUM SERPL-MCNC: 9.4 MG/DL (ref 8.6–10.6)
CHLORIDE SERPL-SCNC: 104 MMOL/L (ref 98–107)
CO2 SERPL-SCNC: 27 MMOL/L (ref 21–32)
CREAT SERPL-MCNC: 0.97 MG/DL (ref 0.5–1.3)
EGFRCR SERPLBLD CKD-EPI 2021: >90 ML/MIN/1.73M*2
EOSINOPHIL # BLD AUTO: 0.2 X10*3/UL (ref 0–0.7)
EOSINOPHIL NFR BLD AUTO: 3.3 %
ERYTHROCYTE [DISTWIDTH] IN BLOOD BY AUTOMATED COUNT: 13 % (ref 11.5–14.5)
GLUCOSE SERPL-MCNC: 110 MG/DL (ref 74–99)
HCT VFR BLD AUTO: 43 % (ref 41–52)
HGB BLD-MCNC: 14.2 G/DL (ref 13.5–17.5)
HOLD SPECIMEN: NORMAL
IMM GRANULOCYTES # BLD AUTO: 0.02 X10*3/UL (ref 0–0.7)
IMM GRANULOCYTES NFR BLD AUTO: 0.3 % (ref 0–0.9)
LDH SERPL L TO P-CCNC: 138 U/L (ref 84–246)
LYMPHOCYTES # BLD AUTO: 0.6 X10*3/UL (ref 1.2–4.8)
LYMPHOCYTES NFR BLD AUTO: 9.9 %
MCH RBC QN AUTO: 28.6 PG (ref 26–34)
MCHC RBC AUTO-ENTMCNC: 33 G/DL (ref 32–36)
MCV RBC AUTO: 87 FL (ref 80–100)
MONOCYTES # BLD AUTO: 0.63 X10*3/UL (ref 0.1–1)
MONOCYTES NFR BLD AUTO: 10.4 %
NEUTROPHILS # BLD AUTO: 4.56 X10*3/UL (ref 1.2–7.7)
NEUTROPHILS NFR BLD AUTO: 75.3 %
NRBC BLD-RTO: 0 /100 WBCS (ref 0–0)
PLATELET # BLD AUTO: 234 X10*3/UL (ref 150–450)
POTASSIUM SERPL-SCNC: 4.1 MMOL/L (ref 3.5–5.3)
PROT SERPL-MCNC: 6.8 G/DL (ref 6.4–8.2)
RBC # BLD AUTO: 4.96 X10*6/UL (ref 4.5–5.9)
SODIUM SERPL-SCNC: 141 MMOL/L (ref 136–145)
WBC # BLD AUTO: 6.1 X10*3/UL (ref 4.4–11.3)

## 2025-02-19 PROCEDURE — 85025 COMPLETE CBC W/AUTO DIFF WBC: CPT

## 2025-02-19 PROCEDURE — 83615 LACTATE (LD) (LDH) ENZYME: CPT

## 2025-02-19 PROCEDURE — 80053 COMPREHEN METABOLIC PANEL: CPT

## 2025-02-21 ENCOUNTER — HOSPITAL ENCOUNTER (OUTPATIENT)
Dept: RADIOLOGY | Facility: HOSPITAL | Age: 50
Discharge: HOME | End: 2025-02-21
Payer: COMMERCIAL

## 2025-02-21 ENCOUNTER — OFFICE VISIT (OUTPATIENT)
Dept: HEMATOLOGY/ONCOLOGY | Facility: HOSPITAL | Age: 50
End: 2025-02-21
Payer: COMMERCIAL

## 2025-02-21 VITALS
WEIGHT: 249.6 LBS | TEMPERATURE: 97.9 F | OXYGEN SATURATION: 99 % | SYSTOLIC BLOOD PRESSURE: 113 MMHG | DIASTOLIC BLOOD PRESSURE: 82 MMHG | HEART RATE: 71 BPM | BODY MASS INDEX: 37.95 KG/M2

## 2025-02-21 DIAGNOSIS — C85.80 MARGINAL ZONE LYMPHOMA (MULTI): ICD-10-CM

## 2025-02-21 PROCEDURE — 2550000001 HC RX 255 CONTRASTS: Performed by: RADIOLOGY

## 2025-02-21 PROCEDURE — A9575 INJ GADOTERATE MEGLUMI 0.1ML: HCPCS | Performed by: RADIOLOGY

## 2025-02-21 PROCEDURE — 72157 MRI CHEST SPINE W/O & W/DYE: CPT

## 2025-02-21 PROCEDURE — 99214 OFFICE O/P EST MOD 30 MIN: CPT | Performed by: INTERNAL MEDICINE

## 2025-02-21 PROCEDURE — 72158 MRI LUMBAR SPINE W/O & W/DYE: CPT

## 2025-02-21 RX ORDER — GADOTERATE MEGLUMINE 376.9 MG/ML
23 INJECTION INTRAVENOUS
Status: COMPLETED | OUTPATIENT
Start: 2025-02-21 | End: 2025-02-21

## 2025-02-21 RX ADMIN — GADOTERATE MEGLUMINE 23 ML: 376.9 INJECTION INTRAVENOUS at 16:30

## 2025-02-21 ASSESSMENT — PAIN SCALES - GENERAL: PAINLEVEL_OUTOF10: 0-NO PAIN

## 2025-02-21 NOTE — PROGRESS NOTES
Patient ID: Dann Ni is a 49 y.o. male.  Referring Physician: Jeff Peters MD PhD  [address] [place] [address]  Primary Care Provider: DO Leela Watson    The patient was in good health until July 2023. He developed back pain which did not respond to NSAID or steroid. By late 9/2023 he lost sensation below the diaphragm and were unable to walk. On 9/30 he had CT scan showing a 4.1x1.8x1cm paraspinal mass at the T7 level. MRI showed abnormal enhancing signals at the C5, T2-8 levels.  On 10/2/2023 he had a debulking surgery which later showed evidence of lymphoma. In the meantime he has improved sensation and is able to walk with a cane. 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 He was consented for rituximab bendamustine and started C1 on 11/30/2023. Tolerated relatively well, but had nausea and flu like symptoms for a few days after chemo. Since then he also completed C2 R stacey on 12/28, followed by C3 on Jan 25, C4 on Feb 22, C5D1 on 3/21, and c6d1 on 4/18. Intrathecal treatment #1 on 2/19, then on 3/18/2024. HE had a PET/CT 5/10/2024 showing persistent FDG avid lesions. Subsequently had EBUS biopsy of a subcarinal mass and Bone marrow bx on 5/31 and 5/23/2024, respectively. There was no evidence of lymphoma in these studies.     Today the patient says he feels well. Walking continues to improve. Back pain is resolved. No bone or hip pain. No fever, wt loss, or nausea. He is back to work with some accomodation.            Objective   BSA: 2.33 meters squared  /82 (BP Location: Left arm, Patient Position: Sitting, BP Cuff Size: Large adult)   Pulse 71   Temp 36.6 °C (97.9 °F) (Temporal)   Wt 113 kg (249 lb 9.6 oz)   SpO2 99%   BMI 37.95 kg/m²     Family History   Problem Relation Name Age of Onset    Hip dysplasia Mother Argnetina     Arthritis Mother [place]     Other (htn) Father      Prostatitis Father      Lung cancer Maternal Grandmother       Kidney cancer Maternal Grandmother      Lung cancer Maternal Grandfather      Kidney cancer Maternal Grandfather      Dementia Other      Cancer Other      Dementia Other       Oncology History   Marginal zone lymphoma (Multi)   10/2/2023 Cancer Staged    Staging form: Hodgkin and Non-Hodgkin Lymphoma, AJCC 8th Edition, Clinical stage from 10/2/2023: Stage IV (Marginal zone lymphoma) - Signed by Jeff Peters MD PhD on 2023 Initial Diagnosis    Presentation: (2023): Back pain, loss of sensation below the diaphragm, gait disturbance    Imaging:  - CT (23): 4.1x1.8x1cm paraspinal mass at the T7 level  - MRI: showed abnormal enhancing signals at the C5, T2-8 levels.     Surgery:  - 10/2/2023 he had a debulking surgery which later showed evidence of lymphoma. In the meantime he has improved sensation and is able to walk with a cane.     - Work up including PET/CT results consistent with stage IV MZL.      2023 -  Chemotherapy    Tolerated relatively well, but had nausea and flu like symptoms for a few days after chemo. Since then he also completed C2 R stacey on , followed by C3 on , C4 on , C5D1 on 3/21, and c6d1 on .     Intrathecal treatment #1 on , then on 3/18/2024.   Bendamustine + RiTUXimab, 28 Day Cycles     2024 Imaging    PET/CT 2024 showing persistent FDG avid lesions.      2024 Biopsy    Bone marrow bx 2024 -- no evidence of lymphoma     2024 Biopsy    EBUS biopsy of a subcarinal mass; There was no evidence of lymphoma      2024 Imaging    CT Chest w contrast (24): 2024: reviewed CT of , showin.  Numerous lung nodules bilaterally measuring up to 5 mm in size, similar to the prior in size and number. 2. Redemonstration of multiple enlarged mediastinal lymph nodes, also unchanged from the prior.   -It is likely, but not proven, that the lung nodules and small mediastinal lymph nodes are reactive to  inflammation. However, the exact source of inflammation is not identifiable.         Dann Ni  reports that he has never smoked. He has never used smokeless tobacco.  He  reports that he does not currently use alcohol.  He  reports no history of drug use.    EXAM: No LAD. No splenomegaly. No skin lesions. Muscle power preserved in the UE but reduced in LLE; walk without assistance in slow pace; upper back surgical scar healed.  Other details below.  5/10: walk independently with a very mild limp.     Physical Exam  Constitutional:       General: He is not in acute distress.     Appearance: He is not toxic-appearing.   HENT:      Head: Normocephalic.      Nose: Nose normal.      Mouth/Throat:      Mouth: Mucous membranes are moist.   Eyes:      Extraocular Movements: Extraocular movements intact.      Pupils: Pupils are equal, round, and reactive to light.   Cardiovascular:      Rate and Rhythm: Normal rate and regular rhythm.      Heart sounds: No murmur heard.  Pulmonary:      Effort: Pulmonary effort is normal.      Breath sounds: Normal breath sounds.   Abdominal:      General: Bowel sounds are normal.      Palpations: Abdomen is soft. There is no mass.      Tenderness: There is no abdominal tenderness. There is no rebound.   Musculoskeletal:         General: No swelling, tenderness, deformity or signs of injury.      Right lower leg: No edema.      Left lower leg: No edema.   Skin:     Coloration: Skin is not jaundiced.      Findings: No bruising, lesion or rash.   Neurological:      Mental Status: He is alert and oriented to person, place, and time.      Cranial Nerves: No cranial nerve deficit.   Psychiatric:         Mood and Affect: Mood normal.         Performance Status:  Symptomatic; fully ambulatory    Assessment/Plan      #EMZL  -Reviewed PET/CT and MRI with the patient and wife. The results are consistent with stage IV disease, involving LN above and below the diaphragm, extensively axial and  appendicular skeleton including bilateral scapulas, right clavicular head, T5 vertebral body, bilateral iliac bones, left ischium, and left femur without definite anatomic correlate (max SUV 7.6).  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   -Current data support treatment using multi-agent chemo. Bendamustine and rituximab have been approved and widely used, found to be safe and effective.   -Patient was disappointed to learn that EMZL such as his may not be curable. Was relieved to know that the treatment can bring multiple years of remission, improvement of strength / quality of life / work experience.   -Discussed the Aes of r-anum, which are numerous, including SAEs such as infection, or even death. Most AE and DUSTIN however can be resolved with effective treatments. He consented for R-anum.   -Due to the location of the disease and bone marrow uptake, he may be at high risk of CNS involvement / relapse. Schedule head CT and intrathecal treatment, timing likely after 2 cycles of R-ANUM. He consented for IT Methotrexate and cytarabine. First IT on 2/19/2024. CSF shows no lymphoma. Next IT on 3/18 tentatively.   -In addition, consolidation using XRT will be discussed in the future.   -5/10/2024: PET/CT results at [facility] were reviewed. Unfortunately, there are a number of residual FDG avid signals involving bones and perihilar nodes. These raise the question of treatment failure, or possible large cell transformation. Will obtain BMBx and additional biopsy, including possibly:   **a subcarinal lymph node has a maximum SUV of 11.3 previously measuring up to 14.9 and a conglomerate of right paratracheal lymph nodes has a maximum  SUV of 8.9 previously measuring up to 14.9. (by interventional pulm)  **proximal left humerus there is a lytic lesion with maximum SUV of 7.5 SUV previously measuring up to 3.6 SUV. (By IR)  -6/7/2024: discussed  the results below:   bmbx: A, B & C. BONE MARROW CLOT WITH ASPIRATE AND CORE WITH TOUCH PREP, LEFT ILIAC CREST:   -- NORMOCELLULAR BONE MARROW (40%) WITH MATURING TRILINEAGE HEMATOPOESIS AND NO MORPHOLOGIC EVIDENCE OF LYMPHOMA.  : A. LYMPH NODE,  LEVEL 7; BIOPSY:      -- Non-diagnostic rare bronchial epithelium and blood.  Those negative results and his clinical improvement do not support relapse or refractoriness. Will monitor closely. CT Chest on .   -2024: reviewed CT of , showin.  Numerous lung nodules bilaterally measuring up to 5 mm in size, similar to the prior in size and number. 2. Redemonstration of multiple enlarged mediastinal lymph nodes, also unchanged from the prior.   -It is likely, but not proven, that the lung nodules and small mediastinal lymph nodes are reactive to inflammation. However, the exact source of inflammation is not identifiable. He has no evidence of Pneumonia. In the past ACE level was found to be Wnl, arguing against (but does not rule out) sarcoidosis.   -Given the above consideration, disease progression/refractoriness is questionable. Will therefore refer back to [facility] to discuss radiation of the soft tissue mass at the T1-5 level previously shown on MRI in 10/2023. Will also repeat PET/CT in 2024.  -10/24/2024: clinically he continues to do well without symptoms associated with lymphoma. MRI TL spines continues to show abnormal epidural enhancement. Lymphoma relapse remains a risk despite recent negative work up. Will order PET/CT.   -11/15: reviewed reports and images of PET/CT thoroughly with the patient and his wife. Overall the PET/CT results did not demonstrate compelling evidence of progression or relapse. Fore example, “Interval increase in metabolic activity within multiple axial and appendicular osseous lesions”, but no CT correlates were present for any of the above FDG avid bony lesions. In addition, he has no bony symptoms at all. The low  FDG avid small lymph nodes have been stable in size, arguing against relapsed FL. These FDG avid lesions (body, small and stable LAD) may thus reflect inflammatory etiology (e.g. sarcoidosis), but less likely FL. Nevertheless, FL cannot be ruled out but will require close surveillance.   -12/19/2024: no new symptoms to suggest relapse or progression. Discussed lab results: ACE level is elevated, monoclonal IgG is 0.2 same as May 2024. Given the negative BMBx in May 2024, the above does not suggest myeloma either. Elevated ACE level is suggestive of sarcoidosis, but that will require further confirmation of biopsy showing non-necrotizing granuloma. Mediastinal and hilar nodes are small, and previous biopsy was not diagnostic. FDG uptakes in the axial bones do not have CT correlates, which may reduce the diagnostic value of the biopsy. Will repeat pelvic bone MRI to guide biopsy. Otherwise, may refer to IR for biopsy of the following abnormal bony signals in the recent PET: right coracoid (maximum SUV 7.9, previously  7.5), right medial clavicle (maximum SUV 12.2, previously 7.3), right  5th rib (maximum SUV 7.5).   -1/24/2025: reviewed MRI results showing right pelvic bone hypodensity, which correlates to FDG avid uptakes. He remains asymptomatic. Will obtain bone biopsy most likely right posterior iliac bone as suggest in MRI report.     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 This tissue diagnosis most likely apply to all the FDG avid signals in 11/2024.   -Will repeat CT CAP in 5/2025.   -RTC PAULA Marissa after CT.       1/24/2025  -Remains asymptomatic.   -MRI shows abnormal enhancement in the pelvic bone.   -Will refer to IR for STAT biopsy, most likely right posterior iliac bone as suggest in MRI report. This does correlate with PET in 11/2024.   -RTC 4w.      Time spent: 35min, >50% on counseling and care coordination.          Jeff Peters MD  PhD

## 2025-02-26 DIAGNOSIS — C85.80 MARGINAL ZONE LYMPHOMA (MULTI): Primary | ICD-10-CM

## 2025-02-26 NOTE — PROGRESS NOTES
Dann Ni  [id number]  1975 02/26/25  1:30 PM    I reviewed results of MR T and L spine with the patient on phone and also discussed with Dr. Peters.    Plan:  Cervical MRI to further characterize the C5 lesion. Very likely this is a component of the overall granuloma lesions seen in multiple locations on prior PET CT and confirmed on recent iliac Bx. However, getting dedicated imaging to track the lesion would be helpful.    Based on results, will plan future imaging.    Patient is agreeable with the plan.      Future Appointments       Date / Time Provider Department Dept Phone    3/31/2025 1:30 PM Maren Queen MD [facility] [phone]    5/19/2025 2:00 PM (Arrive by 1:00 PM) [facility] CT 1 [facility] [phone]    5/23/2025 11:00 AM (Arrive by 10:45 AM) Angelique Murcia PA-C [facility] [phone]                Maren Queen MD, MMM  Senior Attending Physician, [facility]  Professor, [employer]   Our [place]: “To Heal, To Teach, To Discover.”  RN partner: [phone]/ [email or web address]    Phone (scheduling): [phone]/[email or web address]  Proton Therapy (scheduling): [phone]/ [email or web address]  Phone (after hours): [phone]

## 2025-02-28 LAB
LABORATORY COMMENT REPORT: NORMAL
PATH REPORT.ADDENDUM SPEC: NORMAL
PATH REPORT.FINAL DX SPEC: NORMAL
PATH REPORT.GROSS SPEC: NORMAL
PATH REPORT.RELEVANT HX SPEC: NORMAL
PATH REPORT.TOTAL CANCER: NORMAL

## 2025-03-14 ENCOUNTER — HOSPITAL ENCOUNTER (OUTPATIENT)
Dept: RADIOLOGY | Facility: HOSPITAL | Age: 50
Discharge: HOME | End: 2025-03-14
Payer: COMMERCIAL

## 2025-03-14 DIAGNOSIS — C85.80 MARGINAL ZONE LYMPHOMA (MULTI): ICD-10-CM

## 2025-03-14 PROCEDURE — 2550000001 HC RX 255 CONTRASTS: Performed by: RADIOLOGY

## 2025-03-14 PROCEDURE — A9575 INJ GADOTERATE MEGLUMI 0.1ML: HCPCS | Performed by: RADIOLOGY

## 2025-03-14 PROCEDURE — 72156 MRI NECK SPINE W/O & W/DYE: CPT

## 2025-03-14 RX ORDER — GADOTERATE MEGLUMINE 376.9 MG/ML
22 INJECTION INTRAVENOUS
Status: COMPLETED | OUTPATIENT
Start: 2025-03-14 | End: 2025-03-14

## 2025-03-14 RX ADMIN — GADOTERATE MEGLUMINE 22 ML: 376.9 INJECTION INTRAVENOUS at 11:46

## 2025-03-27 ENCOUNTER — TELEPHONE (OUTPATIENT)
Dept: RADIATION ONCOLOGY | Facility: HOSPITAL | Age: 50
End: 2025-03-27
Payer: COMMERCIAL

## 2025-03-27 NOTE — TELEPHONE ENCOUNTER
Called pt to remind of FUV appointment on 03/31/25 at 1:30. Pt's phone went to voicemail left number if needs to reschedule.

## 2025-03-31 ENCOUNTER — HOSPITAL ENCOUNTER (OUTPATIENT)
Dept: RADIATION ONCOLOGY | Facility: HOSPITAL | Age: 50
Setting detail: RADIATION/ONCOLOGY SERIES
Discharge: HOME | End: 2025-03-31
Payer: COMMERCIAL

## 2025-03-31 VITALS
RESPIRATION RATE: 18 BRPM | HEART RATE: 81 BPM | TEMPERATURE: 96.6 F | SYSTOLIC BLOOD PRESSURE: 121 MMHG | DIASTOLIC BLOOD PRESSURE: 82 MMHG | BODY MASS INDEX: 36.49 KG/M2 | WEIGHT: 240 LBS | OXYGEN SATURATION: 96 %

## 2025-03-31 DIAGNOSIS — C85.80 MARGINAL ZONE LYMPHOMA (MULTI): Primary | ICD-10-CM

## 2025-03-31 DIAGNOSIS — C72.0 MALIGNANT NEOPLASM OF SPINAL CORD (MULTI): ICD-10-CM

## 2025-03-31 PROCEDURE — 99213 OFFICE O/P EST LOW 20 MIN: CPT | Performed by: RADIOLOGY

## 2025-03-31 RX ORDER — AMOXICILLIN 500 MG/1
500 CAPSULE ORAL EVERY 12 HOURS SCHEDULED
COMMUNITY
End: 2025-04-06

## 2025-03-31 ASSESSMENT — ENCOUNTER SYMPTOMS
HEMATOLOGIC/LYMPHATIC NEGATIVE: 1
CARDIOVASCULAR NEGATIVE: 1
MUSCULOSKELETAL NEGATIVE: 1
GASTROINTESTINAL NEGATIVE: 1
PSYCHIATRIC NEGATIVE: 1
CONSTITUTIONAL NEGATIVE: 1
NEUROLOGICAL NEGATIVE: 1
EYES NEGATIVE: 1
ENDOCRINE NEGATIVE: 1

## 2025-03-31 ASSESSMENT — PAIN SCALES - GENERAL: PAINLEVEL_OUTOF10: 0-NO PAIN

## 2025-03-31 NOTE — PROGRESS NOTES
Radiation Oncology Nursing Note    Pain: The patient's current pain level was assessed.  They report currently having a pain of 0 out of 10.  They feel their pain is under control without the use of pain medications.    Review of Systems:  Review of Systems   Constitutional: Negative.    HENT:  Negative.     Eyes: Negative.    Respiratory:          Sinus infection - on ATB for   Cardiovascular: Negative.    Gastrointestinal: Negative.    Endocrine: Negative.    Genitourinary: Negative.     Musculoskeletal: Negative.    Skin: Negative.    Neurological: Negative.    Hematological: Negative.    Psychiatric/Behavioral: Negative.

## 2025-04-05 NOTE — PROGRESS NOTES
Radiation Oncology Follow-Up    Patient Name:  Dann Ni  MRN:  [id number]  :  1975    Primary Care Provider: Jeff Peters MD PhD   Care Team: Patient Care Team:  Ventura Gonzalez DO as PCP - General  Ventura Gonzalez DO as PCP - Mariposa HIDALGO PCP  Yesenia Marin MD as Surgeon (Neurosurgery)  Dann Avila PA-C as Physician Assistant (Neurosurgery)  Jeff Peters MD PhD as Consulting Physician (Hematology and Oncology)  DEBI Rodriguez as  (Hematology and Oncology)  Veronica Trevizo MD as Consulting Physician (Hematology and Oncology)  Maren Queen MD as Radiation Oncologist (Radiation Oncology)    Date of Service: 3/31/2025    SUBJECTIVE  History of Present Illness:  Dann Ni is a 49 y.o. 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 He was seen for initial consultation in 2024. At that point, it was decided to complete post-treatment imaging and lab surveillance to be followed by reassessment.     His oncological work up and treatment history is as below:  - The patient was in good health until 2023. He developed back pain which did not respond to NSAID or steroid. By late 2023 he lost sensation below the diaphragm and were unable to walk.     Treatment:  - On 10/2/2023 he had a debulking surgery which later showed evidence of lymphoma. In the meantime he has improved sensation and is able to walk with a cane.   - Initiated on rituximab bendamustine and started C1 on 2023. Tolerated relatively well, but had nausea and flu like symptoms for a few days after chemo. Since then he also completed C2 R stacey on , C3 on , C4 on 2024. C6 on 2024.  -Due to the location of the disease and bone marrow uptake, he may be at high risk of CNS involvement /  relapse. Initiated on IT Methotrexate and cytarabine.   - LP with IT Methotrexate (2/19); CSF flow cytometry negative for lymphoma  - 2nd LP planned for 3/18  - Evaluated by Dr. Peters. It was felt that given negative CSF and BM BX, the lung nodules and mediastinal nodes are reactive. Hence, he has bene off systemic therapy and presents back to discuss role of radiation to T spine.    Interval History:  Symptomatically he is doing very well and continues to have nearly normal functionality of bilateral lower extremities, which has helped with his ambulation. He now walks without support but has to be cautious. Will get occasional low back muscle spasms. 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    Denies neck pain or stiffness. Denies radiating neurological symptoms of pain or weakness of bilateral upper or lower extremities. The numbness below umbilicus is gradually improving.    Denies other systemic symptoms including fever, chills, weight loss, night sweats.    Continues to follow with Dr. Peters. Currently under surveillance.     Since last week underwent additional testing as below:    FINAL DIAGNOSIS 2/7/2025   Right iliac bone, biopsy:   -- Mildly hypercellular marrow (50-60% cellular) with non-necrotizing granulomas, see note   NOTE: In correlation with the flow cytometry findings show no evidence of lymphoid aggregates or lymphoma. The differential diagnosis includes sarcoidosis and infection.       Underwent updated MRIs as below:    MR THORACIC SPINE W AND WO IV CONTRAST; 2/21/2025   Similar appearing postsurgical/posttreatment change with laminectomy  involving the T1-T4 levels. Unchanged extent and appearance of  residual nonspecific epidural thickening and enhancement within the  upper thoracic spine that could reflect postoperative granulation  tissue with residual neoplasm components not excluded. No evidence of  disease progression within the thoracic spine.       Suboptimally assessed on this examination, there is concern for  signal abnormality within the C5 vertebral body that could reflect  worsening neoplastic infiltration versus underlying artifact.  Consider dedicated MRI of the cervical spine for improved evaluation.    MR LUMBAR SPINE W AND WO IV CONTRAST; 2/21/2025   1. Similar appearing abnormal enhancement within the partially  visualized right iliac bone. Residual nonspecific epidural thickening  and enhancement that could reflect prominent venous plexus components  with neoplastic involvement not excluded. No new abnormal enhancement  within the lumbar spine to indicate disease progression.    MR CERVICAL SPINE W AND WO IV CONTRAST; 3/14/2025   As queried on previous thoracic spine MRI examination there is   abnormal STIR hyperintensity and enhancement involving the C5  vertebral body and right pedicle/posterior elements, while new from the   04/30/2024 MRI examination was presumably present on the more recent   PET-CT examination performed 11/04/2024 with intense FDG avidity   within this region. Exact comparison is limited by differences in imaging modality.   No masslike enhancing epidural component or abnormal cord/leptomeningeal   enhancement identified within the cervical region.    Imaging:  - On 9/30 he had CT scan showing a 4.1x1.8x1cm paraspinal mass at the T7 level.   - MRI 10/01/2023: Abnormal signal within the C5 vertebral body which is highly suspicious for neoplastic infiltration. No extension of tumor into the spinal canal. Abnormal signal within the T2-T4 vertebral bodies and posterior elements consistent with neoplastic infiltration. There is tumoral extension into the spinal canal at the level of T1-T2 through T3 causing marked mass effect on the thoracic spinal cord with anterior and right lateral deviation of the cord. There is signal abnormality within the spinal cord which is most consistent with edema. There is extension of tumor into  the left T2-T3 and T3-T4 neural foramina. There is soft tissue located within the right T7-T8 prevertebral soft tissues and within the right T7-T8 neural foramen which is most consistent tumor. Nonspecific mild signal abnormality located within the posterior S1 vertebral body which reflect neoplastic infiltration. No abnormal mass or enhancement is seen within the lumbar spinal canal and there is no striking signal abnormality located within the lumbar osseous structures.     PET/CT 11/13/2023: Multiple FDG avid subcentimeter pulmonary nodule in the left lower lobe (max SUV 3.4) and a FDG avid pulmonary nodule in the left upper lobe with (max SUV 4.4). Right lung is unremarkable *Multiple FDG avid bilateral axillary, mediastinal and bilateral hilar and infrahilar nodes (max SUV 14.9). *Postsurgical changes from T1-4 laminectomies and paraspinal mass excision with interval development of a postoperative seroma and surrounding FDG avidity with (max SUV 5.4). *FDG avid portacaval lymph node with (max SUV 6.4). *Multiple FDG avid bone lesions throughout the axial and appendicular skeleton including bilateral scapulas, right clavicular head, T5 vertebral body, bilateral iliac bones, left ischium, and left femur without definite anatomic correlate (max SUV 7.6).  MRI Spine 11/16/2023: There are postoperative changes compatible with interval posterior laminectomies extending from the T1 through T4 levels. The previously noted abnormal enhancing epidural soft tissue mass concerning for epidural neoplasm through this region is not well-defined on the current study raising the possibility of interval surgical resection and/or sequelae of interval post therapy. There is nonspecific smooth abnormal epidural thickening and enhancement within the spinal canal extending from the C7/T1 level caudally to the T5 level with residual nonspecific abnormal enhancing soft tissue extending through the neural foramen at the T1/2, T2/3,  T3/4, and T4/5 levels left greater than right which may be at least in part postsurgical in etiology with the possibility of a component of underlying residual neoplasm or a superimposed infectious/inflammatory process not entirely excluded. The nonspecific smooth epidural thickening through this region contributes to mild encroachment upon the thecal sac without evidence of spinal cord compression through this region on the current study. The previously noted infiltrative abnormal increased STIR bone marrow signal within the upper thoracic vertebrae including the T2, T3, and T4 vertebrae as well as the C5 vertebral body on the prior MRI dated 10/01/2023 most concerning for underlying infiltrative osseous neoplasm/lymphoma is less conspicuous on the current study.   MR spine 4/30/2024 revealed evolving post-surgical changes in thoracic spine with no new or enlarging enhancing components. There were mixed changes in L spine that raised suspicion for posttreatment change or arachnoiditis although an element of neoplastic involvement is not completely excluded.  MR CERVICAL SPINE WO IV CONTRAST; 4/30/2024   Stable MR appearance of the cervical spine. As on prior examination there is nonspecific, relatively diffuse ill-defined fatty marrow replacement throughout the cervical region without focal STIR hyperintensity/marrow edema signal. Remainder of the cervical spine is normal in MR appearance with trace degenerative changes.     MR THORACIC SPINE W AND WO IV CONTRAST; 4/30/2024   Evolving postsurgical/posttreatment change suggested at the T1-T4 level status post laminectomy and enhancing epidural mass debulking/resection. There is residual nonspecific mild epidural enhancement within the operative regions as well as asymmetric involvement of the left left-greater-than-right T2 neural foraminal region that could reflect residual neoplasm or evolving posttreatment change. No new or enlarging enhancing components. No  significant compressive affect on the thecal sac.     Residual marrow heterogeneity within the T6 vertebral body as well as mild diffusely decreased T1 fatty marrow signal throughout the thoracic spine, nonspecific. There is no focal abnormal enhancement or edema within the thoracic vertebra.    MR LUMBAR SPINE W AND WO IV CONTRAST; 4/30/2024   In comparison with prior lumbar spine MRI examination the cauda equina appear diffusely, mildly enlarged in the interim with probable subtle enhancement on fat-suppressed postcontrast axial imaging. There is also thickening and enhancement of the epidural venous plexus that has developed in the interim. Findings are nonspecific and could reflect an element of posttreatment change or arachnoiditis although an element of neoplastic involvement is not completely excluded. This could be further assessed with lumbar puncture as clinically warranted. No focal abnormal osseous enhancement.    PET CT LYMPHOMA STAGING; 5/9/2024   1. Interval increased metabolic activity within multiple axial and appendicular osseous lesions with increased metabolic activity within an enlarging portacaval lymph node compatible with disease progression.  2. Interval decreased hypermetabolic activity within multiple mediastinal lymph nodes and decreased metabolic activity within multiple left lower lobe lung nodules, again favored to represent mediastinal soraya disease with lung metastasis.  3. Postsurgical changes from prior T1 through T4 laminectomy without evidence of local residual or recurrent disease.    CT CHEST W IV CONTRAST; 7/8/2024   1.  Numerous lung nodules bilaterally measuring up to 5 mm in size, similar to the prior in size and number  2. Redemonstration of multiple enlarged mediastinal lymph nodes, also unchanged from the prior.    MR THORACIC SPINE W AND WO IV CONTRAST; MR LUMBAR SPINE W AND WO IV CONTRAST;  10/21/2024   1. Redemonstration of postoperative changes with laminectomies  from  T1 through T4. There is persistent abnormal epidural enhancement from  T1 through T5 with intraforaminal extension most pronounced on the  left at T2-3. This remains nonspecific and could reflect posttherapy  change or conceivably neoplasm. Enhancement within the posterior  paraspinous soft tissues is also nonspecific and could be  postoperative/therapy related. Neoplastic involvement is difficult to  entirely exclude, however.  2. Indistinct appearance of the nerve roots of the cauda equina maybe  due to imaging technique, at least in part. However, there does  appear to be some subtle thickening and enhancement of the nerve  roots. Again, this could reflect arachnoiditis or lymphoma. There is  persistent epidural enhancement particularly from L4 through the  sacrum which is slightly less conspicuous compared to April 30, 2024.  3. Partially imaged focal lesion in the right ilium most worrisome  for lymphoma.  4. Subtle inhomogeneity of the marrow signal is otherwise nonspecific.    PET CT LYMPHOMA STAGING; 11/4/2024   1. Interval increase in metabolic activity within multiple axial and  appendicular osseous lesions with in increased metabolic activity  within cervical, right supraclavicular, mediastinal, hilar, reed  hepatis and precaval lymph nodes, consistent with disease  progression. Deauville score 5.  2. Mildly hypermetabolic subcentimeter pulmonary nodules bilaterally,  likely infective/inflammatory, although disease involvement can not  be excluded.    Pathology/Cytology:    Surgical Pathology 10/2/2023: -- LOW GRADE B CELL LYMPHOMA WITH PLASMACYTIC DIFFERENTIATION MOST CONSISTENT WITH EXTRANODAL MARGINAL ZONE LYMPHOMA. No clonal B cell or T cell population identified.   NGS is positive for a clonal BCR gene rearrangement, supporting a clonal B cell proliferation. MYD88 L265P allele specific PCR is negative.   FISH for BCL2 and BCL6 rearrangements (performed at [facility]) and FISH for 1p36 abnormalities  (send out test performed at [facility]) are all negative.     CEREBROSPINAL FLUID, CYTOLOGY 3/18/2024:   --  No malignant cells identified.   --  Few lymphocytes and a rare monocyte present.     FINAL DIAGNOSIS 5/23/2024   BONE MARROW CLOT WITH ASPIRATE AND CORE WITH TOUCH PREP, LEFT ILIAC CREST:   -- NORMOCELLULAR BONE MARROW (40%) WITH MATURING TRILINEAGE HEMATOPOESIS AND NO MORPHOLOGIC EVIDENCE OF LYMPHOMA.     Bronch/EBUS 5/31/2024 was negative for disease.  RAPID ONSITE EVALUATION (R.O.S.E.):  - Station 7 TBNA : non-diagnostic  - Station 4R TBNA : scant lymphoid  FINAL DIAGNOSIS   A. LYMPH NODE,  LEVEL 7; BIOPSY:      -- Non-diagnostic rare bronchial epithelium and blood.     Review of Systems:     Pls review note entered by RN.    Performance Status:   The Karnofsky performance scale today is 80, Normal activity with effort; some signs or symptoms of disease (ECOG equivalent 1).     OBJECTIVE  Vital Signs:  /82   Pulse 81   Temp 35.9 °C (96.6 °F) (Temporal)   Resp 18   Wt 109 kg (240 lb)   SpO2 96%   BMI 36.49 kg/m²   Physical Exam  Constitutional:       General: He is not in acute distress.     Appearance: He is not ill-appearing.   HENT:      Head: Atraumatic.   Eyes:      General: No scleral icterus.  Pulmonary:      Effort: Pulmonary effort is normal. No respiratory distress.      Breath sounds: No wheezing.   Musculoskeletal:         General: No tenderness.      Cervical back: No rigidity or tenderness.      Right lower leg: No edema.      Left lower leg: No edema.      Comments: Well healed thoracic spinal surgery scar. No tenderness along entire spine.   Neurological:      General: No focal deficit present.      Mental Status: He is alert and oriented to person, place, and time.      Cranial Nerves: No cranial nerve deficit.      Motor: No weakness.      Gait: Gait (Much improved from last assessment) normal.   Psychiatric:         Mood and Affect: Mood normal.          ASSESSMENT:   Dann ELLIOTT  Raine is a 49 y.o. 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 Imaging findings were concerning for extensive spinal/epidural/neural foramina disease especially from T1-T8 levels. Additional foci of spinal involvement were seen at C5, L and S1 levels. PET-CT identified multiple FDG avid bilateral axillary, mediastinal and bilateral hilar, infrahilar nodes  and abdominal lymph nodes, and multiple FDG avid bone lesions throughout the axial and appendicular skeleton including bilateral scapulas, right clavicular head, T5 vertebral body, bilateral iliac bones, left ischium, and left femur. He  is s/p debulking spinal surgery and was started on R-Teresa 11/2023 with C6 completed 04/18/2024. He has also received IT Methotrexate and cytarabine, LD 3/18/2024. Post-treatment CSF 03/2024 and BM Bx 05/2024 are negative with PET-CT 05/2024 showing concerns for activity in bones and enlarging portacaval lymph node concerning for disease progression, with avidity in mediastinal/upper abdominal nodes and pulmonary nodules also raising suspicion for residual disease. Bronch/EBUS 5/31/2024 was negative for disease. Serial PET CT has continued to show progressive avidity in mediastinal nodes and bones. An iliac bone biopsy in Feb 2025 was negative for malignancy as well. As such he has continued surveillance.     He is otherwise regaining neurological functions with significantly improved gait.    PLAN:    Mr. Ni's pertinent history, exam, imaging and pathology details were reviewed.     Accompanied by his wife.    Reviewed overall stable findings in thoracic and lumbar spine. The C5 lesion described, which was present in the PET-CT from 11/2024 and hasn't progressed since then. There is no epidural extension or mass like component, and he doesn't have any symptoms.    Given  stable findings on MRI, we discussed that we will continue to hold off XRT to T spine.  Will initiate conversation with Ortho spine regarding assessment of stability of C5 lesion. Patient counseled to avoid any sudden jerks or neck movements, especially given the nature of his job.  Will plan continued surveillance and will plan MR total spine for June 2025 (4 months from Feb MRI). Schedule follow up at that time.    Mr. Ni and his wife are agreeable and prefer this route of surveillance vs active radiation.    LONGITUDINAL CARE, EXTRA EFFORT/ : The patient will be followed longitudinally by providers (including APPs) in the department of radiation oncology for monitoring treatment effects during and after radiation. Additional effort needed in the setting of complex presentation and coordination of care with Dr. Peters.     NCCN Guidelines were applicable to guide this patients treatment plan.      Maren Queen MD, MMM  Senior Attending Physician, [facility]  Professor, [employer]   [employer] [place]: “To Heal, To Teach, To Discover.”  RN partner: [phone]/ [email or web address]    Phone (scheduling): [phone]/ [email or web address]  Proton Therapy (scheduling): [phone]/ [email or web address]  Phone (after hours): [phone]

## 2025-04-08 DIAGNOSIS — C85.80 MARGINAL ZONE LYMPHOMA (MULTI): ICD-10-CM

## 2025-04-24 ENCOUNTER — APPOINTMENT (OUTPATIENT)
Dept: HEMATOLOGY/ONCOLOGY | Facility: HOSPITAL | Age: 50
End: 2025-04-24
Payer: COMMERCIAL

## 2025-04-28 ENCOUNTER — TELEPHONE (OUTPATIENT)
Dept: ADMISSION | Facility: HOSPITAL | Age: 50
End: 2025-04-28
Payer: COMMERCIAL

## 2025-04-28 DIAGNOSIS — C85.80 MARGINAL ZONE LYMPHOMA (MULTI): Primary | ICD-10-CM

## 2025-04-28 NOTE — TELEPHONE ENCOUNTER
Dann wants his lab orders switched over to [facility]. Orders pended to team. No further questions or concerns at this time.

## 2025-05-04 LAB
ALBUMIN SERPL-MCNC: 4.6 G/DL (ref 3.6–5.1)
ALP SERPL-CCNC: 93 U/L (ref 36–130)
ALT SERPL-CCNC: 17 U/L (ref 9–46)
ANION GAP SERPL CALCULATED.4IONS-SCNC: 11 MMOL/L (CALC) (ref 7–17)
AST SERPL-CCNC: 18 U/L (ref 10–40)
BASOPHILS # BLD AUTO: 60 CELLS/UL (ref 0–200)
BASOPHILS NFR BLD AUTO: 1 %
BILIRUB SERPL-MCNC: 0.6 MG/DL (ref 0.2–1.2)
BUN SERPL-MCNC: 15 MG/DL (ref 7–25)
CALCIUM SERPL-MCNC: 9.2 MG/DL (ref 8.6–10.3)
CHLORIDE SERPL-SCNC: 104 MMOL/L (ref 98–110)
CO2 SERPL-SCNC: 25 MMOL/L (ref 20–32)
CREAT SERPL-MCNC: 0.94 MG/DL (ref 0.6–1.29)
EGFRCR SERPLBLD CKD-EPI 2021: 99 ML/MIN/1.73M2
EOSINOPHIL # BLD AUTO: 210 CELLS/UL (ref 15–500)
EOSINOPHIL NFR BLD AUTO: 3.5 %
ERYTHROCYTE [DISTWIDTH] IN BLOOD BY AUTOMATED COUNT: 13 % (ref 11–15)
GLUCOSE SERPL-MCNC: 106 MG/DL (ref 65–99)
HCT VFR BLD AUTO: 42.8 % (ref 38.5–50)
HGB BLD-MCNC: 14.4 G/DL (ref 13.2–17.1)
LDH SERPL P TO L-CCNC: 151 U/L (ref 100–220)
LYMPHOCYTES # BLD AUTO: 726 CELLS/UL (ref 850–3900)
LYMPHOCYTES NFR BLD AUTO: 12.1 %
MCH RBC QN AUTO: 28.7 PG (ref 27–33)
MCHC RBC AUTO-ENTMCNC: 33.6 G/DL (ref 32–36)
MCV RBC AUTO: 85.3 FL (ref 80–100)
MONOCYTES # BLD AUTO: 678 CELLS/UL (ref 200–950)
MONOCYTES NFR BLD AUTO: 11.3 %
NEUTROPHILS # BLD AUTO: 4326 CELLS/UL (ref 1500–7800)
NEUTROPHILS NFR BLD AUTO: 72.1 %
PLATELET # BLD AUTO: 228 THOUSAND/UL (ref 140–400)
PMV BLD REES-ECKER: 10.2 FL (ref 7.5–12.5)
POTASSIUM SERPL-SCNC: 4.1 MMOL/L (ref 3.5–5.3)
PROT SERPL-MCNC: 6.9 G/DL (ref 6.1–8.1)
RBC # BLD AUTO: 5.02 MILLION/UL (ref 4.2–5.8)
SODIUM SERPL-SCNC: 140 MMOL/L (ref 135–146)
WBC # BLD AUTO: 6 THOUSAND/UL (ref 3.8–10.8)

## 2025-05-19 ENCOUNTER — TELEPHONE (OUTPATIENT)
Dept: ADMISSION | Facility: HOSPITAL | Age: 50
End: 2025-05-19
Payer: COMMERCIAL

## 2025-05-19 ENCOUNTER — APPOINTMENT (OUTPATIENT)
Dept: RADIOLOGY | Facility: HOSPITAL | Age: 50
End: 2025-05-19
Payer: COMMERCIAL

## 2025-05-19 ENCOUNTER — HOSPITAL ENCOUNTER (OUTPATIENT)
Dept: RADIOLOGY | Facility: HOSPITAL | Age: 50
Discharge: HOME | End: 2025-05-19
Payer: COMMERCIAL

## 2025-05-19 DIAGNOSIS — C85.80 MARGINAL ZONE LYMPHOMA (MULTI): ICD-10-CM

## 2025-05-19 PROCEDURE — 2550000001 HC RX 255 CONTRASTS

## 2025-05-19 PROCEDURE — 74177 CT ABD & PELVIS W/CONTRAST: CPT

## 2025-05-19 RX ADMIN — IOHEXOL 75 ML: 350 INJECTION, SOLUTION INTRAVENOUS at 13:24

## 2025-05-19 NOTE — TELEPHONE ENCOUNTER
Pt will be faxing intermittent LA tomorrow to [phone].   Pt needs paperwork completed and returned by 5/26. Pt has FUV on 5/23.

## 2025-05-22 ENCOUNTER — TELEPHONE (OUTPATIENT)
Dept: ADMISSION | Facility: HOSPITAL | Age: 50
End: 2025-05-22
Payer: COMMERCIAL

## 2025-05-22 ENCOUNTER — TELEPHONE (OUTPATIENT)
Dept: RADIATION ONCOLOGY | Facility: HOSPITAL | Age: 50
End: 2025-05-22
Payer: COMMERCIAL

## 2025-05-22 DIAGNOSIS — C85.80 MARGINAL ZONE LYMPHOMA (MULTI): Primary | ICD-10-CM

## 2025-05-22 NOTE — TELEPHONE ENCOUNTER
Called patient to let him know that I spoke with his insurance company and the MRI's are approved.      Spoke with Amy SALGADO  Case # [id number]  Auth # [id number]  CPT codes - 82645, 06002, [id number]

## 2025-05-22 NOTE — TELEPHONE ENCOUNTER
Pt has 6/3 MRI of cervical, thoracic, and lumbar spine.   Pt wants to ensure that [facility] insurance has approved scans to be completed at [facility]- he needs US IV access assist.   [facility] advised they want him to have scans at out pt setting, not [facility]- last scans did get approved at [facility]'s d/t need to IV access support.   [facility] : [phone]

## 2025-05-23 ENCOUNTER — OFFICE VISIT (OUTPATIENT)
Dept: HEMATOLOGY/ONCOLOGY | Facility: HOSPITAL | Age: 50
End: 2025-05-23
Payer: COMMERCIAL

## 2025-05-23 VITALS
BODY MASS INDEX: 35.69 KG/M2 | SYSTOLIC BLOOD PRESSURE: 119 MMHG | WEIGHT: 234.7 LBS | DIASTOLIC BLOOD PRESSURE: 83 MMHG | OXYGEN SATURATION: 97 % | HEART RATE: 81 BPM | TEMPERATURE: 97 F | RESPIRATION RATE: 16 BRPM

## 2025-05-23 DIAGNOSIS — C85.80 MARGINAL ZONE LYMPHOMA (MULTI): ICD-10-CM

## 2025-05-23 PROCEDURE — 99215 OFFICE O/P EST HI 40 MIN: CPT

## 2025-05-23 ASSESSMENT — ENCOUNTER SYMPTOMS
SLEEP DISTURBANCE: 0
VOMITING: 0
DIZZINESS: 0
ABDOMINAL PAIN: 0
FATIGUE: 0
HEADACHES: 0
DIARRHEA: 0
LIGHT-HEADEDNESS: 0
WOUND: 0
DYSURIA: 0
APPETITE CHANGE: 0
CHILLS: 0
DEPRESSION: 0
COUGH: 0
FEVER: 0
LEG SWELLING: 0
WHEEZING: 0
HEMATURIA: 0
ADENOPATHY: 0
BACK PAIN: 1
BRUISES/BLEEDS EASILY: 0
CONSTIPATION: 0
SHORTNESS OF BREATH: 0
NAUSEA: 0
PALPITATIONS: 0
FREQUENCY: 0

## 2025-05-23 ASSESSMENT — PAIN SCALES - GENERAL: PAINLEVEL_OUTOF10: 0-NO PAIN

## 2025-05-23 NOTE — TELEPHONE ENCOUNTER
Per chart review, MRI scans are approved.  Pt was notified per separate phone encounter yesterday.

## 2025-05-23 NOTE — ASSESSMENT & PLAN NOTE
Reviewed PET/CT and MRI with the patient and wife. The results are consistent with stage IV disease, involving LN above and below the diaphragm, extensively axial and appendicular skeleton including bilateral scapulas, right clavicular head, T5 vertebral body, bilateral iliac bones, left ischium, and left femur without definite anatomic correlate (max SUV 7.6).  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   -Current data support treatment using multi-agent chemo. Bendamustine and rituximab have been approved and widely used, found to be safe and effective.   -Patient was disappointed to learn that EMZL such as his may not be curable. Was relieved to know that the treatment can bring multiple years of remission, improvement of strength / quality of life / work experience.   -Discussed the Aes of r-anum, which are numerous, including SAEs such as infection, or even death. Most AE and DUSTIN however can be resolved with effective treatments. He consented for R-anum.   -Due to the location of the disease and bone marrow uptake, he may be at high risk of CNS involvement / relapse. Schedule head CT and intrathecal treatment, timing likely after 2 cycles of R-ANUM. He consented for IT Methotrexate and cytarabine. First IT on 2/19/2024. CSF shows no lymphoma. Next IT on 3/18 tentatively.   -In addition, consolidation using XRT will be discussed in the future.   -5/10/2024: PET/CT results at [facility] were reviewed. Unfortunately, there are a number of residual FDG avid signals involving bones and perihilar nodes. These raise the question of treatment failure, or possible large cell transformation. Will obtain BMBx and additional biopsy, including possibly:   **a subcarinal lymph node has a maximum SUV of 11.3 previously measuring up to 14.9 and a conglomerate of right paratracheal lymph nodes has a maximum  SUV of 8.9 previously measuring up to 14.9. (by  interventional pulm)  **proximal left humerus there is a lytic lesion with maximum SUV of 7.5 SUV previously measuring up to 3.6 SUV. (By IR)  -2024: discussed the results below:   bmbx: A, B & C. BONE MARROW CLOT WITH ASPIRATE AND CORE WITH TOUCH PREP, LEFT ILIAC CREST:   -- NORMOCELLULAR BONE MARROW (40%) WITH MATURING TRILINEAGE HEMATOPOESIS AND NO MORPHOLOGIC EVIDENCE OF LYMPHOMA.  : A. LYMPH NODE,  LEVEL 7; BIOPSY:      -- Non-diagnostic rare bronchial epithelium and blood.  Those negative results and his clinical improvement do not support relapse or refractoriness. Will monitor closely. CT Chest on .   -2024: reviewed CT of , showin.  Numerous lung nodules bilaterally measuring up to 5 mm in size, similar to the prior in size and number. 2. Redemonstration of multiple enlarged mediastinal lymph nodes, also unchanged from the prior.   -It is likely, but not proven, that the lung nodules and small mediastinal lymph nodes are reactive to inflammation. However, the exact source of inflammation is not identifiable. He has no evidence of Pneumonia. In the past ACE level was found to be Wnl, arguing against (but does not rule out) sarcoidosis.   -Given the above consideration, disease progression/refractoriness is questionable. Will therefore refer back to [facility] to discuss radiation of the soft tissue mass at the T1-5 level previously shown on MRI in 10/2023. Will also repeat PET/CT in 2024.  -10/24/2024: clinically he continues to do well without symptoms associated with lymphoma. MRI TL spines continues to show abnormal epidural enhancement. Lymphoma relapse remains a risk despite recent negative work up. Will order PET/CT.   -11/15: reviewed reports and images of PET/CT thoroughly with the patient and his wife. Overall the PET/CT results did not demonstrate compelling evidence of progression or relapse. Fore example, “Interval increase in metabolic activity within multiple axial and  appendicular osseous lesions”, but no CT correlates were present for any of the above FDG avid bony lesions. In addition, he has no bony symptoms at all. The low FDG avid small lymph nodes have been stable in size, arguing against relapsed FL. These FDG avid lesions (body, small and stable LAD) may thus reflect inflammatory etiology (e.g. sarcoidosis), but less likely FL. Nevertheless, FL cannot be ruled out but will require close surveillance.   -12/19/2024: no new symptoms to suggest relapse or progression. Discussed lab results: ACE level is elevated, monoclonal IgG is 0.2 same as May 2024. Given the negative BMBx in May 2024, the above does not suggest myeloma either. Elevated ACE level is suggestive of sarcoidosis, but that will require further confirmation of biopsy showing non-necrotizing granuloma. Mediastinal and hilar nodes are small, and previous biopsy was not diagnostic. FDG uptakes in the axial bones do not have CT correlates, which may reduce the diagnostic value of the biopsy. Will repeat pelvic bone MRI to guide biopsy. Otherwise, may refer to IR for biopsy of the following abnormal bony signals in the recent PET: right coracoid (maximum SUV 7.9, previously  7.5), right medial clavicle (maximum SUV 12.2, previously 7.3), right  5th rib (maximum SUV 7.5).   -1/24/2025: reviewed MRI results showing right pelvic bone hypodensity, which correlates to FDG avid uptakes. He remains asymptomatic. Will obtain bone biopsy most likely right posterior iliac bone as suggest in MRI report.      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 This tissue diagnosis most likely apply to all the FDG avid signals in 11/2024. It is reassuring that the FDG uptake in the bone is not malignant. However, the nature and etiology of the granuloma remain unknown. AFB GMS stains are pending results. While lymphoma was not found in this biopsy and a previous mediastinal LN bx and bmbx  (5/2024), there is no absolute confidence that lymphoma does not exist. He will be monitored. Will need to balance the burden of scans vs living with uncertainty.     -5/23/25: AFB and GMS stain from iliac bone biopsy both negative. Has MRI scans from Dr Queen in beginning of June. CT CAP showed pulmonary nodules have no change and no new nodules or masses seen. There was slight decrease in size of mediastinal lymph nodes. No new lymph nodes or masses identified. Will continue to monitor.   Labs in beginning of May WNL. Activity continues to improve. Working out, training to go back to work as .   Had ACE levels drawn in November for potential work up of sarcoidosis and levels were 86. Normal range 16-85. No longer experiencing dyspnea. Continuing to increase activity. Can refer to pulmonology if clinically indicated.

## 2025-05-23 NOTE — PROGRESS NOTES
Patient ID: Dann Ni is a 49 y.o. male.       ASSESSMENT & PLAN     Oncology History   Marginal zone lymphoma (Multi)   10/2/2023 Cancer Staged    Staging form: Hodgkin and Non-Hodgkin Lymphoma, AJCC 8th Edition, Clinical stage from 10/2/2023: Stage IV (Marginal zone lymphoma) - Signed by Jeff Peters MD PhD on 2023 Initial Diagnosis    Presentation: (2023): Back pain, loss of sensation below the diaphragm, gait disturbance    Imaging:  - CT (23): 4.1x1.8x1cm paraspinal mass at the T7 level  - MRI: showed abnormal enhancing signals at the C5, T2-8 levels.     Surgery:  - 10/2/2023 he had a debulking surgery which later showed evidence of lymphoma. In the meantime he has improved sensation and is able to walk with a cane.     - Work up including PET/CT results consistent with stage IV MZL.      2023 - 2024 Chemotherapy    Tolerated relatively well, but had nausea and flu like symptoms for a few days after chemo. Since then he also completed C2 R stacey on , followed by C3 on , C4 on , C5D1 on 3/21, and c6d1 on .     Intrathecal treatment #1 on , then on 3/18/2024.   Bendamustine + RiTUXimab, 28 Day Cycles     2024 Imaging    PET/CT 2024 showing persistent FDG avid lesions.      2024 Biopsy    Bone marrow bx 2024 -- no evidence of lymphoma     2024 Biopsy    EBUS biopsy of a subcarinal mass; There was no evidence of lymphoma      2024 Imaging    CT Chest w contrast (24): 2024: reviewed CT of , showin.  Numerous lung nodules bilaterally measuring up to 5 mm in size, similar to the prior in size and number. 2. Redemonstration of multiple enlarged mediastinal lymph nodes, also unchanged from the prior.   -It is likely, but not proven, that the lung nodules and small mediastinal lymph nodes are reactive to inflammation. However, the exact source of inflammation is not identifiable.         25  Here today for routine follow up of marginal zone.     Assessment & Plan  Marginal zone lymphoma (Multi)  Reviewed PET/CT and MRI with the patient and wife. The results are consistent with stage IV disease, involving LN above and below the diaphragm, extensively axial and appendicular skeleton including bilateral scapulas, right clavicular head, T5 vertebral body, bilateral iliac bones, left ischium, and left femur without definite anatomic correlate (max SUV 7.6).  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   -Current data support treatment using multi-agent chemo. Bendamustine and rituximab have been approved and widely used, found to be safe and effective.   -Patient was disappointed to learn that EMZL such as his may not be curable. Was relieved to know that the treatment can bring multiple years of remission, improvement of strength / quality of life / work experience.   -Discussed the Aes of r-anum, which are numerous, including SAEs such as infection, or even death. Most AE and DUSTIN however can be resolved with effective treatments. He consented for R-anum.   -Due to the location of the disease and bone marrow uptake, he may be at high risk of CNS involvement / relapse. Schedule head CT and intrathecal treatment, timing likely after 2 cycles of R-ANUM. He consented for IT Methotrexate and cytarabine. First IT on 2/19/2024. CSF shows no lymphoma. Next IT on 3/18 tentatively.   -In addition, consolidation using XRT will be discussed in the future.   -5/10/2024: PET/CT results at [facility] were reviewed. Unfortunately, there are a number of residual FDG avid signals involving bones and perihilar nodes. These raise the question of treatment failure, or possible large cell transformation. Will obtain BMBx and additional biopsy, including possibly:   **a subcarinal lymph node has a maximum SUV of 11.3 previously measuring up to 14.9 and a  conglomerate of right paratracheal lymph nodes has a maximum  SUV of 8.9 previously measuring up to 14.9. (by interventional pulm)  **proximal left humerus there is a lytic lesion with maximum SUV of 7.5 SUV previously measuring up to 3.6 SUV. (By IR)  -2024: discussed the results below:   bmbx: A, B & C. BONE MARROW CLOT WITH ASPIRATE AND CORE WITH TOUCH PREP, LEFT ILIAC CREST:   -- NORMOCELLULAR BONE MARROW (40%) WITH MATURING TRILINEAGE HEMATOPOESIS AND NO MORPHOLOGIC EVIDENCE OF LYMPHOMA.  : A. LYMPH NODE,  LEVEL 7; BIOPSY:      -- Non-diagnostic rare bronchial epithelium and blood.  Those negative results and his clinical improvement do not support relapse or refractoriness. Will monitor closely. CT Chest on .   -2024: reviewed CT of , showin.  Numerous lung nodules bilaterally measuring up to 5 mm in size, similar to the prior in size and number. 2. Redemonstration of multiple enlarged mediastinal lymph nodes, also unchanged from the prior.   -It is likely, but not proven, that the lung nodules and small mediastinal lymph nodes are reactive to inflammation. However, the exact source of inflammation is not identifiable. He has no evidence of Pneumonia. In the past ACE level was found to be Wnl, arguing against (but does not rule out) sarcoidosis.   -Given the above consideration, disease progression/refractoriness is questionable. Will therefore refer back to [facility] to discuss radiation of the soft tissue mass at the T1-5 level previously shown on MRI in 10/2023. Will also repeat PET/CT in 2024.  -10/24/2024: clinically he continues to do well without symptoms associated with lymphoma. MRI TL spines continues to show abnormal epidural enhancement. Lymphoma relapse remains a risk despite recent negative work up. Will order PET/CT.   -11/15: reviewed reports and images of PET/CT thoroughly with the patient and his wife. Overall the PET/CT results did not demonstrate compelling  evidence of progression or relapse. Fore example, “Interval increase in metabolic activity within multiple axial and appendicular osseous lesions”, but no CT correlates were present for any of the above FDG avid bony lesions. In addition, he has no bony symptoms at all. The low FDG avid small lymph nodes have been stable in size, arguing against relapsed FL. These FDG avid lesions (body, small and stable LAD) may thus reflect inflammatory etiology (e.g. sarcoidosis), but less likely FL. Nevertheless, FL cannot be ruled out but will require close surveillance.   -12/19/2024: no new symptoms to suggest relapse or progression. Discussed lab results: ACE level is elevated, monoclonal IgG is 0.2 same as May 2024. Given the negative BMBx in May 2024, the above does not suggest myeloma either. Elevated ACE level is suggestive of sarcoidosis, but that will require further confirmation of biopsy showing non-necrotizing granuloma. Mediastinal and hilar nodes are small, and previous biopsy was not diagnostic. FDG uptakes in the axial bones do not have CT correlates, which may reduce the diagnostic value of the biopsy. Will repeat pelvic bone MRI to guide biopsy. Otherwise, may refer to IR for biopsy of the following abnormal bony signals in the recent PET: right coracoid (maximum SUV 7.9, previously  7.5), right medial clavicle (maximum SUV 12.2, previously 7.3), right  5th rib (maximum SUV 7.5).   -1/24/2025: reviewed MRI results showing right pelvic bone hypodensity, which correlates to FDG avid uptakes. He remains asymptomatic. Will obtain bone biopsy most likely right posterior iliac bone as suggest in MRI report.      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 This tissue diagnosis most likely apply to all the FDG avid signals in 11/2024. It is reassuring that the FDG uptake in the bone is not malignant. However, the nature and etiology of the granuloma remain unknown. AFB GMS  stains are pending results. While lymphoma was not found in this biopsy and a previous mediastinal LN bx and bmbx (5/2024), there is no absolute confidence that lymphoma does not exist. He will be monitored. Will need to balance the burden of scans vs living with uncertainty.     -5/23/25: AFB and GMS stain from iliac bone biopsy both negative. Has MRI scans from Dr Queen in beginning of June. CT CAP showed pulmonary nodules have no change and no new nodules or masses seen. There was slight decrease in size of mediastinal lymph nodes. No new lymph nodes or masses identified. Will continue to monitor.   Labs in beginning of May WNL. Activity continues to improve. Working out, training to go back to work as .   Had ACE levels drawn in November for potential work up of sarcoidosis and levels were 86. Normal range 16-85. No longer experiencing dyspnea. Continuing to increase activity. Can refer to pulmonology if clinically indicated.     Plan   -follow up with Dr Peters in 3 months   -Has MRI scan and follow up with Dr Queen in June 2025  -repeat PET/CT in 6 months       SUBJECTIVE     HPI    Dann Ni presents today for follow up, accompanied by his wife. He overall states he is feeling well and has been increasing his activity. Him and his wife recently went to [place] and hiked. He did this with no issues. He does sometimes experience some low back pain from surgery and tingling in the legs but has significantly improved from before. No falls.     Has been working out and eating healthier to try and lose weight from when he gained during treatment. Feels not as short of breath as he did before.     Otherwise feeling well. No fevers, chills, chest pain, dyspnea, palpitations, abdominal pain, nausea, vomiting, diarrhea, rashes, bruising or bleeding.     Review of Systems   Constitutional:  Negative for appetite change, chills, fatigue and fever.   HENT:   Negative for mouth sores.    Respiratory:   Negative for cough, shortness of breath and wheezing.    Cardiovascular:  Negative for chest pain, leg swelling and palpitations.   Gastrointestinal:  Negative for abdominal pain, constipation, diarrhea, nausea and vomiting.   Genitourinary:  Negative for dysuria, frequency and hematuria.    Musculoskeletal:  Positive for back pain (sometimes on and off from surgery). Negative for gait problem.   Skin:  Negative for itching, rash and wound.   Neurological:  Negative for dizziness, gait problem, headaches and light-headedness.        Sometimes tingling in legs     Hematological:  Negative for adenopathy. Does not bruise/bleed easily.   Psychiatric/Behavioral:  Negative for depression and sleep disturbance.        Medical History[1]  Social History[2]   Surgical History[3]      OBJECTIVE     BSA: 2.26 meters squared  /83 (BP Location: Right arm, Patient Position: Sitting, BP Cuff Size: Large adult)   Pulse 81   Temp 36.1 °C (97 °F) (Skin)   Resp 16   Wt 106 kg (234 lb 11.2 oz)   SpO2 97%   BMI 35.69 kg/m²        Physical Exam  Constitutional:       General: He is not in acute distress.     Appearance: Normal appearance. He is not ill-appearing.   HENT:      Head: Normocephalic and atraumatic.   Cardiovascular:      Rate and Rhythm: Normal rate and regular rhythm.      Pulses: Normal pulses.      Heart sounds: Normal heart sounds.   Pulmonary:      Effort: Pulmonary effort is normal. No respiratory distress.      Breath sounds: Normal breath sounds. No wheezing.   Abdominal:      General: There is no distension.      Palpations: Abdomen is soft.      Tenderness: There is no abdominal tenderness.   Musculoskeletal:         General: Normal range of motion.      Right lower leg: No edema.      Left lower leg: No edema.   Lymphadenopathy:      Cervical: No cervical adenopathy.      Upper Body:      Right upper body: No supraclavicular or axillary adenopathy.      Left upper body: No supraclavicular or axillary  adenopathy.   Skin:     General: Skin is warm.      Coloration: Skin is not jaundiced.      Findings: No bruising or lesion.   Neurological:      General: No focal deficit present.      Mental Status: He is alert and oriented to person, place, and time.   Psychiatric:         Mood and Affect: Mood normal.         Behavior: Behavior normal.         Thought Content: Thought content normal.         Judgment: Judgment normal.         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    Loss of smell    Cough variant asthma ([facility]) 04/02/2019    Cough variant asthma    Diverticulitis 09/25/2023    Foot pain 09/25/2023    Other conditions influencing health status     No significant past surgical history    Other conditions influencing health status     No significant past medical history    Personal history of other diseases of the respiratory system 04/14/2019    History of bronchitis    Personal history of other drug therapy 10/23/2019    History of influenza vaccination    Personal history of other specified conditions 03/30/2019    History of persistent cough    Recurrent pneumonia 09/25/2023    Tendonitis 09/25/2023   [2]   Social History  Tobacco Use    Smoking status: Never    Smokeless tobacco: Never   Vaping Use    Vaping status: Never Used   Substance Use Topics    Alcohol use: Not Currently     Comment: rarely    Drug use: Never   [3]   Past Surgical History:  Procedure Laterality Date    OTHER SURGICAL HISTORY  04/12/2019    No history of surgery

## 2025-06-03 ENCOUNTER — ANCILLARY PROCEDURE (OUTPATIENT)
Facility: HOSPITAL | Age: 50
End: 2025-06-03
Payer: COMMERCIAL

## 2025-06-03 ENCOUNTER — TELEPHONE (OUTPATIENT)
Dept: RADIATION ONCOLOGY | Facility: HOSPITAL | Age: 50
End: 2025-06-03
Payer: COMMERCIAL

## 2025-06-03 DIAGNOSIS — C85.80 MARGINAL ZONE LYMPHOMA (MULTI): ICD-10-CM

## 2025-06-03 PROCEDURE — 72158 MRI LUMBAR SPINE W/O & W/DYE: CPT

## 2025-06-03 PROCEDURE — A9575 INJ GADOTERATE MEGLUMI 0.1ML: HCPCS | Performed by: RADIOLOGY

## 2025-06-03 PROCEDURE — 72157 MRI CHEST SPINE W/O & W/DYE: CPT

## 2025-06-03 PROCEDURE — 72157 MRI CHEST SPINE W/O & W/DYE: CPT | Performed by: RADIOLOGY

## 2025-06-03 PROCEDURE — 2550000001 HC RX 255 CONTRASTS: Performed by: RADIOLOGY

## 2025-06-03 PROCEDURE — 72156 MRI NECK SPINE W/O & W/DYE: CPT

## 2025-06-03 PROCEDURE — 72158 MRI LUMBAR SPINE W/O & W/DYE: CPT | Performed by: RADIOLOGY

## 2025-06-03 PROCEDURE — 72156 MRI NECK SPINE W/O & W/DYE: CPT | Performed by: RADIOLOGY

## 2025-06-03 RX ORDER — GADOTERATE MEGLUMINE 376.9 MG/ML
20 INJECTION INTRAVENOUS
Status: COMPLETED | OUTPATIENT
Start: 2025-06-03 | End: 2025-06-03

## 2025-06-03 RX ADMIN — GADOTERATE MEGLUMINE 20 ML: 376.9 INJECTION INTRAVENOUS at 13:42

## 2025-06-06 ENCOUNTER — OFFICE VISIT (OUTPATIENT)
Dept: PRIMARY CARE | Facility: CLINIC | Age: 50
End: 2025-06-06
Payer: COMMERCIAL

## 2025-06-06 ENCOUNTER — TELEPHONE (OUTPATIENT)
Dept: PRIMARY CARE | Facility: CLINIC | Age: 50
End: 2025-06-06

## 2025-06-06 VITALS
WEIGHT: 232 LBS | HEIGHT: 68 IN | DIASTOLIC BLOOD PRESSURE: 80 MMHG | TEMPERATURE: 98.7 F | OXYGEN SATURATION: 98 % | SYSTOLIC BLOOD PRESSURE: 100 MMHG | BODY MASS INDEX: 35.16 KG/M2 | HEART RATE: 96 BPM

## 2025-06-06 DIAGNOSIS — R31.9 HEMATURIA, UNSPECIFIED TYPE: ICD-10-CM

## 2025-06-06 DIAGNOSIS — M48.8X4 OTHER SPECIFIED SPONDYLOPATHIES, THORACIC REGION: ICD-10-CM

## 2025-06-06 DIAGNOSIS — J45.991 COUGH VARIANT ASTHMA (HHS-HCC): ICD-10-CM

## 2025-06-06 DIAGNOSIS — R50.9 FEVER, UNSPECIFIED FEVER CAUSE: Primary | ICD-10-CM

## 2025-06-06 DIAGNOSIS — G95.20 UNSPECIFIED CORD COMPRESSION (MULTI): ICD-10-CM

## 2025-06-06 DIAGNOSIS — D84.9 IMMUNOSUPPRESSED STATUS: ICD-10-CM

## 2025-06-06 LAB
POC APPEARANCE, URINE: CLEAR
POC BILIRUBIN, URINE: ABNORMAL
POC BLOOD, URINE: ABNORMAL
POC COLOR, URINE: YELLOW
POC GLUCOSE, URINE: NEGATIVE MG/DL
POC KETONES, URINE: NEGATIVE MG/DL
POC LEUKOCYTES, URINE: NEGATIVE
POC NITRITE,URINE: NEGATIVE
POC PH, URINE: 5.5 PH
POC PROTEIN, URINE: ABNORMAL MG/DL
POC SPECIFIC GRAVITY, URINE: >=1.03
POC UROBILINOGEN, URINE: 0.2 EU/DL

## 2025-06-06 PROCEDURE — 3008F BODY MASS INDEX DOCD: CPT | Performed by: FAMILY MEDICINE

## 2025-06-06 PROCEDURE — 81003 URINALYSIS AUTO W/O SCOPE: CPT | Performed by: FAMILY MEDICINE

## 2025-06-06 PROCEDURE — 1036F TOBACCO NON-USER: CPT | Performed by: FAMILY MEDICINE

## 2025-06-06 PROCEDURE — 99214 OFFICE O/P EST MOD 30 MIN: CPT | Performed by: FAMILY MEDICINE

## 2025-06-06 RX ORDER — BUDESONIDE AND FORMOTEROL FUMARATE DIHYDRATE 160; 4.5 UG/1; UG/1
2 AEROSOL RESPIRATORY (INHALATION)
Qty: 10.2 G | Refills: 3 | Status: SHIPPED | OUTPATIENT
Start: 2025-06-06

## 2025-06-06 RX ORDER — CEFDINIR 300 MG/1
600 CAPSULE ORAL DAILY
Qty: 20 CAPSULE | Refills: 0 | Status: SHIPPED | OUTPATIENT
Start: 2025-06-06 | End: 2025-06-16

## 2025-06-06 RX ORDER — ALBUTEROL SULFATE 90 UG/1
2 INHALANT RESPIRATORY (INHALATION) EVERY 6 HOURS PRN
Qty: 18 G | Refills: 2 | Status: SHIPPED | OUTPATIENT
Start: 2025-06-06

## 2025-06-06 ASSESSMENT — ENCOUNTER SYMPTOMS
WHEEZING: 0
VOMITING: 0
COUGH: 1
HEADACHES: 1
DYSURIA: 0
DIARRHEA: 0
NAUSEA: 0
ABDOMINAL PAIN: 0
SORE THROAT: 0
FEVER: 1

## 2025-06-06 ASSESSMENT — PATIENT HEALTH QUESTIONNAIRE - PHQ9
SUM OF ALL RESPONSES TO PHQ9 QUESTIONS 1 AND 2: 0
2. FEELING DOWN, DEPRESSED OR HOPELESS: NOT AT ALL
1. LITTLE INTEREST OR PLEASURE IN DOING THINGS: NOT AT ALL

## 2025-06-06 NOTE — PROGRESS NOTES
Subjective   Patient ID: Dann Ni is a 49 y.o. male who presents for Fever.  HPI    Patient presents in office for a sudden onset of fever. Ongoing x Wednesday night. Patient states that it started with night sweats, and since then, he has had a persistent nighttime fever and fatigue. Patient states that when his fever gets high, his asthma becomes triggered, and he starts coughing until he takes Ibuprofen. The highest reading he has had is 103 degrees. Patient states that the ibuprofen has been keeping the fever down for a couple of hours. Patient has taken ibuprofen this morning. Patient has been drinking a lot more fluids and using the restroom more frequently due to that. Denies burning or pain with urination or nocturia. Denies runny nose, abdominal pain, or aches or pains.    Patient has history of non-hodgkin lymphoma, managed by oncology. His recent iliac bone biopsy were both negative. He performed MRI of his complete vertebrae on 6/3/25 which showed a new lesion in his thoracic spine suspicious for metastatic disease.    Review of systems  ; Patient seen today for exam denies any problems with headaches or vision, denies any shortness of breath chest pain nausea or vomiting, no black stool no blood in the stool no heartburn type symptoms denies any problems with constipation or diarrhea, and no dysuria-type symptoms    The patient's allergies medications were reviewed with them today    The patient's social family and surgical history or also reviewed here today, along with her past medical history.     Objective     Alert and active in  no acute distress  HEENT TMs clear oropharynx negative nares clear no drainage noted neck supple  With no adenopathy   Heart regular rate and rhythm without murmur and no carotid bruits  Lungs- clear to auscultation bilaterally, no wheeze or rhonchi noted  Thyroid -negative masses or nodularity  Abdomen- soft times four quadrants , bowel sounds positive no masses  "or organomegaly, negative tenderness guarding or rebound    skin -no lesions noted      /80 (BP Location: Right arm, Patient Position: Sitting, BP Cuff Size: Adult long)   Pulse 96   Temp 37.1 °C (98.7 °F) (Temporal)   Ht 1.727 m (5' 8\")   Wt 105 kg (232 lb)   SpO2 98%   BMI 35.28 kg/m²         Assessment/Plan   Problem List Items Addressed This Visit       Cough variant asthma (HHS-HCC)    Relevant Medications    budesonide-formoterol (Symbicort) 160-4.5 mcg/actuation inhaler    albuterol 90 mcg/actuation inhaler    Immunosuppressed status    Unspecified cord compression (Multi)    Other specified spondylopathies, thoracic region     Other Visit Diagnoses         Fever, unspecified fever cause    -  Primary    Relevant Medications    cefdinir (Omnicef) 300 mg capsule    Other Relevant Orders    POCT UA Automated manually resulted (Completed)    Urine Culture      Hematuria, unspecified type        Relevant Medications    cefdinir (Omnicef) 300 mg capsule    Other Relevant Orders    POCT UA Automated manually resulted (Completed)    Urine Culture            The patient's symptoms are not consistent with the flu.  Vase viral syndrome urinalysis performed today showed trace blood, possibly indicating an early infection, though there are no typical UTI symptoms. We discussed that his cancer could possibly irritate his bladder from time to time. We advised him to increase fluid intake over the weekend and monitor his symptoms, including timing and pattern of fever. If symptoms worsen over the weekend, he will go to the ER.    This also could be fever due to new tumor burden as reviewed his MRIs      Will send urine for culture    Refilled Symbicort and Albuterol inhalers.    Reviewed his MRI of the cervical, lumbar, and thoracic spine which shows a new 7 mm lesion in the T6 suspicious for metastatic disease. Will be followed closely by oncology.    If anything worsens or changes please call us at once, " follow up in the office as planned,       Scribe Attestation  By signing my name below, I, Ady Marie   attest that this documentation has been prepared under the direction and in the presence of Ventura Gonzalez DO.    All medical record entries made by the Scribe were at my direction and personally dictated by me.   I have reviewed the chart and agree that the record accurately reflects my personal performance of the history, physical exam, discussion, and plan.

## 2025-06-06 NOTE — LETTER
June 6, 2025     Patient: Dann Ni   YOB: 1975   Date of Visit: 6/6/2025       To Whom It May Concern:    Dann Ni was seen in my clinic on 6/6/2025 and may return on 6/12/2025. Please excuse Dann for his absence from work on this day to make the appointment and recovering from a virus.    If you have any questions or concerns, please don't hesitate to call.         Sincerely,         Ventura Gonzalez,         CC: No Recipients

## 2025-06-08 LAB — BACTERIA UR CULT: NORMAL

## 2025-06-09 ENCOUNTER — HOSPITAL ENCOUNTER (OUTPATIENT)
Dept: RADIATION ONCOLOGY | Facility: HOSPITAL | Age: 50
Setting detail: RADIATION/ONCOLOGY SERIES
Discharge: HOME | End: 2025-06-09
Payer: COMMERCIAL

## 2025-06-09 VITALS
DIASTOLIC BLOOD PRESSURE: 78 MMHG | TEMPERATURE: 96.8 F | BODY MASS INDEX: 36.22 KG/M2 | SYSTOLIC BLOOD PRESSURE: 113 MMHG | RESPIRATION RATE: 18 BRPM | HEART RATE: 77 BPM | WEIGHT: 238.21 LBS | OXYGEN SATURATION: 98 %

## 2025-06-09 DIAGNOSIS — C85.80 MARGINAL ZONE LYMPHOMA (MULTI): Primary | ICD-10-CM

## 2025-06-09 PROCEDURE — 99214 OFFICE O/P EST MOD 30 MIN: CPT | Performed by: RADIOLOGY

## 2025-06-09 ASSESSMENT — ENCOUNTER SYMPTOMS
HEMATOLOGIC/LYMPHATIC NEGATIVE: 1
EYES NEGATIVE: 1
CARDIOVASCULAR NEGATIVE: 1
FEVER: 1
MUSCULOSKELETAL NEGATIVE: 1
COUGH: 1
NEUROLOGICAL NEGATIVE: 1
PSYCHIATRIC NEGATIVE: 1
GASTROINTESTINAL NEGATIVE: 1
ENDOCRINE NEGATIVE: 1

## 2025-06-09 ASSESSMENT — PAIN SCALES - GENERAL: PAINLEVEL_OUTOF10: 0-NO PAIN

## 2025-06-09 NOTE — PROGRESS NOTES
Radiation Oncology Nursing Note    Pain: The patient's current pain level was assessed.  They report currently having a pain of 0 out of 10.  They feel their pain is under control without the use of pain medications.    Review of Systems:  Review of Systems   Constitutional:  Positive for fever (pt. started having fevers up to 103.0 since Wednesday; Prescribed Cefdinir x 10 days).   HENT:  Negative.     Eyes: Negative.    Respiratory:  Positive for cough (just with fevers).    Cardiovascular: Negative.    Gastrointestinal: Negative.    Endocrine: Negative.    Genitourinary: Negative.     Musculoskeletal: Negative.    Skin: Negative.    Neurological: Negative.    Hematological: Negative.    Psychiatric/Behavioral: Negative.         Patient here with his wife as follow up from MRI spine 6/3/25.  Patient's only complaint is a fever that started last Wednesday.  Fever up to 103.0.  PCP ordered Cefdinir.  Patient without any other complaints.

## 2025-06-09 NOTE — PROGRESS NOTES
Radiation Oncology Follow-Up    Patient Name:  Dann Ni  MRN:  [id number]  :  1975    Primary Care Provider: Jeff Peters MD PhD   Care Team: Patient Care Team:  Ventura Gonzalez DO as PCP - General  Ventura Gonzalez DO as PCP - Mariposa HIDALGO PCP  Yesenia Marin MD as Surgeon (Neurosurgery)  Dann Avila PA-C as Physician Assistant (Neurosurgery)  Jeff Peters MD PhD as Consulting Physician (Hematology and Oncology)  DEBI Rodriguez as  (Hematology and Oncology)  Veronica Trevizo MD as Consulting Physician (Hematology and Oncology)  Maren Queen MD as Radiation Oncologist (Radiation Oncology)    Date of Service: 2025    SUBJECTIVE  History of Present Illness:  Dann Ni is a 49 y.o. 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 He was seen for initial consultation in 2024. At that point, it was decided to complete post-treatment imaging and lab surveillance to be followed by reassessment.     Interval History:  Symptomatically he is doing very well overall with no acute decline from last visit.  Ambulating well. 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  No gait/balance difficulty.  No new neurological deficits in upper extremity.     Denies neck pain or stiffness. Stable chronic pain related to prior surgery. Denies radiating neurological symptoms of pain or weakness of bilateral upper or lower extremities. The numbness below umbilicus has improved substantionally.    He did develop fever with chills in the last week. Was prescribed a course of antibiotics, which helped with the fever.     Denies other systemic symptoms including fever, chills, weight loss, night sweats.    Continues to follow with Dr. Peters. Currently  under surveillance. Recent CT And MRI below.    CT CHEST ABDOMEN PELVIS W IV CONTRAST; 5/19/2025   CHEST:  1.  Multiple bilateral pulmonary nodules without interval change  measuring up to 7 mm in size with no new pulmonary nodules or masses  seen.  2. Slight decrease in size of the various mediastinal lymph nodes  involved by the disease process when compared with the CT examination  of the chest done on 07/08/2024.      ABDOMEN-PELVIS:  1. There is stable size and configuration of involved lymph nodes in  the reed hepatis and precaval space within the upper abdomen.  2. Hepatic steatosis.  3. Fat containing periumbilical hernia.  4. The various osseous lesions involved by the marginal zone lymphoma  are difficult to appreciate on the CT examination, best evaluated  with fused PET/CT study.    MR CERVICAL SPINE W AND WO IV CONTRAST; MR THORACIC SPINE W AND WO IV  CONTRAST; MR LUMBAR SPINE W AND WO IV CONTRAST;  6/3/2025   1. Diffuse enhancement within the C5 vertebra, extending into the right pedicle and posterior elements appears similar to prior.  2. A new 7 mm enhancing lesion anterior inferior corner of the T6 vertebra suspicious for metastatic disease.  3. Slightly increasing fusiform thickening and enhancement of the dura at the levels of decompression T1 and T4.  4. Also some increased undulating fusiform thickening and enhancement of the dura at and below the T10 level.  5. Asymmetric abnormal enhancement within the right neural foramen at T7-T8 which was also present on multiple prior,, suspected nerve sheath tumor rather than metastatic disease..  6. No metastatic disease involving the lumbar spine.  7. No pathologic fractures.    His oncological work up and treatment history is as below:  - The patient was in good health until July 2023. He developed back pain which did not respond to NSAID or steroid. By late 9/2023 he lost sensation below the diaphragm and were unable to walk.     Treatment:  - On  10/2/2023 he had a debulking surgery which later showed evidence of lymphoma. In the meantime he has improved sensation and is able to walk with a cane.   - Initiated on rituximab bendamustine and started C1 on 11/30/2023. Tolerated relatively well, but had nausea and flu like symptoms for a few days after chemo. Since then he also completed C2 R stacey on 12/28, C3 on 01/25, C4 on 02/22/2024. C6 on 04/18/2024.  -Due to the location of the disease and bone marrow uptake, he may be at high risk of CNS involvement / relapse. Initiated on IT Methotrexate and cytarabine.   - LP with IT Methotrexate (2/19); CSF flow cytometry negative for lymphoma  - Evaluated by Dr. Peters. It was felt that given negative CSF and BM BX, the lung nodules and mediastinal nodes are reactive. Hence, he has bene off systemic therapy and presents back to discuss role of radiation to T spine.  - Right iliac bone, biopsy 2/7/2025: Mildly hypercellular marrow (50-60% cellular) with non-necrotizing granulomas      Relevant Pathology:  Surgical Pathology 10/2/2023: -- LOW GRADE B CELL LYMPHOMA WITH PLASMACYTIC DIFFERENTIATION MOST CONSISTENT WITH EXTRANODAL MARGINAL ZONE LYMPHOMA. No clonal B cell or T cell population identified.   NGS is positive for a clonal BCR gene rearrangement, supporting a clonal B cell proliferation. MYD88 L265P allele specific PCR is negative.   FISH for BCL2 and BCL6 rearrangements (performed at [facility]) and FISH for 1p36 abnormalities (send out test performed at [facility]) are all negative.     CEREBROSPINAL FLUID, CYTOLOGY 3/18/2024:   --  No malignant cells identified.   --  Few lymphocytes and a rare monocyte present.     FINAL DIAGNOSIS 5/23/2024   BONE MARROW CLOT WITH ASPIRATE AND CORE WITH TOUCH PREP, LEFT ILIAC CREST:   -- NORMOCELLULAR BONE MARROW (40%) WITH MATURING TRILINEAGE HEMATOPOESIS AND NO MORPHOLOGIC EVIDENCE OF LYMPHOMA.     Bronch/EBUS 5/31/2024 was negative for disease.  RAPID ONSITE EVALUATION  (R.O.S.E.):  - Station 7 TBNA : non-diagnostic  - Station 4R TBNA : scant lymphoid  FINAL DIAGNOSIS   A. LYMPH NODE,  LEVEL 7; BIOPSY:      -- Non-diagnostic rare bronchial epithelium and blood.     FINAL DIAGNOSIS 2/7/2025   Right iliac bone, biopsy:   -- Mildly hypercellular marrow (50-60% cellular) with non-necrotizing granulomas, see note   NOTE: In correlation with the flow cytometry findings show no evidence of lymphoid aggregates or lymphoma. The differential diagnosis includes sarcoidosis and infection.     Imaging:  - On 9/30 he had CT scan showing a 4.1x1.8x1cm paraspinal mass at the T7 level.   - MRI 10/01/2023: Abnormal signal within the C5 vertebral body which is highly suspicious for neoplastic infiltration. No extension of tumor into the spinal canal. Abnormal signal within the T2-T4 vertebral bodies and posterior elements consistent with neoplastic infiltration. There is tumoral extension into the spinal canal at the level of T1-T2 through T3 causing marked mass effect on the thoracic spinal cord with anterior and right lateral deviation of the cord. There is signal abnormality within the spinal cord which is most consistent with edema. There is extension of tumor into the left T2-T3 and T3-T4 neural foramina. There is soft tissue located within the right T7-T8 prevertebral soft tissues and within the right T7-T8 neural foramen which is most consistent tumor. Nonspecific mild signal abnormality located within the posterior S1 vertebral body which reflect neoplastic infiltration. No abnormal mass or enhancement is seen within the lumbar spinal canal and there is no striking signal abnormality located within the lumbar osseous structures.     PET/CT 11/13/2023: Multiple FDG avid subcentimeter pulmonary nodule in the left lower lobe (max SUV 3.4) and a FDG avid pulmonary nodule in the left upper lobe with (max SUV 4.4). Right lung is unremarkable *Multiple FDG avid bilateral axillary, mediastinal and  bilateral hilar and infrahilar nodes (max SUV 14.9). *Postsurgical changes from T1-4 laminectomies and paraspinal mass excision with interval development of a postoperative seroma and surrounding FDG avidity with (max SUV 5.4). *FDG avid portacaval lymph node with (max SUV 6.4). *Multiple FDG avid bone lesions throughout the axial and appendicular skeleton including bilateral scapulas, right clavicular head, T5 vertebral body, bilateral iliac bones, left ischium, and left femur without definite anatomic correlate (max SUV 7.6).  MRI Spine 11/16/2023: There are postoperative changes compatible with interval posterior laminectomies extending from the T1 through T4 levels. The previously noted abnormal enhancing epidural soft tissue mass concerning for epidural neoplasm through this region is not well-defined on the current study raising the possibility of interval surgical resection and/or sequelae of interval post therapy. There is nonspecific smooth abnormal epidural thickening and enhancement within the spinal canal extending from the C7/T1 level caudally to the T5 level with residual nonspecific abnormal enhancing soft tissue extending through the neural foramen at the T1/2, T2/3, T3/4, and T4/5 levels left greater than right which may be at least in part postsurgical in etiology with the possibility of a component of underlying residual neoplasm or a superimposed infectious/inflammatory process not entirely excluded. The nonspecific smooth epidural thickening through this region contributes to mild encroachment upon the thecal sac without evidence of spinal cord compression through this region on the current study. The previously noted infiltrative abnormal increased STIR bone marrow signal within the upper thoracic vertebrae including the T2, T3, and T4 vertebrae as well as the C5 vertebral body on the prior MRI dated 10/01/2023 most concerning for underlying infiltrative osseous neoplasm/lymphoma is less  conspicuous on the current study.   MR spine 4/30/2024 revealed evolving post-surgical changes in thoracic spine with no new or enlarging enhancing components. There were mixed changes in L spine that raised suspicion for posttreatment change or arachnoiditis although an element of neoplastic involvement is not completely excluded.  MR CERVICAL SPINE WO IV CONTRAST; 4/30/2024   Stable MR appearance of the cervical spine. As on prior examination there is nonspecific, relatively diffuse ill-defined fatty marrow replacement throughout the cervical region without focal STIR hyperintensity/marrow edema signal. Remainder of the cervical spine is normal in MR appearance with trace degenerative changes.     MR THORACIC SPINE W AND WO IV CONTRAST; 4/30/2024   Evolving postsurgical/posttreatment change suggested at the T1-T4 level status post laminectomy and enhancing epidural mass debulking/resection. There is residual nonspecific mild epidural enhancement within the operative regions as well as asymmetric involvement of the left left-greater-than-right T2 neural foraminal region that could reflect residual neoplasm or evolving posttreatment change. No new or enlarging enhancing components. No significant compressive affect on the thecal sac.     Residual marrow heterogeneity within the T6 vertebral body as well as mild diffusely decreased T1 fatty marrow signal throughout the thoracic spine, nonspecific. There is no focal abnormal enhancement or edema within the thoracic vertebra.    MR LUMBAR SPINE W AND WO IV CONTRAST; 4/30/2024   In comparison with prior lumbar spine MRI examination the cauda equina appear diffusely, mildly enlarged in the interim with probable subtle enhancement on fat-suppressed postcontrast axial imaging. There is also thickening and enhancement of the epidural venous plexus that has developed in the interim. Findings are nonspecific and could reflect an element of posttreatment change or  arachnoiditis although an element of neoplastic involvement is not completely excluded. This could be further assessed with lumbar puncture as clinically warranted. No focal abnormal osseous enhancement.    PET CT LYMPHOMA STAGING; 5/9/2024   1. Interval increased metabolic activity within multiple axial and appendicular osseous lesions with increased metabolic activity within an enlarging portacaval lymph node compatible with disease progression.  2. Interval decreased hypermetabolic activity within multiple mediastinal lymph nodes and decreased metabolic activity within multiple left lower lobe lung nodules, again favored to represent mediastinal soraya disease with lung metastasis.  3. Postsurgical changes from prior T1 through T4 laminectomy without evidence of local residual or recurrent disease.    CT CHEST W IV CONTRAST; 7/8/2024   1.  Numerous lung nodules bilaterally measuring up to 5 mm in size, similar to the prior in size and number  2. Redemonstration of multiple enlarged mediastinal lymph nodes, also unchanged from the prior.    MR THORACIC SPINE W AND WO IV CONTRAST; MR LUMBAR SPINE W AND WO IV CONTRAST;  10/21/2024   1. Redemonstration of postoperative changes with laminectomies from  T1 through T4. There is persistent abnormal epidural enhancement from  T1 through T5 with intraforaminal extension most pronounced on the  left at T2-3. This remains nonspecific and could reflect posttherapy  change or conceivably neoplasm. Enhancement within the posterior  paraspinous soft tissues is also nonspecific and could be  postoperative/therapy related. Neoplastic involvement is difficult to  entirely exclude, however.  2. Indistinct appearance of the nerve roots of the cauda equina maybe  due to imaging technique, at least in part. However, there does  appear to be some subtle thickening and enhancement of the nerve  roots. Again, this could reflect arachnoiditis or lymphoma. There is  persistent epidural  enhancement particularly from L4 through the  sacrum which is slightly less conspicuous compared to April 30, 2024.  3. Partially imaged focal lesion in the right ilium most worrisome  for lymphoma.  4. Subtle inhomogeneity of the marrow signal is otherwise nonspecific.    PET CT LYMPHOMA STAGING; 11/4/2024   1. Interval increase in metabolic activity within multiple axial and  appendicular osseous lesions with in increased metabolic activity  within cervical, right supraclavicular, mediastinal, hilar, reed  hepatis and precaval lymph nodes, consistent with disease  progression. Deauville score 5.  2. Mildly hypermetabolic subcentimeter pulmonary nodules bilaterally,  likely infective/inflammatory, although disease involvement can not  be excluded.    MR THORACIC SPINE W AND WO IV CONTRAST; 2/21/2025   Similar appearing postsurgical/posttreatment change with laminectomy  involving the T1-T4 levels. Unchanged extent and appearance of  residual nonspecific epidural thickening and enhancement within the  upper thoracic spine that could reflect postoperative granulation  tissue with residual neoplasm components not excluded. No evidence of  disease progression within the thoracic spine.      Suboptimally assessed on this examination, there is concern for  signal abnormality within the C5 vertebral body that could reflect  worsening neoplastic infiltration versus underlying artifact.  Consider dedicated MRI of the cervical spine for improved evaluation.    MR LUMBAR SPINE W AND WO IV CONTRAST; 2/21/2025   1. Similar appearing abnormal enhancement within the partially  visualized right iliac bone. Residual nonspecific epidural thickening  and enhancement that could reflect prominent venous plexus components  with neoplastic involvement not excluded. No new abnormal enhancement  within the lumbar spine to indicate disease progression.    MR CERVICAL SPINE W AND WO IV CONTRAST; 3/14/2025   As queried on previous thoracic  spine MRI examination there is   abnormal STIR hyperintensity and enhancement involving the C5  vertebral body and right pedicle/posterior elements, while new from the   04/30/2024 MRI examination was presumably present on the more recent   PET-CT examination performed 11/04/2024 with intense FDG avidity   within this region. Exact comparison is limited by differences in imaging modality.   No masslike enhancing epidural component or abnormal cord/leptomeningeal   enhancement identified within the cervical region.    Review of Systems:     Pls review note entered by RN.    Performance Status:   The Karnofsky performance scale today is 80, Normal activity with effort; some signs or symptoms of disease (ECOG equivalent 1).     OBJECTIVE  Vital Signs:  /78   Pulse 77   Temp 36 °C (96.8 °F) (Skin)   Resp 18   Wt 108 kg (238 lb 3.3 oz)   SpO2 98%   BMI 36.22 kg/m²   Physical Exam  Constitutional:       General: He is not in acute distress.     Appearance: He is not ill-appearing.   HENT:      Head: Atraumatic.   Eyes:      General: No scleral icterus.  Pulmonary:      Effort: Pulmonary effort is normal. No respiratory distress.      Breath sounds: No wheezing.   Musculoskeletal:         General: No tenderness.      Cervical back: No rigidity or tenderness.      Right lower leg: No edema.      Left lower leg: No edema.      Comments: Well healed thoracic spinal surgery scar. No tenderness along entire spine.   Neurological:      General: No focal deficit present.      Mental Status: He is alert and oriented to person, place, and time.      Cranial Nerves: No cranial nerve deficit.      Motor: No weakness.      Gait: Gait (Much improved from last assessment) normal.   Psychiatric:         Mood and Affect: Mood normal.        ASSESSMENT:   Dann Ni is a 49 y.o. 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 Imaging findings were concerning for extensive spinal/epidural/neural foramina disease especially from T1-T8 levels. Additional foci of spinal involvement were seen at C5, L and S1 levels. PET-CT identified multiple FDG avid bilateral axillary, mediastinal and bilateral hilar, infrahilar nodes  and abdominal lymph nodes, and multiple FDG avid bone lesions throughout the axial and appendicular skeleton including bilateral scapulas, right clavicular head, T5 vertebral body, bilateral iliac bones, left ischium, and left femur. He  is s/p debulking spinal surgery and was started on R-Teresa 11/2023 with C6 completed 04/18/2024. He has also received IT Methotrexate and cytarabine, LD 3/18/2024. Post-treatment CSF 03/2024 and BM Bx 05/2024 are negative with PET-CT 05/2024 showing concerns for activity in bones and enlarging portacaval lymph node concerning for disease progression, with avidity in mediastinal/upper abdominal nodes and pulmonary nodules also raising suspicion for residual disease. Bronch/EBUS 5/31/2024 was negative for disease. Serial PET CT has continued to show progressive avidity in mediastinal nodes and bones. An iliac bone biopsy in Feb 2025 was negative for malignancy as well.     As such he has continued surveillance demonstrating stability of lung nodules, decreased soraya burden in mediastinum and stable abdominal findings. MR shows stable enhancement in C5, new 7 mm enhancement T6, slightly increased enhancement around T1 and T4, and below T10,    He is otherwise regaining neurological functions with significantly improved gait.    PLAN:    Mr. Ni's pertinent history, exam, imaging and pathology details were reviewed.     Accompanied by his wife.    I messaged Dr Peters and we discussed reviewing further in tumor board. Dr. Peters would like to discuss furthere evaluation of the lung nodules.    The increased enhancement findings on MRI are  multi-level and could be post-treatment. No further plan for radiation at this time but will follow closely.  Will plan next MRI in 4 months.    LONGITUDINAL CARE, EXTRA EFFORT/ : The patient will be followed longitudinally by providers (including APPs) in the department of radiation oncology for monitoring treatment effects during and after radiation. Additional effort needed in the setting of complex presentation and coordination of care with Dr. Peters.     NCCN Guidelines were applicable to guide this patients treatment plan.      Maren Queen MD, MMM  Senior Attending Physician, [facility]  Professor, [employer]   Our [place]: “To Heal, To Teach, To Discover.”  Phone (after hours): [phone]  RN partner: Anya Stover  Radiation Oncology (scheduling): Arlene Roper  Proton Therapy (scheduling): Mariangel Ty  Brachytherapy (scheduling): Jadyn Rojas

## 2025-06-11 ENCOUNTER — TELEPHONE (OUTPATIENT)
Dept: ADMISSION | Facility: HOSPITAL | Age: 50
End: 2025-06-11
Payer: COMMERCIAL

## 2025-06-11 NOTE — TELEPHONE ENCOUNTER
Dann called the office questioning his Pulmonology appointment; states he received a call from scheduling this morning and was extremely caught off guard.     He does not know why he is being referred to Pulmonology and is anxious questioning if something new was seen on his scans.    Asking for a call back from Dr. Peters directly.

## 2025-06-19 ENCOUNTER — TUMOR BOARD CONFERENCE (OUTPATIENT)
Dept: HEMATOLOGY/ONCOLOGY | Facility: HOSPITAL | Age: 50
End: 2025-06-19
Payer: COMMERCIAL

## 2025-06-19 NOTE — TUMOR BOARD NOTE
TUMOR BOARD DISCUSSION SUMMARY    PRESENTER:  ***    DIAGNOSIS: ***    SUMMARY/PRESENTATION: Dann Ni is a 49 y.o. male patient who presents with      History: ***    Previous treatment: ***      Information reviewed: {MDT Conference Review Type:31056}    Radiology:     ***    Pathology:     ***      RECOMMENDATIONS: ***           Disclaimer     [facility] tumor board recommendations represent the consensus opinion of physicians present at a weekly patient care conference. The treating [facility] physician is not always present, and many of the physicians formulating the recommendation have not personally seen or examined the patient under discussion. It is understood that the treating [facility] physician considers the expertise of the Tumor Board Recommendation in formulating his/her plan for the patient. However, in many situations, based on individualized patient considerations, a different plan is determined by the treating physician to be the optimal medical management.     Scribe Attestation  By signing my name below, Emily NEWMAN, [employer]rib[employer]   attest that this documentation has been prepared under the direction and in the presence of MALIGNANT HEME TUMOR BOARD.

## 2025-06-20 DIAGNOSIS — C85.80 MARGINAL ZONE LYMPHOMA (MULTI): ICD-10-CM

## 2025-07-08 ENCOUNTER — APPOINTMENT (OUTPATIENT)
Dept: PULMONOLOGY | Facility: CLINIC | Age: 50
End: 2025-07-08
Payer: COMMERCIAL

## 2025-07-09 ENCOUNTER — APPOINTMENT (OUTPATIENT)
Dept: PRIMARY CARE | Facility: CLINIC | Age: 50
End: 2025-07-09
Payer: COMMERCIAL

## 2025-07-29 ENCOUNTER — OFFICE VISIT (OUTPATIENT)
Dept: PULMONOLOGY | Facility: CLINIC | Age: 50
End: 2025-07-29
Payer: COMMERCIAL

## 2025-07-29 VITALS
BODY MASS INDEX: 35.03 KG/M2 | SYSTOLIC BLOOD PRESSURE: 133 MMHG | HEART RATE: 80 BPM | TEMPERATURE: 97.5 F | WEIGHT: 231.1 LBS | OXYGEN SATURATION: 98 % | HEIGHT: 68 IN | DIASTOLIC BLOOD PRESSURE: 90 MMHG

## 2025-07-29 DIAGNOSIS — R59.0 HILAR LYMPHADENOPATHY: ICD-10-CM

## 2025-07-29 DIAGNOSIS — R91.8 PULMONARY NODULES: Primary | ICD-10-CM

## 2025-07-29 DIAGNOSIS — J45.30 MILD PERSISTENT ASTHMA, UNSPECIFIED WHETHER COMPLICATED (HHS-HCC): ICD-10-CM

## 2025-07-29 DIAGNOSIS — C85.80 MARGINAL ZONE LYMPHOMA (MULTI): ICD-10-CM

## 2025-07-29 PROCEDURE — 99203 OFFICE O/P NEW LOW 30 MIN: CPT | Performed by: INTERNAL MEDICINE

## 2025-07-29 PROCEDURE — 3008F BODY MASS INDEX DOCD: CPT | Performed by: INTERNAL MEDICINE

## 2025-07-29 PROCEDURE — 99205 OFFICE O/P NEW HI 60 MIN: CPT | Performed by: INTERNAL MEDICINE

## 2025-07-29 RX ORDER — ALBUTEROL SULFATE 0.83 MG/ML
3 SOLUTION RESPIRATORY (INHALATION) ONCE
OUTPATIENT
Start: 2025-07-29 | End: 2025-07-29

## 2025-07-29 RX ORDER — ALBUTEROL SULFATE 90 UG/1
4 INHALANT RESPIRATORY (INHALATION) ONCE
OUTPATIENT
Start: 2025-07-29

## 2025-07-29 ASSESSMENT — ASTHMA QUESTIONNAIRES
QUESTION_1 LAST FOUR WEEKS HOW MUCH OF THE TIME DID YOUR ASTHMA KEEP YOU FROM GETTING AS MUCH DONE AT WORK, SCHOOL OR AT HOME: NONE OF THE TIME
QUESTION_4 LAST FOUR WEEKS HOW OFTEN HAVE YOU USED YOUR RESCUE INHALER OR NEBULIZER MEDICATION (SUCH AS ALBUTEROL): NOT AT ALL
ACT_TOTALSCORE: 24
QUESTION_2 LAST FOUR WEEKS HOW OFTEN HAVE YOU HAD SHORTNESS OF BREATH: NOT AT ALL
QUESTION_3 LAST FOUR WEEKS HOW OFTEN DID YOUR ASTHMA SYMPTOMS (WHEEZING, COUGHING, SHORTNESS OF BREATH, CHEST TIGHTNESS OR PAIN) WAKE YOU UP AT NIGHT OR EARLIER THAN USUAL IN THE MORNING: ONCE OR TWICE
QUESTION_5 LAST FOUR WEEKS HOW WOULD YOU RATE YOUR ASTHMA CONTROL: COMPLETELY CONTROLLED

## 2025-07-29 ASSESSMENT — PAIN SCALES - GENERAL: PAINLEVEL_OUTOF10: 0-NO PAIN

## 2025-07-29 NOTE — PATIENT INSTRUCTIONS
Dear Dann Ni It was nice seeing you today. We discussed the following:     The bony lesions are likely due to sarcoidosis which showed upon the hip bone biopsy. Sarcoidosis will likely have a benign course and no indication to treat and will requires monitoring only at this point   I will communicate with Jacky Queen and Fran  Your asthma seems well-controlled.  Please continue the Symbicort  Please complete a breathing test before your next appointment  RTC in 4 months   For scheduling purposes:    Call [phone] to schedule a breathing or a walking test   Should you have any questions Please Call my assistant Sukhdeep Colon at [phone] or our pulmonary nurse Yulia Graves [phone].

## 2025-07-29 NOTE — PROGRESS NOTES
New patient visit for Sarcoidosis     HPI retrieved from rad onc/onc note   -10/ 2023 Reviewed PET/CT and MRI with the patient and wife. The results are consistent with stage IV disease, involving LN above and below the diaphragm, extensively axial and appendicular skeleton including bilateral scapulas, right clavicular head, T5 vertebral body, bilateral iliac bones, left ischium, and left femur without definite anatomic correlate (max SUV 7.6).  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   -Current data support treatment using multi-agent chemo. Bendamustine and rituximab have been approved and widely used, found to be safe and effective.   -Patient was disappointed to learn that EMZL such as his may not be curable. Was relieved to know that the treatment can bring multiple years of remission, improvement of strength / quality of life / work experience.   -Discussed the Aes of r-anum, which are numerous, including SAEs such as infection, or even death. Most AE and DUSTIN however can be resolved with effective treatments. He consented for R-anum.   -Due to the location of the disease and bone marrow uptake, he may be at high risk of CNS involvement / relapse. Schedule head CT and intrathecal treatment, timing likely after 2 cycles of R-ANUM. He consented for IT Methotrexate and cytarabine. First IT on 2/19/2024. CSF shows no lymphoma. Next IT on 3/18 tentatively.   -In addition, consolidation using XRT will be discussed in the future.   -5/10/2024: PET/CT results at [facility] were reviewed. Unfortunately, there are a number of residual FDG avid signals involving bones and perihilar nodes. These raise the question of treatment failure, or possible large cell transformation. Will obtain BMBx and additional biopsy, including possibly:   **a subcarinal lymph node has a maximum SUV of 11.3 previously measuring up to 14.9 and a conglomerate of right  paratracheal lymph nodes has a maximum  SUV of 8.9 previously measuring up to 14.9. (by interventional pulm)  **proximal left humerus there is a lytic lesion with maximum SUV of 7.5 SUV previously measuring up to 3.6 SUV. (By IR)  -2024: discussed the results below:   bmbx: A, B & C. BONE MARROW CLOT WITH ASPIRATE AND CORE WITH TOUCH PREP, LEFT ILIAC CREST:   -- NORMOCELLULAR BONE MARROW (40%) WITH MATURING TRILINEAGE HEMATOPOESIS AND NO MORPHOLOGIC EVIDENCE OF LYMPHOMA.  : A. LYMPH NODE,  LEVEL 7; BIOPSY:      -- Non-diagnostic rare bronchial epithelium and blood.  Those negative results and his clinical improvement do not support relapse or refractoriness. Will monitor closely. CT Chest on .   -2024: reviewed CT of , showin.  Numerous lung nodules bilaterally measuring up to 5 mm in size, similar to the prior in size and number. 2. Redemonstration of multiple enlarged mediastinal lymph nodes, also unchanged from the prior.   -It is likely, but not proven, that the lung nodules and small mediastinal lymph nodes are reactive to inflammation. However, the exact source of inflammation is not identifiable. He has no evidence of Pneumonia. In the past ACE level was found to be Wnl, arguing against (but does not rule out) sarcoidosis.   -Given the above consideration, disease progression/refractoriness is questionable. Will therefore refer back to [facility] to discuss radiation of the soft tissue mass at the T1-5 level previously shown on MRI in 10/2023. Will also repeat PET/CT in 2024.  -10/24/2024: clinically he continues to do well without symptoms associated with lymphoma. MRI TL spines continues to show abnormal epidural enhancement. Lymphoma relapse remains a risk despite recent negative work up. Will order PET/CT.   -2024: reviewed reports and images of PET/CT thoroughly with the patient and his wife. Overall the PET/CT results did not demonstrate compelling evidence of progression or  relapse. Fore example, “Interval increase in metabolic activity within multiple axial and appendicular osseous lesions”, but no CT correlates were present for any of the above FDG avid bony lesions. In addition, he has no bony symptoms at all. The low FDG avid small lymph nodes have been stable in size, arguing against relapsed FL. These FDG avid lesions (body, small and stable LAD) may thus reflect inflammatory etiology (e.g. sarcoidosis), but less likely FL. Nevertheless, FL cannot be ruled out but will require close surveillance.   -12/19/2024: no new symptoms to suggest relapse or progression. Discussed lab results: ACE level is elevated, monoclonal IgG is 0.2 same as May 2024. Given the negative BMBx in May 2024, the above does not suggest myeloma either. Elevated ACE level is suggestive of sarcoidosis, but that will require further confirmation of biopsy showing non-necrotizing granuloma. Mediastinal and hilar nodes are small, and previous biopsy was not diagnostic. FDG uptakes in the axial bones do not have CT correlates, which may reduce the diagnostic value of the biopsy. Will repeat pelvic bone MRI to guide biopsy. Otherwise, may refer to IR for biopsy of the following abnormal bony signals in the recent PET: right coracoid (maximum SUV 7.9, previously  7.5), right medial clavicle (maximum SUV 12.2, previously 7.3), right  5th rib (maximum SUV 7.5).   -1/24/2025: reviewed MRI results showing right pelvic bone hypodensity, which correlates to FDG avid uptakes. He remains asymptomatic. Will obtain bone biopsy most likely right posterior iliac bone as suggest in MRI report.    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 This tissue diagnosis most likely apply to all the FDG avid signals in 11/2024. It is reassuring that the FDG uptake in the bone is not malignant. However, the nature and etiology of the granuloma remain unknown. AFB GMS stains are pending results.  While lymphoma was not found in this biopsy and a previous mediastinal LN bx and bmbx (5/2024), there is no absolute confidence that lymphoma does not exist. He will be monitored. Will need to balance the burden of scans vs living with uncertainty.    -5/23/25: AFB and GMS stain from iliac bone biopsy both negative. Has MRI scans from Dr Queen in beginning of June. CT CAP showed pulmonary nodules have no change and no new nodules or masses seen. There was slight decrease in size of mediastinal lymph nodes. No new lymph nodes or masses identified. Will continue to monitor.   Labs in beginning of May WNL. Activity continues to improve. Working out, training to go back to work as .   Had ACE levels drawn in November for potential work up of sarcoidosis and levels were 86. Normal range 16-85. No longer experiencing dyspnea. Continuing to increase activity. Can refer to pulmonology if clinically indicated.      Interview 7/29/2025  Mr Garcia has the above oncologic history. 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 Since the completion of his chemotherapy in may 2024, he does not endorse any shortness of breath.  He has a longstanding history of asthma and he follows by allergy immunology and is prescribed Symbicort that he uses regularly.  His ACT score today is 24.  Fever last month that went away with a course of antibiotic.  He denies regular fever.  He denies night sweats no loss of appetite.  He endorses intentional weight loss by diet and exercising.  He denies any shortness of breath.  No cough or wheezing.      Previous pulmonary history:     asthma x multiple years, takes symbicort     Inhalers/nebulized medications: albuterol - has not used in 2 months symbicort    Hospitalization History: Not been hospitalized over the last year for breathing related problem.    Sleep history:  Denies snoring, apnea, feeling tired during the day or taking naps during the day.       ALLERGIES AND MEDICATIONS      ALLERGIES  Allergies   Allergen Reactions    Ciprofloxacin Itching       MEDICATIONS  Current Outpatient Medications   Medication Sig Dispense Refill    albuterol 90 mcg/actuation inhaler Inhale 2 puffs every 6 hours if needed for shortness of breath or wheezing.      budesonide-formoteroL (Symbicort) 160-4.5 mcg/actuation inhaler Inhale 2 puffs 2 times a day.      cyclobenzaprine (Flexeril) 10 mg tablet Take 1 tablet (10 mg) by mouth 3 times a day as needed for muscle spasms. 30 tablet 2    ondansetron (Zofran) 8 mg tablet Take 1 tablet (8 mg) by mouth every 8 hours if needed for nausea or vomiting. 30 tablet 5    prochlorperazine (Compazine) 10 mg tablet Take 1 tablet (10 mg) by mouth every 6 hours if needed for nausea or vomiting. 30 tablet 5    zolpidem (Ambien) 10 mg tablet Take 1 tablet (10 mg) by mouth as needed at bedtime for sleep. 30 tablet 1     No current facility-administered medications for this visit.         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    PAST SURGICAL HISTORY  Past Surgical History:   Procedure Laterality Date    OTHER SURGICAL HISTORY  04/12/2019    No history of surgery       IMMUNIZATION HISTORY  Immunization History   Administered Date(s) Administered    Flu vaccine (IIV4), preservative free *Check age/dose* 10/23/2019, 11/05/2021    Influenza Whole 10/01/2014    Influenza, Unspecified 11/01/2016, 10/01/2017    Influenza, injectable, quadrivalent 10/08/2018    Tdap vaccine, age 7 year and older  (BOOSTRIX, ADACEL) 01/07/2013       SOCIAL HISTORY  He  reports that he has never smoked. He has never used smokeless tobacco. He reports that he does not currently use alcohol. He reports that he does not use drugs.     OCCUPATIONAL/ENVIRONMENTAL HISTORY  Currently works as:    Unknown exposure to asbestos, silica, beryllium or inhaled metals.  DOES/DOES NOT EC: does not have exposure to birds or exotic animals. Has dogs and cats     FAMILY HISTORY  Family History   Problem Relation Name Age of Onset    Hip dysplasia Mother [place]     Arthritis Mother [place]     Other (htn) Father      Prostatitis Father      Lung cancer Maternal Grandmother      Kidney cancer Maternal Grandmother      Lung cancer Maternal Grandfather      Kidney cancer Maternal Grandfather      Dementia Other      Cancer Other      Dementia Other       DOES/DOES NOT EC: does have a family history of pulmonary disease.  DOES/DOES NOT EC: does have a family history of cancer.  DOES/DOES NOT EC: does not have a family history of autoimmune disorders.    RESULTS/DATA     Pulmonary Function Test Results     No testing done      Chest Radiograph     XR chest 2 views 02/13/2024    INDICATION:  Signs/Symptoms:cough, fever.    COMPARISON:  10/2/2023    FINDINGS:  The cardiac silhouette is stable in size. Asymmetric elevation of  right diaphragm and mild right basilar atelectasis. No significant  pleural effusion. No pneumothorax.    Impression  Asymmetric elevation of right diaphragm and mild basilar atelectasis.    Chest CT Scan     === 05/19/25 ===CT CHEST ABDOMEN PELVIS W IV CONTRAST    - Impression -  CHEST:  1.  Multiple bilateral pulmonary nodules without interval change  measuring up to 7 mm in size with no new pulmonary nodules or masses  seen.  2. Slight decrease in size of the various mediastinal lymph nodes  involved by the disease process when compared with the CT examination  of the chest done on 07/08/2024.    ABDOMEN-PELVIS:  1.  There is stable size and configuration of involved lymph nodes in  the reed hepatis and precaval space within the upper abdomen.  2. Hepatic steatosis.  3. Fat containing periumbilical hernia.  4. The various osseous lesions involved by the marginal zone lymphoma  are difficult to appreciate on the CT examination, best evaluated  with fused PET/CT study.    === 11/04/24 NM PET CT LYMPHOMA STAGING    - Impression -  1. Interval increase in metabolic activity within multiple axial and appendicular osseous lesions with in increased metabolic activity within cervical, right supraclavicular, mediastinal, hilar, reed hepatis and precaval lymph nodes, consistent with disease progression. Deauville score 5.  2. Mildly hypermetabolic subcentimeter pulmonary nodules bilaterally,  likely infective/inflammatory, although disease involvement can not  be excluded.      Pathology 02 2025 Right iliac bone, biopsy:     -- Mildly hypercellular marrow (50-60% cellular) with non-necrotizing granulomas, see note     NOTE: In correlation with the flow cytometry findings show no evidence of lymphoid aggregates or lymphoma. The differential diagnosis includes sarcoidosis and infection. AFB and GMS stains are pending and will be reported in an addendum. Clinical correlation is suggested        Echocardiogram     ECHOCARDIOGRAM     Narrative  Ordered by an unspecified provider.      ASSESSMENT/PLAN     Mr. Ni is a 48 y.o. 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 s/p chemo wiith R stacey and IT methotrexate finished May 2024     2. 5/10/2024: PET/CT results at [facility] ---> number of residual FDG avid signals involving bones and perihilar nodes.   5/23/24  bmbx: A, B & C. BONE MARROW CLOT WITH ASPIRATE AND CORE WITH TOUCH PREP, LEFT ILIAC CREST:   -- NORMOCELLULAR BONE MARROW (40%) WITH MATURING TRILINEAGE HEMATOPOESIS AND NO MORPHOLOGIC EVIDENCE OF LYMPHOMA.  5/31: A. LYMPH NODE,  LEVEL 7; BIOPSY:      -- Non-diagnostic rare bronchial epithelium and blood.    Given above negative results of bone marrow and med LN and his clinical improvement it was felt it was not consistent with relapse or refractoriness --> Close monitoring-- CT Chest and PET === 11/04/24 === NM PET CT 1. Interval increase in metabolic activity within multiple axial and appendicular osseous lesions with in increased metabolic activity within cervical, right supraclavicular, mediastinal, hilar, reed hepatis and precaval lymph nodes-- CT guided iliac bone biopsy 02/2025 Mildly hypercellular marrow (50-60% cellular) with non-necrotizing granulomas, likely sarcoidosis. Pt remains asymptomatic and with the following Extra pulmonary involvement work:    1) General: no fever, chills, asthenia, fatigue or weight loss.  2) Neuro: No sign of neurosarcoidosis.  3) Eyes: no hx of uveitis, retinal vascular change or  conjunctival nodules. Yearly ophthalmology appointment.  4) ADP: no peripheral adp noted on exam today  5) skin: as above  6) Liver: no transaminitis. Year LFTs.  7) Heart: yearly ECG, will order before her next visit  8) Kidneys: Normal kidney function. Will check 1,25OH vitamin D. Pending  9) MSK: no muscle weakness  10) GI/: very uncommon involvement, no clinic signs    No indication to treat. Follow up in few months     3. History of asthma well controlled

## 2025-08-22 ENCOUNTER — APPOINTMENT (OUTPATIENT)
Dept: HEMATOLOGY/ONCOLOGY | Facility: HOSPITAL | Age: 50
End: 2025-08-22
Payer: COMMERCIAL

## 2025-08-26 LAB
ALBUMIN SERPL-MCNC: 5.2 G/DL (ref 3.6–5.1)
ALP SERPL-CCNC: 84 U/L (ref 36–130)
ALT SERPL-CCNC: 18 U/L (ref 9–46)
ANION GAP SERPL CALCULATED.4IONS-SCNC: 12 MMOL/L (CALC) (ref 7–17)
AST SERPL-CCNC: 21 U/L (ref 10–40)
BASOPHILS # BLD AUTO: 71 CELLS/UL (ref 0–200)
BASOPHILS NFR BLD AUTO: 1 %
BILIRUB SERPL-MCNC: 0.7 MG/DL (ref 0.2–1.2)
BUN SERPL-MCNC: 21 MG/DL (ref 7–25)
CALCIUM SERPL-MCNC: 9.3 MG/DL (ref 8.6–10.3)
CHLORIDE SERPL-SCNC: 100 MMOL/L (ref 98–110)
CO2 SERPL-SCNC: 23 MMOL/L (ref 20–32)
CREAT SERPL-MCNC: 0.87 MG/DL (ref 0.6–1.29)
EGFRCR SERPLBLD CKD-EPI 2021: 106 ML/MIN/1.73M2
EOSINOPHIL # BLD AUTO: 170 CELLS/UL (ref 15–500)
EOSINOPHIL NFR BLD AUTO: 2.4 %
ERYTHROCYTE [DISTWIDTH] IN BLOOD BY AUTOMATED COUNT: 13.2 % (ref 11–15)
GLUCOSE SERPL-MCNC: 97 MG/DL (ref 65–139)
HCT VFR BLD AUTO: 43.2 % (ref 38.5–50)
HGB BLD-MCNC: 14.8 G/DL (ref 13.2–17.1)
LDH SERPL P TO L-CCNC: 156 U/L (ref 100–220)
LYMPHOCYTES # BLD AUTO: 1271 CELLS/UL (ref 850–3900)
LYMPHOCYTES NFR BLD AUTO: 17.9 %
MCH RBC QN AUTO: 29.8 PG (ref 27–33)
MCHC RBC AUTO-ENTMCNC: 34.3 G/DL (ref 32–36)
MCV RBC AUTO: 87.1 FL (ref 80–100)
MONOCYTES # BLD AUTO: 802 CELLS/UL (ref 200–950)
MONOCYTES NFR BLD AUTO: 11.3 %
NEUTROPHILS # BLD AUTO: 4785 CELLS/UL (ref 1500–7800)
NEUTROPHILS NFR BLD AUTO: 67.4 %
PLATELET # BLD AUTO: 226 THOUSAND/UL (ref 140–400)
PMV BLD REES-ECKER: 10.1 FL (ref 7.5–12.5)
POTASSIUM SERPL-SCNC: 4 MMOL/L (ref 3.5–5.3)
PROT SERPL-MCNC: 7.4 G/DL (ref 6.1–8.1)
RBC # BLD AUTO: 4.96 MILLION/UL (ref 4.2–5.8)
SODIUM SERPL-SCNC: 135 MMOL/L (ref 135–146)
WBC # BLD AUTO: 7.1 THOUSAND/UL (ref 3.8–10.8)

## 2025-08-27 ENCOUNTER — APPOINTMENT (OUTPATIENT)
Dept: PRIMARY CARE | Facility: CLINIC | Age: 50
End: 2025-08-27
Payer: COMMERCIAL

## 2025-08-27 VITALS
HEIGHT: 68 IN | TEMPERATURE: 98.4 F | WEIGHT: 233 LBS | DIASTOLIC BLOOD PRESSURE: 82 MMHG | HEART RATE: 94 BPM | SYSTOLIC BLOOD PRESSURE: 122 MMHG | RESPIRATION RATE: 18 BRPM | OXYGEN SATURATION: 98 % | BODY MASS INDEX: 35.31 KG/M2

## 2025-08-27 DIAGNOSIS — F41.9 ANXIETY: Primary | ICD-10-CM

## 2025-08-27 DIAGNOSIS — M25.512 ACUTE PAIN OF LEFT SHOULDER: ICD-10-CM

## 2025-08-27 DIAGNOSIS — R42 DIZZINESS: ICD-10-CM

## 2025-08-27 DIAGNOSIS — C85.80 MARGINAL ZONE LYMPHOMA (MULTI): ICD-10-CM

## 2025-08-27 PROCEDURE — 99214 OFFICE O/P EST MOD 30 MIN: CPT | Performed by: FAMILY MEDICINE

## 2025-08-27 PROCEDURE — 3008F BODY MASS INDEX DOCD: CPT | Performed by: FAMILY MEDICINE

## 2025-08-27 RX ORDER — ESCITALOPRAM OXALATE 10 MG/1
10 TABLET ORAL DAILY
Qty: 30 TABLET | Refills: 2 | Status: SHIPPED | OUTPATIENT
Start: 2025-08-27 | End: 2026-02-23

## 2025-08-29 ENCOUNTER — OFFICE VISIT (OUTPATIENT)
Dept: HEMATOLOGY/ONCOLOGY | Facility: HOSPITAL | Age: 50
End: 2025-08-29
Payer: COMMERCIAL

## 2025-08-29 VITALS
HEART RATE: 74 BPM | RESPIRATION RATE: 16 BRPM | DIASTOLIC BLOOD PRESSURE: 83 MMHG | OXYGEN SATURATION: 98 % | SYSTOLIC BLOOD PRESSURE: 124 MMHG | TEMPERATURE: 96.3 F

## 2025-08-29 DIAGNOSIS — C85.80 MARGINAL ZONE LYMPHOMA (MULTI): ICD-10-CM

## 2025-08-29 ASSESSMENT — PAIN SCALES - GENERAL: PAINLEVEL_OUTOF10: 0-NO PAIN

## (undated) DEVICE — SUTURE, SILK, 2-0, 30 IN, SH, BLACK

## (undated) DEVICE — ELECTRODE, CORKSCREW NEEDLE 1.5M LENGTH

## (undated) DEVICE — CATHETER TRAY, SURESTEP, 16FR, URINE METER W/STATLOCK

## (undated) DEVICE — Device

## (undated) DEVICE — COVER, TABLE, UHC

## (undated) DEVICE — GOWN, SURGICAL, SMARTGOWN, XLARGE, STERILE

## (undated) DEVICE — COVER, CART, 45 X 27 X 48 IN, CLEAR

## (undated) DEVICE — SUTURE, VICRYL, 0, 18 IN, CT-1, UNDYED

## (undated) DEVICE — MANIFOLD, 4 PORT NEPTUNE STANDARD

## (undated) DEVICE — TRAY, MINOR, SINGLE BASIN, STERILE

## (undated) DEVICE — ELECTRODE, GROUND PLATE

## (undated) DEVICE — DRESSING, MEPILEX, POST OP, 8 X 4

## (undated) DEVICE — WOUND SYSTEM, DEBRIDEMENT & CLEANING, O.R DUOPAK

## (undated) DEVICE — DRESSING, MEPILEX, BORDER FLEX, 3 X 3

## (undated) DEVICE — NEEDLE, ELECTRODE, SUBDERMAL, PAIRED, 2.0 LEAD, DISP

## (undated) DEVICE — DRESSING, MEPILEX, BORDER FLEX, 6 X 8

## (undated) DEVICE — MARKER, SKIN, RULER AND LABEL PACK, CUSTOM

## (undated) DEVICE — SEALANT, HEMOSTATIC, FLOSEAL, 10 ML

## (undated) DEVICE — ELECTRODE, ELECTROSURGICAL, BLADE, INSULATED, ENT/IMA, STERILE

## (undated) DEVICE — DRAIN, WOUND, ROUND, W/TROCAR, HOLE PATTERN, 10 IN, MEDIUM, 1/8 X 49 IN

## (undated) DEVICE — DRESSING, MEPILEX, FOAM, BORDER FLEX, 6 X 6

## (undated) DEVICE — KIT, PATIENT CARE, JACKSON TABLE W/PRONE-SAFE HEADREST

## (undated) DEVICE — EVACUATOR, WOUND, CLOSED, 3 SPRING, 400 CC, Y CONNECTING TUBE

## (undated) DEVICE — ELECTRODE, ELECTROSURGICAL, BLADE EXT 4 INCH, INSULATED

## (undated) DEVICE — COVER PROBE, SOFT FLEX W/ GEL, 5 X 48 IN (13X122CM)